# Patient Record
Sex: FEMALE | Race: WHITE | NOT HISPANIC OR LATINO | Employment: OTHER | URBAN - METROPOLITAN AREA
[De-identification: names, ages, dates, MRNs, and addresses within clinical notes are randomized per-mention and may not be internally consistent; named-entity substitution may affect disease eponyms.]

---

## 2017-01-03 ENCOUNTER — OFFICE VISIT (OUTPATIENT)
Dept: INTERNAL MEDICINE | Facility: CLINIC | Age: 62
End: 2017-01-03

## 2017-01-03 VITALS
DIASTOLIC BLOOD PRESSURE: 80 MMHG | HEIGHT: 65 IN | SYSTOLIC BLOOD PRESSURE: 110 MMHG | WEIGHT: 147 LBS | BODY MASS INDEX: 24.49 KG/M2

## 2017-01-03 DIAGNOSIS — E05.90 SUBCLINICAL HYPERTHYROIDISM: Primary | ICD-10-CM

## 2017-01-03 DIAGNOSIS — M25.512 CHRONIC PAIN OF BOTH SHOULDERS: ICD-10-CM

## 2017-01-03 DIAGNOSIS — R41.3 MEMORY CHANGES: ICD-10-CM

## 2017-01-03 DIAGNOSIS — E04.2 NON-TOXIC MULTINODULAR GOITER: ICD-10-CM

## 2017-01-03 DIAGNOSIS — R73.01 IMPAIRED FASTING GLUCOSE: ICD-10-CM

## 2017-01-03 DIAGNOSIS — R25.3 EYELID TWITCH: ICD-10-CM

## 2017-01-03 DIAGNOSIS — M25.511 CHRONIC PAIN OF BOTH SHOULDERS: ICD-10-CM

## 2017-01-03 DIAGNOSIS — G89.29 CHRONIC PAIN OF BOTH SHOULDERS: ICD-10-CM

## 2017-01-03 DIAGNOSIS — I10 ESSENTIAL HYPERTENSION: ICD-10-CM

## 2017-01-03 DIAGNOSIS — M79.601 RIGHT ARM PAIN: ICD-10-CM

## 2017-01-03 PROCEDURE — 99214 OFFICE O/P EST MOD 30 MIN: CPT | Performed by: INTERNAL MEDICINE

## 2017-01-03 RX ORDER — CYCLOBENZAPRINE HCL 10 MG
TABLET ORAL
Qty: 30 TABLET | Refills: 0 | Status: SHIPPED | OUTPATIENT
Start: 2017-01-03 | End: 2017-06-22 | Stop reason: SDUPTHER

## 2017-01-03 NOTE — MR AVS SNAPSHOT
Carli Bolanos   1/3/2017 2:45 PM   Office Visit    Dept Phone:  [phone]   Encounter #:  [id number]    Provider:  Rosa M Venegas MD   Department:  [facility] INTERNAL MEDICINE AND ENDOCRINOLOGY [place]                Your Full Care Plan              Today's Medication Changes          These changes are accurate as of: 1/3/17  3:36 PM.  If you have any questions, ask your nurse or doctor.               Stop taking medication(s)listed here:     amoxicillin 875 MG tablet   Commonly known as:  AMOXIL   Stopped by:  Rosa M Venegas MD           PredniSONE 10 MG (21) tablet pack   Commonly known as:  DELTASONE   Stopped by:  Rosa M Venegas MD           ZITHROMAX Z-DAVID 250 MG tablet   Generic drug:  azithromycin   Stopped by:  Rosa M Venegas MD                Where to Get Your Medications      These medications were sent to [address] - [phone]  - [phone] [place] [address]     Phone:  [phone]     cyclobenzaprine 10 MG tablet                  Your Updated Medication List          This list is accurate as of: 1/3/17  3:36 PM.  Always use your most recent med list.                amLODIPine 5 MG tablet   Commonly known as:  NORVASC   Take 1 tablet by mouth daily.       cyclobenzaprine 10 MG tablet   Commonly known as:  FLEXERIL   1/2-1 tid prn muscle spasm       docusate sodium 250 MG capsule   Commonly known as:  COLACE       ibuprofen 200 MG tablet   Commonly known as:  ADVIL,MOTRIN       simvastatin 20 MG tablet   Commonly known as:  ZOCOR   Take 1 tablet by mouth every night.       vitamin B-6 50 MG tablet   Commonly known as:  PYRIDOXINE               You Were Diagnosed With        Codes Comments    Subclinical hyperthyroidism    -  Primary ICD-10-CM: E05.90  ICD-9-CM: 242.90     Impaired fasting glucose     ICD-10-CM: R73.01  ICD-9-CM: 790.21     Essential hypertension     ICD-10-CM:  I10  ICD-9-CM: 401.9     Non-toxic multinodular goiter     ICD-10-CM: E04.2  ICD-9-CM: 241.1     Right arm pain     ICD-10-CM: M79.601  ICD-9-CM: 729.5     Chronic pain of both shoulders     ICD-10-CM: M25.512, G89.29, M25.511  ICD-9-CM: 719.41, 338.29       Instructions     None    Patient Instructions History      Upcoming Appointments     Visit Type Date Time Department    FOLLOW UP 1/3/2017  2:45 PM [facility]  [facility]    PAP SMEAR/PELVIC EXAM 4/18/2017  9:30 AM [facility]    PHYSICAL 6/22/2017  9:00 AM [facility]      MyChart Signup     Our records indicate that you have an active [facility] iZettle account.    You can view your After Visit Summary by going to lifeaction games and logging in with your iZettle username and password.  If you don't have a iZettle username and password but a parent or guardian has access to your record, the parent or guardian should login with their own iZettle username and password and access your record to view the After Visit Summary.    If you have questions, you can email [email or web address] or call [phone] to talk to our iZettle staff.  Remember, iZettle is NOT to be used for urgent needs.  For medical emergencies, dial 911.               Other Info from Your Visit           Your Appointments     Apr 18, 2017  9:30 AM EDT   Pap Smear/Pelvic Exam with GHADA Bello   [facility] (--)    [address]  [facility] [address] [phone]            Jun 22, 2017  9:00 AM EDT   Physical with Rosa M Venegas MD   [facility] INTERNAL MEDICINE AND ENDOCRINOLOGY [place] (--)    [address]  [facility] [address] [phone]           Arrive 15 minutes prior to appointment.              Allergies     Codeine      Other reaction(s): hyperactivity    Lisinopril  Cough    Naproxen      Other reaction(s): mouth sores      Reason for Visit     Follow-up Impaired fasting  "glucose, subclinical hyerthyroidism       Vital Signs     Blood Pressure Height Weight Body Mass Index Smoking Status       110/80 65\" (165.1 cm) 147 lb (66.7 kg) 24.46 kg/m2 Never Smoker       Problems and Diagnoses Noted     High blood pressure    Increased fasting blood sugar    Non-toxic multinodular goiter    Right arm pain    Subclinical hyperthyroidism    Chronic pain of both shoulders            "

## 2017-01-03 NOTE — PROGRESS NOTES
"Chief Complaint   Patient presents with   • Follow-up     Impaired fasting glucose, subclinical hyerthyroidism        History of Present Illness  61 y.o.  woman presents for sugar and thyroid follow-up.    Review of Systems  ROS (+) for arthritis pains, most recently in her shoulders, and currently takes OTC ibuprofen 200mg BID, with rare occasion 400mg dose when more pain.    ROS (+) for occ eye twitching right upper eyelid.  No associated visual changes, visual loss, eye pain.      ROS (+) for concerns with poss short term memory loss.  Forgets where she puts things down.  Balances her check book herself, cooks, and has no difficulty with traveling places. All other ROS reviewed and negative.    [facility]  The following portions of the patient's history were reviewed and updated as appropriate: allergies, current medications, past family history, past medical history, past social history, past surgical history and problem list.      Current Outpatient Prescriptions:   •  amLODIPine (NORVASC) 5 MG tablet, Take 1 tablet by mouth daily., Disp: 90 tablet, Rfl: 3  •  cyclobenzaprine (FLEXERIL) 10 MG tablet, 1/2-1 tid prn muscle spasm, Disp: 30 tablet, Rfl: 0  •  docusate sodium (COLACE) 250 MG capsule, Take 250 mg by mouth daily., Disp: , Rfl:   •  ibuprofen (ADVIL,MOTRIN) 200 MG tablet, Take 200 mg by mouth every 6 (six) hours as needed., Disp: , Rfl:   •  simvastatin (ZOCOR) 20 MG tablet, Take 1 tablet by mouth every night., Disp: 90 tablet, Rfl: 3  •  vitamin B-6 (PYRIDOXINE) 50 MG tablet, Take 100 mg by mouth Daily., Disp: , Rfl:     Visit Vitals   • /80   • Ht 65\" (165.1 cm)   • Wt 147 lb (66.7 kg)   • BMI 24.46 kg/m2       Physical Exam   Constitutional: She appears well-developed and well-nourished.   Eyes: Conjunctivae and EOM are normal. Pupils are equal, round, and reactive to light. No scleral icterus.   No current eyelid twitching   Cardiovascular: Normal rate, regular rhythm and normal heart " "sounds.    Pulmonary/Chest: Effort normal and breath sounds normal.   Abdominal: Soft. Bowel sounds are normal.   Neurological: She is alert.   Psychiatric: She has a normal mood and affect. Her behavior is normal.   Nursing note and vitals reviewed.      LABS  Results for orders placed or performed in visit on 11/30/16   TSH   Result Value Ref Range    TSH 0.525 0.350 - 5.350 mIU/mL   T4, free   Result Value Ref Range    Free T4 1.13 0.89 - 1.76 ng/dL   Hemoglobin A1c   Result Value Ref Range    Hemoglobin A1C 5.70 (H) 4.80 - 5.60 %   5/16 A1C 5.2    ASSESSMENT/PLAN  Problem List Items Addressed This Visit     Subclinical hyperthyroidism - Primary     Resolved - normal TFTs from 11/16         Hypertension     BP stable on amlodipine 5mg QD; low Na diet         Impaired fasting glucose     BG control bord with A1C 5.7; encouraged healthy exercise and moderation in unhealthy starches/sweet; f/u A1C in 6 mos (next PE 6/17)         Non-toxic multinodular goiter     Plan to updated thyr u/s with her next PE in 6/17         Right arm pain     Takes ibuprofen 200mg BID, occ 400mg BID; reviewed risks of chronic NSAIDs; cont to encourage HEP           Other Visit Diagnoses     Chronic pain of both shoulders        takes ibuprofen BID; caution re: chronic NSAIDs; assess renal function with PE labs    Relevant Medications    cyclobenzaprine (FLEXERIL) 10 MG tablet    Eyelid twitch        benign, reassurance given; reviewed role of stress and sleep; asx today on exam    Memory changes        currently no deficits with executive functioning; reviewed \"brain exercises\"; discussed importance of good BP, lipids, sugars, exercise          FOLLOW-UP  1. Health maintenance - flu vaccine 10/16  2. RTC for next PE after 6/14/17; fasting labs the week prior to appt (CBC, CMP, TSH, lipids, UA/micro, A1C, HCV, FT4)    Electronically signed by:    Rosa M Venegas MD  01/03/2017      "

## 2017-01-03 NOTE — ASSESSMENT & PLAN NOTE
BG control bord with A1C 5.7; encouraged healthy exercise and moderation in unhealthy starches/sweet; f/u A1C in 6 mos (next PE 6/17)

## 2017-04-18 ENCOUNTER — OFFICE VISIT (OUTPATIENT)
Dept: GYNECOLOGIC ONCOLOGY | Facility: CLINIC | Age: 62
End: 2017-04-18

## 2017-04-18 VITALS
WEIGHT: 147 LBS | HEIGHT: 65 IN | BODY MASS INDEX: 24.49 KG/M2 | TEMPERATURE: 98.3 F | HEART RATE: 62 BPM | SYSTOLIC BLOOD PRESSURE: 144 MMHG | RESPIRATION RATE: 20 BRPM | DIASTOLIC BLOOD PRESSURE: 68 MMHG | OXYGEN SATURATION: 96 %

## 2017-04-18 DIAGNOSIS — Z01.419 WELL WOMAN EXAM WITH ROUTINE GYNECOLOGICAL EXAM: Primary | ICD-10-CM

## 2017-04-18 DIAGNOSIS — C54.1 ENDOMETRIAL/UTERINE ADENOCARCINOMA (HCC): ICD-10-CM

## 2017-04-18 PROCEDURE — 99396 PREV VISIT EST AGE 40-64: CPT | Performed by: NURSE PRACTITIONER

## 2017-04-18 NOTE — PROGRESS NOTES
GYN ONCOLOGY ANNUAL WELL WOMAN VISIT      Carli Bolanos  [id number]  1955      Chief Complaint: Annual Exam (hx endometrial cancer)        History of present illness:  Carli Bolanos is a 62 y.o. year old female who is here today for an annual exam and ongoing surveillance of endometrial cancer, see history below. She reports she is feeling very well today and has no complaints. She denies vaginal bleeding, pelvic pain, changes in bowel or bladder function, new or concerning lesions, and breast problems. All well woman screenings brian currently UTD and she denies any changes since her last visit.       Cancer History:      Endometrial/uterine adenocarcinoma    2013 Initial Diagnosis    D&C for PMB and abnormal uterine ultrasound. Pathology revealed well differentiated adenocarcinoma in the background of CAH      3/15/2013 Surgery    RTLH/RSO, bilateral pelvic and periaortic LND, and omental biopsy. Stage IA grade 1 by final path         Obstetric History:  OB History      Para Term  AB TAB SAB Ectopic Multiple Living    2 2                 Menstrual History:     No LMP recorded. Patient is postmenopausal.          Past Medical History:   Diagnosis Date   • Arthritis    • Chest pain    • Diverticulosis of colon     Colonoscopy  repeat 2018 per Dr. Prather   • Endometrial cancer    • H/O uterine leiomyoma     s/p DUSTY/USO ('13)   • History of CT scan 2016    neg head ct   • Lumps on the skin     R. temple lesion; evaluated by Dr. Amelia Gann 13 with upcoming excision       Past Surgical History:   Procedure Laterality Date   • CERVICAL POLYPECTOMY  2005    with h/o fibroids and DUB   • CHOLECYSTECTOMY     • DILATATION AND CURETTAGE  2013   • EXCISION MASS HEAD/NECK  2013    s/p excision scalp lesion large R. parietal and L. temporal areas; plastics - Dr. Amelia Gann   • HIP ARTHROSCOPY Right     with subsequent [place] resurfacing with hip replacement  (5/11)   • OTHER SURGICAL HISTORY      KERATOTIC LESION   • SALPINGO OOPHORECTOMY Left 1991    secondary to tubal pregnancy and ovarian cyst   • SALPINGO OOPHORECTOMY Right     secondary to tubal pregnancy   • TOTAL HIP ARTHROPLASTY Left 06/2011   • TOTAL HIP ARTHROPLASTY Right 05/2011   • TOTAL LAPAROSCOPIC HYSTERECTOMY WITH DAVINCI ROBOT  03/05/2013    RTLH/BSO with LND and omental biopsy for endometrial cancer; GYN Onc - Dr. Malin   • WRIST SURGERY Left     carpel tunnel left        MEDICATIONS: The current medication list was reviewed and reconciled.     Allergies:  is allergic to codeine; lisinopril; and naproxen.    Family History   Problem Relation Age of Onset   • Dementia Mother    • Cancer Mother      GYN   • Hip fracture Mother    • Thyroid disease Mother    • Hypertension Mother    • Cervical cancer Mother    • Heart attack Father    • Heart failure Father    • Diabetes Father    • Heart disease Father      CHF   • Prostate cancer Father    • Hypertension Father    • Hypertension Brother    • Heart disease Brother    • Kidney cancer Maternal Grandmother    • Colon cancer Maternal Grandfather 70   • Heart failure Paternal Grandmother    • Heart disease Paternal Grandmother    • Diabetes Paternal Grandfather    • Colon cancer Paternal Uncle 78   • Breast cancer Neg Hx    • Ovarian cancer Neg Hx        Health Maintenance:  Last mammogram was 8/2016. Last colonoscopy was 2011, with recommended follow-up in 5-10 year(s). Last DEXA was 6/2015, WNL. Last pap smear was 4/2016, results were  normal PAP..      Review of Systems   Constitutional: Negative for fatigue, fever and unexpected weight change.   HENT: Negative for congestion, ear pain, hearing loss, sinus pressure and trouble swallowing.    Eyes: Negative for visual disturbance.   Respiratory: Negative for cough, chest tightness, shortness of breath and wheezing.    Cardiovascular: Negative for chest pain, palpitations and leg swelling.  "  Gastrointestinal: Negative for abdominal distention, abdominal pain, constipation, diarrhea, nausea and vomiting.   Endocrine: Negative for cold intolerance, heat intolerance, polydipsia, polyphagia and polyuria.   Genitourinary: Negative for difficulty urinating, dyspareunia, dysuria, frequency, hematuria, pelvic pain, urgency, vaginal bleeding, vaginal discharge and vaginal pain.   Musculoskeletal: Negative for arthralgias, gait problem, joint swelling and myalgias.   Skin: Negative for color change, pallor and rash.   Neurological: Negative for dizziness, seizures, syncope, weakness, light-headedness, numbness and headaches.   Hematological: Negative for adenopathy. Does not bruise/bleed easily.   Psychiatric/Behavioral: Negative for agitation, confusion, sleep disturbance and suicidal ideas. The patient is not nervous/anxious.        Physical Exam  Vital Signs: /68  Pulse 62  Temp 98.3 °F (36.8 °C) (Temporal Artery )   Resp 20  Ht 65\" (165.1 cm)  Wt 147 lb (66.7 kg)  SpO2 96%  BMI 24.46 kg/m2   General Appearance:  alert, cooperative, no apparent distress, appears stated age and normal weight   Neurologic/Psychiatric: A&O x 3, gait steady, appropriate affect   HEENT:  Normocephalic, without obvious abnormality, mucous membranes moist   Neck: Supple, symmetrical, trachea midline, no adenopathy;  No thyromegaly, masses, or tenderness   Back:   Symmetric, no curvature, ROM normal, no CVA tenderness   Lungs:   Clear to auscultation bilaterally; respirations regular, even, and unlabored bilaterally   Heart:  Regular rate and rhythm, no murmurs appreciated   Breasts:  Symmetrical, no masses, no lesions and no nipple discharge   Abdomen:   Soft, non-tender, non-distended and no organomegaly   Lymph nodes: No cervical, supraclavicular, inguinal or axillary adenopathy noted   Extremities: Normal, atraumatic; no clubbing, cyanosis, or edema    Skin: No rashes, ulcers, or suspicious lesions noted   Pelvic: " External Genitalia  without lesions or skin changes  Vagina  is pink, moist, without lesions.   Vaginal Cuff  Female Vaginal Cuff: smooth, intact, without visible lesions and pap obtained  Uterus  surgically absent and no palpable masses  Ovaries  surgically absent bilaterallly  Parametria  smooth  Rectovaginal  Female rectovaginal: confirms no masses or bleeding and Hemoccult negative     ECOG Performance Status: 0 - Asymptomatic    Procedure Note:  No notes on file    Assessment and Plan:    Carli was seen today for annual exam.    Diagnoses and all orders for this visit:    Well woman exam with routine gynecological exam    Endometrial/uterine adenocarcinoma        There is no evidence of disease upon today's exam. She is understanding to call with any changes in pelvic symptoms or general GYN concerns. As she is doing very well and has had not problems since completion of surgery, she may now go to annual visits. She v/u and agress with plan.     She may call in 1 week or check MyChart for pap results. Any abnormal results will be called.     She was encouraged to get yearly mammograms.  She should report any palpable breast lump(s) or skin changes regardless of mammographic findings.  I explained to Carli that notification regarding her mammogram results will come from the center performing the study.  Our office will not be routinely calling with mammogram results.  It is her responsibility to make sure that the results from the mammogram are communicated to her by the breast center.  If she has any questions about the results, she is welcome to call our office anytime.    Carli's PCP orders her colonoscopy and she plans to follow up with their office this summer to see when she is due to repeat this. DEXA will be repeated after she turns 63.     Return in about 1 year (around 4/18/2018) for annual exam, ongoing cancer surveillance.      GHADA Bello      Note: Speech recognition transcription  software was used to dictate portions of this document.  An attempt at proofreading has been made though minor errors in transcription may still be present.  Please do not hesitate to call our office with any questions.

## 2017-06-09 ENCOUNTER — TELEPHONE (OUTPATIENT)
Dept: INTERNAL MEDICINE | Facility: CLINIC | Age: 62
End: 2017-06-09

## 2017-06-09 DIAGNOSIS — Z11.59 SCREENING FOR VIRAL DISEASE: ICD-10-CM

## 2017-06-09 DIAGNOSIS — Z00.00 HEALTHCARE MAINTENANCE: ICD-10-CM

## 2017-06-09 DIAGNOSIS — E78.5 HYPERLIPIDEMIA, UNSPECIFIED HYPERLIPIDEMIA TYPE: Primary | ICD-10-CM

## 2017-06-09 DIAGNOSIS — E05.90 SUBCLINICAL HYPERTHYROIDISM: ICD-10-CM

## 2017-06-09 DIAGNOSIS — R73.01 IMPAIRED FASTING GLUCOSE: ICD-10-CM

## 2017-06-09 DIAGNOSIS — I10 ESSENTIAL HYPERTENSION: ICD-10-CM

## 2017-06-09 NOTE — TELEPHONE ENCOUNTER
PATIENT NEEDS ORDERS PLACED IN HER CHART FOR LABS. SHE WANTS TO HAVE THEM DONE AT [facility] ON Monday FOR HER PHYSICAL SCHEDULED ON June 22ND

## 2017-06-12 ENCOUNTER — APPOINTMENT (OUTPATIENT)
Dept: LAB | Facility: HOSPITAL | Age: 62
End: 2017-06-12

## 2017-06-12 LAB
ALBUMIN SERPL-MCNC: 4.8 G/DL (ref 3.2–4.8)
ALBUMIN/GLOB SERPL: 1.7 G/DL (ref 1.5–2.5)
ALP SERPL-CCNC: 76 U/L (ref 25–100)
ALT SERPL W P-5'-P-CCNC: 30 U/L (ref 7–40)
ANION GAP SERPL CALCULATED.3IONS-SCNC: 5 MMOL/L (ref 3–11)
ARTICHOKE IGE QN: 70 MG/DL (ref 0–130)
AST SERPL-CCNC: 27 U/L (ref 0–33)
BACTERIA UR QL AUTO: ABNORMAL /HPF
BASOPHILS # BLD AUTO: 0.02 10*3/MM3 (ref 0–0.2)
BASOPHILS NFR BLD AUTO: 0.2 % (ref 0–1)
BILIRUB SERPL-MCNC: 0.4 MG/DL (ref 0.3–1.2)
BILIRUB UR QL STRIP: NEGATIVE
BUN BLD-MCNC: 18 MG/DL (ref 9–23)
BUN/CREAT SERPL: 25.7 (ref 7–25)
CALCIUM SPEC-SCNC: 10.2 MG/DL (ref 8.7–10.4)
CHLORIDE SERPL-SCNC: 107 MMOL/L (ref 99–109)
CHOLEST SERPL-MCNC: 161 MG/DL (ref 0–200)
CLARITY UR: CLEAR
CO2 SERPL-SCNC: 31 MMOL/L (ref 20–31)
COLOR UR: YELLOW
CREAT BLD-MCNC: 0.7 MG/DL (ref 0.6–1.3)
DEPRECATED RDW RBC AUTO: 45.4 FL (ref 37–54)
EOSINOPHIL # BLD AUTO: 0.14 10*3/MM3 (ref 0.1–0.3)
EOSINOPHIL NFR BLD AUTO: 1.7 % (ref 0–3)
ERYTHROCYTE [DISTWIDTH] IN BLOOD BY AUTOMATED COUNT: 13.7 % (ref 11.3–14.5)
GFR SERPL CREATININE-BSD FRML MDRD: 84 ML/MIN/1.73
GLOBULIN UR ELPH-MCNC: 2.9 GM/DL
GLUCOSE BLD-MCNC: 99 MG/DL (ref 70–100)
GLUCOSE UR STRIP-MCNC: NEGATIVE MG/DL
HBA1C MFR BLD: 5.6 % (ref 4.8–5.6)
HCT VFR BLD AUTO: 42.6 % (ref 34.5–44)
HCV AB SER DONR QL: NORMAL
HDLC SERPL-MCNC: 62 MG/DL (ref 40–60)
HGB BLD-MCNC: 13.4 G/DL (ref 11.5–15.5)
HGB UR QL STRIP.AUTO: NEGATIVE
HYALINE CASTS UR QL AUTO: ABNORMAL /LPF
IMM GRANULOCYTES # BLD: 0.02 10*3/MM3 (ref 0–0.03)
IMM GRANULOCYTES NFR BLD: 0.2 % (ref 0–0.6)
KETONES UR QL STRIP: NEGATIVE
LEUKOCYTE ESTERASE UR QL STRIP.AUTO: NEGATIVE
LYMPHOCYTES # BLD AUTO: 1.78 10*3/MM3 (ref 0.6–4.8)
LYMPHOCYTES NFR BLD AUTO: 21.3 % (ref 24–44)
MCH RBC QN AUTO: 28.6 PG (ref 27–31)
MCHC RBC AUTO-ENTMCNC: 31.5 G/DL (ref 32–36)
MCV RBC AUTO: 91 FL (ref 80–99)
MONOCYTES # BLD AUTO: 0.54 10*3/MM3 (ref 0–1)
MONOCYTES NFR BLD AUTO: 6.5 % (ref 0–12)
NEUTROPHILS # BLD AUTO: 5.84 10*3/MM3 (ref 1.5–8.3)
NEUTROPHILS NFR BLD AUTO: 70.1 % (ref 41–71)
NITRITE UR QL STRIP: NEGATIVE
PH UR STRIP.AUTO: 6.5 [PH] (ref 5–8)
PLATELET # BLD AUTO: 268 10*3/MM3 (ref 150–450)
PMV BLD AUTO: 9.7 FL (ref 6–12)
POTASSIUM BLD-SCNC: 4.9 MMOL/L (ref 3.5–5.5)
PROT SERPL-MCNC: 7.7 G/DL (ref 5.7–8.2)
PROT UR QL STRIP: NEGATIVE
RBC # BLD AUTO: 4.68 10*6/MM3 (ref 3.89–5.14)
RBC # UR: ABNORMAL /HPF
REF LAB TEST METHOD: ABNORMAL
SODIUM BLD-SCNC: 143 MMOL/L (ref 132–146)
SP GR UR STRIP: 1.02 (ref 1–1.03)
SQUAMOUS #/AREA URNS HPF: ABNORMAL /HPF
T4 FREE SERPL-MCNC: 1.32 NG/DL (ref 0.89–1.76)
TRIGL SERPL-MCNC: 150 MG/DL (ref 0–150)
TSH SERPL DL<=0.05 MIU/L-ACNC: 0.21 MIU/ML (ref 0.35–5.35)
UROBILINOGEN UR QL STRIP: NORMAL
WBC NRBC COR # BLD: 8.34 10*3/MM3 (ref 3.5–10.8)
WBC UR QL AUTO: ABNORMAL /HPF

## 2017-06-12 PROCEDURE — 84443 ASSAY THYROID STIM HORMONE: CPT | Performed by: INTERNAL MEDICINE

## 2017-06-12 PROCEDURE — 85025 COMPLETE CBC W/AUTO DIFF WBC: CPT | Performed by: INTERNAL MEDICINE

## 2017-06-12 PROCEDURE — 81001 URINALYSIS AUTO W/SCOPE: CPT | Performed by: INTERNAL MEDICINE

## 2017-06-12 PROCEDURE — 86803 HEPATITIS C AB TEST: CPT | Performed by: INTERNAL MEDICINE

## 2017-06-12 PROCEDURE — 84439 ASSAY OF FREE THYROXINE: CPT | Performed by: INTERNAL MEDICINE

## 2017-06-12 PROCEDURE — 80053 COMPREHEN METABOLIC PANEL: CPT | Performed by: INTERNAL MEDICINE

## 2017-06-12 PROCEDURE — 83036 HEMOGLOBIN GLYCOSYLATED A1C: CPT | Performed by: INTERNAL MEDICINE

## 2017-06-12 PROCEDURE — 80061 LIPID PANEL: CPT | Performed by: INTERNAL MEDICINE

## 2017-06-12 PROCEDURE — 36415 COLL VENOUS BLD VENIPUNCTURE: CPT | Performed by: INTERNAL MEDICINE

## 2017-06-22 ENCOUNTER — OFFICE VISIT (OUTPATIENT)
Dept: INTERNAL MEDICINE | Facility: CLINIC | Age: 62
End: 2017-06-22

## 2017-06-22 VITALS
BODY MASS INDEX: 24.32 KG/M2 | HEIGHT: 65 IN | WEIGHT: 146 LBS | DIASTOLIC BLOOD PRESSURE: 82 MMHG | SYSTOLIC BLOOD PRESSURE: 130 MMHG

## 2017-06-22 DIAGNOSIS — M15.9 PRIMARY OSTEOARTHRITIS INVOLVING MULTIPLE JOINTS: ICD-10-CM

## 2017-06-22 DIAGNOSIS — R73.01 IMPAIRED FASTING GLUCOSE: ICD-10-CM

## 2017-06-22 DIAGNOSIS — M25.512 CHRONIC PAIN OF BOTH SHOULDERS: ICD-10-CM

## 2017-06-22 DIAGNOSIS — Z00.00 PE (PHYSICAL EXAM), ROUTINE: Primary | ICD-10-CM

## 2017-06-22 DIAGNOSIS — M85.80 OSTEOPENIA: ICD-10-CM

## 2017-06-22 DIAGNOSIS — E05.90 SUBCLINICAL HYPERTHYROIDISM: ICD-10-CM

## 2017-06-22 DIAGNOSIS — M25.511 CHRONIC PAIN OF BOTH SHOULDERS: ICD-10-CM

## 2017-06-22 DIAGNOSIS — E78.00 PURE HYPERCHOLESTEROLEMIA: ICD-10-CM

## 2017-06-22 DIAGNOSIS — E04.2 NON-TOXIC MULTINODULAR GOITER: ICD-10-CM

## 2017-06-22 DIAGNOSIS — G89.29 CHRONIC PAIN OF BOTH SHOULDERS: ICD-10-CM

## 2017-06-22 DIAGNOSIS — I10 ESSENTIAL HYPERTENSION: ICD-10-CM

## 2017-06-22 PROCEDURE — 99214 OFFICE O/P EST MOD 30 MIN: CPT | Performed by: INTERNAL MEDICINE

## 2017-06-22 PROCEDURE — 99396 PREV VISIT EST AGE 40-64: CPT | Performed by: INTERNAL MEDICINE

## 2017-06-22 RX ORDER — AMLODIPINE BESYLATE 5 MG/1
5 TABLET ORAL DAILY
Qty: 90 TABLET | Refills: 3 | Status: SHIPPED | OUTPATIENT
Start: 2017-06-22 | End: 2018-06-26 | Stop reason: SDUPTHER

## 2017-06-22 RX ORDER — SIMVASTATIN 20 MG
20 TABLET ORAL NIGHTLY
Qty: 90 TABLET | Refills: 3 | Status: SHIPPED | OUTPATIENT
Start: 2017-06-22 | End: 2018-06-26 | Stop reason: SDUPTHER

## 2017-06-22 RX ORDER — CYCLOBENZAPRINE HCL 10 MG
TABLET ORAL
Qty: 30 TABLET | Refills: 0 | Status: SHIPPED | OUTPATIENT
Start: 2017-06-22 | End: 2018-06-26 | Stop reason: SDUPTHER

## 2017-06-22 NOTE — ASSESSMENT & PLAN NOTE
BP and electrolytes stable on amlodipine 5mg QD #90, 3RF; low Na diet; discussed not taking extra doses of amlodipine when BP only rises to the 130/70s for risk of hypotension

## 2017-06-22 NOTE — ASSESSMENT & PLAN NOTE
Random joints at hands, knees, and foot; discussed topical and OTC meds for inflammation; prn ibuprofen

## 2017-06-22 NOTE — PROGRESS NOTES
"Chief Complaint   Patient presents with   • Annual Exam       History of Present Illness  62 y.o.  woman presents for updated phys examination.  Feels well overall.  Notes some tress and anxiety, therefore wondering if ok to take an extra amlodipine when she feels that way.  BP has been in the 130s/70s during these anxious episodes.    Review of Systems  Denies headaches, visual changes, CP, palpitations, SOB, cough, abd pain, n/v/d, difficulty with urination, vaginal discharge/bleeding (no periods), numbness/tingling, falls, mood changes, lightheadedness, hearing changes, rashes.    ROS (+) for occ joint pains in the fingers and knees.  Reports some right knee pain.  Has a bony bump at the right big toe and wonders whether this is altering her gait.  Denies significant pain with walking at this location.     Notes sparing use of ibuprofen and muscle relaxant when upper back and shoulder pains flare up.     All other ROS reviewed and negative.    [facility]  The following portions of the patient's history were reviewed and updated as appropriate: allergies, current medications, past family history, past medical history, past social history, past surgical history and problem list.      Current Outpatient Prescriptions:   •  amLODIPine (NORVASC) 5 MG tablet, Take 1 tablet by mouth Daily., Disp: 90 tablet, Rfl: 3  •  cyclobenzaprine (FLEXERIL) 10 MG tablet, 1/2-1 tid prn muscle spasm, Disp: 30 tablet, Rfl: 0  •  ibuprofen (ADVIL,MOTRIN) 200 MG tablet, Take 200 mg by mouth every 6 (six) hours as needed., Disp: , Rfl:   •  simvastatin (ZOCOR) 20 MG tablet, Take 1 tablet by mouth Every Night., Disp: 90 tablet, Rfl: 3  •  vitamin B-6 (PYRIDOXINE) 50 MG tablet, Take 100 mg by mouth Daily., Disp: , Rfl:     VITALS:  /82  Ht 65\" (165.1 cm)  Wt 146 lb (66.2 kg)  BMI 24.3 kg/m2    Physical Exam   Constitutional: She is oriented to person, place, and time. She appears well-developed and well-nourished.   HENT: "   Head: Normocephalic.   Right Ear: Tympanic membrane normal.   Left Ear: Tympanic membrane normal.   Nose: Nose normal.   Mouth/Throat: Oropharynx is clear and moist and mucous membranes are normal. No oropharyngeal exudate.   Eyes: Conjunctivae and EOM are normal. Pupils are equal, round, and reactive to light.   Neck: Normal range of motion. Neck supple. Carotid bruit is not present. Thyromegaly (nontender, L>R) present.   Cardiovascular: Normal rate, regular rhythm and normal heart sounds.    Pulmonary/Chest: Effort normal and breath sounds normal. No respiratory distress. She has no wheezes. She has no rales.   Abdominal: Soft. Bowel sounds are normal. She exhibits no distension and no mass. There is no hepatosplenomegaly. There is no tenderness.   Genitourinary:   Genitourinary Comments: Breast/pelvic exams deferred to GYN     Musculoskeletal: Normal range of motion. She exhibits no edema.   Dorsal bony abnlity at the right 1st MTP without erythema, warmth, swelling, or crepitus   Lymphadenopathy:     She has no cervical adenopathy.   Neurological: She is alert and oriented to person, place, and time. She has normal strength and normal reflexes. She displays normal reflexes. No cranial nerve deficit or sensory deficit.   Skin: Skin is warm and dry. No rash noted.   Psychiatric: She has a normal mood and affect. Her behavior is normal.   Nursing note and vitals reviewed.      LABS  Results for orders placed or performed in visit on 06/09/17   Comprehensive Metabolic Panel   Result Value Ref Range    Glucose 99 70 - 100 mg/dL    BUN 18 9 - 23 mg/dL    Creatinine 0.70 0.60 - 1.30 mg/dL    Sodium 143 132 - 146 mmol/L    Potassium 4.9 3.5 - 5.5 mmol/L    Chloride 107 99 - 109 mmol/L    CO2 31.0 20.0 - 31.0 mmol/L    Calcium 10.2 8.7 - 10.4 mg/dL    Total Protein 7.7 5.7 - 8.2 g/dL    Albumin 4.80 3.20 - 4.80 g/dL    ALT (SGPT) 30 7 - 40 U/L    AST (SGOT) 27 0 - 33 U/L    Alkaline Phosphatase 76 25 - 100 U/L     Total Bilirubin 0.4 0.3 - 1.2 mg/dL    eGFR Non African Amer 84 >60 mL/min/1.73    Globulin 2.9 gm/dL    A/G Ratio 1.7 1.5 - 2.5 g/dL    BUN/Creatinine Ratio 25.7 (H) 7.0 - 25.0    Anion Gap 5.0 3.0 - 11.0 mmol/L   TSH   Result Value Ref Range    TSH 0.214 (L) 0.350 - 5.350 mIU/mL   Lipid Panel   Result Value Ref Range    Total Cholesterol 161 0 - 200 mg/dL    Triglycerides 150 0 - 150 mg/dL    HDL Cholesterol 62 (H) 40 - 60 mg/dL    LDL Cholesterol  70 0 - 130 mg/dL   Hemoglobin A1c   Result Value Ref Range    Hemoglobin A1C 5.60 4.80 - 5.60 %   Hepatitis C antibody   Result Value Ref Range    Hepatitis C Ab Non-Reactive Non-Reactive   T4, Free   Result Value Ref Range    Free T4 1.32 0.89 - 1.76 ng/dL   CBC Auto Differential   Result Value Ref Range    WBC 8.34 3.50 - 10.80 10*3/mm3    RBC 4.68 3.89 - 5.14 10*6/mm3    Hemoglobin 13.4 11.5 - 15.5 g/dL    Hematocrit 42.6 34.5 - 44.0 %    MCV 91.0 80.0 - 99.0 fL    MCH 28.6 27.0 - 31.0 pg    MCHC 31.5 (L) 32.0 - 36.0 g/dL    RDW 13.7 11.3 - 14.5 %    RDW-SD 45.4 37.0 - 54.0 fl    MPV 9.7 6.0 - 12.0 fL    Platelets 268 150 - 450 10*3/mm3    Neutrophil % 70.1 41.0 - 71.0 %    Lymphocyte % 21.3 (L) 24.0 - 44.0 %    Monocyte % 6.5 0.0 - 12.0 %    Eosinophil % 1.7 0.0 - 3.0 %    Basophil % 0.2 0.0 - 1.0 %    Immature Grans % 0.2 0.0 - 0.6 %    Neutrophils, Absolute 5.84 1.50 - 8.30 10*3/mm3    Lymphocytes, Absolute 1.78 0.60 - 4.80 10*3/mm3    Monocytes, Absolute 0.54 0.00 - 1.00 10*3/mm3    Eosinophils, Absolute 0.14 0.10 - 0.30 10*3/mm3    Basophils, Absolute 0.02 0.00 - 0.20 10*3/mm3    Immature Grans, Absolute 0.02 0.00 - 0.03 10*3/mm3   Urinalysis   Result Value Ref Range    Color, UA Yellow Yellow, Straw    Appearance, UA Clear Clear    pH, UA 6.5 5.0 - 8.0    Specific Gravity, UA 1.019 1.001 - 1.030    Glucose, UA Negative Negative    Ketones, UA Negative Negative    Bilirubin, UA Negative Negative    Blood, UA Negative Negative    Protein, UA Negative Negative     Leuk Esterase, UA Negative Negative    Nitrite, UA Negative Negative    Urobilinogen, UA 0.2 E.U./dL 0.2 - 1.0 E.U./dL   Urinalysis, Microscopic Only   Result Value Ref Range    RBC, UA 0-2 None Seen, 0-2 /HPF    WBC, UA 0-2 (A) None Seen /HPF    Bacteria, UA None Seen None Seen, Trace /HPF    Squamous Epithelial Cells, UA 0-2 None Seen, 0-2 /HPF    Hyaline Casts, UA 0-6 0 - 6 /LPF    Methodology Automated Microscopy        ASSESSMENT/PLAN  Problem List Items Addressed This Visit     Subclinical hyperthyroidism     Reviewed s/sxs of hyperthyr; asx therefore follow clinically and repeat TFTs in 6 mos         Relevant Orders    TSH    T4, Free    Hyperlipidemia     Lipids, LFTs stable on simvastatin 20mg QHS #90, 3RF         Relevant Medications    simvastatin (ZOCOR) 20 MG tablet    Hypertension     BP and electrolytes stable on amlodipine 5mg QD #90, 3RF; low Na diet; discussed not taking extra doses of amlodipine when BP only rises to the 130/70s for risk of hypotension         Relevant Medications    amLODIPine (NORVASC) 5 MG tablet    Impaired fasting glucose     BG control stable with A1C 5.6; encouraged reg phys activity to decr insulin resistance, moderation in unhealthy starches/sweets; f/u A1C in 6 mos           Relevant Orders    Hemoglobin A1c    Non-toxic multinodular goiter     Stable TFTs with asx subclin hyperthyroidism; f/u thyr u/s for surveillance of MNG         Relevant Orders    US Thyroid    Osteoarthritis     Random joints at hands, knees, and foot; discussed topical and OTC meds for inflammation; prn ibuprofen         Osteopenia     Calcium and vitamin D supplementation with weight-bearing exercise; DEXA 6/15, repeat 2018-19            PE (physical exam), routine - Primary     Health maintenance - flu vacc 10/16; Prevnar 6/15; PVX 3/14, Tdap 10/14, Zostavax 6/15; mammo due after 8/8/17; GYN care with Pap 4/17 with Dr. Malin's PA; DEXA nl 6/15, repeat 2018-19; colonosc 3/11, repeat 2018 per  Dr. Thornton; eye exam with Dr. Melissa 8/15, to be repeated; dental exam UTD, done every 6 mos           Other Visit Diagnoses     Shoulder pain        takes ibuprofen BID and prn cyclobenzaprine #30, 0RF    Relevant Medications    cyclobenzaprine (FLEXERIL) 10 MG tablet          FOLLOW-UP  RTC 6 mos with TFTs, A1C (escribed)    Electronically signed by:    Rosa M Venegas MD  06/22/2017

## 2017-06-22 NOTE — ASSESSMENT & PLAN NOTE
Health maintenance - flu vacc 10/16; Prevnar 6/15; PVX 3/14, Tdap 10/14, Zostavax 6/15; mammo due after 8/8/17; GYN care with Pap 4/17 with Dr. Malin's PA; DEXA nl 6/15, repeat 2018-19; colonosc 3/11, repeat 2018 per Dr. Thornton; eye exam with Dr. Melissa 8/15, to be repeated; dental exam UTD, done every 6 mos

## 2017-06-30 ENCOUNTER — APPOINTMENT (OUTPATIENT)
Dept: ULTRASOUND IMAGING | Facility: HOSPITAL | Age: 62
End: 2017-06-30
Attending: INTERNAL MEDICINE

## 2017-07-05 ENCOUNTER — HOSPITAL ENCOUNTER (OUTPATIENT)
Dept: ULTRASOUND IMAGING | Facility: HOSPITAL | Age: 62
Discharge: HOME | End: 2017-07-05
Attending: INTERNAL MEDICINE | Admitting: INTERNAL MEDICINE

## 2017-07-05 DIAGNOSIS — E04.2 NON-TOXIC MULTINODULAR GOITER: ICD-10-CM

## 2017-07-05 PROCEDURE — 76536 US EXAM OF HEAD AND NECK: CPT

## 2017-07-09 NOTE — PROGRESS NOTES
Stable enlarged thyroid with nodules on both sides, unchanged as compared to last thyroid u/s in 2015, plan to repeat in 2 years for cont'd surveillance

## 2017-07-10 ENCOUNTER — TELEPHONE (OUTPATIENT)
Dept: INTERNAL MEDICINE | Facility: CLINIC | Age: 62
End: 2017-07-10

## 2017-07-10 NOTE — TELEPHONE ENCOUNTER
7/10/17    Left v/m for pt to call back.    Pt called back and was notified of results, pt understood.

## 2017-07-10 NOTE — TELEPHONE ENCOUNTER
----- Message from Rosa M Venegas MD sent at 7/9/2017  4:08 AM EDT -----  Stable enlarged thyroid with nodules on both sides, unchanged as compared to last thyroid u/s in 2015, plan to repeat in 2 years for cont'd surveillance

## 2017-07-13 ENCOUNTER — TELEPHONE (OUTPATIENT)
Dept: INTERNAL MEDICINE | Facility: CLINIC | Age: 62
End: 2017-07-13

## 2017-07-13 NOTE — TELEPHONE ENCOUNTER
----- Message from Rosa M Venegas MD sent at 7/12/2017 12:16 AM EDT -----  Regarding: thyr u/s results      ----- Message -----     From: Mary Russo MA     Sent: 7/10/2017  10:06 AM       To: Rosa M Venegas MD    7/10/17    Left v/m for pt to call back.

## 2017-07-17 ENCOUNTER — TELEPHONE (OUTPATIENT)
Dept: INTERNAL MEDICINE | Facility: CLINIC | Age: 62
End: 2017-07-17

## 2017-07-17 NOTE — TELEPHONE ENCOUNTER
PT RECEIVED A BILL FOR $30 FOR HER June 22 2017 VISIT. SHE CALLED THE BILLING OFFICE AND THEY INFORMED HER THAT IS SHE SPOKE WITH DR GALVAN ABOUT ANYTHING OTHER THAN WHAT HER SCHEDULED VISIT WAS THAT SHE WOULD GET BILLED FOR THAT AS WELL.THE PT FEELS THAT SHE DIDN'T SPEAK WITH DR GALVAN ABOUT ANYTHING THAT WOULD ADD ON AN EXTRA $30 AND WOULD LIKE FOR DR GALVAN OUR HER NURSE TO GIVE HER A CALL BACK [phone]

## 2017-07-17 NOTE — TELEPHONE ENCOUNTER
Unfortunately, any medical issues discussed constitutes as something additional to the physical visit per insurance.  This includes issues with blood pressure, anxiety, arthritis, muscles pain that you brought up in the office.  We don't submit for billing all the other issues discussed, including cholesterol, thyroid, pre-diabetes, osteopenia.

## 2017-07-19 ENCOUNTER — TRANSCRIBE ORDERS (OUTPATIENT)
Dept: ADMINISTRATIVE | Facility: HOSPITAL | Age: 62
End: 2017-07-19

## 2017-07-19 DIAGNOSIS — Z12.31 VISIT FOR SCREENING MAMMOGRAM: Primary | ICD-10-CM

## 2017-07-19 NOTE — TELEPHONE ENCOUNTER
Patient contacted [facility] to inquire about the bill ($30 co-pay)  she received for 6/22/17 office visit. She requested that a representative call and discuss this matter with me.     Zayra from [facility] called. According to her,  the only dx code they received for 6/22/17 was hypertension. She said if it was billed as preventative, it would be covered 100%. It looks to me like it was billed as preventative.

## 2017-08-15 ENCOUNTER — HOSPITAL ENCOUNTER (OUTPATIENT)
Dept: MAMMOGRAPHY | Facility: HOSPITAL | Age: 62
Discharge: HOME | End: 2017-08-15
Attending: INTERNAL MEDICINE | Admitting: INTERNAL MEDICINE

## 2017-08-15 DIAGNOSIS — Z12.31 VISIT FOR SCREENING MAMMOGRAM: ICD-10-CM

## 2017-08-15 PROCEDURE — 77067 SCR MAMMO BI INCL CAD: CPT | Performed by: RADIOLOGY

## 2017-08-15 PROCEDURE — G0202 SCR MAMMO BI INCL CAD: HCPCS

## 2017-08-15 PROCEDURE — 77063 BREAST TOMOSYNTHESIS BI: CPT | Performed by: RADIOLOGY

## 2017-08-15 PROCEDURE — 77063 BREAST TOMOSYNTHESIS BI: CPT

## 2017-11-18 ENCOUNTER — APPOINTMENT (OUTPATIENT)
Dept: GENERAL RADIOLOGY | Facility: HOSPITAL | Age: 62
End: 2017-11-18

## 2017-11-18 ENCOUNTER — HOSPITAL ENCOUNTER (EMERGENCY)
Facility: HOSPITAL | Age: 62
Discharge: HOME | End: 2017-11-19
Attending: EMERGENCY MEDICINE | Admitting: EMERGENCY MEDICINE

## 2017-11-18 DIAGNOSIS — M25.571 ACUTE RIGHT ANKLE PAIN: ICD-10-CM

## 2017-11-18 DIAGNOSIS — M79.671 RIGHT FOOT PAIN: Primary | ICD-10-CM

## 2017-11-18 DIAGNOSIS — L03.115 CELLULITIS OF RIGHT LOWER EXTREMITY: ICD-10-CM

## 2017-11-18 LAB
ANION GAP SERPL CALCULATED.3IONS-SCNC: 5 MMOL/L (ref 3–11)
BASOPHILS # BLD AUTO: 0.02 10*3/MM3 (ref 0–0.2)
BASOPHILS NFR BLD AUTO: 0.2 % (ref 0–1)
BUN BLD-MCNC: 16 MG/DL (ref 9–23)
BUN/CREAT SERPL: 20 (ref 7–25)
CALCIUM SPEC-SCNC: 9.7 MG/DL (ref 8.7–10.4)
CHLORIDE SERPL-SCNC: 107 MMOL/L (ref 99–109)
CO2 SERPL-SCNC: 28 MMOL/L (ref 20–31)
CREAT BLD-MCNC: 0.8 MG/DL (ref 0.6–1.3)
DEPRECATED RDW RBC AUTO: 41.8 FL (ref 37–54)
EOSINOPHIL # BLD AUTO: 0.1 10*3/MM3 (ref 0–0.3)
EOSINOPHIL NFR BLD AUTO: 1 % (ref 0–3)
ERYTHROCYTE [DISTWIDTH] IN BLOOD BY AUTOMATED COUNT: 13 % (ref 11.3–14.5)
GFR SERPL CREATININE-BSD FRML MDRD: 72 ML/MIN/1.73
GLUCOSE BLD-MCNC: 120 MG/DL (ref 70–100)
HCT VFR BLD AUTO: 39.8 % (ref 34.5–44)
HGB BLD-MCNC: 13.5 G/DL (ref 11.5–15.5)
IMM GRANULOCYTES # BLD: 0.02 10*3/MM3 (ref 0–0.03)
IMM GRANULOCYTES NFR BLD: 0.2 % (ref 0–0.6)
LYMPHOCYTES # BLD AUTO: 1.54 10*3/MM3 (ref 0.6–4.8)
LYMPHOCYTES NFR BLD AUTO: 15.8 % (ref 24–44)
MCH RBC QN AUTO: 29.5 PG (ref 27–31)
MCHC RBC AUTO-ENTMCNC: 33.9 G/DL (ref 32–36)
MCV RBC AUTO: 86.9 FL (ref 80–99)
MONOCYTES # BLD AUTO: 0.69 10*3/MM3 (ref 0–1)
MONOCYTES NFR BLD AUTO: 7.1 % (ref 0–12)
NEUTROPHILS # BLD AUTO: 7.38 10*3/MM3 (ref 1.5–8.3)
NEUTROPHILS NFR BLD AUTO: 75.7 % (ref 41–71)
PLATELET # BLD AUTO: 248 10*3/MM3 (ref 150–450)
PMV BLD AUTO: 8.7 FL (ref 6–12)
POTASSIUM BLD-SCNC: 3.8 MMOL/L (ref 3.5–5.5)
RBC # BLD AUTO: 4.58 10*6/MM3 (ref 3.89–5.14)
SODIUM BLD-SCNC: 140 MMOL/L (ref 132–146)
URATE SERPL-MCNC: 4.1 MG/DL (ref 3.1–7.8)
WBC NRBC COR # BLD: 9.75 10*3/MM3 (ref 3.5–10.8)

## 2017-11-18 PROCEDURE — 73610 X-RAY EXAM OF ANKLE: CPT

## 2017-11-18 PROCEDURE — 85025 COMPLETE CBC W/AUTO DIFF WBC: CPT | Performed by: NURSE PRACTITIONER

## 2017-11-18 PROCEDURE — 73630 X-RAY EXAM OF FOOT: CPT

## 2017-11-18 PROCEDURE — 80048 BASIC METABOLIC PNL TOTAL CA: CPT | Performed by: NURSE PRACTITIONER

## 2017-11-18 PROCEDURE — 36415 COLL VENOUS BLD VENIPUNCTURE: CPT

## 2017-11-18 PROCEDURE — 84550 ASSAY OF BLOOD/URIC ACID: CPT | Performed by: NURSE PRACTITIONER

## 2017-11-18 PROCEDURE — 99284 EMERGENCY DEPT VISIT MOD MDM: CPT

## 2017-11-18 RX ORDER — HYDROCODONE BITARTRATE AND ACETAMINOPHEN 5; 325 MG/1; MG/1
1 TABLET ORAL ONCE
Status: COMPLETED | OUTPATIENT
Start: 2017-11-18 | End: 2017-11-18

## 2017-11-18 RX ORDER — IBUPROFEN 600 MG/1
600 TABLET ORAL EVERY 6 HOURS PRN
Qty: 24 TABLET | Refills: 0 | Status: SHIPPED | OUTPATIENT
Start: 2017-11-18 | End: 2018-01-22

## 2017-11-18 RX ORDER — CEPHALEXIN 500 MG/1
500 CAPSULE ORAL 4 TIMES DAILY
Qty: 40 CAPSULE | Refills: 0 | Status: SHIPPED | OUTPATIENT
Start: 2017-11-18 | End: 2018-01-22

## 2017-11-18 RX ADMIN — HYDROCODONE BITARTRATE AND ACETAMINOPHEN 1 TABLET: 5; 325 TABLET ORAL at 22:39

## 2017-11-19 VITALS
WEIGHT: 145 LBS | HEART RATE: 62 BPM | DIASTOLIC BLOOD PRESSURE: 70 MMHG | SYSTOLIC BLOOD PRESSURE: 116 MMHG | OXYGEN SATURATION: 97 % | BODY MASS INDEX: 24.75 KG/M2 | TEMPERATURE: 98.3 F | HEIGHT: 64 IN | RESPIRATION RATE: 16 BRPM

## 2017-11-19 LAB
HOLD SPECIMEN: NORMAL
HOLD SPECIMEN: NORMAL
WHOLE BLOOD HOLD SPECIMEN: NORMAL
WHOLE BLOOD HOLD SPECIMEN: NORMAL

## 2017-11-19 RX ORDER — CEPHALEXIN 250 MG/1
500 CAPSULE ORAL ONCE
Status: COMPLETED | OUTPATIENT
Start: 2017-11-19 | End: 2017-11-19

## 2017-11-19 RX ADMIN — CEPHALEXIN 500 MG: 250 CAPSULE ORAL at 00:11

## 2017-11-19 NOTE — ED PROVIDER NOTES
Subjective   HPI Comments: Carli Bolanos is a 62 y.o.female who presents to the ED with c/o foot pain with onset today. She reports that she woke up this morning with right foot, calf, and ankle pain with increased redness and swelling. She notes that her pain has progressively worsened throughout the day which prompted her visit to the ED. She states that her pain worsens with ambulation. She notes that she has taken three ibuprofen with partial relief. She also complains of mild SOA with pain but denies CP, or any other complaints at this time. She notes that she has hx of HTN but denies hx of DM or gout. She denies any recent surgeries, recent travel, or embolizations. She denies injuring her right foot.     Patient is a 62 y.o. female presenting with lower extremity pain.   History provided by:  Patient  Lower Extremity Issue   Location:  Leg, foot and ankle  Leg location:  R leg  Ankle location:  R ankle  Foot location:  R foot  Pain details:     Quality:  Aching and burning    Radiates to:  Does not radiate    Severity:  Moderate    Onset quality:  Sudden    Timing:  Constant    Progression:  Worsening  Chronicity:  New  Dislocation: no    Foreign body present:  No foreign bodies  Tetanus status:  Unknown  Prior injury to area:  Unable to specify  Relieved by: Ibuprofen x 3.  Worsened by:  Bearing weight (Ambulating)  Ineffective treatments:  None tried  Associated symptoms: swelling        Review of Systems   Respiratory: Positive for shortness of breath.    Cardiovascular: Negative for chest pain.   Musculoskeletal: Positive for arthralgias.   Skin: Positive for color change (Right foot redness).   All other systems reviewed and are negative.      Past Medical History:   Diagnosis Date   • Abnormal thyroid ultrasound 07/05/2017    stable MNG (7/5/17 by u/s   • Chest pain    • Endometrial cancer    • H/O uterine leiomyoma     s/p DUSTY/USO ('13)   • History of CT scan 02/27/2016    neg head ct   • Lumps on  the skin     R. temple lesion; evaluated by Dr. Amelia Gann 02/20/13 with upcoming excision       Allergies   Allergen Reactions   • Codeine      Other reaction(s): hyperactivity   • Lisinopril Cough   • Naproxen      Other reaction(s): mouth sores       Past Surgical History:   Procedure Laterality Date   • CARPAL TUNNEL RELEASE Left     carpel tunnel left    • CERVICAL POLYPECTOMY  03/2005    with h/o fibroids and DUB   • CHOLECYSTECTOMY     • DILATATION AND CURETTAGE  02/22/2013   • EXCISION MASS HEAD/NECK  03/2013    s/p excision scalp lesion large R. parietal and L. temporal areas; plastics - Dr. Amelia Gann   • HIP ARTHROSCOPY Right 2004    with subsequent Clyde resurfacing with hip replacement (5/11)   • HYSTERECTOMY      age 58   • OTHER SURGICAL HISTORY      KERATOTIC LESION   • SALPINGO OOPHORECTOMY Left 1991    secondary to tubal pregnancy and ovarian cyst   • SALPINGO OOPHORECTOMY Right     secondary to tubal pregnancy   • TOTAL HIP ARTHROPLASTY Left 06/2011   • TOTAL HIP ARTHROPLASTY Right 05/2011   • TOTAL LAPAROSCOPIC HYSTERECTOMY WITH DAVINCI ROBOT  03/05/2013    RTLH/BSO with LND and omental biopsy for endometrial cancer; GYN Onc - Dr. Malin       Family History   Problem Relation Age of Onset   • Dementia Mother    • Cancer Mother      GYN   • Hip fracture Mother    • Thyroid disease Mother    • Hypertension Mother    • Cervical cancer Mother    • Heart attack Father    • Heart failure Father    • Diabetes Father    • Heart disease Father      CHF   • Prostate cancer Father    • Hypertension Father    • Hypertension Brother    • Heart disease Brother    • Kidney cancer Maternal Grandmother    • Colon cancer Maternal Grandfather 70   • Heart failure Paternal Grandmother    • Heart disease Paternal Grandmother    • Diabetes Paternal Grandfather    • Colon cancer Paternal Uncle 78   • Breast cancer Neg Hx    • Ovarian cancer Neg Hx        Social History     Social History   • Marital  status:      Spouse name: N/A   • Number of children: N/A   • Years of education: N/A     Occupational History   •  at [employer]      Social History Main Topics   • Smoking status: Never Smoker   • Smokeless tobacco: Never Used   • Alcohol use Yes      Comment: social   • Drug use: No   • Sexual activity: Yes     Partners: Male     Birth control/ protection: Surgical     Other Topics Concern   • None     Social History Narrative         Objective   Physical Exam   Constitutional: She is oriented to person, place, and time. She appears well-developed and well-nourished. No distress.   HENT:   Head: Normocephalic and atraumatic.   Eyes: Conjunctivae and EOM are normal.   Neck: Normal range of motion. Neck supple.   Cardiovascular: Normal rate, regular rhythm, normal heart sounds and intact distal pulses.    Pulmonary/Chest: Effort normal and breath sounds normal. No respiratory distress. She has no wheezes.   Abdominal: Soft. Bowel sounds are normal. She exhibits no distension. There is no tenderness.   Musculoskeletal:        Right ankle: She exhibits swelling. She exhibits normal range of motion and no deformity. Tenderness. Lateral malleolus tenderness found.        Right foot: There is tenderness (dorsal aspect of foot, erythema to dorsal aspect of foot.) and swelling. There is normal range of motion, normal capillary refill and no deformity.   Neurological: She is alert and oriented to person, place, and time.   Skin: Skin is warm and dry. There is erythema (rt dorsal foot).   Psychiatric: She has a normal mood and affect.   Nursing note and vitals reviewed.      Procedures         ED Course  ED Course   Comment By Time   0000 results are discussed patient this time.  Patient is encouraged to follow up with her orthopedic doctor / PCP as discussed.  Patient to take anti-inflammatories as well as Keflex as ordered.  Patient agrees and verbalizes understanding. Magnolia DANISHA Funk, APRN 11/19 [id number]      Recent Results (from the past 24 hour(s))   Basic Metabolic Panel    Collection Time: 11/18/17 10:46 PM   Result Value Ref Range    Glucose 120 (H) 70 - 100 mg/dL    BUN 16 9 - 23 mg/dL    Creatinine 0.80 0.60 - 1.30 mg/dL    Sodium 140 132 - 146 mmol/L    Potassium 3.8 3.5 - 5.5 mmol/L    Chloride 107 99 - 109 mmol/L    CO2 28.0 20.0 - 31.0 mmol/L    Calcium 9.7 8.7 - 10.4 mg/dL    eGFR Non African Amer 72 >60 mL/min/1.73    BUN/Creatinine Ratio 20.0 7.0 - 25.0    Anion Gap 5.0 3.0 - 11.0 mmol/L   Uric Acid    Collection Time: 11/18/17 10:46 PM   Result Value Ref Range    Uric Acid 4.1 3.1 - 7.8 mg/dL   CBC Auto Differential    Collection Time: 11/18/17 10:46 PM   Result Value Ref Range    WBC 9.75 3.50 - 10.80 10*3/mm3    RBC 4.58 3.89 - 5.14 10*6/mm3    Hemoglobin 13.5 11.5 - 15.5 g/dL    Hematocrit 39.8 34.5 - 44.0 %    MCV 86.9 80.0 - 99.0 fL    MCH 29.5 27.0 - 31.0 pg    MCHC 33.9 32.0 - 36.0 g/dL    RDW 13.0 11.3 - 14.5 %    RDW-SD 41.8 37.0 - 54.0 fl    MPV 8.7 6.0 - 12.0 fL    Platelets 248 150 - 450 10*3/mm3    Neutrophil % 75.7 (H) 41.0 - 71.0 %    Lymphocyte % 15.8 (L) 24.0 - 44.0 %    Monocyte % 7.1 0.0 - 12.0 %    Eosinophil % 1.0 0.0 - 3.0 %    Basophil % 0.2 0.0 - 1.0 %    Immature Grans % 0.2 0.0 - 0.6 %    Neutrophils, Absolute 7.38 1.50 - 8.30 10*3/mm3    Lymphocytes, Absolute 1.54 0.60 - 4.80 10*3/mm3    Monocytes, Absolute 0.69 0.00 - 1.00 10*3/mm3    Eosinophils, Absolute 0.10 0.00 - 0.30 10*3/mm3    Basophils, Absolute 0.02 0.00 - 0.20 10*3/mm3    Immature Grans, Absolute 0.02 0.00 - 0.03 10*3/mm3   Light Blue Top    Collection Time: 11/18/17 10:46 PM   Result Value Ref Range    Extra Tube hold for add-on    Green Top (Gel)    Collection Time: 11/18/17 10:46 PM   Result Value Ref Range    Extra Tube Hold for add-ons.    Lavender Top    Collection Time: 11/18/17 10:46 PM   Result Value Ref Range    Extra Tube hold for add-on    Gold Top - SST    Collection Time: 11/18/17 10:46 PM   Result  "Value Ref Range    Extra Tube Hold for add-ons.      Note: In addition to lab results from this visit, the labs listed above may include labs taken at another facility or during a different encounter within the last 24 hours. Please correlate lab times with ED admission and discharge times for further clarification of the services performed during this visit.    XR Foot 3+ View Right   Final Result     No acute fracture identified.      THIS DOCUMENT HAS BEEN ELECTRONICALLY SIGNED BY BETTY IBANEZ MD      XR Ankle 3+ View Right   Final Result     No acute fracture identified.      THIS DOCUMENT HAS BEEN ELECTRONICALLY SIGNED BY BETTY IBANEZ MD        Vitals:    11/18/17 2143 11/18/17 2230 11/19/17 0011   BP: 176/81 137/68 116/70   BP Location: Right arm     Patient Position: Sitting     Pulse: 70 64 62   Resp: 18 16 16   Temp: 98 °F (36.7 °C)  98.3 °F (36.8 °C)   TempSrc: Oral     SpO2: 99% 96% 97%   Weight: 145 lb (65.8 kg)     Height: 64\" (162.6 cm)       Medications   HYDROcodone-acetaminophen (NORCO) 5-325 MG per tablet 1 tablet (1 tablet Oral Given 11/18/17 2239)   cephalexin (KEFLEX) capsule 500 mg (500 mg Oral Given 11/19/17 0011)     ECG/EMG Results (last 24 hours)     ** No results found for the last 24 hours. **                        [facility]    Final diagnoses:   Right foot pain   Acute right ankle pain   Cellulitis of right lower extremity       Documentation assistance provided by jose alejandro Gibbs.  Information recorded by the jose alejandro was done at my direction and has been verified and validated by me.     Ming Gibbs  11/18/17 2208       Magnolia Funk, GHADA  11/19/17 0140    "

## 2018-01-03 ENCOUNTER — LAB (OUTPATIENT)
Dept: INTERNAL MEDICINE | Facility: CLINIC | Age: 63
End: 2018-01-03

## 2018-01-03 DIAGNOSIS — E05.90 SUBCLINICAL HYPERTHYROIDISM: ICD-10-CM

## 2018-01-03 DIAGNOSIS — R73.01 IMPAIRED FASTING GLUCOSE: ICD-10-CM

## 2018-01-03 LAB
HBA1C MFR BLD: 5.8 % (ref 4.8–5.6)
T4 FREE SERPL-MCNC: 1.18 NG/DL (ref 0.89–1.76)
TSH SERPL DL<=0.05 MIU/L-ACNC: 0.31 MIU/ML (ref 0.35–5.35)

## 2018-01-03 PROCEDURE — 84439 ASSAY OF FREE THYROXINE: CPT | Performed by: INTERNAL MEDICINE

## 2018-01-03 PROCEDURE — 83036 HEMOGLOBIN GLYCOSYLATED A1C: CPT | Performed by: INTERNAL MEDICINE

## 2018-01-03 PROCEDURE — 84443 ASSAY THYROID STIM HORMONE: CPT | Performed by: INTERNAL MEDICINE

## 2018-01-22 ENCOUNTER — OFFICE VISIT (OUTPATIENT)
Dept: INTERNAL MEDICINE | Facility: CLINIC | Age: 63
End: 2018-01-22

## 2018-01-22 VITALS
BODY MASS INDEX: 25.32 KG/M2 | SYSTOLIC BLOOD PRESSURE: 130 MMHG | HEART RATE: 68 BPM | WEIGHT: 147.5 LBS | DIASTOLIC BLOOD PRESSURE: 78 MMHG | RESPIRATION RATE: 16 BRPM

## 2018-01-22 DIAGNOSIS — I10 ESSENTIAL HYPERTENSION: Primary | ICD-10-CM

## 2018-01-22 DIAGNOSIS — E78.00 PURE HYPERCHOLESTEROLEMIA: ICD-10-CM

## 2018-01-22 DIAGNOSIS — E05.90 SUBCLINICAL HYPERTHYROIDISM: ICD-10-CM

## 2018-01-22 DIAGNOSIS — M15.9 PRIMARY OSTEOARTHRITIS INVOLVING MULTIPLE JOINTS: ICD-10-CM

## 2018-01-22 DIAGNOSIS — R73.01 IMPAIRED FASTING GLUCOSE: ICD-10-CM

## 2018-01-22 DIAGNOSIS — Z00.00 ROUTINE HEALTH MAINTENANCE: ICD-10-CM

## 2018-01-22 LAB
CRP SERPL-MCNC: 0.02 MG/DL (ref 0–1)
ERYTHROCYTE [SEDIMENTATION RATE] IN BLOOD: 33 MM/HR (ref 0–30)

## 2018-01-22 PROCEDURE — 86160 COMPLEMENT ANTIGEN: CPT | Performed by: INTERNAL MEDICINE

## 2018-01-22 PROCEDURE — 86200 CCP ANTIBODY: CPT | Performed by: INTERNAL MEDICINE

## 2018-01-22 PROCEDURE — 86431 RHEUMATOID FACTOR QUANT: CPT | Performed by: INTERNAL MEDICINE

## 2018-01-22 PROCEDURE — 85652 RBC SED RATE AUTOMATED: CPT | Performed by: INTERNAL MEDICINE

## 2018-01-22 PROCEDURE — 86038 ANTINUCLEAR ANTIBODIES: CPT | Performed by: INTERNAL MEDICINE

## 2018-01-22 PROCEDURE — 86235 NUCLEAR ANTIGEN ANTIBODY: CPT | Performed by: INTERNAL MEDICINE

## 2018-01-22 PROCEDURE — 86140 C-REACTIVE PROTEIN: CPT | Performed by: INTERNAL MEDICINE

## 2018-01-22 PROCEDURE — 99214 OFFICE O/P EST MOD 30 MIN: CPT | Performed by: INTERNAL MEDICINE

## 2018-01-22 NOTE — ASSESSMENT & PLAN NOTE
Likely osteoarthritis and less likely gout or RA; she will proceed with rheum labs eval and right hand xray; rheum consult as needed

## 2018-01-22 NOTE — ASSESSMENT & PLAN NOTE
Labs remains c/w with subclinical hyperthy; reviewed s/sxs of hyperthyroidism - will cont to follow clinically

## 2018-01-22 NOTE — PROGRESS NOTES
"Chief Complaint   Patient presents with   • Hypothyroidism     fu   • Hyperglycemia     fu       History of Present Illness  63 y.o.  woman presents for sugar and thyroid follow-up.  Concerned about \"strange aches and pains in my joints.\"  Notes occ in the shoulder, knees, and an episode at the right ankle that ended her up in the ER.  Swelling and pain resolved after 1 week. Had some assoc'd redness, which was attributed to cellulitis.  Other joints don't swell and usually resolve after 4-5 days with ibuprofen (takes 3 at a time).  Denies falls or injuries. Notes increased swelling at the fingers, holger right index finger, that now has tightness and incomplete bending. Notes was checked in the ER for gout and told it wasn't gout.    Review of Systems  ROS (+) for joint pains without swelling or redness.  Already takes turmeric.  All other ROS reviewed and negative.    [facility]  The following portions of the patient's history were reviewed and updated as appropriate: allergies, current medications, past family history, past medical history, past social history, past surgical history and problem list.      Current Outpatient Prescriptions:   •  amLODIPine (NORVASC) 5 MG tablet, Take 1 tablet by mouth Daily., Disp: 90 tablet, Rfl: 3  •  cyclobenzaprine (FLEXERIL) 10 MG tablet, 1/2-1 tid prn muscle spasm, Disp: 30 tablet, Rfl: 0  •  ibuprofen (ADVIL,MOTRIN) 200 MG tablet, Take 200 mg by mouth every 6 (six) hours as needed., Disp: , Rfl:   •  simvastatin (ZOCOR) 20 MG tablet, Take 1 tablet by mouth Every Night., Disp: 90 tablet, Rfl: 3  •  TURMERIC PO, Take  by mouth., Disp: , Rfl:   •  vitamin B-6 (PYRIDOXINE) 50 MG tablet, Take 100 mg by mouth Daily., Disp: , Rfl:     VITALS:  /78 (BP Location: Left arm, Patient Position: Sitting)  Pulse 68  Resp 16  Wt 66.9 kg (147 lb 8 oz)  BMI 25.32 kg/m2    Physical Exam   Constitutional: She is oriented to person, place, and time. She appears well-developed and " well-nourished.   Eyes: Conjunctivae and EOM are normal.   Cardiovascular: Normal rate, regular rhythm and normal heart sounds.    Pulmonary/Chest: Effort normal and breath sounds normal.   Abdominal: Soft. Bowel sounds are normal.   Musculoskeletal: She exhibits deformity (mild swelling diffuse PIP joints of hands, decreased flexion at right index finger due to pain).   Neurological: She is alert and oriented to person, place, and time.   Psychiatric: She has a normal mood and affect. Her behavior is normal.   Nursing note and vitals reviewed.      LABS  Results for orders placed or performed in visit on 01/03/18   Hemoglobin A1c   Result Value Ref Range    Hemoglobin A1C 5.80 (H) 4.80 - 5.60 %   TSH   Result Value Ref Range    TSH 0.306 (L) 0.350 - 5.350 mIU/mL   T4, Free   Result Value Ref Range    Free T4 1.18 0.89 - 1.76 ng/dL       ASSESSMENT/PLAN  Problem List Items Addressed This Visit     Subclinical hyperthyroidism     Labs remains c/w with subclinical hyperthy; reviewed s/sxs of hyperthyroidism - will cont to follow clinically         Hypertension - Primary     BP remains stable on amlo 5mg QD; rec low Na diet, increased aerobic exercise; home BP monitoring with goal BP < 130/80           Impaired fasting glucose     BG control stable with A1C 5.8 (was 5.6 in 6/17); encouraged reg phys activity to decr insulin resistance, moderation in unhealthy starches/sweets; f/u A1C in 6 mos           Osteoarthritis     Likely osteoarthritis and less likely gout or RA; she will proceed with rheum labs eval and right hand xray; rheum consult as needed         Relevant Orders    XR Hand 3+ View Right    BUSHRA With / DsDNA, RNP, Sjogrens A / B, Smith    Cyclic Citrul Peptide Antibody, IgG / IgA    Rheumatoid Factor    Sedimentation Rate    C-reactive Protein    Sjogren's Antibody, Anti-SS-A / -SS-B    C4 Complement    C3 Complement          FOLLOW-UP  1. Health maintenance - flu vacc 9/29/17  2. RTC for next PE after  6/22/18; fasting labs wk prior to appt (CBC, CMP, TSH, lipids, UA/micr, A1C, FT4, CPK) - escribed    Electronically signed by:    Rosa M Venegas MD  01/22/2018

## 2018-01-22 NOTE — ASSESSMENT & PLAN NOTE
BG control stable with A1C 5.8 (was 5.6 in 6/17); encouraged reg phys activity to decr insulin resistance, moderation in unhealthy starches/sweets; f/u A1C in 6 mos

## 2018-01-23 LAB
RHEUMATOID FACT SERPL-ACNC: POSITIVE [IU]/ML
RHEUMATOID FACT TITR SER: NORMAL IU/ML

## 2018-01-24 ENCOUNTER — HOSPITAL ENCOUNTER (OUTPATIENT)
Dept: GENERAL RADIOLOGY | Facility: HOSPITAL | Age: 63
Discharge: HOME | End: 2018-01-24
Attending: INTERNAL MEDICINE | Admitting: INTERNAL MEDICINE

## 2018-01-24 DIAGNOSIS — M15.9 PRIMARY OSTEOARTHRITIS INVOLVING MULTIPLE JOINTS: ICD-10-CM

## 2018-01-24 PROBLEM — R76.8 RHEUMATOID FACTOR POSITIVE: Status: ACTIVE | Noted: 2018-01-24

## 2018-01-24 LAB
ANA SER QL: NEGATIVE
C3 SERPL-MCNC: 183 MG/DL (ref 82–167)
C4 SERPL-MCNC: 25 MG/DL (ref 14–44)
ENA SS-A AB SER-ACNC: <0.2 AI (ref 0–0.9)
ENA SS-B AB SER-ACNC: <0.2 AI (ref 0–0.9)

## 2018-01-24 PROCEDURE — 73130 X-RAY EXAM OF HAND: CPT

## 2018-01-25 LAB — CCP IGA+IGG SERPL IA-ACNC: >250 UNITS (ref 0–19)

## 2018-01-26 NOTE — PROGRESS NOTES
R. Hand xray shows arthritis changes at the base of the thumb and at the index finger where you have the swelling; otherwise no fractures or other abnormalities; will review with you at your follow-up visit 1/29/18

## 2018-01-29 ENCOUNTER — OFFICE VISIT (OUTPATIENT)
Dept: INTERNAL MEDICINE | Facility: CLINIC | Age: 63
End: 2018-01-29

## 2018-01-29 VITALS
HEART RATE: 68 BPM | SYSTOLIC BLOOD PRESSURE: 124 MMHG | RESPIRATION RATE: 18 BRPM | WEIGHT: 148 LBS | DIASTOLIC BLOOD PRESSURE: 80 MMHG | BODY MASS INDEX: 25.4 KG/M2

## 2018-01-29 DIAGNOSIS — I10 ESSENTIAL HYPERTENSION: ICD-10-CM

## 2018-01-29 DIAGNOSIS — Z20.828 EXPOSURE TO THE FLU: ICD-10-CM

## 2018-01-29 DIAGNOSIS — R76.8 RHEUMATOID FACTOR POSITIVE: Primary | ICD-10-CM

## 2018-01-29 DIAGNOSIS — M15.9 PRIMARY OSTEOARTHRITIS INVOLVING MULTIPLE JOINTS: ICD-10-CM

## 2018-01-29 PROCEDURE — 99214 OFFICE O/P EST MOD 30 MIN: CPT | Performed by: INTERNAL MEDICINE

## 2018-01-29 RX ORDER — OSELTAMIVIR PHOSPHATE 75 MG/1
75 CAPSULE ORAL DAILY
Qty: 10 CAPSULE | Refills: 0 | Status: SHIPPED | OUTPATIENT
Start: 2018-01-29 | End: 2018-02-08

## 2018-01-29 NOTE — PROGRESS NOTES
Chief Complaint   Patient presents with   • Abnormal Lab     (+) RF       History of Present Illness  63 y.o.  woman, accompanied by her  Elvis, presents for lab follow-up addressing hand joint pains.  Was evaluated recently for hand joints pain, right index finger swelling with decreased ROM.    Review of Systems  ROS (+) for hand joint pains that come/go. Denies numbness/tingling.  Admits to some morning stiffness.     ROS (+) for grandson just dx'd with flu per the .  All other ROS reviewed and negative.    [facility]  The following portions of the patient's history were reviewed and updated as appropriate: allergies, current medications, past family history, past medical history, past social history, past surgical history and problem list.      Current Outpatient Prescriptions:   •  amLODIPine (NORVASC) 5 MG tablet, QD  •  cyclobenzaprine (FLEXERIL) 10 MG tablet, 1/2-1 tid prn  •  ibuprofen (ADVIL,MOTRIN) 200 MG tablet, q6 prn   •  simvastatin (ZOCOR) 20 MG tablet, QHS  •  TURMERIC PO, Take  by mouth QD  •  vitamin B-6 (PYRIDOXINE) 50 MG tablet, 2 QD    VITALS:  /80 (BP Location: Right arm, Patient Position: Sitting)  Pulse 68  Resp 18  Wt 67.1 kg (148 lb)  BMI 25.4 kg/m2    Physical Exam   Constitutional: She is oriented to person, place, and time. She appears well-developed and well-nourished.   Eyes: Conjunctivae and EOM are normal.   Cardiovascular: Normal rate, regular rhythm and normal heart sounds.    Pulmonary/Chest: Effort normal and breath sounds normal.   Abdominal: Soft. Bowel sounds are normal.   Musculoskeletal: She exhibits deformity (right index finger with swollen PIP without erythema/warmth; bony changes at MCPs bilaterally).   Neurological: She is alert and oriented to person, place, and time.   Psychiatric: She has a normal mood and affect. Her behavior is normal.   Nursing note and vitals reviewed.      LABS  Results for orders placed or performed in visit on  01/22/18   BUSHRA With / DsDNA, RNP, Sjogrens A / B, Leal   Result Value Ref Range    BUSHRA Direct Negative Negative   Cyclic Citrul Peptide Antibody, IgG / IgA   Result Value Ref Range    CCP Antibodies IgG/IgA >250 (H) 0 - 19 units   Rheumatoid Factor   Result Value Ref Range    Rheumatoid Factor Qualitative Positive (A) Negative   Sedimentation Rate   Result Value Ref Range    Sed Rate 33 (H) 0 - 30 mm/hr   C-reactive Protein   Result Value Ref Range    C-Reactive Protein 0.02 0.00 - 1.00 mg/dL   Sjogren's Antibody, Anti-SS-A / -SS-B   Result Value Ref Range    DARIUS SSA (RO) Ab <0.2 0.0 - 0.9 AI    DARIUS SSB (LA) Ab <0.2 0.0 - 0.9 AI   C4 Complement   Result Value Ref Range    C4 Complement 25 14 - 44 mg/dL   C3 Complement   Result Value Ref Range    C3 Complement 183 (H) 82 - 167 mg/dL   Rheumatoid Factor (IU / ML) Titer   Result Value Ref Range    Rheumatoid Factor Semi-Quant 20 IU/mL IU/mL     1/24/18 R. Hand xray: degen changes 1st metacarpal, 2nd PIP    ASSESSMENT/PLAN  Problem List Items Addressed This Visit     Hypertension     BP remains stable; on amlo 5mg QD         Osteoarthritis     degen changes by R. Hand xray; on turmeric; rec topical therapy as well         Rheumatoid factor positive - Primary     New finding (+) RF and (+) anti-CCP; symptomatic at MCP and PIP joints; rheum referral to assess for possible RA           Other Visit Diagnoses     Exposure to the flu        prophylaxis with tamiflu 75mg QD x 10d    Relevant Medications    oseltamivir (TAMIFLU) 75 MG capsule          FOLLOW-UP  1. Health maintenance - flu vacc 9/17  2. RTC for next PE after 6/22/18; fasting labs wk prior to appt (CBC, CMP, TSH, lipids, UA/micr, A1C, FT4, CPK)    Electronically signed by:    Rosa M Venegas MD  01/29/2018

## 2018-01-30 NOTE — ASSESSMENT & PLAN NOTE
New finding (+) RF and (+) anti-CCP; symptomatic at MCP and PIP joints; rheum referral to assess for possible RA

## 2018-02-05 ENCOUNTER — TELEPHONE (OUTPATIENT)
Dept: INTERNAL MEDICINE | Facility: CLINIC | Age: 63
End: 2018-02-05

## 2018-02-05 NOTE — TELEPHONE ENCOUNTER
PT WAS LAST SEEN ON 1/29 BY DR. GALVAN AND SAID THAT A REFERRAL TO RHEUMATOLOGY WAS DISCUSSED IN APPT.. SHE CALLED TO CHECK ON STATUS OF APPT BUT THERE IS NOT A REFERRAL IN PATIENTS CHART.     PLEASE ADVISE. THANKS.

## 2018-03-08 DIAGNOSIS — I10 ESSENTIAL HYPERTENSION: ICD-10-CM

## 2018-03-08 PROBLEM — M05.79 RHEUMATOID ARTHRITIS INVOLVING MULTIPLE SITES WITH POSITIVE RHEUMATOID FACTOR: Status: ACTIVE | Noted: 2018-01-24

## 2018-03-08 RX ORDER — FOLIC ACID 1 MG/1
1 TABLET ORAL DAILY
Start: 2018-03-08 | End: 2019-04-25

## 2018-03-08 RX ORDER — AMLODIPINE BESYLATE 5 MG/1
5 TABLET ORAL DAILY
Qty: 90 TABLET | Refills: 3 | OUTPATIENT
Start: 2018-03-08

## 2018-03-08 NOTE — TELEPHONE ENCOUNTER
----- Message from Rosa M Venegas MD sent at 3/8/2018  2:55 PM EST -----  Regarding: update med list  Per rheum Dr. Orozco, new meds for rheum arthritis:    Methotrexate 2.5mg 6/week  Folic acid 1mg QD    Also send him last DEXA (looks like in 2015 as she is due for one this year)

## 2018-03-08 NOTE — TELEPHONE ENCOUNTER
Patient received amlo RX #90, 3RF at 6/22/17 PE, therefore should have enough until 6/2018.    If something has gone wrong at the pharmacy level, ok to #90, 0RF

## 2018-04-17 ENCOUNTER — OFFICE VISIT (OUTPATIENT)
Dept: INTERNAL MEDICINE | Facility: CLINIC | Age: 63
End: 2018-04-17

## 2018-04-17 ENCOUNTER — HOSPITAL ENCOUNTER (OUTPATIENT)
Dept: GENERAL RADIOLOGY | Facility: HOSPITAL | Age: 63
Discharge: HOME | End: 2018-04-17
Admitting: NURSE PRACTITIONER

## 2018-04-17 VITALS
SYSTOLIC BLOOD PRESSURE: 124 MMHG | WEIGHT: 145 LBS | OXYGEN SATURATION: 99 % | TEMPERATURE: 98.6 F | BODY MASS INDEX: 24.16 KG/M2 | DIASTOLIC BLOOD PRESSURE: 72 MMHG | HEIGHT: 65 IN | HEART RATE: 63 BPM

## 2018-04-17 DIAGNOSIS — J20.9 ACUTE BRONCHITIS, UNSPECIFIED ORGANISM: Primary | ICD-10-CM

## 2018-04-17 DIAGNOSIS — R04.2 HEMOPTYSIS: ICD-10-CM

## 2018-04-17 LAB
BASOPHILS # BLD AUTO: 0.02 10*3/MM3 (ref 0–0.2)
BASOPHILS NFR BLD AUTO: 0.2 % (ref 0–1)
DEPRECATED RDW RBC AUTO: 41.6 FL (ref 37–54)
EOSINOPHIL # BLD AUTO: 0.33 10*3/MM3 (ref 0–0.3)
EOSINOPHIL NFR BLD AUTO: 4 % (ref 0–3)
ERYTHROCYTE [DISTWIDTH] IN BLOOD BY AUTOMATED COUNT: 13.2 % (ref 11.3–14.5)
HCT VFR BLD AUTO: 39.8 % (ref 34.5–44)
HGB BLD-MCNC: 13.1 G/DL (ref 11.5–15.5)
IMM GRANULOCYTES # BLD: 0.03 10*3/MM3 (ref 0–0.03)
IMM GRANULOCYTES NFR BLD: 0.4 % (ref 0–0.6)
LYMPHOCYTES # BLD AUTO: 2.17 10*3/MM3 (ref 0.6–4.8)
LYMPHOCYTES NFR BLD AUTO: 26.4 % (ref 24–44)
MCH RBC QN AUTO: 28.5 PG (ref 27–31)
MCHC RBC AUTO-ENTMCNC: 32.9 G/DL (ref 32–36)
MCV RBC AUTO: 86.5 FL (ref 80–99)
MONOCYTES # BLD AUTO: 0.63 10*3/MM3 (ref 0–1)
MONOCYTES NFR BLD AUTO: 7.7 % (ref 0–12)
NEUTROPHILS # BLD AUTO: 5.05 10*3/MM3 (ref 1.5–8.3)
NEUTROPHILS NFR BLD AUTO: 61.3 % (ref 41–71)
PLATELET # BLD AUTO: 326 10*3/MM3 (ref 150–450)
PMV BLD AUTO: 9.4 FL (ref 6–12)
RBC # BLD AUTO: 4.6 10*6/MM3 (ref 3.89–5.14)
WBC NRBC COR # BLD: 8.23 10*3/MM3 (ref 3.5–10.8)

## 2018-04-17 PROCEDURE — 85025 COMPLETE CBC W/AUTO DIFF WBC: CPT | Performed by: NURSE PRACTITIONER

## 2018-04-17 PROCEDURE — 71046 X-RAY EXAM CHEST 2 VIEWS: CPT

## 2018-04-17 PROCEDURE — 99213 OFFICE O/P EST LOW 20 MIN: CPT | Performed by: NURSE PRACTITIONER

## 2018-04-17 RX ORDER — AMOXICILLIN AND CLAVULANATE POTASSIUM 875; 125 MG/1; MG/1
1 TABLET, FILM COATED ORAL EVERY 12 HOURS SCHEDULED
Qty: 20 TABLET | Refills: 0 | Status: SHIPPED | OUTPATIENT
Start: 2018-04-17 | End: 2018-06-26

## 2018-04-17 RX ORDER — ALBUTEROL SULFATE 90 UG/1
2 AEROSOL, METERED RESPIRATORY (INHALATION) EVERY 6 HOURS PRN
Qty: 1 INHALER | Refills: 0 | Status: SHIPPED | OUTPATIENT
Start: 2018-04-17 | End: 2018-06-26

## 2018-04-17 NOTE — PATIENT INSTRUCTIONS
Hemoptysis  Hemoptysis is when you cough up blood. It can be mild or serious. If it is mild, you may cough up bloody spit and mucus (sputum). If you cough up 1-2 cups (240-480 mL) of blood within 24 hours (massive hemoptysis), it is an emergency.  If you cough up blood, it is important to go and see your doctor.  Follow these instructions at home:  · Watch your condition for any changes.  · Take over-the-counter and prescription medicines only as told by your doctor.  · If you were prescribed an antibiotic medicine, take it as told by your doctor. Do not stop taking the antibiotic even if you start to feel better.  · Go back to your normal activities as told by your doctor. Ask your doctor what activities are safe for you to do.  · Do not use any products that contain nicotine or tobacco. These include cigarettes and e-cigarettes. If you need help quitting, ask your doctor.  · Keep all follow-up visits as told by your doctor. This is important.  Contact a doctor if:  · You have a fever.  · You cough up bloody spit and mucus.  Get help right away if:  · You cough up fresh blood or blood clots.  · You have trouble breathing.  · You have chest pain.  This information is not intended to replace advice given to you by your health care provider. Make sure you discuss any questions you have with your health care provider.  Document Released: 12/04/2013 Document Revised: 09/15/2017 Document Reviewed: 09/15/2017  [employer] Interactive Patient Education © 2017 [employer]    Begin Probiotic to help prevent diarrhea while taking Augmentin.  Proventil as discussed.  Drink plenty of fluids. CBC, CXR today   Begin Robitussin DM as discussed FU in Clinic 1 week.

## 2018-04-17 NOTE — PROGRESS NOTES
Subjective   Carli Bolanos is a 63 y.o. female.   Chief Complaint   Patient presents with   • URI     Was seen at [facility] in [facility]; given abx; now coughing up blood this morning.       History of Present Illness  Was seen at [facility] on 04/11/18-was given a Zpack and Tessalon Perles.  Today, started coughing up blood and feeling lack luster.  Had HA this AM improved as day progressed.  Has mostly clear sinus D/C.  Ears feel full.  No sore throat.  No dysphagia,  SOA, C/P, abd pain NV.  Had 3 episodes of sputum streaked with blood.  Temp 99.7 at home this AM, chills    The following portions of the patient's history were reviewed and updated as appropriate: allergies, current medications, past family history, past medical history, past social history, past surgical history and problem list.    Current Outpatient Prescriptions:   •  amLODIPine (NORVASC) 5 MG tablet, Take 1 tablet by mouth Daily., Disp: 90 tablet, Rfl: 3  •  azithromycin (ZITHROMAX) 250 MG tablet, Take 2 tablets the first day, then 1 tablet daily for 4 days., Disp: 6 tablet, Rfl: 0  •  benzonatate (TESSALON) 200 MG capsule, Take 1 capsule by mouth 3 (Three) Times a Day As Needed for Cough., Disp: 20 capsule, Rfl: 0  •  cyclobenzaprine (FLEXERIL) 10 MG tablet, 1/2-1 tid prn muscle spasm, Disp: 30 tablet, Rfl: 0  •  folic acid (FOLVITE) 1 MG tablet, Take 1 tablet by mouth Daily., Disp: , Rfl:   •  ibuprofen (ADVIL,MOTRIN) 200 MG tablet, Take 200 mg by mouth every 6 (six) hours as needed., Disp: , Rfl:   •  methotrexate 2.5 MG tablet, Take 1 tablet by mouth 1 (One) Time Per Week., Disp: , Rfl:   •  simvastatin (ZOCOR) 20 MG tablet, Take 1 tablet by mouth Every Night., Disp: 90 tablet, Rfl: 3  •  TURMERIC PO, Take  by mouth., Disp: , Rfl:   •  vitamin B-6 (PYRIDOXINE) 50 MG tablet, Take 100 mg by mouth Daily., Disp: , Rfl:   •  albuterol (PROVENTIL HFA;VENTOLIN HFA) 108 (90 Base) MCG/ACT inhaler, Inhale 2 puffs Every 6 (Six) Hours As Needed for Wheezing.,  "Disp: 1 inhaler, Rfl: 0  •  amoxicillin-clavulanate (AUGMENTIN) 875-125 MG per tablet, Take 1 tablet by mouth Every 12 (Twelve) Hours., Disp: 20 tablet, Rfl: 0    Review of Systems   Constitutional:        See history of present illness   HENT:        See history of present illness   Eyes: Negative.    Respiratory: Positive for cough.         See history of present illness   Cardiovascular: Negative for chest pain, palpitations and leg swelling.   Gastrointestinal: Negative for abdominal pain, blood in stool, constipation, nausea and vomiting.   Endocrine: Negative.    Genitourinary: Negative for difficulty urinating.   Musculoskeletal: Negative for arthralgias.   Skin: Negative.    Neurological:        See history of present illness   Hematological: Negative.      /72   Pulse 63   Temp 98.6 °F (37 °C) (Oral)   Ht 165.1 cm (65\")   Wt 65.8 kg (145 lb)   SpO2 99%   BMI 24.13 kg/m²     Objective   Allergies   Allergen Reactions   • Codeine      Other reaction(s): hyperactivity   • Lisinopril Cough   • Naproxen      Other reaction(s): mouth sores       Physical Exam   Constitutional: She is oriented to person, place, and time. She appears well-developed and well-nourished. No distress.   HENT:   Head: Normocephalic and atraumatic.   TMs dull.  Throat with PND.  Nontender over sinuses.   Eyes: Right eye exhibits no discharge. Left eye exhibits no discharge.   Neck: Neck supple.   Cardiovascular: Normal rate, regular rhythm, normal heart sounds and intact distal pulses.  Exam reveals no gallop and no friction rub.    No murmur heard.  Pulmonary/Chest: Effort normal.   Occasional scattered wheeze in lower bases   Abdominal: Soft. There is no tenderness.   Lymphadenopathy:     She has no cervical adenopathy.   Neurological: She is alert and oriented to person, place, and time.   Skin: Skin is warm and dry. Capillary refill takes less than 2 seconds.   Pink, no rash   Nursing note and vitals " reviewed.      Procedures    Assessment/Plan   Carli was seen today for uri.    Diagnoses and all orders for this visit:    Acute bronchitis, unspecified organism  -     albuterol (PROVENTIL HFA;VENTOLIN HFA) 108 (90 Base) MCG/ACT inhaler; Inhale 2 puffs Every 6 (Six) Hours As Needed for Wheezing.  -     amoxicillin-clavulanate (AUGMENTIN) 875-125 MG per tablet; Take 1 tablet by mouth Every 12 (Twelve) Hours.  -     XR Chest PA & Lateral    Hemoptysis  -     CBC w AUTO Differential  -     XR Chest PA & Lateral  -     CBC Auto Differential        Patient Instructions   Hemoptysis  Hemoptysis is when you cough up blood. It can be mild or serious. If it is mild, you may cough up bloody spit and mucus (sputum). If you cough up 1-2 cups (240-480 mL) of blood within 24 hours (massive hemoptysis), it is an emergency.  If you cough up blood, it is important to go and see your doctor.  Follow these instructions at home:  · Watch your condition for any changes.  · Take over-the-counter and prescription medicines only as told by your doctor.  · If you were prescribed an antibiotic medicine, take it as told by your doctor. Do not stop taking the antibiotic even if you start to feel better.  · Go back to your normal activities as told by your doctor. Ask your doctor what activities are safe for you to do.  · Do not use any products that contain nicotine or tobacco. These include cigarettes and e-cigarettes. If you need help quitting, ask your doctor.  · Keep all follow-up visits as told by your doctor. This is important.  Contact a doctor if:  · You have a fever.  · You cough up bloody spit and mucus.  Get help right away if:  · You cough up fresh blood or blood clots.  · You have trouble breathing.  · You have chest pain.  This information is not intended to replace advice given to you by your health care provider. Make sure you discuss any questions you have with your health care provider.  Document Released: 12/04/2013  Document Revised: 09/15/2017 Document Reviewed: 09/15/2017  [employer] Interactive Patient Education © 2017 [employer]    Begin Probiotic to help prevent diarrhea while taking Augmentin.  Proventil as discussed.  Drink plenty of fluids. CBC, CXR today   Begin Robitussin DM as discussed FU in Clinic 1 week.         Marisol Crow, APRN

## 2018-04-19 ENCOUNTER — OFFICE VISIT (OUTPATIENT)
Dept: GYNECOLOGIC ONCOLOGY | Facility: CLINIC | Age: 63
End: 2018-04-19

## 2018-04-19 VITALS
WEIGHT: 144 LBS | TEMPERATURE: 99.2 F | DIASTOLIC BLOOD PRESSURE: 67 MMHG | RESPIRATION RATE: 16 BRPM | OXYGEN SATURATION: 95 % | SYSTOLIC BLOOD PRESSURE: 141 MMHG | HEART RATE: 66 BPM | BODY MASS INDEX: 23.99 KG/M2 | HEIGHT: 65 IN

## 2018-04-19 DIAGNOSIS — C54.1 ENDOMETRIAL/UTERINE ADENOCARCINOMA (HCC): ICD-10-CM

## 2018-04-19 DIAGNOSIS — Z01.419 WELL WOMAN EXAM WITH ROUTINE GYNECOLOGICAL EXAM: Primary | ICD-10-CM

## 2018-04-19 PROCEDURE — G0101 CA SCREEN;PELVIC/BREAST EXAM: HCPCS | Performed by: NURSE PRACTITIONER

## 2018-04-19 NOTE — PROGRESS NOTES
GYN ONCOLOGY ANNUAL WELL WOMAN VISIT      Carli Bolanos  [id number]  1955      Chief Complaint: Annual Exam (with no gyn complaints)        History of present illness:  Carli Bolanos is a 63 y.o. year old female who is here today for an annual exam and ongoing surveillance for Stage IA endometrial cancer, see history below. She reports she is feeling generally well today and has no GYN complaints. She denies vaginal bleeding, pelvic pain, changes in bowel or bladder function, new or concerning lesions, and breast problems. All well woman screenings are currently UTD. Since her last visit she has been diagnosed with RA. She is followed by Dr. KIMBERLY Crow and continues to see her PCP, Dr. Venegas, regularly. She started on methotrexate last month and feels this has been very helpful thus far.     Cancer History:      Endometrial/uterine adenocarcinoma    2013 Initial Diagnosis     D&C for PMB and abnormal uterine ultrasound. Pathology revealed well differentiated adenocarcinoma in the background of CAH         3/15/2013 Surgery     RTLH/RSO, bilateral pelvic and periaortic LND, and omental biopsy. Stage IA grade 1 by final path            Obstetric History:  OB History      Para Term  AB Living    2 2        SAB TAB Ectopic Molar Multiple Live Births                  Menstrual History:     No LMP recorded. Patient is postmenopausal.          Past Medical History:   Diagnosis Date   • Abnormal thyroid ultrasound 2017    stable MNG (17 by u/s   • Chest pain    • Endometrial cancer    • H/O uterine leiomyoma     s/p DUSTY/USO ('13)   • History of CT scan 2016    neg head ct   • Hx of hand x-ray 2018    R. hand xray (18): degen changes 1st metacarpal joint and index PIP   • Lumps on the skin     R. temple lesion; evaluated by Dr. Amelia Gann 13 with upcoming excision   • Rheumatoid arthritis        Past Surgical History:   Procedure Laterality Date   • CARPAL TUNNEL  RELEASE Left     carpel tunnel left    • CERVICAL POLYPECTOMY  03/2005    with h/o fibroids and DUB   • CHOLECYSTECTOMY     • DILATATION AND CURETTAGE  02/22/2013   • EXCISION MASS HEAD/NECK  03/2013    s/p excision scalp lesion large R. parietal and L. temporal areas; plastics - Dr. Amelia Gann   • HIP ARTHROSCOPY Right 2004    with subsequent Ivory resurfacing with hip replacement (5/11)   • HYSTERECTOMY      age 58   • OTHER SURGICAL HISTORY      KERATOTIC LESION   • SALPINGO OOPHORECTOMY Left 1991    secondary to tubal pregnancy and ovarian cyst   • SALPINGO OOPHORECTOMY Right     secondary to tubal pregnancy   • TOTAL HIP ARTHROPLASTY Left 06/2011   • TOTAL HIP ARTHROPLASTY Right 05/2011   • TOTAL LAPAROSCOPIC HYSTERECTOMY WITH DAVINCI ROBOT  03/05/2013    RTLH/BSO with LND and omental biopsy for endometrial cancer; GYN Onc - Dr. Malin       MEDICATIONS: The current medication list was reviewed and reconciled.     Allergies:  is allergic to codeine; lisinopril; and naproxen.    Family History   Problem Relation Age of Onset   • Dementia Mother    • Cancer Mother      GYN   • Hip fracture Mother    • Thyroid disease Mother    • Hypertension Mother    • Cervical cancer Mother    • Heart attack Father    • Heart failure Father    • Diabetes Father    • Heart disease Father      CHF   • Prostate cancer Father    • Hypertension Father    • Hypertension Brother    • Heart disease Brother    • Kidney cancer Maternal Grandmother    • Colon cancer Maternal Grandfather 70   • Heart failure Paternal Grandmother    • Heart disease Paternal Grandmother    • Diabetes Paternal Grandfather    • Colon cancer Paternal Uncle 78   • Breast cancer Neg Hx    • Ovarian cancer Neg Hx        Health Maintenance:  Last mammogram was 8/2017. Last colonoscopy was 2011, with recommended follow-up in 7 year(s). Last DEXA was 2016. Last pap smear was 4/18/2017, results were  normal PAP..      Review of Systems   Constitutional:  "Negative for fatigue, fever and unexpected weight change.   HENT: Negative for congestion, ear pain, hearing loss, sinus pressure and trouble swallowing.    Eyes: Negative for visual disturbance.   Respiratory: Negative for cough, chest tightness, shortness of breath and wheezing.    Cardiovascular: Negative for chest pain, palpitations and leg swelling.   Gastrointestinal: Negative for abdominal distention, abdominal pain, constipation, diarrhea, nausea and vomiting.   Endocrine: Negative for cold intolerance, heat intolerance, polydipsia, polyphagia and polyuria.   Genitourinary: Negative for difficulty urinating, dyspareunia, dysuria, frequency, hematuria, pelvic pain, urgency, vaginal bleeding, vaginal discharge and vaginal pain.   Musculoskeletal: Positive for arthralgias. Negative for gait problem, joint swelling and myalgias.   Skin: Negative for color change, pallor and rash.   Neurological: Negative for dizziness, seizures, syncope, weakness, light-headedness, numbness and headaches.   Hematological: Negative for adenopathy. Does not bruise/bleed easily.   Psychiatric/Behavioral: Negative for agitation, confusion, sleep disturbance and suicidal ideas. The patient is not nervous/anxious.        Physical Exam  Vital Signs: /67   Pulse 66   Temp 99.2 °F (37.3 °C) (Temporal Artery )   Resp 16   Ht 165.1 cm (65\")   Wt 65.3 kg (144 lb)   SpO2 95%   BMI 23.96 kg/m²    General Appearance:  alert, cooperative, no apparent distress, appears stated age and normal weight   Neurologic/Psychiatric: A&O x 3, gait steady, appropriate affect   HEENT:  Normocephalic, without obvious abnormality, mucous membranes moist   Neck: Supple, symmetrical, trachea midline, no adenopathy;  No thyromegaly, masses, or tenderness   Back:   Symmetric, no curvature, ROM normal, no CVA tenderness   Lungs:   Clear to auscultation bilaterally; respirations regular, even, and unlabored bilaterally   Heart:  Regular rate and rhythm, " no murmurs appreciated   Breasts:  Symmetrical, no masses, no lesions and no nipple discharge   Abdomen:   Soft, non-tender, non-distended and no organomegaly   Lymph nodes: No cervical, supraclavicular, inguinal or axillary adenopathy noted   Extremities: Normal, atraumatic; no clubbing, cyanosis, or edema    Skin: No rashes, ulcers, or suspicious lesions noted   Pelvic: External Genitalia  without lesions or skin changes  Vagina  is pink, moist, without lesions.   Vaginal Cuff  Female Vaginal Cuff: smooth, intact, without visible lesions and pap obtained  Uterus  surgically absent and no palpable masses  Ovaries  surgically absent bilaterallly  Parametria  smooth  Rectovaginal  Female rectovaginal: confirms no masses or bleeding and Hemoccult negative     ECOG Performance Status: 0 - Asymptomatic    Procedure Note:  No notes on file    Assessment and Plan:    Carli was seen today for annual exam.    Diagnoses and all orders for this visit:    Well woman exam with routine gynecological exam    Endometrial/uterine adenocarcinoma      There is no evidence of disease upon today's exam. She is understanding to call with any changes in pelvic symptoms or general GYN concerns at any time between regularly scheduled visits.     Pap was done today. Call in 1 week for pap smear results.     She was encouraged to get yearly mammograms.  She should report any palpable breast lump(s) or skin changes regardless of mammographic findings.  I explained to Carli that notification regarding her mammogram results will come from the center performing the study.  Our office will not be routinely calling with mammogram results.  It is her responsibility to make sure that the results from the mammogram are communicated to her by the breast center.  If she has any questions about the results, she is welcome to call our office anytime.    Repeat colonoscopy due this year or next.     Repeat DEXA per PCP and rheumatologist  recommendations.       Return in about 1 year (around 4/19/2019) for Annual & ongoing cancer surveillance, or PRN.      GHADA Bello      Note: Speech recognition transcription software was used to dictate portions of this document.  An attempt at proofreading has been made though minor errors in transcription may still be present.  Please do not hesitate to call our office with any questions.

## 2018-06-13 ENCOUNTER — LAB (OUTPATIENT)
Dept: LAB | Facility: HOSPITAL | Age: 63
End: 2018-06-13

## 2018-06-13 DIAGNOSIS — I10 ESSENTIAL HYPERTENSION: ICD-10-CM

## 2018-06-13 DIAGNOSIS — R73.01 IMPAIRED FASTING GLUCOSE: ICD-10-CM

## 2018-06-13 DIAGNOSIS — E78.00 PURE HYPERCHOLESTEROLEMIA: ICD-10-CM

## 2018-06-13 DIAGNOSIS — Z00.00 ROUTINE HEALTH MAINTENANCE: ICD-10-CM

## 2018-06-13 DIAGNOSIS — E05.90 SUBCLINICAL HYPERTHYROIDISM: ICD-10-CM

## 2018-06-13 LAB
ALBUMIN SERPL-MCNC: 4.74 G/DL (ref 3.2–4.8)
ALBUMIN/GLOB SERPL: 1.5 G/DL (ref 1.5–2.5)
ALP SERPL-CCNC: 82 U/L (ref 25–100)
ALT SERPL W P-5'-P-CCNC: 32 U/L (ref 7–40)
ANION GAP SERPL CALCULATED.3IONS-SCNC: 6 MMOL/L (ref 3–11)
ARTICHOKE IGE QN: 77 MG/DL (ref 0–130)
AST SERPL-CCNC: 25 U/L (ref 0–33)
BACTERIA UR QL AUTO: NORMAL /HPF
BASOPHILS # BLD AUTO: 0.03 10*3/MM3 (ref 0–0.2)
BASOPHILS NFR BLD AUTO: 0.5 % (ref 0–1)
BILIRUB SERPL-MCNC: 0.4 MG/DL (ref 0.3–1.2)
BILIRUB UR QL STRIP: NEGATIVE
BUN BLD-MCNC: 17 MG/DL (ref 9–23)
BUN/CREAT SERPL: 23.6 (ref 7–25)
CALCIUM SPEC-SCNC: 9.7 MG/DL (ref 8.7–10.4)
CHLORIDE SERPL-SCNC: 105 MMOL/L (ref 99–109)
CHOLEST SERPL-MCNC: 141 MG/DL (ref 0–200)
CK SERPL-CCNC: 71 U/L (ref 26–174)
CLARITY UR: CLEAR
CO2 SERPL-SCNC: 28 MMOL/L (ref 20–31)
COLOR UR: YELLOW
CREAT BLD-MCNC: 0.72 MG/DL (ref 0.6–1.3)
DEPRECATED RDW RBC AUTO: 45.8 FL (ref 37–54)
EOSINOPHIL # BLD AUTO: 0.11 10*3/MM3 (ref 0–0.3)
EOSINOPHIL NFR BLD AUTO: 1.7 % (ref 0–3)
ERYTHROCYTE [DISTWIDTH] IN BLOOD BY AUTOMATED COUNT: 14.3 % (ref 11.3–14.5)
GFR SERPL CREATININE-BSD FRML MDRD: 81 ML/MIN/1.73
GLOBULIN UR ELPH-MCNC: 3.1 GM/DL
GLUCOSE BLD-MCNC: 102 MG/DL (ref 70–100)
GLUCOSE UR STRIP-MCNC: NEGATIVE MG/DL
HBA1C MFR BLD: 5.9 % (ref 4.8–5.6)
HCT VFR BLD AUTO: 41.2 % (ref 34.5–44)
HDLC SERPL-MCNC: 61 MG/DL (ref 40–60)
HGB BLD-MCNC: 13.8 G/DL (ref 11.5–15.5)
HGB UR QL STRIP.AUTO: NEGATIVE
HYALINE CASTS UR QL AUTO: NORMAL /LPF
IMM GRANULOCYTES # BLD: 0.01 10*3/MM3 (ref 0–0.03)
IMM GRANULOCYTES NFR BLD: 0.2 % (ref 0–0.6)
KETONES UR QL STRIP: NEGATIVE
LEUKOCYTE ESTERASE UR QL STRIP.AUTO: NEGATIVE
LYMPHOCYTES # BLD AUTO: 1.7 10*3/MM3 (ref 0.6–4.8)
LYMPHOCYTES NFR BLD AUTO: 26.2 % (ref 24–44)
MCH RBC QN AUTO: 29.9 PG (ref 27–31)
MCHC RBC AUTO-ENTMCNC: 33.5 G/DL (ref 32–36)
MCV RBC AUTO: 89.2 FL (ref 80–99)
MONOCYTES # BLD AUTO: 0.35 10*3/MM3 (ref 0–1)
MONOCYTES NFR BLD AUTO: 5.4 % (ref 0–12)
NEUTROPHILS # BLD AUTO: 4.3 10*3/MM3 (ref 1.5–8.3)
NEUTROPHILS NFR BLD AUTO: 66.2 % (ref 41–71)
NITRITE UR QL STRIP: NEGATIVE
PH UR STRIP.AUTO: 5.5 [PH] (ref 5–8)
PLATELET # BLD AUTO: 269 10*3/MM3 (ref 150–450)
PMV BLD AUTO: 10 FL (ref 6–12)
POTASSIUM BLD-SCNC: 4.5 MMOL/L (ref 3.5–5.5)
PROT SERPL-MCNC: 7.8 G/DL (ref 5.7–8.2)
PROT UR QL STRIP: NEGATIVE
RBC # BLD AUTO: 4.62 10*6/MM3 (ref 3.89–5.14)
RBC # UR: NORMAL /HPF
REF LAB TEST METHOD: NORMAL
SODIUM BLD-SCNC: 139 MMOL/L (ref 132–146)
SP GR UR STRIP: <=1.005 (ref 1–1.03)
SQUAMOUS #/AREA URNS HPF: NORMAL /HPF
T4 FREE SERPL-MCNC: 1.31 NG/DL (ref 0.89–1.76)
TRIGL SERPL-MCNC: 43 MG/DL (ref 0–150)
TSH SERPL DL<=0.05 MIU/L-ACNC: 0.24 MIU/ML (ref 0.35–5.35)
UROBILINOGEN UR QL STRIP: NORMAL
WBC NRBC COR # BLD: 6.49 10*3/MM3 (ref 3.5–10.8)
WBC UR QL AUTO: NORMAL /HPF

## 2018-06-13 PROCEDURE — 80053 COMPREHEN METABOLIC PANEL: CPT

## 2018-06-13 PROCEDURE — 82570 ASSAY OF URINE CREATININE: CPT | Performed by: INTERNAL MEDICINE

## 2018-06-13 PROCEDURE — 84439 ASSAY OF FREE THYROXINE: CPT

## 2018-06-13 PROCEDURE — 85025 COMPLETE CBC W/AUTO DIFF WBC: CPT

## 2018-06-13 PROCEDURE — 80061 LIPID PANEL: CPT

## 2018-06-13 PROCEDURE — 82550 ASSAY OF CK (CPK): CPT

## 2018-06-13 PROCEDURE — 83036 HEMOGLOBIN GLYCOSYLATED A1C: CPT

## 2018-06-13 PROCEDURE — 84443 ASSAY THYROID STIM HORMONE: CPT

## 2018-06-13 PROCEDURE — 82043 UR ALBUMIN QUANTITATIVE: CPT | Performed by: INTERNAL MEDICINE

## 2018-06-13 PROCEDURE — 81001 URINALYSIS AUTO W/SCOPE: CPT

## 2018-06-14 LAB
CREAT 24H UR-MCNC: 18.6 MG/DL
MICROALBUMIN UR-MCNC: 3.1 UG/ML
MICROALBUMIN/CREAT UR: 16.7 MG/G CREAT (ref 0–30)

## 2018-06-26 ENCOUNTER — OFFICE VISIT (OUTPATIENT)
Dept: INTERNAL MEDICINE | Facility: CLINIC | Age: 63
End: 2018-06-26

## 2018-06-26 VITALS
HEART RATE: 62 BPM | SYSTOLIC BLOOD PRESSURE: 128 MMHG | BODY MASS INDEX: 23.37 KG/M2 | RESPIRATION RATE: 16 BRPM | DIASTOLIC BLOOD PRESSURE: 76 MMHG | WEIGHT: 140.25 LBS | HEIGHT: 65 IN

## 2018-06-26 DIAGNOSIS — M05.79 RHEUMATOID ARTHRITIS INVOLVING MULTIPLE SITES WITH POSITIVE RHEUMATOID FACTOR (HCC): ICD-10-CM

## 2018-06-26 DIAGNOSIS — E78.00 PURE HYPERCHOLESTEROLEMIA: ICD-10-CM

## 2018-06-26 DIAGNOSIS — I10 ESSENTIAL HYPERTENSION: ICD-10-CM

## 2018-06-26 DIAGNOSIS — M85.89 OSTEOPENIA OF MULTIPLE SITES: ICD-10-CM

## 2018-06-26 DIAGNOSIS — M25.512 CHRONIC PAIN OF BOTH SHOULDERS: ICD-10-CM

## 2018-06-26 DIAGNOSIS — M25.511 CHRONIC PAIN OF BOTH SHOULDERS: ICD-10-CM

## 2018-06-26 DIAGNOSIS — Z00.00 WELCOME TO MEDICARE PREVENTIVE VISIT: Primary | ICD-10-CM

## 2018-06-26 DIAGNOSIS — Z78.0 MENOPAUSE: ICD-10-CM

## 2018-06-26 DIAGNOSIS — R73.01 IMPAIRED FASTING GLUCOSE: ICD-10-CM

## 2018-06-26 DIAGNOSIS — G89.29 CHRONIC PAIN OF BOTH SHOULDERS: ICD-10-CM

## 2018-06-26 DIAGNOSIS — L82.1 SEBORRHEIC KERATOSIS: ICD-10-CM

## 2018-06-26 DIAGNOSIS — E05.90 SUBCLINICAL HYPERTHYROIDISM: ICD-10-CM

## 2018-06-26 PROCEDURE — 99214 OFFICE O/P EST MOD 30 MIN: CPT | Performed by: INTERNAL MEDICINE

## 2018-06-26 PROCEDURE — G0403 EKG FOR INITIAL PREVENT EXAM: HCPCS | Performed by: INTERNAL MEDICINE

## 2018-06-26 PROCEDURE — G0402 INITIAL PREVENTIVE EXAM: HCPCS | Performed by: INTERNAL MEDICINE

## 2018-06-26 RX ORDER — SIMVASTATIN 20 MG
20 TABLET ORAL NIGHTLY
Qty: 90 TABLET | Refills: 3 | Status: SHIPPED | OUTPATIENT
Start: 2018-06-26 | End: 2019-06-18 | Stop reason: SDUPTHER

## 2018-06-26 RX ORDER — AMLODIPINE BESYLATE 5 MG/1
5 TABLET ORAL DAILY
Qty: 90 TABLET | Refills: 3 | Status: SHIPPED | OUTPATIENT
Start: 2018-06-26 | End: 2019-06-18 | Stop reason: SDUPTHER

## 2018-06-26 RX ORDER — CYCLOBENZAPRINE HCL 10 MG
TABLET ORAL
Qty: 30 TABLET | Refills: 0 | Status: SHIPPED | OUTPATIENT
Start: 2018-06-26 | End: 2021-01-14 | Stop reason: SDUPTHER

## 2018-06-26 NOTE — ASSESSMENT & PLAN NOTE
Health maintenance - flu vacc 9/17; Prevnar 6/15; PVX 3/14, Tdap 10/14, Zostavax 6/15; rec Shingrix; mammo due after 8/15/18; GYN care with nl Pap 4/18 per Dr. Escalera, APRN; DEXA ordered (nl 6/15); due for colonosc (last 3/11) per Dr. Thornton; eye exam with Dr. Fuentes 2018; dental exam UTD q6 mos; (+) seat belt use    Consultants:  Patient Care Team:  Rosa M Venegas MD as PCP - General  Rosa M Venegas MD as PCP - Internal Medicine  Arthur Thornton MD as Consulting Physician (Colon and Rectal Surgery)  Jeromy Melissa, STERLING (Optometry)  Shalini Malin MD as Consulting Physician (Gynecologic Oncology)  Ernesto Crow MD as Consulting Physician (Rheumatology)  Bree Ware MD as Consulting Physician (Dermatology)

## 2018-06-26 NOTE — PROGRESS NOTES
WELCOME TO MEDICARE    DRUG AND ALCOHOL USE      no tobacco use, alcohol intake:1 glasses of wine per weeks and caffeine intake: 2 cups of caffeinated coffee per day    DIET AND PHYSICAL ACTIVITY     Diet: general    Exercise: daily   Exercise Details: walking    MOOD DISORDER AND COGNITIVE SCREENING   Depression Screening Tool Used yes - see PHQ-9   Anxiety Screening Tool Used yes     Mini-Cog Performed   Yes    1. Tell Patient 3 Words Apple, Vee, Watch    2. Administer Clock Test normal    3. Recall 3 words  Apple, Vee, Watch    4. Number Correct Items 3    FUNCTIONAL ABILITY AND LEVEL OF SAFETY   Hearing mild hearing loss     Wears Hearing Aids No       Current Activities Independent      none  - see Funct/Cog Status Intake     Fall Risk Assessment       Has difficulty with walking or balance  No         Timed Up and Go (TUG) Test  8 sec.       If >12 sec, normal    ADVANCED DIRECTIVE has an advance directive - a copy HAS NOT been provided    PAIN SCREENING Do you have pain right now? yes      If so, 1-10 scale: 3     Intermittent     Do you have pain every day? Yes      Probable chronic pain: Yes     Recent Hospitalizations:  No recent hospitalization(s)..     MEDICATION REVIEW   - updated and reviewed (see Medication List).   - reviewed for potentially harmful drug-disease interactions in the elderly.   - reviewed for high risk medications in the elderly.   - aspirin use: No    BMI  Body mass index is 23.7 kg/m².    Patient's Body mass index is 23.7 kg/m². BMI is within normal parameters. No follow-up required.    _________________________________________________________  Chief Complaint   Patient presents with   • Annual Exam     welcome to medicare       History of Present Illness  63 y.o.  woman presents for Wel to [facility].  Has a list of questions to include questions on labs (urine microalb and Cr), small spot on left side of face, labs for Dr. Crow; issues with vision and dry  "eyes.    States rough spot on left cheek and just saw derm; has had several benign spots taken off.  Denies assoc'd pain, redness, or irritation.    Worried about kidney function with MTX; states she has to do labs every 8 weeks and next due 7/8/18; wants to do them here.    Notes change of ophtho due to dry eyes, attributed to RA.  Currently wearing reading glasses but has been advised by ophtho to change to bifocals.    Review of Systems  Denies headaches, visual changes (except issues with dry eyes; also uses readers currently), CP, palpitations, SOB, cough, abd pain, n/v/d, difficulty with urination, numbness/tingling, falls, mood changes, lightheadedness, hearing changes, rashes.    Denies vaginal discharge or bleeding (no periods) or breast concerns.    ROS (+) for pink spot on left cheek, no assoc'd pain, rash, or redness.    Also requesting refill for cyclobenzaprine.     All other ROS reviewed and negative.    [facility]  The following portions of the patient's history were reviewed and updated as appropriate: allergies, current medications, past family history, past medical history, past social history, past surgical history and problem list.    Current Outpatient Prescriptions:   •  amLODIPine (NORVASC) 5 MG tablet, QD  •  cyclobenzaprine (FLEXERIL) 10 MG tablet, 1/2-1 tid prn muscle spasm  •  folic acid (FOLVITE) 1 MG tablet, QD  •  ibuprofen (ADVIL,MOTRIN) 200 MG tablet, q6 prn  •  methotrexate 2.5 MG tablet, 6 weekly  •  simvastatin (ZOCOR) 20 MG tablet, QHS  •  TURMERIC PO, Take  by mouth., Disp: , Rfl:   •  vitamin B-6 (PYRIDOXINE) 50 MG tablet, 2 QD     VITALS:  /76 (BP Location: Right arm, Patient Position: Sitting)   Pulse 62   Resp 16   Ht 163.8 cm (64.5\")   Wt 63.6 kg (140 lb 4 oz)   BMI 23.70 kg/m²     VISUAL ACUITY - not corrected (has contacts at home)  Right Eye (OD):  20/40  Left Eye (OS):  20/40  Both eyes (OU): 20/40    Physical Exam   Constitutional: She is oriented to person, " place, and time. She appears well-developed and well-nourished.   HENT:   Head: Normocephalic.   Right Ear: External ear normal.   Left Ear: External ear normal.   Nose: Nose normal.   Mouth/Throat: Oropharynx is clear and moist and mucous membranes are normal. No oropharyngeal exudate.   Eyes: Conjunctivae and EOM are normal. Pupils are equal, round, and reactive to light.   Neck: Normal range of motion. Neck supple. Carotid bruit is not present (bilaterally). No thyromegaly present.   Cardiovascular: Regular rhythm and normal heart sounds.  Bradycardia present.    Pulmonary/Chest: Effort normal and breath sounds normal. No respiratory distress.   Abdominal: Soft. Bowel sounds are normal. She exhibits no distension and no mass. There is no hepatosplenomegaly. There is no tenderness.   Genitourinary:   Genitourinary Comments: Breast/pelvic exams deferred to GYN     Musculoskeletal: Normal range of motion. She exhibits no edema.   Lymphadenopathy:     She has no cervical adenopathy.   Neurological: She is alert and oriented to person, place, and time. She has normal reflexes. She displays normal reflexes. No cranial nerve deficit.   Skin: Skin is warm and dry. No rash noted.   Left cheek at cheek bone area with 3mm pink hyperkeratotic papule, c/w SK   Psychiatric: She has a normal mood and affect. Her behavior is normal.   Nursing note and vitals reviewed.      LABS  Results for orders placed or performed in visit on 06/13/18   Comprehensive Metabolic Panel   Result Value Ref Range    Glucose 102 (H) 70 - 100 mg/dL    BUN 17 9 - 23 mg/dL    Creatinine 0.72 0.60 - 1.30 mg/dL    Sodium 139 132 - 146 mmol/L    Potassium 4.5 3.5 - 5.5 mmol/L    Chloride 105 99 - 109 mmol/L    CO2 28.0 20.0 - 31.0 mmol/L    Calcium 9.7 8.7 - 10.4 mg/dL    Total Protein 7.8 5.7 - 8.2 g/dL    Albumin 4.74 3.20 - 4.80 g/dL    ALT (SGPT) 32 7 - 40 U/L    AST (SGOT) 25 0 - 33 U/L    Alkaline Phosphatase 82 25 - 100 U/L    Total Bilirubin 0.4  0.3 - 1.2 mg/dL    eGFR Non African Amer 81 >60 mL/min/1.73    Globulin 3.1 gm/dL    A/G Ratio 1.5 1.5 - 2.5 g/dL    BUN/Creatinine Ratio 23.6 7.0 - 25.0    Anion Gap 6.0 3.0 - 11.0 mmol/L   Lipid Panel   Result Value Ref Range    Total Cholesterol 141 0 - 200 mg/dL    Triglycerides 43 0 - 150 mg/dL    HDL Cholesterol 61 (H) 40 - 60 mg/dL    LDL Cholesterol  77 0 - 130 mg/dL   TSH   Result Value Ref Range    TSH 0.241 (L) 0.350 - 5.350 mIU/mL   T4, Free   Result Value Ref Range    Free T4 1.31 0.89 - 1.76 ng/dL   CK   Result Value Ref Range    Creatine Kinase 71 26 - 174 U/L   Hemoglobin A1c   Result Value Ref Range    Hemoglobin A1C 5.90 (H) 4.80 - 5.60 %   CBC Auto Differential   Result Value Ref Range    WBC 6.49 3.50 - 10.80 10*3/mm3    RBC 4.62 3.89 - 5.14 10*6/mm3    Hemoglobin 13.8 11.5 - 15.5 g/dL    Hematocrit 41.2 34.5 - 44.0 %    MCV 89.2 80.0 - 99.0 fL    MCH 29.9 27.0 - 31.0 pg    MCHC 33.5 32.0 - 36.0 g/dL    RDW 14.3 11.3 - 14.5 %    RDW-SD 45.8 37.0 - 54.0 fl    MPV 10.0 6.0 - 12.0 fL    Platelets 269 150 - 450 10*3/mm3    Neutrophil % 66.2 41.0 - 71.0 %    Lymphocyte % 26.2 24.0 - 44.0 %    Monocyte % 5.4 0.0 - 12.0 %    Eosinophil % 1.7 0.0 - 3.0 %    Basophil % 0.5 0.0 - 1.0 %    Immature Grans % 0.2 0.0 - 0.6 %    Neutrophils, Absolute 4.30 1.50 - 8.30 10*3/mm3    Lymphocytes, Absolute 1.70 0.60 - 4.80 10*3/mm3    Monocytes, Absolute 0.35 0.00 - 1.00 10*3/mm3    Eosinophils, Absolute 0.11 0.00 - 0.30 10*3/mm3    Basophils, Absolute 0.03 0.00 - 0.20 10*3/mm3    Immature Grans, Absolute 0.01 0.00 - 0.03 10*3/mm3   Urinalysis - Urine, Clean Catch   Result Value Ref Range    Color, UA Yellow Yellow, Straw    Appearance, UA Clear Clear    pH, UA 5.5 5.0 - 8.0    Specific Gravity, UA <=1.005 1.001 - 1.030    Glucose, UA Negative Negative    Ketones, UA Negative Negative    Bilirubin, UA Negative Negative    Blood, UA Negative Negative    Protein, UA Negative Negative    Leuk Esterase, UA Negative  Negative    Nitrite, UA Negative Negative    Urobilinogen, UA 0.2 E.U./dL 0.2 - 1.0 E.U./dL   Urinalysis, Microscopic Only - Urine, Clean Catch   Result Value Ref Range    RBC, UA 0-2 None Seen, 0-2 /HPF    WBC, UA None Seen None Seen, 0-2 /HPF    Bacteria, UA None Seen None Seen, Trace /HPF    Squamous Epithelial Cells, UA None Seen None Seen, 0-2 /HPF    Hyaline Casts, UA None Seen 0 - 6 /LPF    Methodology Automated Microscopy      1/18 A1C 5.8    ECG 12 Lead  Date/Time: 6/26/2018 3:44 PM  Performed by: ROGELIO GALVAN  Authorized by: ROGELIO GALVAN   Comparison: compared with previous ECG from 6/25/2016  Similar to previous ECG  Rhythm: sinus bradycardia  Rate: bradycardic  BPM: 53  Conduction: conduction normal  ST Segments: ST segments normal  T Waves: T waves normal  QRS axis: normal  Clinical impression: normal ECG          ASSESSMENT/PLAN  Problem List Items Addressed This Visit     Subclinical hyperthyroidism     Remains abnl with subclin hyperthyroidism; reviewed s/sxs hyperthyroidism and patient remains asx; last thyr u/s 7/17; f/u TFTs in 6 mos         Relevant Orders    TSH    T4, Free    Hyperlipidemia     Lipids stable, at goal on simvastatin 20mg QHS #90, 3RF with stable LFTs, CPK         Relevant Medications    simvastatin (ZOCOR) 20 MG tablet    Hypertension     BP and electrolytes stable on amlo 5mg QD #90, 3RF         Relevant Medications    amLODIPine (NORVASC) 5 MG tablet    Impaired fasting glucose     BG control stable with A1C 5.9; encouraged reg phys activity to decr insulin resistance, moderation in unhealthy starches/sweets; f/u A1C in 6 mos           Relevant Orders    Hemoglobin A1c    Osteopenia      Calcium and vitamin D supplementation with weight-bearing exercise; DEXA ordered and need to forward results to Dr. Crow (rheum)            Rheumatoid arthritis involving multiple sites with positive rheumatoid factor    Relevant Orders    CBC & Differential    Comprehensive Metabolic Panel     Sedimentation Rate    C-reactive Protein    Welcome to Medicare preventive visit - Primary     Health maintenance - flu vacc 9/17; Prevnar 6/15; PVX 3/14, Tdap 10/14, Zostavax 6/15; rec Shingrix; mammo due after 8/15/18; GYN care with nl Pap 4/18 per Dr. Escalera, APRN; DEXA ordered (nl 6/15); due for colonosc (last 3/11) per Dr. Thornton; eye exam with Dr. Fuentes 2018; dental exam UTD q6 mos; (+) seat belt use    Consultants:  Patient Care Team:  Rosa M Venegas MD as PCP - General  Rosa M Venegas MD as PCP - Internal Medicine  Arthur Thornton MD as Consulting Physician (Colon and Rectal Surgery)  Jeromy Melissa, STERLING (Optometry)  Shalini Malin MD as Consulting Physician (Gynecologic Oncology)  Ernesto Crow MD as Consulting Physician (Rheumatology)  Bree Ware MD as Consulting Physician (Dermatology)             Relevant Orders    ECG 12 Lead    Menopause    Relevant Orders    DEXA Bone Density Axial      Other Visit Diagnoses     Shoulder pain        takes ibuprofen BID and prn cyclobenzaprine #30, 0RF    Relevant Medications    cyclobenzaprine (FLEXERIL) 10 MG tablet    Seborrheic keratosis        left cheek papule c/w SK; reassurance and follow clinically - she states next derm appt 11/18          FOLLOW-UP  1. CBC, CMP, ESR, CRP per Dr. Crow due 1st week July - will forward results as well as DEXA results  2. RTC 6 mos with A1C, TSH, FT4 (escribed)    Electronically signed by:    Rosa M Venegas MD  06/26/2018

## 2018-06-26 NOTE — ASSESSMENT & PLAN NOTE
Remains abnl with subclin hyperthyroidism; reviewed s/sxs hyperthyroidism and patient remains asx; last thyr u/s 7/17; f/u TFTs in 6 mos

## 2018-06-26 NOTE — ASSESSMENT & PLAN NOTE
BG control stable with A1C 5.9; encouraged reg phys activity to decr insulin resistance, moderation in unhealthy starches/sweets; f/u A1C in 6 mos

## 2018-06-26 NOTE — ASSESSMENT & PLAN NOTE
Calcium and vitamin D supplementation with weight-bearing exercise; DEXA ordered and need to forward results to Dr. Crow (rheum)

## 2018-07-10 ENCOUNTER — LAB (OUTPATIENT)
Dept: INTERNAL MEDICINE | Facility: CLINIC | Age: 63
End: 2018-07-10

## 2018-07-10 DIAGNOSIS — E05.90 SUBCLINICAL HYPERTHYROIDISM: ICD-10-CM

## 2018-07-10 DIAGNOSIS — R73.01 IMPAIRED FASTING GLUCOSE: ICD-10-CM

## 2018-07-10 DIAGNOSIS — M05.79 RHEUMATOID ARTHRITIS INVOLVING MULTIPLE SITES WITH POSITIVE RHEUMATOID FACTOR (HCC): ICD-10-CM

## 2018-07-10 LAB
ALBUMIN SERPL-MCNC: 4.58 G/DL (ref 3.2–4.8)
ALBUMIN/GLOB SERPL: 1.6 G/DL (ref 1.5–2.5)
ALP SERPL-CCNC: 73 U/L (ref 25–100)
ALT SERPL W P-5'-P-CCNC: 33 U/L (ref 7–40)
ANION GAP SERPL CALCULATED.3IONS-SCNC: 9 MMOL/L (ref 3–11)
AST SERPL-CCNC: 29 U/L (ref 0–33)
BASOPHILS # BLD AUTO: 0.03 10*3/MM3 (ref 0–0.2)
BASOPHILS NFR BLD AUTO: 0.4 % (ref 0–1)
BILIRUB SERPL-MCNC: 0.4 MG/DL (ref 0.3–1.2)
BUN BLD-MCNC: 14 MG/DL (ref 9–23)
BUN/CREAT SERPL: 19.4 (ref 7–25)
CALCIUM SPEC-SCNC: 9.1 MG/DL (ref 8.7–10.4)
CHLORIDE SERPL-SCNC: 103 MMOL/L (ref 99–109)
CO2 SERPL-SCNC: 29 MMOL/L (ref 20–31)
CREAT BLD-MCNC: 0.72 MG/DL (ref 0.6–1.3)
CRP SERPL-MCNC: <0.01 MG/DL (ref 0–1)
DEPRECATED RDW RBC AUTO: 45.6 FL (ref 37–54)
EOSINOPHIL # BLD AUTO: 0.14 10*3/MM3 (ref 0–0.3)
EOSINOPHIL NFR BLD AUTO: 2 % (ref 0–3)
ERYTHROCYTE [DISTWIDTH] IN BLOOD BY AUTOMATED COUNT: 14.2 % (ref 11.3–14.5)
ERYTHROCYTE [SEDIMENTATION RATE] IN BLOOD: 24 MM/HR (ref 0–30)
GFR SERPL CREATININE-BSD FRML MDRD: 81 ML/MIN/1.73
GLOBULIN UR ELPH-MCNC: 2.8 GM/DL
GLUCOSE BLD-MCNC: 92 MG/DL (ref 70–100)
HBA1C MFR BLD: 6.1 % (ref 4.8–5.6)
HCT VFR BLD AUTO: 40.4 % (ref 34.5–44)
HGB BLD-MCNC: 13.2 G/DL (ref 11.5–15.5)
IMM GRANULOCYTES # BLD: 0.02 10*3/MM3 (ref 0–0.03)
IMM GRANULOCYTES NFR BLD: 0.3 % (ref 0–0.6)
LYMPHOCYTES # BLD AUTO: 1.7 10*3/MM3 (ref 0.6–4.8)
LYMPHOCYTES NFR BLD AUTO: 24.1 % (ref 24–44)
MCH RBC QN AUTO: 29.2 PG (ref 27–31)
MCHC RBC AUTO-ENTMCNC: 32.7 G/DL (ref 32–36)
MCV RBC AUTO: 89.4 FL (ref 80–99)
MONOCYTES # BLD AUTO: 0.42 10*3/MM3 (ref 0–1)
MONOCYTES NFR BLD AUTO: 5.9 % (ref 0–12)
NEUTROPHILS # BLD AUTO: 4.77 10*3/MM3 (ref 1.5–8.3)
NEUTROPHILS NFR BLD AUTO: 67.6 % (ref 41–71)
PLATELET # BLD AUTO: 293 10*3/MM3 (ref 150–450)
PMV BLD AUTO: 9.8 FL (ref 6–12)
POTASSIUM BLD-SCNC: 4.8 MMOL/L (ref 3.5–5.5)
PROT SERPL-MCNC: 7.4 G/DL (ref 5.7–8.2)
RBC # BLD AUTO: 4.52 10*6/MM3 (ref 3.89–5.14)
SODIUM BLD-SCNC: 141 MMOL/L (ref 132–146)
T4 FREE SERPL-MCNC: 1.4 NG/DL (ref 0.89–1.76)
TSH SERPL DL<=0.05 MIU/L-ACNC: 0.28 MIU/ML (ref 0.35–5.35)
WBC NRBC COR # BLD: 7.06 10*3/MM3 (ref 3.5–10.8)

## 2018-07-10 PROCEDURE — 80053 COMPREHEN METABOLIC PANEL: CPT | Performed by: INTERNAL MEDICINE

## 2018-07-10 PROCEDURE — 84439 ASSAY OF FREE THYROXINE: CPT | Performed by: INTERNAL MEDICINE

## 2018-07-10 PROCEDURE — 85652 RBC SED RATE AUTOMATED: CPT | Performed by: INTERNAL MEDICINE

## 2018-07-10 PROCEDURE — 86140 C-REACTIVE PROTEIN: CPT | Performed by: INTERNAL MEDICINE

## 2018-07-10 PROCEDURE — 85025 COMPLETE CBC W/AUTO DIFF WBC: CPT | Performed by: INTERNAL MEDICINE

## 2018-07-10 PROCEDURE — 83036 HEMOGLOBIN GLYCOSYLATED A1C: CPT | Performed by: INTERNAL MEDICINE

## 2018-07-10 PROCEDURE — 84443 ASSAY THYROID STIM HORMONE: CPT | Performed by: INTERNAL MEDICINE

## 2018-07-13 ENCOUNTER — TRANSCRIBE ORDERS (OUTPATIENT)
Dept: ADMINISTRATIVE | Facility: HOSPITAL | Age: 63
End: 2018-07-13

## 2018-07-13 DIAGNOSIS — Z12.31 VISIT FOR SCREENING MAMMOGRAM: Primary | ICD-10-CM

## 2018-07-15 NOTE — PROGRESS NOTES
pls forward labs to rheum Dr. Crow - let patient know labs stable except bord A1C 6.1 and persistent bord elevated thyroid hormone level.  Have followed this bord thyr abnlity for 4 years now and remains unchanged; rec endo consultation for 2nd opinion - is she ok with referral?

## 2018-07-17 ENCOUNTER — HOSPITAL ENCOUNTER (OUTPATIENT)
Dept: BONE DENSITY | Facility: HOSPITAL | Age: 63
Discharge: HOME | End: 2018-07-17
Attending: INTERNAL MEDICINE | Admitting: INTERNAL MEDICINE

## 2018-07-17 DIAGNOSIS — Z78.0 MENOPAUSE: ICD-10-CM

## 2018-07-17 PROCEDURE — 77080 DXA BONE DENSITY AXIAL: CPT

## 2018-07-18 DIAGNOSIS — E05.90 SUBCLINICAL HYPERTHYROIDISM: Primary | ICD-10-CM

## 2018-07-18 NOTE — PROGRESS NOTES
Dear Carli,    Thank you for obtaining your DEXA (bone density) scan.  I have received those results and would like to review them with you.      Your bone density remains in the normal range although it is a little decreased compared to your last DEXA in 2015.    Please maintain regular calcium and vitamin D supplementation.  Calcium intake should be 1000mg per day between diet and supplements.  Weight bearing exercise is good for bone health as well.    We should plan to repeat your DEXA in 3 years.  Please let me know if you have any questions or concerns regarding these results.        Sincerely,  Rosa M Venegas MD

## 2018-08-24 ENCOUNTER — HOSPITAL ENCOUNTER (OUTPATIENT)
Dept: MAMMOGRAPHY | Facility: HOSPITAL | Age: 63
Discharge: HOME | End: 2018-08-24
Attending: INTERNAL MEDICINE | Admitting: INTERNAL MEDICINE

## 2018-08-24 DIAGNOSIS — Z12.31 VISIT FOR SCREENING MAMMOGRAM: ICD-10-CM

## 2018-08-24 PROCEDURE — 77067 SCR MAMMO BI INCL CAD: CPT

## 2018-08-24 PROCEDURE — 77063 BREAST TOMOSYNTHESIS BI: CPT

## 2018-08-24 PROCEDURE — 77063 BREAST TOMOSYNTHESIS BI: CPT | Performed by: RADIOLOGY

## 2018-08-24 PROCEDURE — 77067 SCR MAMMO BI INCL CAD: CPT | Performed by: RADIOLOGY

## 2018-09-16 DIAGNOSIS — M05.79 RHEUMATOID ARTHRITIS INVOLVING MULTIPLE SITES WITH POSITIVE RHEUMATOID FACTOR (HCC): Primary | ICD-10-CM

## 2018-09-16 RX ORDER — CELECOXIB 200 MG/1
200 CAPSULE ORAL DAILY PRN
Start: 2018-09-16

## 2018-10-09 PROBLEM — K64.8 INTERNAL HEMORRHOIDS: Status: ACTIVE | Noted: 2018-10-09

## 2018-12-12 ENCOUNTER — TELEPHONE (OUTPATIENT)
Dept: INTERNAL MEDICINE | Facility: CLINIC | Age: 63
End: 2018-12-12

## 2018-12-12 DIAGNOSIS — R73.01 IMPAIRED FASTING GLUCOSE: ICD-10-CM

## 2018-12-12 DIAGNOSIS — Z00.00 WELCOME TO MEDICARE PREVENTIVE VISIT: Primary | ICD-10-CM

## 2018-12-12 DIAGNOSIS — E04.2 NON-TOXIC MULTINODULAR GOITER: ICD-10-CM

## 2019-01-02 ENCOUNTER — OFFICE VISIT (OUTPATIENT)
Dept: ENDOCRINOLOGY | Facility: CLINIC | Age: 64
End: 2019-01-02

## 2019-01-02 VITALS
BODY MASS INDEX: 23.32 KG/M2 | WEIGHT: 140 LBS | OXYGEN SATURATION: 98 % | DIASTOLIC BLOOD PRESSURE: 82 MMHG | HEIGHT: 65 IN | HEART RATE: 53 BPM | SYSTOLIC BLOOD PRESSURE: 130 MMHG

## 2019-01-02 DIAGNOSIS — E05.90 SUBCLINICAL HYPERTHYROIDISM: ICD-10-CM

## 2019-01-02 DIAGNOSIS — E04.2 MULTINODULAR GOITER: Primary | ICD-10-CM

## 2019-01-02 PROCEDURE — 99204 OFFICE O/P NEW MOD 45 MIN: CPT | Performed by: INTERNAL MEDICINE

## 2019-01-02 PROCEDURE — 76536 US EXAM OF HEAD AND NECK: CPT | Performed by: INTERNAL MEDICINE

## 2019-01-02 RX ORDER — GLUCOSAMINE/D3/BOSWELLIA SERRA 1500MG-400
TABLET ORAL DAILY
COMMUNITY
Start: 2018-12-10 | End: 2019-06-03

## 2019-01-02 NOTE — PROGRESS NOTES
Chief Complaint   Patient presents with   • Subclinical hyperthyroidism     Consult for Dr. Rosa M Venegas        HPI:   Carli Bolanos is a 64 y.o.female sent in consultation by Rosa M Venegas MD for further evaluation of pt's subclinical hyperthyroidism and multinodular goiter. Her history is as follows:    1) subclinical hyperthyroidism:  - per chart review, pt has had a slightly decreased TSH with normal free T4 and T3 levels since 2014. Her TSH has not been suppressed to <0.01. (TSH levels ranging 0.214 - 0.525)  - on further review, pt denies palpitations or tremor. She has had a mild weight loss of 5 lbs since 04/2018 which she attributes to her RA.     Pt is currently taking 10,000 mcg of Biotin which she started yesterday. Her prior TFT's were not collected on biotin.      2) multinodular goiter:  - first found on physical exam in 2014. Pt has had Thyroid US's completed at  radiology in 04/2014, 06/2015, and 07/2017  - no prior FNA  Summary of imaging with  radiology:  (04/2014): R mid - 1.9 x 2.7, R inf - 1.5 x 1.9, L mid 1.7 x 2.5 complex nodules  (06/2015): Report states stable nodules. Size and appearance of nodules are not described.  (07/2017): R sup- 2.1 x 1.0 x 1.4, R mid 2.3 x 1.7 x 2.6, L mid 2.3 x 1.8 x 2.3 complex nodules    FMH: no known thyroid cancer, mother had thyroid disease  PMH: no h/o irradiation of head, neck, or chest    Review of Systems   Constitutional:        Weight loss   HENT: Negative.    Eyes:        Dry eyes   Respiratory: Negative.    Cardiovascular: Negative.    Gastrointestinal: Negative.    Endocrine: Negative.    Genitourinary: Negative.    Musculoskeletal: Positive for arthralgias and myalgias.   Skin: Negative.    Allergic/Immunologic: Negative.    Neurological: Negative.    Hematological: Negative.    Psychiatric/Behavioral: Negative.      Past Medical History:   Diagnosis Date   • Abnormal thyroid ultrasound 07/05/2017    stable MNG (7/5/17) by u/s   • Abnormal  thyroid ultrasound 06/12/2015    stable MNG (6/12/15)   • Abnormal thyroid ultrasound 04/18/2014    thyr u/s (4/18/14): MNG except dominant complex mass mid L. lobe, repeat u/s in 6 mos   • Endometrial cancer (CMS/HCC)    • H/O uterine leiomyoma     s/p DUSTY/USO ('13)   • History of CT scan 02/27/2016    neg head ct   • Hx of bone density study 06/08/2015    DEXA (6/15) L -0.4, H0.7   • Hx of bone density study 07/17/2018    nl DEXA (7/17/18): L -0.4, H 0.2, repeat 3 yrs   • Hx of colonoscopy 03/11/2011    colonosc (3/11/11): diverticulosis, sm int hem, repeat 2018; CRS - Dr. Thornton   • Hx of colonoscopy 10/05/2018    colonosc (10/5/18): diverticulosis, redundant colon, int hem, repeat 5 yrs due to h/o polyps; CRS Juani Thornton   • Hx of EMG/NCV 02/27/2008    EMG/NCV (2/27/08): right median neuropathy   • Hx of hand x-ray 01/24/2018    R. hand xray (1/24/18): degen changes 1st metacarpal joint and index PIP   • Lumps on the skin 02/20/2013    R. temple lesion; evaluated by Dr. Amelia Gann 02/20/13 with upcoming excision     family history includes Cancer in her mother; Cervical cancer in her mother; Colon cancer (age of onset: 70) in her maternal grandfather; Colon cancer (age of onset: 78) in her paternal uncle; Dementia in her mother; Diabetes in her father and paternal grandfather; Heart attack in her father; Heart disease in her brother, father, and paternal grandmother; Heart failure in her father and paternal grandmother; Hip fracture in her mother; Hypertension in her brother, father, and mother; Kidney cancer in her maternal grandmother; Prostate cancer in her father; Thyroid disease in her mother.  Past Surgical History:   Procedure Laterality Date   • CARPAL TUNNEL RELEASE Left     carpel tunnel left    • CERVICAL POLYPECTOMY  03/2005    with h/o fibroids and DUB   • CHOLECYSTECTOMY     • DILATATION AND CURETTAGE  02/22/2013   • EXCISION MASS HEAD/NECK Right 03/05/2013    s/p excision scalp lesion large R.  "parietal and L. temporal areas; plastics - Dr. Amelia Gann   • HIP ARTHROSCOPY Right 2004    with subsequent Ivory resurfacing with hip replacement (5/11)   • HYSTERECTOMY      age 58   • OTHER SURGICAL HISTORY      KERATOTIC LESION   • SALPINGO OOPHORECTOMY Left 1991    secondary to tubal pregnancy and ovarian cyst   • SALPINGO OOPHORECTOMY Right     secondary to tubal pregnancy   • TOTAL HIP ARTHROPLASTY Left 06/2011   • TOTAL HIP ARTHROPLASTY Right 05/2011   • TOTAL LAPAROSCOPIC HYSTERECTOMY WITH DAVINCI ROBOT  03/05/2013    RTLH/BSO with LND and omental biopsy for endometrial cancer; GYN Onc - Dr. Malin     Social History     Tobacco Use   • Smoking status: Never Smoker   • Smokeless tobacco: Never Used   Substance Use Topics   • Alcohol use: Yes     Comment: social   • Drug use: No     Current Outpatient Medications:   •  amLODIPine (NORVASC) 5 MG tablet, Take 1 tablet by mouth Daily., Disp: 90 tablet, Rfl: 3  •  Biotin 48546 MCG tablet, , Disp: , Rfl:   •  celecoxib (CELEBREX) 200 MG capsule, Take 1 capsule by mouth Daily As Needed for Mild Pain ., Disp: , Rfl:   •  cyclobenzaprine (FLEXERIL) 10 MG tablet, 1/2-1 tid prn muscle spasm, Disp: 30 tablet, Rfl: 0  •  folic acid (FOLVITE) 1 MG tablet, Take 1 tablet by mouth Daily., Disp: , Rfl:   •  methotrexate 2.5 MG tablet, Take 6 tablets by mouth 1 (One) Time Per Week., Disp: , Rfl:   •  simvastatin (ZOCOR) 20 MG tablet, Take 1 tablet by mouth Every Night., Disp: 90 tablet, Rfl: 3  •  TURMERIC PO, Take  by mouth., Disp: , Rfl:   •  vitamin B-6 (PYRIDOXINE) 50 MG tablet, Take 100 mg by mouth Daily., Disp: , Rfl:   Allergies   Allergen Reactions   • Codeine Other (See Comments)     Other reaction(s): hyperactivity   • Lisinopril Cough   • Naproxen Other (See Comments)     Other reaction(s): mouth sores       /82   Pulse 53   Ht 165.1 cm (65\")   Wt 63.5 kg (140 lb)   SpO2 98%   BMI 23.30 kg/m²   Physical Exam   Constitutional: She is oriented to " person, place, and time. She appears well-developed. No distress.   HENT:   Head: Normocephalic.   Mouth/Throat: Oropharynx is clear and moist.   Eyes: Conjunctivae and EOM are normal. Pupils are equal, round, and reactive to light.   Neck: No tracheal deviation present. Thyromegaly (mild, right lobe larger than left) present.   No palpable thyroid nodules     Cardiovascular: Normal rate, regular rhythm and normal heart sounds.   No murmur heard.  Pulmonary/Chest: Effort normal and breath sounds normal. No respiratory distress.   Abdominal: Soft. Bowel sounds are normal. She exhibits no mass. There is no tenderness.   Lymphadenopathy:     She has no cervical adenopathy.   Neurological: She is alert and oriented to person, place, and time. No cranial nerve deficit.   Skin: Skin is warm and dry. She is not diaphoretic. No erythema.   Psychiatric: She has a normal mood and affect. Her behavior is normal.   Vitals reviewed.    LABS/IMAGING: outside records reviewed and summarized in HPI    ADDENDUM: labs completed off of biotin for 5 days  (01/07/2019) TSH 0.251, free T4 1.39, T3 116    PROCEDURES (in office):  Thyroid Ultrasound Report  [facility]  [place], [place]    Date: 01/02/2019    Indication: multinodular goiter  Comparison Imaging:  Radiology Thyroid US 04/2014, 06/2015, 07/2017  Clinical History: 64 y.o. Female with h/o MNG and subclinical hyperthyroidism since 2014    Real time high resolution imaging of the thyroid gland was performed in transverse and longitudinal planes.  The right lobe measured 6.59 cm in Length x 3.00cm in AP diameter x 3.38cm in TV dimension.  The isthmus measured 0.49 cm in thickness.  The left thyroid lobe measured 5.65 cm in length x 2.29 cm in AP diameter x 2.77 cm in TV dimension.    The thyroid gland appeared overall isoechoic with diffusely heterogeneous echotexture.     In the right inferior lobe extending into the isthmus, a 2.25(L) x 1.53(AP) x 2.28(T) cm  isoechoic, mostly solid nodule was identified. The nodule had a smooth margin, peripheral vascularity, and no microcalcifications.    In the right mid to superior lobe, a 1.90(L) x 1.34(AP) x 2.19(T) cm isoechoic, spongiform nodule was identified. The nodule had a smooth margin, minimal peripheral vascularity, and no microcalcifications.    In the left mid lobe, a 2.23(L) x 1.47(AP) x 2.07(T) cm isoechoic, spongiform nodule was identified. The nodule had a smooth margin, peripheral and intranodal vascularity, and no microcalcifications.    In the left mid lobe, 1.84(L) x 1.11(AP) x 1.38(T) cm isoechoic, spongiform nodule was identified. The nodule had a smooth margin, minimal peripheral vascularity, and no microcalcifications.     No pathologic lymph nodes were seen.     IMPRESSION:   1) The thyroid gland was enlarged by measured dimensions. Findings were consistent with a multinodular goiter.    2) Multiple isoechoic, spongiform nodules were identified in both lobes as described above. These nodules lacked suspicious US characteristics and did not meet criteria for FNA.   3) These nodules appeared similar to prior outside imaging completed from 2014 - 2017.     RECOMMENDATIONS: These nodules lacked suspicious US characteristics and did not meet criteria for FNA. Given pt's h/o subclinical hyperthyroidism, recommended NM thyroid uptake and scan for further characterization of the nodules.     ASSESSMENT/PLAN:  1) subclinical hyperthyroidism due to multinodular goiter:  - could not complete labs today as she has been taking high dose biotin. Reviewed with patient that high dose biotin supplements may interfere with the lab assays for thyroid function and cause spurious results. Had pt hold biotin for 5 days.  Labs 01/07/2019 showed a stable slightly decreased TSH with normal free T4 and T3 levels.     - Discussed with patient that there are no exact guidelines on the management of subclinical hyperthyroidism as there  is very limited clinical data on the long term benefits of treatment. However, for patients with endogenous subclinical hyperthyroidism at high risk for cardiac or skeletal complications (ie, older adults) and who have a TSH concentration less than 0.1 mU/L persistently, treatment is often considered.     - In Ms. Bolanos's case, I do not think treatment is indicated at this time.  - her TFT's can be followed yearly or sooner if concerning symptoms develop    2) multinodular goiter: Thyroid US completed in clinic today showed -   - The thyroid gland was enlarged by measured dimensions. Findings were consistent with a multinodular goiter.    - Multiple isoechoic, spongiform nodules were identified in both lobes as described above in the full report. These nodules lacked suspicious US characteristics and did not meet criteria for FNA.   - These nodules appeared similar to prior outside imaging completed from 2014 - 2017.     RECOMMENDATIONS: These nodules lacked suspicious US characteristics and did not meet criteria for FNA. Given pt's h/o subclinical hyperthyroidism, recommended NM thyroid uptake and scan for further characterization of the nodules.     I have ordered a NM thyroid uptake and scan to be completed at the pt's convenience.     RTC in one year.

## 2019-01-02 NOTE — PROGRESS NOTES
Thyroid Ultrasound Report  [facility]  [place], [place]    Date: 01/02/2019    Indication: multinodular goiter  Comparison Imaging:  Radiology Thyroid US 04/2014, 06/2015, 07/2017  Clinical History: 64 y.o. Female with h/o MNG and subclinical hyperthyroidism since 2014    Real time high resolution imaging of the thyroid gland was performed in transverse and longitudinal planes.  The right lobe measured 6.59 cm in Length x 3.00cm in AP diameter x 3.38cm in TV dimension.  The isthmus measured 0.49 cm in thickness.  The left thyroid lobe measured 5.65 cm in length x 2.29 cm in AP diameter x 2.77 cm in TV dimension.    The thyroid gland appeared overall isoechoic with diffusely heterogeneous echotexture.     In the right inferior lobe extending into the isthmus, a 2.25(L) x 1.53(AP) x 2.28(T) cm isoechoic, mostly solid nodule was identified. The nodule had a smooth margin, peripheral vascularity, and no microcalcifications.    In the right mid to superior lobe, a 1.90(L) x 1.34(AP) x 2.19(T) cm isoechoic, spongiform nodule was identified. The nodule had a smooth margin, minimal peripheral vascularity, and no microcalcifications.    In the left mid lobe, a 2.23(L) x 1.47(AP) x 2.07(T) cm isoechoic, spongiform nodule was identified. The nodule had a smooth margin, peripheral and intranodal vascularity, and no microcalcifications.    In the left mid lobe, 1.84(L) x 1.11(AP) x 1.38(T) cm isoechoic, spongiform nodule was identified. The nodule had a smooth margin, minimal peripheral vascularity, and no microcalcifications.     No pathologic lymph nodes were seen.     IMPRESSION:   1) The thyroid gland was enlarged by measured dimensions. Findings were consistent with a multinodular goiter.    2) Multiple isoechoic, spongiform nodules were identified in both lobes as described above. These nodules lacked suspicious US characteristics and did not meet criteria for FNA.   3) These nodules appeared similar to  prior outside imaging completed from 2014 - 2017.     RECOMMENDATIONS: These nodules lacked suspicious US characteristics and did not meet criteria for FNA. Given pt's h/o subclinical hyperthyroidism, recommended NM thyroid uptake and scan for further characterization of the nodules.

## 2019-01-07 ENCOUNTER — LAB (OUTPATIENT)
Dept: INTERNAL MEDICINE | Facility: CLINIC | Age: 64
End: 2019-01-07

## 2019-01-07 DIAGNOSIS — R73.01 IMPAIRED FASTING GLUCOSE: ICD-10-CM

## 2019-01-07 DIAGNOSIS — E05.90 SUBCLINICAL HYPERTHYROIDISM: ICD-10-CM

## 2019-01-07 DIAGNOSIS — E04.2 NON-TOXIC MULTINODULAR GOITER: ICD-10-CM

## 2019-01-07 LAB
HBA1C MFR BLD: 5.9 % (ref 4.8–5.6)
T4 FREE SERPL-MCNC: 1.39 NG/DL (ref 0.89–1.76)
TSH SERPL DL<=0.05 MIU/L-ACNC: 0.25 MIU/ML (ref 0.35–5.35)

## 2019-01-07 PROCEDURE — 84439 ASSAY OF FREE THYROXINE: CPT | Performed by: INTERNAL MEDICINE

## 2019-01-07 PROCEDURE — 84443 ASSAY THYROID STIM HORMONE: CPT | Performed by: INTERNAL MEDICINE

## 2019-01-07 PROCEDURE — 84480 ASSAY TRIIODOTHYRONINE (T3): CPT | Performed by: INTERNAL MEDICINE

## 2019-01-07 PROCEDURE — 83036 HEMOGLOBIN GLYCOSYLATED A1C: CPT | Performed by: INTERNAL MEDICINE

## 2019-01-08 ENCOUNTER — OFFICE VISIT (OUTPATIENT)
Dept: INTERNAL MEDICINE | Facility: CLINIC | Age: 64
End: 2019-01-08

## 2019-01-08 VITALS
HEART RATE: 51 BPM | WEIGHT: 139 LBS | OXYGEN SATURATION: 98 % | HEIGHT: 65 IN | SYSTOLIC BLOOD PRESSURE: 128 MMHG | DIASTOLIC BLOOD PRESSURE: 78 MMHG | BODY MASS INDEX: 23.16 KG/M2

## 2019-01-08 DIAGNOSIS — I10 ESSENTIAL HYPERTENSION: ICD-10-CM

## 2019-01-08 DIAGNOSIS — M15.9 PRIMARY OSTEOARTHRITIS INVOLVING MULTIPLE JOINTS: ICD-10-CM

## 2019-01-08 DIAGNOSIS — E05.90 SUBCLINICAL HYPERTHYROIDISM: ICD-10-CM

## 2019-01-08 DIAGNOSIS — F51.01 PRIMARY INSOMNIA: ICD-10-CM

## 2019-01-08 DIAGNOSIS — R73.01 IMPAIRED FASTING GLUCOSE: Primary | ICD-10-CM

## 2019-01-08 LAB — T3 SERPL-MCNC: 116 NG/DL (ref 71–180)

## 2019-01-08 PROCEDURE — 99214 OFFICE O/P EST MOD 30 MIN: CPT | Performed by: INTERNAL MEDICINE

## 2019-01-08 NOTE — PROGRESS NOTES
"Chief Complaint   Patient presents with   • Impaired fasting glucose   • Hypertension   • Hyperthyroidism       History of Present Illness  63 y.o.  woman presents for sugar and thyroid follow-up.  States still waiting to get scheduled for iodine test for her thyroid.  Reports recent stable u/s with Dr. Mae.    Has been trying to stay active and eat healthy. Has been walking 4-5 miles daily.      Has insomnia a few times per week, has been taking some benadryl, which has been ineffective but afraid of taking too much.    Also notes rheum has prescribed celebrex but she is concerned about potential cause for heart failure since she has (+) FH CHF.  Patient herself without heart failure dx, also denying difficulty with breathing, breathing when sleeping, and leg swelling. States celebrex is effective, but she also tries to ration it to only about 2x/week. Has been taking turmeric and wonders about adding fish oil for her joints.    Review of Systems  ROS (+) for RA joint pains.  ROS (+) for intermittent insomnia.  Denies CP, palpitations, SOB, orthopnea/PND, leg swelling, dizziness, lightheadedness, falling down. All other ROS reviewed and negative.    [facility]  The following portions of the patient's history were reviewed and updated as appropriate: allergies, current medications, past family history, past medical history, past social history, past surgical history and problem list.      Current Outpatient Medications:   •  amLODIPine (NORVASC) 5 MG QD  •  Biotin 17964 MCG tablet, QD  •  celecoxib (CELEBREX) 200 MG QD prn  •  cyclobenzaprine (FLEXERIL) 10 MG 1/2-1 tid prn   •  folic acid (FOLVITE) 1 MG tablet, QD  •  methotrexate 2.5 MG tablet, 6 weekly  •  simvastatin (ZOCOR) 20 MG QHS  •  TURMERIC PO, QD  •  vitamin B-6 (PYRIDOXINE) 50 MG 2 QD    VITALS:  /78   Pulse 51   Ht 165.1 cm (65\")   Wt 63 kg (139 lb)   SpO2 98%   BMI 23.13 kg/m²     Physical Exam   Constitutional: She is oriented to " person, place, and time. She appears well-developed and well-nourished.   Eyes: Conjunctivae and EOM are normal.   Cardiovascular: Normal rate, regular rhythm and normal heart sounds.   Pulmonary/Chest: Effort normal and breath sounds normal. No respiratory distress. She has no wheezes. She has no rales.   Abdominal: Soft. Bowel sounds are normal.   Musculoskeletal: She exhibits no edema (BLE).   Normal gait   Neurological: She is alert and oriented to person, place, and time.   Skin: Skin is warm and dry.   Psychiatric: She has a normal mood and affect. Her behavior is normal.   Nursing note and vitals reviewed.      LABS  Results for orders placed or performed in visit on 01/07/19   TSH   Result Value Ref Range    TSH 0.251 (L) 0.350 - 5.350 mIU/mL   Hemoglobin A1c   Result Value Ref Range    Hemoglobin A1C 5.90 (H) 4.80 - 5.60 %   T4, Free   Result Value Ref Range    Free T4 1.39 0.89 - 1.76 ng/dL   T3   Result Value Ref Range    T3, Total 116 71 - 180 ng/dL     7/18 A1C 6.1, TSH 0.283    ASSESSMENT/PLAN  Problem List Items Addressed This Visit     Subclinical hyperthyroidism     Asx - reviewed s/sxs of hyperthyroidism; no meds currently; thyr u/s updated per Dr. Mae and waiting on what sounds like thyroid uptake scan to be scheduled         Hypertension     BP stable on amlo 5mg QD         Impaired fasting glucose - Primary     BG control stable with A1C 5.9; encouraged reg phys activity to decr insulin resistance, moderation in unhealthy starches/sweets; f/u A1C in 6 mos           Osteoarthritis     Reviewed risks versus benefits of NSAIDs, particularly celebrex; note also with RA; also taking turmeric currently; unsure fish oil supplementation would contribute much to pain control; encouraged patient to take celebrex as needed, rec taking with food and monitoring for s/e; will cont to monitor renal function         Insomnia     Discussed good sleep hygiene; discussed scheduled benadryl for 7-10 nights to  "\"teset the sleep clock\"; could return to discuss other sleep RX options if needed               FOLLOW-UP  1. Health maintenance - rec hep A vacc series; will inquire whether patient rec'd 2nd dose of Shingrix  2. RTC for init AWV after 6/26/19; fasting labs wk prior to appt (CBC, CMP, TSH, lipids, UA/micr, A1C, FT4, CPK)    Electronically signed by:    Rosa M Venegas MD  01/08/2019      "

## 2019-01-08 NOTE — ASSESSMENT & PLAN NOTE
Asx - reviewed s/sxs of hyperthyroidism; no meds currently; thyr u/s updated per Dr. Mae and waiting on what sounds like thyroid uptake scan to be scheduled

## 2019-01-09 ENCOUNTER — TELEPHONE (OUTPATIENT)
Dept: ENDOCRINOLOGY | Facility: CLINIC | Age: 64
End: 2019-01-09

## 2019-01-09 ENCOUNTER — TELEPHONE (OUTPATIENT)
Dept: INTERNAL MEDICINE | Facility: CLINIC | Age: 64
End: 2019-01-09

## 2019-01-09 NOTE — TELEPHONE ENCOUNTER
----- Message from Rosa M Venegas MD sent at 1/8/2019 10:47 PM EST -----  pls ask patient and document if needed whether she got her 2nd dose of Shingrix; also flu vaccine this season - needs it if not done yet

## 2019-01-09 NOTE — TELEPHONE ENCOUNTER
Spoke to patient about thyroid lab results. See clinic note from 01/02/2019 for details.   Judi Mae MD

## 2019-01-09 NOTE — TELEPHONE ENCOUNTER
Pt called back to clarify, She actually got second shingrix in November (documented) along with Flu vaccine, had Hep A and goes back in May for second shot of Hep A.

## 2019-01-09 NOTE — PROGRESS NOTES
Pt states she got her flu vaccine in November and will be able to get her 2nd shingrix in May 2019.

## 2019-01-09 NOTE — ASSESSMENT & PLAN NOTE
Reviewed risks versus benefits of NSAIDs, particularly celebrex; note also with RA; also taking turmeric currently; unsure fish oil supplementation would contribute much to pain control; encouraged patient to take celebrex as needed, rec taking with food and monitoring for s/e; will cont to monitor renal function

## 2019-01-09 NOTE — ASSESSMENT & PLAN NOTE
"Discussed good sleep hygiene; discussed scheduled benadryl for 7-10 nights to \"teset the sleep clock\"; could return to discuss other sleep RX options if needed  "

## 2019-01-09 NOTE — TELEPHONE ENCOUNTER
Pt states she had flu vaccine in November (documented) and will be able to get 2nd Shingrix in May 2019

## 2019-02-11 ENCOUNTER — HOSPITAL ENCOUNTER (OUTPATIENT)
Dept: NUCLEAR MEDICINE | Facility: HOSPITAL | Age: 64
Discharge: HOME | End: 2019-02-11
Attending: INTERNAL MEDICINE

## 2019-02-11 DIAGNOSIS — E04.2 MULTINODULAR GOITER: ICD-10-CM

## 2019-02-11 DIAGNOSIS — E05.90 SUBCLINICAL HYPERTHYROIDISM: ICD-10-CM

## 2019-02-11 PROCEDURE — A9516 IODINE I-123 SOD IODIDE MIC: HCPCS | Performed by: INTERNAL MEDICINE

## 2019-02-11 PROCEDURE — 0 SODIUM IODIDE 3.7 MBQ CAPSULE: Performed by: INTERNAL MEDICINE

## 2019-02-11 PROCEDURE — 78014 THYROID IMAGING W/BLOOD FLOW: CPT

## 2019-02-11 RX ORDER — SODIUM IODIDE I 123 100 UCI/1
1 CAPSULE, GELATIN COATED ORAL
Status: COMPLETED | OUTPATIENT
Start: 2019-02-11 | End: 2019-02-11

## 2019-02-11 RX ADMIN — Medication 1 CAPSULE: at 10:35

## 2019-02-12 ENCOUNTER — HOSPITAL ENCOUNTER (OUTPATIENT)
Dept: NUCLEAR MEDICINE | Facility: HOSPITAL | Age: 64
Discharge: HOME | End: 2019-02-12
Attending: INTERNAL MEDICINE

## 2019-02-25 ENCOUNTER — TELEPHONE (OUTPATIENT)
Dept: INTERNAL MEDICINE | Facility: CLINIC | Age: 64
End: 2019-02-25

## 2019-02-25 NOTE — TELEPHONE ENCOUNTER
THIS MESSAGE WAS SUPPOSE TO GO TO DR NINO, JUST CALLED AGAIN, I TOLD HER I WOULD SEND TO DR NINO, PLEASE SEE PREVIOUS MESSAGE...THANKS

## 2019-02-26 NOTE — TELEPHONE ENCOUNTER
Spoke to patient about NM thyroid uptake and scan results. I have reviewed the images and correlated with her Thyroid US. No concerning features. No indication for FNA. Follow-up as scheduled.  Judi Mae MD

## 2019-04-23 ENCOUNTER — OFFICE VISIT (OUTPATIENT)
Dept: GYNECOLOGIC ONCOLOGY | Facility: CLINIC | Age: 64
End: 2019-04-23

## 2019-04-23 VITALS
HEART RATE: 68 BPM | DIASTOLIC BLOOD PRESSURE: 70 MMHG | WEIGHT: 139 LBS | SYSTOLIC BLOOD PRESSURE: 156 MMHG | RESPIRATION RATE: 14 BRPM | BODY MASS INDEX: 23.13 KG/M2 | OXYGEN SATURATION: 98 %

## 2019-04-23 DIAGNOSIS — Z01.419 WELL WOMAN EXAM WITH ROUTINE GYNECOLOGICAL EXAM: Primary | ICD-10-CM

## 2019-04-23 PROCEDURE — G0101 CA SCREEN;PELVIC/BREAST EXAM: HCPCS | Performed by: NURSE PRACTITIONER

## 2019-04-23 NOTE — PROGRESS NOTES
GYN ONCOLOGY ANNUAL WELL WOMAN VISIT      Carli Bolanos  [id number]  1955      Chief Complaint: Annual Exam (no gyn complaints)        History of present illness:  Carli Bolanos is a 64 y.o. year old female who is here today for an annual exam ongoing surveillance for Stage IA endometrial cancer, see history below. She reports she is feeling very well today and has no GYN complaints. She denies vaginal bleeding, pelvic pain, changes in bowel or bladder function, new or concerning lesions, and breast problems. All of her well woman screenings are UTD. She continues to be followed by Dr. Crow and Dr. Venegas regularly. She does note some joint stiffness and discomfort associated with her RA diagnosis. She has had some sickness and had to go off the methotrexate for brief time and has noticed an increased flare in her RA symptoms. She rates pain r/t arthritis 4/10 on pain scale today, particularly in hands. She is having a hearing test this afternoon.     Cancer History:      Endometrial/uterine adenocarcinoma (CMS/HCC)    2013 Initial Diagnosis     D&C for PMB and abnormal uterine ultrasound. Pathology revealed well differentiated adenocarcinoma in the background of CAH         3/15/2013 Surgery     RTLH/RSO, bilateral pelvic and periaortic LND, and omental biopsy. Stage IA grade 1 by final path            Obstetric History:  OB History      Para Term  AB Living    2 2            SAB TAB Ectopic Molar Multiple Live Births                        Menstrual History:     No LMP recorded. Patient is postmenopausal.          Past Medical History:   Diagnosis Date   • Abnormal thyroid ultrasound 2017    stable MNG (17) by u/s   • Abnormal thyroid ultrasound 2015    stable MNG (6/12/15)   • Abnormal thyroid ultrasound 2014    thyr u/s (14): MNG except dominant complex mass mid L. lobe, repeat u/s in 6 mos   • Endometrial cancer (CMS/HCC)    • H/O uterine leiomyoma     s/p  DUSTY/USO ('13)   • History of CT scan 02/27/2016    neg head ct   • Hx of bone density study 06/08/2015    DEXA (6/15) L -0.4, H0.7   • Hx of bone density study 07/17/2018    nl DEXA (7/17/18): L -0.4, H 0.2, repeat 3 yrs   • Hx of colonoscopy 03/11/2011    colonosc (3/11/11): diverticulosis, sm int hem, repeat 2018; CRS - Dr. Thornton   • Hx of colonoscopy 10/05/2018    colonosc (10/5/18): diverticulosis, redundant colon, int hem, repeat 5 yrs due to h/o polyps; CRS - Dr. Thornton   • Hx of EMG/NCV 02/27/2008    EMG/NCV (2/27/08): right median neuropathy   • Hx of hand x-ray 01/24/2018    R. hand xray (1/24/18): degen changes 1st metacarpal joint and index PIP   • Lumps on the skin 02/20/2013    R. temple lesion; evaluated by Dr. Amelia Gann 02/20/13 with upcoming excision   • Right arm numbness 06/13/2016    Impression: improved after CTS surgery per pt; 03/01/2016 - resolved at this time; consider pinched nerve due to positioning during sleep the night before; if sxs recur, rec updated EMG/NCV for further eval;        Past Surgical History:   Procedure Laterality Date   • CARPAL TUNNEL RELEASE Left     carpel tunnel left    • CERVICAL POLYPECTOMY  03/2005    with h/o fibroids and DUB   • CHOLECYSTECTOMY     • DILATATION AND CURETTAGE  02/22/2013   • EXCISION MASS HEAD/NECK Right 03/05/2013    s/p excision scalp lesion large R. parietal and L. temporal areas; plastics - Dr. Amelia Gann   • HIP ARTHROSCOPY Right 2004    with subsequent Floris resurfacing with hip replacement (5/11)   • HYSTERECTOMY      age 58   • OTHER SURGICAL HISTORY      KERATOTIC LESION   • SALPINGO OOPHORECTOMY Left 1991    secondary to tubal pregnancy and ovarian cyst   • SALPINGO OOPHORECTOMY Right     secondary to tubal pregnancy   • TOTAL HIP ARTHROPLASTY Left 06/2011   • TOTAL HIP ARTHROPLASTY Right 05/2011   • TOTAL LAPAROSCOPIC HYSTERECTOMY WITH DAVINCI ROBOT  03/05/2013    RTLH/BSO with LND and omental biopsy for endometrial cancer;  GYN Onc - Dr. Malin       MEDICATIONS: The current medication list was reviewed and reconciled.     Allergies:  is allergic to codeine; lisinopril; and naproxen.    Family History   Problem Relation Age of Onset   • Dementia Mother    • Cancer Mother         GYN   • Hip fracture Mother    • Thyroid disease Mother    • Hypertension Mother    • Cervical cancer Mother    • Heart attack Father    • Heart failure Father    • Diabetes Father    • Heart disease Father         CHF   • Prostate cancer Father    • Hypertension Father    • Hypertension Brother    • Heart disease Brother    • Kidney cancer Maternal Grandmother    • Colon cancer Maternal Grandfather 70   • Heart failure Paternal Grandmother    • Heart disease Paternal Grandmother    • Diabetes Paternal Grandfather    • Colon cancer Paternal Uncle 78   • Breast cancer Neg Hx    • Ovarian cancer Neg Hx        Health Maintenance:  Last mammogram was 8/24/2018. Last colonoscopy was 10/2018, with recommended follow-up in 7 year(s). Last DEXA was 7/17/2018. Last pap smear was 4/19/2018, results were  normal PAP..      Review of Systems   Constitutional: Negative for fatigue, fever and unexpected weight change.   HENT: Negative for congestion, ear pain, hearing loss, sinus pressure and trouble swallowing.    Eyes: Negative for visual disturbance.   Respiratory: Negative for cough, chest tightness, shortness of breath and wheezing.    Cardiovascular: Negative for chest pain, palpitations and leg swelling.   Gastrointestinal: Negative for abdominal distention, abdominal pain, constipation, diarrhea, nausea and vomiting.   Endocrine: Negative for cold intolerance, heat intolerance, polydipsia, polyphagia and polyuria.   Genitourinary: Negative for difficulty urinating, dyspareunia, dysuria, frequency, hematuria, pelvic pain, urgency, vaginal bleeding, vaginal discharge and vaginal pain.   Musculoskeletal: Positive for arthralgias. Negative for gait problem, joint  swelling and myalgias.   Skin: Negative for color change, pallor and rash.   Neurological: Negative for dizziness, seizures, syncope, weakness, light-headedness, numbness and headaches.   Hematological: Negative for adenopathy. Does not bruise/bleed easily.   Psychiatric/Behavioral: Negative for agitation, confusion, sleep disturbance and suicidal ideas. The patient is not nervous/anxious.        Physical Exam  Vital Signs: /70   Pulse 68   Resp 14   Wt 63 kg (139 lb)   SpO2 98%   BMI 23.13 kg/m²   Pain Score    04/23/19 0934   PainSc:   4   PainLoc: Hand      General Appearance:  alert, cooperative, no apparent distress and appears stated age   Neurologic/Psychiatric: A&O x 3, gait steady, appropriate affect   HEENT:  Normocephalic, without obvious abnormality, mucous membranes moist   Neck: Supple, symmetrical, trachea midline, no adenopathy;  No masses or tenderness. Small palpable right thyroid goiter, known   Back:   Symmetric, no curvature, ROM normal, no CVA tenderness   Lungs:   Clear to auscultation bilaterally; respirations regular, even, and unlabored bilaterally   Heart:  Regular rate and rhythm, no murmurs appreciated   Breasts:  Symmetrical, no masses, no lesions and no nipple discharge   Abdomen:   Soft, non-tender, non-distended and no organomegaly   Lymph nodes: No cervical, supraclavicular, inguinal or axillary adenopathy noted   Extremities: Normal, atraumatic; no clubbing, cyanosis, or edema    Skin: No rashes, ulcers, or suspicious lesions noted   Pelvic: External Genitalia  without lesions or skin changes  Vagina  is pale, atrophic.   Vaginal Cuff  Female Vaginal Cuff: smooth, intact and without visible lesions  Uterus  surgically absent and no palpable masses  Ovaries  surgically absent bilaterallly and without palpable masses or fullness  Parametria  smooth  Rectovaginal  Female rectovaginal: deferred     ECOG Performance Status: 0 - Asymptomatic    Procedure Note:  No notes on  file    Assessment and Plan:    Carli was seen today for annual exam.    Diagnoses and all orders for this visit:    Well woman exam with routine gynecological exam        There is no evidence of disease upon today's exam. She is understanding to call with any changes in pelvic symptoms or general GYN concerns at any time between regularly scheduled visits.     She was encouraged to get yearly mammograms.  She should report any palpable breast lump(s) or skin changes regardless of mammographic findings.  I explained to Carli that notification regarding her mammogram results will come from the center performing the study.  Our office will not be routinely calling with mammogram results.  It is her responsibility to make sure that the results from the mammogram are communicated to her by the breast center.  If she has any questions about the results, she is welcome to call our office anytime.    Colonoscopy due 2025 or sooner if new problems.    DEXA due approximately 2021.    Pain assessment was performed today as a part of patient’s care.  For patients with pain related to surgery, gynecologic malignancy or cancer treatment, the plan is as noted in the assessment/plan.  For patients with pain not related to these issues, they are to seek any further needed care from a more appropriate provider, such as PCP or rheumatologitst.    She continues to be followed by Dr. Mae for her goiter. She also is being followed by Dr. Crow for her RA and management of methotrexate. Dr. Venegas performs all of her primary care and keeps her well woman screenings UTD.       Return to clinic in 1 year for Annual exam.      GHADA Bello

## 2019-04-25 DIAGNOSIS — M05.79 RHEUMATOID ARTHRITIS INVOLVING MULTIPLE SITES WITH POSITIVE RHEUMATOID FACTOR (HCC): Primary | ICD-10-CM

## 2019-04-25 RX ORDER — FOLIC ACID 1 MG/1
2 TABLET ORAL DAILY
Start: 2019-04-25 | End: 2022-12-16

## 2019-04-25 NOTE — PROGRESS NOTES
Reviewed rheum note 4/24/19 - seen for RA    Note patient is on biotin 10,000 units QD    Will notify patient that biotin can interfere with some lab assays - may want to hold med within 1 week of whenever she has to get labs drawn in the future

## 2019-06-03 ENCOUNTER — OFFICE VISIT (OUTPATIENT)
Dept: INTERNAL MEDICINE | Facility: CLINIC | Age: 64
End: 2019-06-03

## 2019-06-03 VITALS
BODY MASS INDEX: 22.82 KG/M2 | WEIGHT: 137 LBS | HEART RATE: 63 BPM | SYSTOLIC BLOOD PRESSURE: 164 MMHG | OXYGEN SATURATION: 99 % | DIASTOLIC BLOOD PRESSURE: 78 MMHG | HEIGHT: 65 IN

## 2019-06-03 DIAGNOSIS — I10 ESSENTIAL HYPERTENSION: Primary | ICD-10-CM

## 2019-06-03 DIAGNOSIS — M05.79 RHEUMATOID ARTHRITIS INVOLVING MULTIPLE SITES WITH POSITIVE RHEUMATOID FACTOR (HCC): ICD-10-CM

## 2019-06-03 PROCEDURE — 99214 OFFICE O/P EST MOD 30 MIN: CPT | Performed by: INTERNAL MEDICINE

## 2019-06-03 PROCEDURE — 93000 ELECTROCARDIOGRAM COMPLETE: CPT | Performed by: INTERNAL MEDICINE

## 2019-06-03 NOTE — ASSESSMENT & PLAN NOTE
Discussed taking Celebrex more regularly if it is actually helpful for her symptoms; discussed monitoring side effects and complications of NSAIDs, including renal function; discussed role of DMARDs for RA and would defer recommendations to rheumatology; note current concerns with elevated blood pressure when she has gone in for new infusion treatment

## 2019-06-03 NOTE — ASSESSMENT & PLAN NOTE
BP elevated in office today but asymptomatic; she notes BP also elevated only at rheumatology on the times she went for the infusion but not her other regular follow-up visits there; remains asymptomatic with respect to elevated BP; discussed checking blood pressures more regularly at home; reviewed how to check blood pressure correctly; have asked her to assess whether the blood pressures are elevated in general versus only related to anxiety associated with the infusion; if BPs are elevated at home, will need to augment BP med therapy; if normotensive at home, consider pre-medicating with BP med prior to infusion versus dose of benzodiazepine to help calm her before the infusions; discussed risks of each medication option as well as goals; follow-up in 2 weeks and recommend that she bring her BP cuff with her so we can assess accuracy; EKG stable today; plsn to check labs if BPs are all elevated; goal BP < 130/80; remains on amlo 5mg QD

## 2019-06-03 NOTE — PROGRESS NOTES
Chief Complaint   Patient presents with   • Hypertension       History of Present Illness  64 y.o.  woman, accompanied by her  Elvis, presents for further evaluation of elevated blood pressure.  Has been to rheumatology twice now to initiate IV Orencia for RA.  Each time blood pressures were quite elevated.  At home she notes blood pressures in 120s-130s/70s-80s.  She feels the blood pressure is due to anxiety of potential side effects and complications of the medication.  She was given Benadryl prior to the last attempt but this also did not raise blood pressure, blood pressure up to 190/90.  She is only symptomatic with mild palpitations with heavy heartbeat but not beating fast.  No associated headaches, visual changes, chest pain, lightheadedness, confusion, nausea/vomiting.    Also try taking an extra dose of amlodipine in the morning of the last attempted infusion, and this also did not help blood pressure.  Amlodipine is usually taken at nighttime per patient.    Notes previously worsened arthritis with some fist and hand stiffness which is now improving.  Tries to utilize Celebrex only sparingly, currently only about once every 3 days.  Denies any known side effects with the Celebrex but has concerns regarding potential cardiac side effects.    Review of Systems  ROS (+) for elevated pressures only when in rheum office to initiate IV infusion per rheumatology.  She notes rheumatology has reviewed her previous visits there, and the blood pressures were normal.  Patient also notes normotensive readings at home.  Has heavy beating of the heart when the blood pressure is elevated at the rheumatology office but no tachycardia and no irreg beats; otherwise denies associated chest pain,  shortness of breath, visual changes, headaches, lightheadedness, dizziness, confusion, passing out.  All other ROS reviewed and negative.    PMSFH  The following portions of the patient's history were reviewed and  "updated as appropriate: allergies, current medications, past family history, past medical history, past social history, past surgical history and problem list.    Current Outpatient Medications:   •  amLODIPine (NORVASC) 5 MG tablet, QD  •  celecoxib (CELEBREX) 200 MG capsule, QD prn  •  cyclobenzaprine (FLEXERIL) 10 MG tablet, 1/2-1 tid prn muscle spasm  •  folic acid (FOLVITE) 1 MG tablet, 2 QD  •  methotrexate 2.5 MG tablet, 7 tabs weekly  •  simvastatin (ZOCOR) 20 MG tablet, TaQD  •  vitamin B-6 (PYRIDOXINE) 50 MG tablet, 2 QD    VITALS:  /78   Pulse 63   Ht 165.1 cm (65\")   Wt 62.1 kg (137 lb)   SpO2 99%   BMI 22.80 kg/m²   Repeat /74 right arm, 164/80 left arm    Physical Exam   Constitutional: She is oriented to person, place, and time. She appears well-developed and well-nourished.   Eyes: Conjunctivae and EOM are normal.   Cardiovascular: Normal rate, regular rhythm and normal heart sounds.   No murmur heard.  Pulmonary/Chest: Effort normal and breath sounds normal. No respiratory distress. She has no wheezes. She has no rales.   Abdominal: Soft. Bowel sounds are normal.   Musculoskeletal: She exhibits tenderness (Discomfort with range of motion of the wrist and fingers but no swelling, erythema, warmth).   Normal gait   Neurological: She is alert and oriented to person, place, and time.   Psychiatric: She has a normal mood and affect. Her behavior is normal.   Nursing note and vitals reviewed.      LABS  1/19 A1c 5.9      ECG 12 Lead  Date/Time: 6/3/2019 4:28 PM  Performed by: Rosa M Venegas MD  Authorized by: Rosa M Venegas MD   Comparison: compared with previous ECG from 7/30/2018  Similar to previous ECG  Comparison to previous ECG: Except no longer bradycardic  Rhythm: sinus rhythm  Rate: normal  BPM: 60  Conduction: conduction normal  ST Segments: ST segments normal  T Waves: T waves normal  QRS axis: normal    Clinical impression: normal ECG  Clinical impression comment: stable " EKG            ASSESSMENT/PLAN  Problem List Items Addressed This Visit     Hypertension - Primary     BP elevated in office today but asymptomatic; she notes BP also elevated only at rheumatology on the times she went for the infusion but not her other regular follow-up visits there; remains asymptomatic with respect to elevated BP; discussed checking blood pressures more regularly at home; reviewed how to check blood pressure correctly; have asked her to assess whether the blood pressures are elevated in general versus only related to anxiety associated with the infusion; if BPs are elevated at home, will need to augment BP med therapy; if normotensive at home, consider pre-medicating with BP med prior to infusion versus dose of benzodiazepine to help calm her before the infusions; discussed risks of each medication option as well as goals; follow-up in 2 weeks and recommend that she bring her BP cuff with her so we can assess accuracy; EKG stable today; plsn to check labs if BPs are all elevated; goal BP < 130/80; remains on amlo 5mg QD         Relevant Orders    ECG 12 Lead    Rheumatoid arthritis involving multiple sites with positive rheumatoid factor (CMS/[facility])     Discussed taking Celebrex more regularly if it is actually helpful for her symptoms; discussed monitoring side effects and complications of NSAIDs, including renal function; discussed role of DMARDs for RA and would defer recommendations to rheumatology; note current concerns with elevated blood pressure when she has gone in for new infusion treatment             Time Documentation  Counseled patient and  Elvis  Counseling topics: diagnosis, treatment options, follow up plan and discussion of mental health issues  Total encounter time: counseling time more than 50% of visit: 25 minutes    FOLLOW-UP  RTC 2wks with BP readings; also recommend bringing BP cuff to visit to check accuracy' chirag contact rheum at that time with plan for BP  treatment    Electronically signed by:    Rosa M Venegas MD  06/03/2019

## 2019-06-18 ENCOUNTER — OFFICE VISIT (OUTPATIENT)
Dept: INTERNAL MEDICINE | Facility: CLINIC | Age: 64
End: 2019-06-18

## 2019-06-18 VITALS
HEART RATE: 58 BPM | DIASTOLIC BLOOD PRESSURE: 82 MMHG | BODY MASS INDEX: 22.99 KG/M2 | WEIGHT: 138 LBS | HEIGHT: 65 IN | SYSTOLIC BLOOD PRESSURE: 144 MMHG | OXYGEN SATURATION: 99 %

## 2019-06-18 DIAGNOSIS — I10 ESSENTIAL HYPERTENSION: ICD-10-CM

## 2019-06-18 DIAGNOSIS — E78.00 PURE HYPERCHOLESTEROLEMIA: ICD-10-CM

## 2019-06-18 DIAGNOSIS — M05.79 RHEUMATOID ARTHRITIS INVOLVING MULTIPLE SITES WITH POSITIVE RHEUMATOID FACTOR (HCC): ICD-10-CM

## 2019-06-18 DIAGNOSIS — E05.90 SUBCLINICAL HYPERTHYROIDISM: ICD-10-CM

## 2019-06-18 DIAGNOSIS — R73.01 IMPAIRED FASTING GLUCOSE: ICD-10-CM

## 2019-06-18 DIAGNOSIS — F41.9 ANXIETY: ICD-10-CM

## 2019-06-18 DIAGNOSIS — I10 ESSENTIAL HYPERTENSION: Primary | ICD-10-CM

## 2019-06-18 LAB
ALBUMIN SERPL-MCNC: 4.8 G/DL (ref 3.5–5.2)
ALBUMIN/GLOB SERPL: 1.3 G/DL
ALP SERPL-CCNC: 87 U/L (ref 39–117)
ALT SERPL W P-5'-P-CCNC: 21 U/L (ref 1–33)
ANION GAP SERPL CALCULATED.3IONS-SCNC: 13.1 MMOL/L
AST SERPL-CCNC: 25 U/L (ref 1–32)
BACTERIA UR QL AUTO: NORMAL /HPF
BASOPHILS # BLD AUTO: 0.04 10*3/MM3 (ref 0–0.2)
BASOPHILS NFR BLD AUTO: 0.5 % (ref 0–1.5)
BILIRUB SERPL-MCNC: 0.5 MG/DL (ref 0.2–1.2)
BILIRUB UR QL STRIP: NEGATIVE
BUN BLD-MCNC: 14 MG/DL (ref 8–23)
BUN/CREAT SERPL: 21.9 (ref 7–25)
CALCIUM SPEC-SCNC: 9.8 MG/DL (ref 8.6–10.5)
CHLORIDE SERPL-SCNC: 100 MMOL/L (ref 98–107)
CHOLEST SERPL-MCNC: 146 MG/DL (ref 0–200)
CK SERPL-CCNC: 66 U/L (ref 20–180)
CLARITY UR: CLEAR
CO2 SERPL-SCNC: 26.9 MMOL/L (ref 22–29)
COLOR UR: YELLOW
CREAT BLD-MCNC: 0.64 MG/DL (ref 0.57–1)
DEPRECATED RDW RBC AUTO: 46.6 FL (ref 37–54)
EOSINOPHIL # BLD AUTO: 0.11 10*3/MM3 (ref 0–0.4)
EOSINOPHIL NFR BLD AUTO: 1.3 % (ref 0.3–6.2)
ERYTHROCYTE [DISTWIDTH] IN BLOOD BY AUTOMATED COUNT: 13.7 % (ref 12.3–15.4)
GFR SERPL CREATININE-BSD FRML MDRD: 93 ML/MIN/1.73
GLOBULIN UR ELPH-MCNC: 3.8 GM/DL
GLUCOSE BLD-MCNC: 80 MG/DL (ref 65–99)
GLUCOSE UR STRIP-MCNC: NEGATIVE MG/DL
HBA1C MFR BLD: 5.6 % (ref 4.8–5.6)
HCT VFR BLD AUTO: 43.2 % (ref 34–46.6)
HDLC SERPL-MCNC: 68 MG/DL (ref 40–60)
HGB BLD-MCNC: 13.4 G/DL (ref 12–15.9)
HGB UR QL STRIP.AUTO: NEGATIVE
HYALINE CASTS UR QL AUTO: NORMAL /LPF
IMM GRANULOCYTES # BLD AUTO: 0.04 10*3/MM3 (ref 0–0.05)
IMM GRANULOCYTES NFR BLD AUTO: 0.5 % (ref 0–0.5)
KETONES UR QL STRIP: NEGATIVE
LDLC SERPL CALC-MCNC: 65 MG/DL (ref 0–100)
LDLC/HDLC SERPL: 0.96 {RATIO}
LEUKOCYTE ESTERASE UR QL STRIP.AUTO: NEGATIVE
LYMPHOCYTES # BLD AUTO: 1.62 10*3/MM3 (ref 0.7–3.1)
LYMPHOCYTES NFR BLD AUTO: 18.8 % (ref 19.6–45.3)
MCH RBC QN AUTO: 28.9 PG (ref 26.6–33)
MCHC RBC AUTO-ENTMCNC: 31 G/DL (ref 31.5–35.7)
MCV RBC AUTO: 93.1 FL (ref 79–97)
MONOCYTES # BLD AUTO: 0.4 10*3/MM3 (ref 0.1–0.9)
MONOCYTES NFR BLD AUTO: 4.6 % (ref 5–12)
NEUTROPHILS # BLD AUTO: 6.41 10*3/MM3 (ref 1.7–7)
NEUTROPHILS NFR BLD AUTO: 74.3 % (ref 42.7–76)
NITRITE UR QL STRIP: NEGATIVE
NRBC BLD AUTO-RTO: 0 /100 WBC (ref 0–0.2)
PH UR STRIP.AUTO: 6.5 [PH] (ref 5–8)
PLATELET # BLD AUTO: 325 10*3/MM3 (ref 140–450)
PMV BLD AUTO: 10 FL (ref 6–12)
POTASSIUM BLD-SCNC: 4.6 MMOL/L (ref 3.5–5.2)
PROT SERPL-MCNC: 8.6 G/DL (ref 6–8.5)
PROT UR QL STRIP: NEGATIVE
RBC # BLD AUTO: 4.64 10*6/MM3 (ref 3.77–5.28)
RBC # UR: NORMAL /HPF
REF LAB TEST METHOD: NORMAL
SODIUM BLD-SCNC: 140 MMOL/L (ref 136–145)
SP GR UR STRIP: 1.01 (ref 1–1.03)
SQUAMOUS #/AREA URNS HPF: NORMAL /HPF
T4 FREE SERPL-MCNC: 1.61 NG/DL (ref 0.93–1.7)
TRIGL SERPL-MCNC: 65 MG/DL (ref 0–150)
TSH SERPL DL<=0.05 MIU/L-ACNC: 0.17 MIU/ML (ref 0.27–4.2)
UROBILINOGEN UR QL STRIP: NORMAL
VLDLC SERPL-MCNC: 13 MG/DL (ref 5–40)
WBC NRBC COR # BLD: 8.62 10*3/MM3 (ref 3.4–10.8)
WBC UR QL AUTO: NORMAL /HPF

## 2019-06-18 PROCEDURE — G0009 ADMIN PNEUMOCOCCAL VACCINE: HCPCS | Performed by: INTERNAL MEDICINE

## 2019-06-18 PROCEDURE — 83036 HEMOGLOBIN GLYCOSYLATED A1C: CPT | Performed by: INTERNAL MEDICINE

## 2019-06-18 PROCEDURE — 85025 COMPLETE CBC W/AUTO DIFF WBC: CPT | Performed by: INTERNAL MEDICINE

## 2019-06-18 PROCEDURE — 90732 PPSV23 VACC 2 YRS+ SUBQ/IM: CPT | Performed by: INTERNAL MEDICINE

## 2019-06-18 PROCEDURE — 99214 OFFICE O/P EST MOD 30 MIN: CPT | Performed by: INTERNAL MEDICINE

## 2019-06-18 PROCEDURE — 82550 ASSAY OF CK (CPK): CPT | Performed by: INTERNAL MEDICINE

## 2019-06-18 PROCEDURE — 81001 URINALYSIS AUTO W/SCOPE: CPT | Performed by: INTERNAL MEDICINE

## 2019-06-18 PROCEDURE — 82043 UR ALBUMIN QUANTITATIVE: CPT | Performed by: INTERNAL MEDICINE

## 2019-06-18 PROCEDURE — 80061 LIPID PANEL: CPT | Performed by: INTERNAL MEDICINE

## 2019-06-18 PROCEDURE — 82570 ASSAY OF URINE CREATININE: CPT | Performed by: INTERNAL MEDICINE

## 2019-06-18 PROCEDURE — 84439 ASSAY OF FREE THYROXINE: CPT | Performed by: INTERNAL MEDICINE

## 2019-06-18 PROCEDURE — 84443 ASSAY THYROID STIM HORMONE: CPT | Performed by: INTERNAL MEDICINE

## 2019-06-18 PROCEDURE — 80053 COMPREHEN METABOLIC PANEL: CPT | Performed by: INTERNAL MEDICINE

## 2019-06-18 RX ORDER — SIMVASTATIN 20 MG
TABLET ORAL
Qty: 90 TABLET | Refills: 2 | Status: SHIPPED | OUTPATIENT
Start: 2019-06-18 | End: 2020-03-09

## 2019-06-18 RX ORDER — AMLODIPINE BESYLATE 5 MG/1
TABLET ORAL
Qty: 90 TABLET | Refills: 2 | Status: SHIPPED | OUTPATIENT
Start: 2019-06-18 | End: 2020-03-09

## 2019-06-18 RX ORDER — PROPRANOLOL HYDROCHLORIDE 10 MG/1
10 TABLET ORAL 3 TIMES DAILY
Qty: 20 TABLET | Refills: 0 | Status: SHIPPED | OUTPATIENT
Start: 2019-06-18 | End: 2019-08-11 | Stop reason: SDUPTHER

## 2019-06-18 NOTE — PROGRESS NOTES
"Chief Complaint   Patient presents with   • Hypertension       History of Present Illness  64 y.o.  woman presents for blood pressure follow-up.  Home BP readings have been 110-120s/60-70s consistently.  Brings BP cuff with her today.    Continues to have reservations about proceeding with IV infusion medication recommended for RA versus continuing methotrexate.  Has just started trial of CBD oil.  Remains on Celebrex but would like to try to wean back on the Celebrex.  Notes some increased symptoms of the right hand as well as the right index finger.  Patient is right-handed.  Pain is better this week than it was a couple of weeks ago.    Thinks BP is elevated when she goes for the infusions due to anxiety and concerns regarding potential risks of the infusions.    Review of Systems  ROS (+) for joint pains attributed to RA, currently in the right hand.  Patient is right-handed.  Reports normotensive BP readings at home.  Denies visual changes, CP, palpitations, SOB, fainting.  ROS (+) for anxiety re: potential med complications/side effects. All other ROS reviewed and negative.    [facility]  The following portions of the patient's history were reviewed and updated as appropriate: allergies, current medications, past family history, past medical history, past social history, past surgical history and problem list.    Current Outpatient Medications:   •  amLODIPine (NORVASC) 5 MG tablet QD  •  celecoxib (CELEBREX) 200 MG capsule qd prn  •  cyclobenzaprine (FLEXERIL) 10 MG tablet, 1/2-1 tid prn  •  folic acid (FOLVITE) 1 MG tablet, 2 QD  •  methotrexate 2.5 MG tablet, 7 weekly  •  simvastatin (ZOCOR) 20 MG tablet, QPM  •  TURMERIC PO, QD  •  vitamin B-6 (PYRIDOXINE) 50 MG tablet, QD  •  CBD oil (from [facility], pharmacy in [place])    VITALS:  /82   Pulse 58   Ht 165.1 cm (65\")   Wt 62.6 kg (138 lb)   SpO2 99%   BMI 22.96 kg/m²   Repeat BP right arm 136/68, her machine 134/74    Physical Exam "   Constitutional: She is oriented to person, place, and time. She appears well-developed and well-nourished.   Eyes: Conjunctivae and EOM are normal.   Wears glasses   Cardiovascular: Normal rate, regular rhythm and normal heart sounds.   Pulmonary/Chest: Effort normal and breath sounds normal. No respiratory distress.   Abdominal: Soft. Bowel sounds are normal.   Musculoskeletal: She exhibits deformity (arthritic changes hands, note right index finger).   Decreased wrist ROM, holger ulnar deviation; normal gait   Neurological: She is alert and oriented to person, place, and time.   Psychiatric: She has a normal mood and affect. Her behavior is normal.   Nursing note and vitals reviewed.      LABS  Results for orders placed or performed in visit on 01/07/19   TSH   Result Value Ref Range    TSH 0.251 (L) 0.350 - 5.350 mIU/mL   Hemoglobin A1c   Result Value Ref Range    Hemoglobin A1C 5.90 (H) 4.80 - 5.60 %   T4, Free   Result Value Ref Range    Free T4 1.39 0.89 - 1.76 ng/dL   T3   Result Value Ref Range    T3, Total 116 71 - 180 ng/dL       ASSESSMENT/PLAN  Problem List Items Addressed This Visit     Subclinical hyperthyroidism     update TFTs holger with elev BP and anxiety re: RA infusion treatment         Relevant Medications    propranolol (INDERAL) 10 MG tablet    Other Relevant Orders    TSH    T4, Free    Hyperlipidemia     Update lipids on simva 20mg QD; she states Xeljanz is not an option due to lipids per rheum         Relevant Orders    Lipid Panel    CK    Hypertension - Primary     Normotensive readings at home; BP machine checked for accuracy and verified; repeat blood pressure improved today in the office; discussed elevated blood pressures prior to infusion is likely related to anxiety; options include a low-dose beta-blocker versus benzodiazepine; discussed risks and goals; patient is not for sure she wants to proceed with infusion; if she proceeds, she has Rx for Propranolol 10 mg TID prn #20, 0RF - she  was instructed to take a dose 1 hr prior to appt for infusion and monitor for effects and side effects; cont amlo 5mg QD         Relevant Medications    propranolol (INDERAL) 10 MG tablet    Other Relevant Orders    Comprehensive Metabolic Panel    Urinalysis With Microscopic - Urine, Clean Catch    Microalbumin / Creatinine Urine Ratio - Urine, Clean Catch    Urinalysis without microscopic (no culture) - Urine, Clean Catch    Urinalysis, Microscopic Only - Urine, Clean Catch    Impaired fasting glucose    Relevant Orders    Hemoglobin A1c    Rheumatoid arthritis involving multiple sites with positive rheumatoid factor (CMS/[facility])     Reviewed again role of DMARDs, to maintain her function and decrease risk of deformities, balanced again risks; remains on MTX and folic acid with prn celebrex; now patient is trying CBD oil; fu with rheum as scheduled and she has RX for propranolol for premedication for infusion treatment if she decides to proceed         Relevant Orders    CBC & Differential    CBC Auto Differential    Anxiety     elevated BPs before RA infusions attributed to stress/anxiety as she has been otherwise normotensive; discussed premedication options to incl beta-blockers versus benzos.  Reviewed risks and common side effects;  note her baseline HR in the 60s; if she decides to proceed, she has RX for propranolol 10mg tid prn #20, 0RF to take a dose 1 hr prior to appt; will need assess efficacy if she proceeds         Relevant Medications    propranolol (INDERAL) 10 MG tablet          FOLLOW-UP  1. Health maintenance - PVX given today; due for hep A #2 (get at pharmacy  2. RTC initial AWV 7/2/19 a scheduled; she is doing her fasting labs today    Electronically signed by:    Rosa M Venegas MD  06/18/2019

## 2019-06-18 NOTE — ASSESSMENT & PLAN NOTE
Normotensive readings at home; BP machine checked for accuracy and verified; repeat blood pressure improved today in the office; discussed elevated blood pressures prior to infusion is likely related to anxiety; options include a low-dose beta-blocker versus benzodiazepine; discussed risks and goals; patient is not for sure she wants to proceed with infusion; if she proceeds, she has Rx for Propranolol 10 mg TID prn #20, 0RF - she was instructed to take a dose 1 hr prior to appt for infusion and monitor for effects and side effects; cont amlo 5mg QD

## 2019-06-18 NOTE — ASSESSMENT & PLAN NOTE
Reviewed again role of DMARDs, to maintain her function and decrease risk of deformities, balanced again risks; remains on MTX and folic acid with prn celebrex; now patient is trying CBD oil; fu with rheum as scheduled and she has RX for propranolol for premedication for infusion treatment if she decides to proceed

## 2019-06-18 NOTE — ASSESSMENT & PLAN NOTE
elevated BPs before RA infusions attributed to stress/anxiety as she has been otherwise normotensive; discussed premedication options to incl beta-blockers versus benzos.  Reviewed risks and common side effects;  note her baseline HR in the 60s; if she decides to proceed, she has RX for propranolol 10mg tid prn #20, 0RF to take a dose 1 hr prior to appt; will need assess efficacy if she proceeds

## 2019-06-19 LAB
ALBUMIN UR-MCNC: <1.2 MG/L
CREAT UR-MCNC: 12.7 MG/DL
MICROALBUMIN/CREAT UR: NORMAL MG/G

## 2019-06-20 ENCOUNTER — OFFICE VISIT (OUTPATIENT)
Dept: INTERNAL MEDICINE | Facility: CLINIC | Age: 64
End: 2019-06-20

## 2019-06-20 VITALS
DIASTOLIC BLOOD PRESSURE: 68 MMHG | HEIGHT: 65 IN | BODY MASS INDEX: 23.29 KG/M2 | OXYGEN SATURATION: 97 % | HEART RATE: 73 BPM | TEMPERATURE: 98.5 F | WEIGHT: 139.8 LBS | SYSTOLIC BLOOD PRESSURE: 130 MMHG | RESPIRATION RATE: 18 BRPM

## 2019-06-20 DIAGNOSIS — T80.90XA INJECTION SITE REACTION, INITIAL ENCOUNTER: Primary | ICD-10-CM

## 2019-06-20 PROCEDURE — 99213 OFFICE O/P EST LOW 20 MIN: CPT | Performed by: INTERNAL MEDICINE

## 2019-06-20 RX ORDER — CEPHALEXIN 500 MG/1
500 CAPSULE ORAL 2 TIMES DAILY
Qty: 14 CAPSULE | Refills: 0 | Status: SHIPPED | OUTPATIENT
Start: 2019-06-20 | End: 2019-07-02

## 2019-06-20 RX ORDER — HEPATITIS A VACCINE 1440 [IU]/ML
INJECTION, SUSPENSION INTRAMUSCULAR
COMMUNITY
Start: 2019-06-03 | End: 2019-07-02

## 2019-06-20 RX ORDER — FOLIC ACID 1 MG/1
TABLET ORAL
COMMUNITY
End: 2019-07-02 | Stop reason: SDUPTHER

## 2019-06-20 NOTE — PROGRESS NOTES
Chief Complaint   Patient presents with   • Arm Swelling     L arm; redness and pain started 6/18/19 after immunization       Subjective   Carli Bolanos is a 64 y.o. female.       History of Present Illness     Pt had pneumovax 23 on 6/18. Later that evening she developed some hardness surrounding the injection site on lateral shoulder. The next day noticed some redness and swelling below the injection and down to her elbow. Felt tender to touch.Erythema continues today. Feels like the tenderness has improved but the redness and swelling may have moved down her arm somewhat. No fever or other systemic symptoms.        Current Outpatient Medications:   •  amLODIPine (NORVASC) 5 MG tablet, TAKE ONE TABLET BY MOUTH DAILY, Disp: 90 tablet, Rfl: 2  •  CBD (cannabidiol) oral oil, Take  by mouth., Disp: , Rfl:   •  celecoxib (CELEBREX) 200 MG capsule, Take 1 capsule by mouth Daily As Needed for Mild Pain ., Disp: , Rfl:   •  cyclobenzaprine (FLEXERIL) 10 MG tablet, 1/2-1 tid prn muscle spasm, Disp: 30 tablet, Rfl: 0  •  folic acid (FOLVITE) 1 MG tablet, , Disp: , Rfl:   •  methotrexate 2.5 MG tablet, Take 7 tablets by mouth 1 (One) Time Per Week., Disp: , Rfl:   •  simvastatin (ZOCOR) 20 MG tablet, TAKE ONE TABLET BY MOUTH EVERY EVENING, Disp: 90 tablet, Rfl: 2  •  TURMERIC PO, Take  by mouth., Disp: , Rfl:   •  vitamin B-6 (PYRIDOXINE) 50 MG tablet, Take 100 mg by mouth Daily., Disp: , Rfl:   •  cephalexin (KEFLEX) 500 MG capsule, Take 1 capsule by mouth 2 (Two) Times a Day., Disp: 14 capsule, Rfl: 0  •  folic acid (FOLVITE) 1 MG tablet, Take 2 tablets by mouth Daily., Disp: , Rfl:   •  HAVRIX 1440 EL U/ML vaccine, , Disp: , Rfl:   •  propranolol (INDERAL) 10 MG tablet, Take 1 tablet by mouth 3 (Three) Times a Day., Disp: 20 tablet, Rfl: 0     PMFSH  The following portions of the patient's history were reviewed and updated as appropriate: allergies, current medications, past family history, past medical history, past  "social history, past surgical history and problem list.    Review of Systems   Constitutional: Negative for activity change, appetite change and fatigue.   HENT: Negative for congestion and rhinorrhea.    Respiratory: Negative for chest tightness and shortness of breath.    Cardiovascular: Negative for chest pain and palpitations.   Gastrointestinal: Negative for abdominal pain.   Genitourinary: Negative for dysuria.   Musculoskeletal: Negative for arthralgias and myalgias.   Skin: Positive for rash.   Neurological: Negative for dizziness, weakness, light-headedness and headaches.   Psychiatric/Behavioral: Negative for dysphoric mood. The patient is not nervous/anxious.        Objective   /68   Pulse 73   Temp 98.5 °F (36.9 °C) (Oral)   Resp 18   Ht 165.1 cm (65\")   Wt 63.4 kg (139 lb 12.8 oz)   SpO2 97%   BMI 23.26 kg/m²     Physical Exam   Skin:        Well-demarcated erythematous area as depicted, no warmth or tenderness       ASSESSMENT/PLAN    Problem List Items Addressed This Visit     None      Visit Diagnoses     Injection site reaction, initial encounter    -  Primary    Reaction to vaccine vs cellulitis. Pt will use warm compresses and ibuprofen for symptom relief. Start keflex for any underlying infection.    Relevant Medications    cephalexin (KEFLEX) 500 MG capsule               Return if symptoms worsen or fail to improve.  "

## 2019-07-02 ENCOUNTER — OFFICE VISIT (OUTPATIENT)
Dept: INTERNAL MEDICINE | Facility: CLINIC | Age: 64
End: 2019-07-02

## 2019-07-02 VITALS
HEART RATE: 64 BPM | SYSTOLIC BLOOD PRESSURE: 118 MMHG | DIASTOLIC BLOOD PRESSURE: 64 MMHG | HEIGHT: 65 IN | WEIGHT: 141 LBS | OXYGEN SATURATION: 98 % | BODY MASS INDEX: 23.49 KG/M2

## 2019-07-02 DIAGNOSIS — R73.01 IMPAIRED FASTING GLUCOSE: ICD-10-CM

## 2019-07-02 DIAGNOSIS — Z00.00 MEDICARE ANNUAL WELLNESS VISIT, INITIAL: Primary | ICD-10-CM

## 2019-07-02 DIAGNOSIS — E04.2 NON-TOXIC MULTINODULAR GOITER: ICD-10-CM

## 2019-07-02 DIAGNOSIS — I10 ESSENTIAL HYPERTENSION: ICD-10-CM

## 2019-07-02 DIAGNOSIS — E78.00 PURE HYPERCHOLESTEROLEMIA: ICD-10-CM

## 2019-07-02 DIAGNOSIS — E05.90 SUBCLINICAL HYPERTHYROIDISM: ICD-10-CM

## 2019-07-02 DIAGNOSIS — M85.89 OSTEOPENIA OF MULTIPLE SITES: ICD-10-CM

## 2019-07-02 PROCEDURE — 99213 OFFICE O/P EST LOW 20 MIN: CPT | Performed by: INTERNAL MEDICINE

## 2019-07-02 PROCEDURE — G0438 PPPS, INITIAL VISIT: HCPCS | Performed by: INTERNAL MEDICINE

## 2019-07-02 NOTE — ASSESSMENT & PLAN NOTE
BG control stable/improved with A1C 5.6; encouraged reg phys activity to decr insulin resistance, moderation in unhealthy starches/sweets; f/u A1C in 6 mos

## 2019-07-02 NOTE — ASSESSMENT & PLAN NOTE
Reviewed results from uptake scan as well as thyroid ultrasound; discussed details of tiny nodular goiter as well as correlation with TFTs; she has follow-up with Dr. Mae 1/6/2020

## 2019-07-02 NOTE — PROGRESS NOTES
ANNUAL WELLNESS VISIT    DRUG AND ALCOHOL USE      no tobacco use, alcohol intake:1 glasses of wine per week and caffeine intake: 2 cups of caffeinated coffee per day    DIET AND PHYSICAL ACTIVITY     Diet: general    Exercise: daily   Exercise Details: walking    MOOD DISORDER AND COGNITIVE SCREENING   Depression Screening Tool Used yes - see PHQ-9; components reviewed with patient - denies depressed mood or feeling blue or not having fun doing activities she enjoys, (+) only for some sleeping issues.   Anxiety Screening Tool Used yes     Mini-Cog Performed   Yes    1. Tell Patient 3 Words apple table aarti    2. Administer Clock Test normal    3. Recall 3 words  apple table aarti    4. Number Correct Items 3    FUNCTIONAL ABILITY AND LEVEL OF SAFETY   Hearing mild hearing loss     Wears Hearing Aids No       Current Activities Independent      none  - see Funct/Cog Status Intake     Fall Risk Assessment       Has difficulty with walking or balance  No         Timed Up and Go (TUG) Test  8 sec.       If >12 sec, normal    ADVANCED DIRECTIVE Patient has an advance directive - a copy has been provided and is visible in patient header    PAIN SCREENING Do you have pain right now? yes      If so, 1-10 scale: 3     Intermittent     Do you have pain every day? Yes      Probable chronic pain: Yes     Recent Hospitalizations:  No recent hospitalization(s)..     MEDICATION REVIEW   - updated and reviewed (see Medication List).   - reviewed for potentially harmful drug-disease interactions in the elderly.   - reviewed for high risk medications in the elderly.   - aspirin use: No    BMI  Body mass index is 23.46 kg/m².    Patient's Body mass index is 23.46 kg/m². BMI is within normal parameters. No follow-up required..    ____________________________________________________  Chief Complaint   Patient presents with   • Medicare Wellness-Initial Visit   • Hypertension       History of Present Illness  64 y.o.  woman  "presents for initial AWV.  Has cut out soda (orange crush) and has been walking for exercise.    Review of Systems  Denies headaches, visual changes, CP, palpitations, SOB, cough, abd pain, n/v/d, difficulty with urination, numbness/tingling, falls, mood changes, lightheadedness, hearing changes, rashes.    Denies vaginal discharge or bleeding (no periods) or breast concerns.   All other ROS reviewed and negative.    [facility]  The following portions of the patient's history were reviewed and updated as appropriate: allergies, current medications, past family history, past medical history, past social history, past surgical history and problem list.      Current Outpatient Medications:   •  amLODIPine (NORVASC) 5 MG QD  •  CBD (cannabidiol) oral oil, QD  •  celecoxib (CELEBREX) 200 MG QD prn  •  cyclobenzaprine (FLEXERIL) 10 MG tablet, 1/2-1 tid prn  •  folic acid (FOLVITE) 1 MG tablet, 2 QD  •  methotrexate 2.5 MG tablet, 7 weekly  •  propranolol (INDERAL) 10 MG tablet, TID  •  simvastatin (ZOCOR) 20 MG QHS  •  TURMERIC PO, QD  •  vitamin B-6 (PYRIDOXINE) 50 MG 2 QD      VITALS:  /64   Pulse 64   Ht 165.1 cm (65\")   Wt 64 kg (141 lb)   SpO2 98%   BMI 23.46 kg/m²     Physical Exam   Constitutional: She is oriented to person, place, and time. She appears well-developed and well-nourished.   HENT:   Head: Normocephalic.   Right Ear: External ear normal.   Left Ear: External ear normal.   Nose: Nose normal.   Mouth/Throat: Oropharynx is clear and moist and mucous membranes are normal. No oropharyngeal exudate.   Eyes: Conjunctivae and EOM are normal. Pupils are equal, round, and reactive to light.   Wears glasses   Neck: Normal range of motion. Neck supple. Carotid bruit is not present (bilaterally). Thyromegaly (mild thyromegaly, R>L) present.   Cardiovascular: Normal rate, regular rhythm and normal heart sounds.   Pulmonary/Chest: Effort normal and breath sounds normal. No respiratory distress.   Abdominal: " Soft. Bowel sounds are normal. She exhibits no distension and no mass. There is no hepatosplenomegaly. There is no tenderness.   Genitourinary:   Genitourinary Comments: Breast/pelvic exams deferred to GYN     Musculoskeletal: Normal range of motion. She exhibits no edema.   Lymphadenopathy:     She has no cervical adenopathy.   Neurological: She is alert and oriented to person, place, and time. She displays normal reflexes. No cranial nerve deficit.   Skin: Skin is warm and dry. No rash noted.   Psychiatric: She has a normal mood and affect. Her behavior is normal.   Nursing note and vitals reviewed.      LABS  Results for orders placed or performed in visit on 06/18/19   Comprehensive Metabolic Panel   Result Value Ref Range    Glucose 80 65 - 99 mg/dL    BUN 14 8 - 23 mg/dL    Creatinine 0.64 0.57 - 1.00 mg/dL    Sodium 140 136 - 145 mmol/L    Potassium 4.6 3.5 - 5.2 mmol/L    Chloride 100 98 - 107 mmol/L    CO2 26.9 22.0 - 29.0 mmol/L    Calcium 9.8 8.6 - 10.5 mg/dL    Total Protein 8.6 (H) 6.0 - 8.5 g/dL    Albumin 4.80 3.50 - 5.20 g/dL    ALT (SGPT) 21 1 - 33 U/L    AST (SGOT) 25 1 - 32 U/L    Alkaline Phosphatase 87 39 - 117 U/L    Total Bilirubin 0.5 0.2 - 1.2 mg/dL    eGFR Non African Amer 93 >60 mL/min/1.73    Globulin 3.8 gm/dL    A/G Ratio 1.3 g/dL    BUN/Creatinine Ratio 21.9 7.0 - 25.0    Anion Gap 13.1 mmol/L   Lipid Panel   Result Value Ref Range    Total Cholesterol 146 0 - 200 mg/dL    Triglycerides 65 0 - 150 mg/dL    HDL Cholesterol 68 (H) 40 - 60 mg/dL    LDL Cholesterol  65 0 - 100 mg/dL    VLDL Cholesterol 13 5 - 40 mg/dL    LDL/HDL Ratio 0.96    TSH   Result Value Ref Range    TSH 0.171 (L) 0.270 - 4.200 mIU/mL   Hemoglobin A1c   Result Value Ref Range    Hemoglobin A1C 5.60 4.80 - 5.60 %   T4, Free   Result Value Ref Range    Free T4 1.61 0.93 - 1.70 ng/dL   Microalbumin / Creatinine Urine Ratio - Urine, Clean Catch   Result Value Ref Range    Microalbumin/Creatinine Ratio  mg/g     Creatinine, Urine 12.7 mg/dL    Microalbumin, Urine <1.2 mg/L   CK   Result Value Ref Range    Creatine Kinase 66 20 - 180 U/L   CBC Auto Differential   Result Value Ref Range    WBC 8.62 3.40 - 10.80 10*3/mm3    RBC 4.64 3.77 - 5.28 10*6/mm3    Hemoglobin 13.4 12.0 - 15.9 g/dL    Hematocrit 43.2 34.0 - 46.6 %    MCV 93.1 79.0 - 97.0 fL    MCH 28.9 26.6 - 33.0 pg    MCHC 31.0 (L) 31.5 - 35.7 g/dL    RDW 13.7 12.3 - 15.4 %    RDW-SD 46.6 37.0 - 54.0 fl    MPV 10.0 6.0 - 12.0 fL    Platelets 325 140 - 450 10*3/mm3    Neutrophil % 74.3 42.7 - 76.0 %    Lymphocyte % 18.8 (L) 19.6 - 45.3 %    Monocyte % 4.6 (L) 5.0 - 12.0 %    Eosinophil % 1.3 0.3 - 6.2 %    Basophil % 0.5 0.0 - 1.5 %    Immature Grans % 0.5 0.0 - 0.5 %    Neutrophils, Absolute 6.41 1.70 - 7.00 10*3/mm3    Lymphocytes, Absolute 1.62 0.70 - 3.10 10*3/mm3    Monocytes, Absolute 0.40 0.10 - 0.90 10*3/mm3    Eosinophils, Absolute 0.11 0.00 - 0.40 10*3/mm3    Basophils, Absolute 0.04 0.00 - 0.20 10*3/mm3    Immature Grans, Absolute 0.04 0.00 - 0.05 10*3/mm3    nRBC 0.0 0.0 - 0.2 /100 WBC   Urinalysis without microscopic (no culture) - Urine, Clean Catch   Result Value Ref Range    Color, UA Yellow Yellow, Straw    Appearance, UA Clear Clear    pH, UA 6.5 5.0 - 8.0    Specific Gravity, UA 1.006 1.005 - 1.030    Glucose, UA Negative Negative    Ketones, UA Negative Negative    Bilirubin, UA Negative Negative    Blood, UA Negative Negative    Protein, UA Negative Negative    Leuk Esterase, UA Negative Negative    Nitrite, UA Negative Negative    Urobilinogen, UA 0.2 E.U./dL 0.2 - 1.0 E.U./dL   Urinalysis, Microscopic Only - Urine, Clean Catch   Result Value Ref Range    RBC, UA 0-2 None Seen, 0-2 /HPF    WBC, UA 0-2 None Seen, 0-2 /HPF    Bacteria, UA None Seen None Seen /HPF    Squamous Epithelial Cells, UA 0-2 None Seen, 0-2 /HPF    Hyaline Casts, UA None Seen None Seen /LPF    Methodology Automated Microscopy        ASSESSMENT/PLAN  Problem List Items  Addressed This Visit     Subclinical hyperthyroidism     Bord TSH 0.171 with nl FT4; rec repeat TFTs in 6 mos; followed by Dr. Mae - had thyr uptake scan in 2/19, next appt pending 1/6/20         Hyperlipidemia     Lipids stable, at goal with LDL 65; on simva 20mg QHS - renew when needed         Hypertension     BP stable/good with stable electrolytes; on amlo 5mg QD renew when needed         Impaired fasting glucose     BG control stable/improved with A1C 5.6; encouraged reg phys activity to decr insulin resistance, moderation in unhealthy starches/sweets; f/u A1C in 6 mos           Non-toxic multinodular goiter     Reviewed results from uptake scan as well as thyroid ultrasound; discussed details of tiny nodular goiter as well as correlation with TFTs; she has follow-up with Dr. Mae 1/6/2020         Osteopenia      Calcium and vitamin D supplementation with weight-bearing exercise; DEXA 7/18, repeat 2021            Medicare annual wellness visit, initial - Primary     Health maintenance - flu vacc 11/18 (rec annually); Prevnar 6/15; PVX 3/14, Tdap 10/14 (Td due 2024), Zostavax 6/15; Shingrix done; HAV done; mammo 8/25/18, repeat 1-2 yrs; GYN care with nl Pap 4/18 per GHADA White (next apptt 4/20/20); DEXA 7/18, repeat 2021; colonosc 10/18, repeat 2023 per Dr. Thornton; eye exam with [facility] 4/19 (glasses); dental exam q6 mos (last 5/19); (+) seat belt use    Consultants:  Patient Care Team:  Rosa M Venegas MD as PCP - General  Rosa M Venegas MD as PCP - Internal Medicine  Ernesto Crow MD as PCP - Claims Attributed  Arthur Thornton MD as Consulting Physician (Colon and Rectal Surgery)  Jeromy Melissa, STERLING (Optometry)  Shalini Malin MD as Consulting Physician (Gynecologic Oncology)  Ernesto Crow MD as Consulting Physician (Rheumatology)  Bree Ware MD as Consulting Physician (Dermatology)  Quincy Fuentes MD as Consulting Physician (Ophthalmology)  Amelia Gann MD as Consulting  Physician (Plastic Surgery)  Judi Mae MD as Consulting Physician (Endocrinology)  Solange Alejandra APRN as Nurse Practitioner (Rheumatology)                   FOLLOW-UP  RTC 6 mos with A1C    Electronically signed by:    Rosa M Venegas MD  07/02/2019

## 2019-07-02 NOTE — ASSESSMENT & PLAN NOTE
Bord TSH 0.171 with nl FT4; rec repeat TFTs in 6 mos; followed by Dr. Mae - had thyr uptake scan in 2/19, next appt pending 1/6/20

## 2019-07-02 NOTE — ASSESSMENT & PLAN NOTE
Health maintenance - flu vacc 11/18 (rec annually); Prevnar 6/15; PVX 3/14, Tdap 10/14 (Td due 2024), Zostavax 6/15; Shingrix done; HAV done; mammo 8/25/18, repeat 1-2 yrs; GYN care with nl Pap 4/18 per GHADA White (next apptt 4/20/20); DEXA 7/18, repeat 2021; colonosc 10/18, repeat 2023 per Dr. Thornton; eye exam with [facility] 4/19 (glasses); dental exam q6 mos (last 5/19); (+) seat belt use    Consultants:  Patient Care Team:  Rosa M Venegas MD as PCP - General  Rosa M Venegas MD as PCP - Internal Medicine  Ernesto Crow MD as PCP - Claims Attributed  Arthur Thornton MD as Consulting Physician (Colon and Rectal Surgery)  Jeromy Melissa, STERLING (Optometry)  Shalini Malin MD as Consulting Physician (Gynecologic Oncology)  Ernesto Crow MD as Consulting Physician (Rheumatology)  Bree Ware MD as Consulting Physician (Dermatology)  Quincy Fuentes MD as Consulting Physician (Ophthalmology)  Amelia Gann MD as Consulting Physician (Plastic Surgery)  Judi Mae MD as Consulting Physician (Endocrinology)  Solange Alejandra APRN as Nurse Practitioner (Rheumatology)

## 2019-08-11 DIAGNOSIS — F41.9 ANXIETY: ICD-10-CM

## 2019-08-11 RX ORDER — PROPRANOLOL HYDROCHLORIDE 10 MG/1
TABLET ORAL
Qty: 20 TABLET | Refills: 0 | Status: SHIPPED | OUTPATIENT
Start: 2019-08-11 | End: 2020-01-06

## 2019-08-19 DIAGNOSIS — F41.9 ANXIETY: ICD-10-CM

## 2019-08-19 RX ORDER — PROPRANOLOL HYDROCHLORIDE 10 MG/1
TABLET ORAL
Qty: 20 TABLET | Refills: 0 | OUTPATIENT
Start: 2019-08-19

## 2019-08-22 ENCOUNTER — TELEPHONE (OUTPATIENT)
Dept: INTERNAL MEDICINE | Facility: CLINIC | Age: 64
End: 2019-08-22

## 2019-08-22 NOTE — TELEPHONE ENCOUNTER
Pt notified and verbalized understanding. She thinks she was just nervous and that her Bps are usually doing well. She states she will check them at home for a week to see if they are good at home.

## 2019-08-22 NOTE — TELEPHONE ENCOUNTER
----- Message from Rosa M Venegas MD sent at 8/22/2019  1:26 PM EDT -----  Regarding: needs BP f/u   Reviewed recent note from rheumatology.    BP was elevated 156/77    Needs office visit sooner than January 2020 if BP remains elevated consistently > 140/90 (goal is < 130/< 80)

## 2019-12-27 ENCOUNTER — APPOINTMENT (OUTPATIENT)
Dept: LAB | Facility: HOSPITAL | Age: 64
End: 2019-12-27

## 2019-12-27 ENCOUNTER — TELEPHONE (OUTPATIENT)
Dept: INTERNAL MEDICINE | Facility: CLINIC | Age: 64
End: 2019-12-27

## 2019-12-27 DIAGNOSIS — R73.01 IMPAIRED FASTING GLUCOSE: Primary | ICD-10-CM

## 2019-12-27 LAB — HBA1C MFR BLD: 5.94 % (ref 4.8–5.6)

## 2019-12-27 PROCEDURE — 83036 HEMOGLOBIN GLYCOSYLATED A1C: CPT | Performed by: INTERNAL MEDICINE

## 2019-12-27 NOTE — TELEPHONE ENCOUNTER
Pt went to the lab in [place] to have her A1C drawn. Ordered the A1C since she was already at the lab.

## 2020-01-06 ENCOUNTER — OFFICE VISIT (OUTPATIENT)
Dept: ENDOCRINOLOGY | Facility: CLINIC | Age: 65
End: 2020-01-06

## 2020-01-06 VITALS
HEIGHT: 65 IN | BODY MASS INDEX: 23.32 KG/M2 | HEART RATE: 58 BPM | OXYGEN SATURATION: 99 % | SYSTOLIC BLOOD PRESSURE: 120 MMHG | DIASTOLIC BLOOD PRESSURE: 76 MMHG | WEIGHT: 140 LBS

## 2020-01-06 DIAGNOSIS — E04.2 MULTINODULAR GOITER: ICD-10-CM

## 2020-01-06 DIAGNOSIS — E05.90 SUBCLINICAL HYPERTHYROIDISM: Primary | ICD-10-CM

## 2020-01-06 LAB
T3 SERPL-MCNC: 116 NG/DL (ref 80–200)
T4 FREE SERPL-MCNC: 1.59 NG/DL (ref 0.93–1.7)
TSH SERPL DL<=0.05 MIU/L-ACNC: 0.18 UIU/ML (ref 0.27–4.2)

## 2020-01-06 PROCEDURE — 84439 ASSAY OF FREE THYROXINE: CPT | Performed by: INTERNAL MEDICINE

## 2020-01-06 PROCEDURE — 84443 ASSAY THYROID STIM HORMONE: CPT | Performed by: INTERNAL MEDICINE

## 2020-01-06 PROCEDURE — 76536 US EXAM OF HEAD AND NECK: CPT | Performed by: INTERNAL MEDICINE

## 2020-01-06 PROCEDURE — 99213 OFFICE O/P EST LOW 20 MIN: CPT | Performed by: INTERNAL MEDICINE

## 2020-01-06 PROCEDURE — 84480 ASSAY TRIIODOTHYRONINE (T3): CPT | Performed by: INTERNAL MEDICINE

## 2020-01-06 NOTE — PROGRESS NOTES
Chief Complaint   Patient presents with   • Multinodular goiter     f/u        HPI:   Carli Bolanos is a 64 y.o.female who returns to Endocrine Clinic for f/u evaluation of her subclinical hyperthyroidism and multinodular goiter. Last visit 01/02/2019 Her history is as follows:    Interim Events:   - has been off biotin for the past 30 days  - using chlorhexidine troches for mouth sores due to methotrexate   - no other new medical problems    1) subclinical hyperthyroidism:  - per chart review, pt has had a slightly decreased TSH with normal free T4 and T3 levels since 2014. Her TSH has not been suppressed to <0.01. (TSH levels ranging 0.214 - 0.525)  - on further review, pt denies palpitations or tremor. Weight is stable. She had a mild weight loss of 5 lbs after 2018 which she attributes to her RA.     - has been off biotin for the past 30 days    2) multinodular goiter:  - first found on physical exam in 2014. Pt has had Thyroid US's completed at  radiology in 04/2014, 06/2015, and 07/2017  - no prior FNA  FMH: no known thyroid cancer, mother had thyroid disease  PMH: no h/o irradiation of head, neck, or chest    Summary of imaging with  radiology:  (04/2014): R mid - 1.9 x 2.7, R inf - 1.5 x 1.9, L mid 1.7 x 2.5 complex nodules  (06/2015): Report states stable nodules. Size and appearance of nodules are not described.  (07/2017): R sup- 2.1 x 1.0 x 1.4, R mid 2.3 x 1.7 x 2.6, L mid 2.3 x 1.8 x 2.3 complex nodules    Initial Endocrine Visit: (01/2019)  Thyroid US completed in clinic (01/02/2019) showed the thyroid gland was enlarged by measured dimensions. Findings were consistent with a multinodular goiter. Multiple isoechoic, spongiform nodules were identified in both lobes as described in the full report. These nodules lacked suspicious US characteristics and did not meet criteria for FNA. These nodules appeared similar to prior outside imaging completed from 2014 - 2017.   RECOMMENDATIONS: These  "nodules lacked suspicious US characteristics and did not meet criteria for FNA. Given pt's h/o subclinical hyperthyroidism, recommended NM thyroid uptake and scan for further characterization of the nodules.     A NM thyroid uptake and scan was completed at  radiology on 02/12/2019:  \"Four hour thyroid uptake at the lower limits of normal at 11%. 24-hour thyroid uptake is also in the lower range of normal at 25%\"  \"IMPRESSION: 4 hour and 24 hour thyroid uptake both at lower limits of normal. Please correlate with patient's serum thyroid profile.  Heterogeneous thyroid uptake pattern, with relatively large right thyroid lobe, and questionable small cold nodule possibly a cyst of the  left lateral mid thyroid pole.\"     Pt returns today for follow-up evaluation of her MNG.     Review of Systems   Constitutional:        Weight stable   HENT: Negative.    Eyes:        Dry eyes   Respiratory: Negative.    Cardiovascular: Negative.    Gastrointestinal: Negative.    Endocrine: Negative.    Genitourinary: Negative.    Musculoskeletal: Positive for arthralgias and myalgias.   Skin: Negative.    Allergic/Immunologic: Negative.    Neurological: Negative.    Hematological: Negative.    Psychiatric/Behavioral: Negative.      The following portions of the patient's history were reviewed and updated as appropriate: allergies, current medications, past family history, past medical history, past social history, past surgical history and problem list.    /76   Pulse 58   Ht 165.1 cm (65\")   Wt 63.5 kg (140 lb)   SpO2 99%   BMI 23.30 kg/m²   Physical Exam   Constitutional: She is oriented to person, place, and time. She appears well-developed. No distress.   HENT:   Head: Normocephalic.   Mouth/Throat: Oropharynx is clear and moist.   Eyes: Pupils are equal, round, and reactive to light. Conjunctivae and EOM are normal.   Neck: No tracheal deviation present. Thyromegaly (mild, right lobe larger than left) present.   No " palpable thyroid nodules     Cardiovascular: Normal rate, regular rhythm and normal heart sounds.   No murmur heard.  Pulmonary/Chest: Effort normal and breath sounds normal. No respiratory distress.   Abdominal: Soft. Bowel sounds are normal. She exhibits no mass. There is no tenderness.   Lymphadenopathy:     She has no cervical adenopathy.   Neurological: She is alert and oriented to person, place, and time. No cranial nerve deficit.   Skin: Skin is warm and dry. She is not diaphoretic. No erythema.   Psychiatric: She has a normal mood and affect. Her behavior is normal.   Vitals reviewed.    LABS/IMAGING: outside records reviewed and summarized in HPI  Office Visit on 01/06/2020   Component Date Value Ref Range Status   • TSH 01/06/2020 0.177* 0.270 - 4.200 uIU/mL Final   • Free T4 01/06/2020 1.59  0.93 - 1.70 ng/dL Final   • T3, Total 01/06/2020 116.0  80.0 - 200.0 ng/dl Final       PROCEDURES (in office):  Thyroid Ultrasound Report  [facility]  [place], [place]    Date: 01/06/2020    Indication: multinodular goiter  Comparison Imaging:  Radiology Thyroid US 04/2014, 06/2015, 07/2017, Endocrine Clinic Thyroid US 01/2019, NM Thyroid uptake and scan 02/2019  Clinical History: 65 y.o. Female with h/o MNG and subclinical hyperthyroidism since 2014    Real time high resolution imaging of the thyroid gland was performed in transverse and longitudinal planes.  The right lobe measured 6.07 cm in Length x 3.50 cm in AP diameter x 3.86 cm in TV dimension.  The isthmus measured 0.48 cm in thickness.  The left thyroid lobe measured 6.24 cm in length x 2.34 cm in AP diameter x 2.81 cm in TV dimension.    The thyroid gland appeared overall isoechoic with diffusely heterogeneous echotexture.     In the right inferior lobe extending into the isthmus, a 2.17(L) x 2.07(AP) x 2.36(T) cm isoechoic, mixed solid and cystic nodule was again identified. The nodule had a smooth margin, peripheral and intranodal  "vascularity, and no microcalcifications.    In the right mid to superior lobe, a 2.16(L) x 1.34(AP) x 2.21(T) cm isoechoic, spongiform nodule was again identified. The nodule had a smooth margin, minimal peripheral vascularity, and no microcalcifications.    In the right mid, a 2.28(L) x 2.01(AP) x 2.45(T) cm isoechoic, spongiform nodule was identified. The nodule's margin was not well defined. The nodule had minimal peripheral vascularity and no microcalcifications.    In the left mid lobe, a 2.42(L) x 2.06(AP) x 2.13(T) cm isoechoic, spongiform nodule was again identified. The nodule had a smooth margin, peripheral and intranodal vascularity, and no microcalcifications. Multiple comet tail artifacts consistent with colloid crystals were present. This nodule had a peripheral cystic area that correlated with the \"questionable small cold area\" in the left lateral mid thyroid pole seen on NM thyroid uptake and scan in 02/2019.     In the left mid lobe, medially, 1.79(L) x 1.08(AP) x 1.08(T) cm isoechoic, spongiform nodule was again identified. The nodule had a smooth margin, minimal peripheral vascularity, and no microcalcifications.     No pathologic lymph nodes were seen.     IMPRESSION:   1) The thyroid gland was enlarged by measured dimensions. Findings were consistent with a multinodular goiter.    2) Multiple isoechoic, spongiform nodules were identified in both lobes as described above. These nodules lacked suspicious US characteristics and did not meet criteria for FNA.   3) These nodules appeared similar to prior outside imaging completed from 2014 - 2019.     RECOMMENDATIONS: These nodules lacked suspicious US characteristics and did not meet criteria for FNA. Repeat Thyroid US recommended if changes in the gland/neck are noted on physical exam.       ASSESSMENT/PLAN:  1) subclinical hyperthyroidism due to multinodular goiter:  - labs completed today again show a slight decrease in TSH with normal free T4, " "normal Total T3    - Again reviewed with patient that there are no definitive guidelines on the management of subclinical hyperthyroidism as there is very limited clinical data on the long term benefits of treatment. However, for patients with endogenous subclinical hyperthyroidism at high risk for cardiac or skeletal complications (ie, older adults) and who have a TSH concentration less than 0.1 mU/L persistently, treatment is often considered.   - In Ms. Bolanos's case, I do not think treatment is indicated at this time.  - her TFT's can be followed yearly or sooner if concerning symptoms develop  - I will have her RTC in one year for follow-up of her subclinical hyperthyroidism     2) multinodular goiter: Thyroid US completed in clinic today showed -   - The thyroid gland was enlarged by measured dimensions. Findings were consistent with a multinodular goiter.    - Multiple isoechoic, spongiform nodules were identified in both lobes as described above. These nodules lacked suspicious US characteristics and did not meet criteria for FNA.   - These nodules appeared similar to prior outside imaging completed from 2014 - 2019.   - In the left mid lobe, a 2.42(L) x 2.06(AP) x 2.13(T) cm isoechoic, spongiform nodule was again identified. The nodule had a smooth margin, peripheral and intranodal vascularity, and no microcalcifications. Multiple comet tail artifacts consistent with colloid crystals were present. This nodule had a peripheral cystic area that correlated with the \"questionable small cold area\" in the left lateral mid thyroid pole seen on NM thyroid uptake and scan in 02/2019.     RECOMMENDATIONS: These nodules lacked suspicious US characteristics and did not meet criteria for FNA. Repeat Thyroid US recommended if changes in the gland/neck are noted on physical exam.     Pt scheduled to RTC in one year for f/u of her subclinical hyperthyroidism.         "

## 2020-01-06 NOTE — PROGRESS NOTES
"Thyroid Ultrasound Report  [facility]  [place], [place]    Date: 01/06/2020    Indication: multinodular goiter  Comparison Imaging:  Radiology Thyroid US 04/2014, 06/2015, 07/2017, Endocrine Clinic Thyroid US 01/2019, NM Thyroid uptake and scan 02/2019  Clinical History: 65 y.o. Female with h/o MNG and subclinical hyperthyroidism since 2014    Real time high resolution imaging of the thyroid gland was performed in transverse and longitudinal planes.  The right lobe measured 6.07 cm in Length x 3.50 cm in AP diameter x 3.86 cm in TV dimension.  The isthmus measured 0.48 cm in thickness.  The left thyroid lobe measured 6.24 cm in length x 2.34 cm in AP diameter x 2.81 cm in TV dimension.    The thyroid gland appeared overall isoechoic with diffusely heterogeneous echotexture.     In the right inferior lobe extending into the isthmus, a 2.17(L) x 2.07(AP) x 2.36(T) cm isoechoic, mixed solid and cystic nodule was again identified. The nodule had a smooth margin, peripheral and intranodal vascularity, and no microcalcifications.    In the right mid to superior lobe, a 2.16(L) x 1.34(AP) x 2.21(T) cm isoechoic, spongiform nodule was again identified. The nodule had a smooth margin, minimal peripheral vascularity, and no microcalcifications.    In the right mid, a 2.28(L) x 2.01(AP) x 2.45(T) cm isoechoic, spongiform nodule was identified. The nodule's margin was not well defined. The nodule had minimal peripheral vascularity and no microcalcifications.    In the left mid lobe, a 2.42(L) x 2.06(AP) x 2.13(T) cm isoechoic, spongiform nodule was again identified. The nodule had a smooth margin, peripheral and intranodal vascularity, and no microcalcifications. Multiple comet tail artifacts consistent with colloid crystals were present. This nodule had a peripheral cystic area that correlated with the \"questionable small cold area\" in the left lateral mid thyroid pole seen on NM thyroid uptake and scan in " 02/2019.     In the left mid lobe, medially, 1.79(L) x 1.08(AP) x 1.08(T) cm isoechoic, spongiform nodule was again identified. The nodule had a smooth margin, minimal peripheral vascularity, and no microcalcifications.     No pathologic lymph nodes were seen.     IMPRESSION:   1) The thyroid gland was enlarged by measured dimensions. Findings were consistent with a multinodular goiter.    2) Multiple isoechoic, spongiform nodules were identified in both lobes as described above. These nodules lacked suspicious US characteristics and did not meet criteria for FNA.   3) These nodules appeared similar to prior outside imaging completed from 2014 - 2019.     RECOMMENDATIONS: These nodules lacked suspicious US characteristics and did not meet criteria for FNA. Repeat Thyroid US recommended if changes in the gland/neck are noted on physical exam.

## 2020-01-07 ENCOUNTER — OFFICE VISIT (OUTPATIENT)
Dept: INTERNAL MEDICINE | Facility: CLINIC | Age: 65
End: 2020-01-07

## 2020-01-07 VITALS
HEIGHT: 65 IN | DIASTOLIC BLOOD PRESSURE: 70 MMHG | SYSTOLIC BLOOD PRESSURE: 118 MMHG | OXYGEN SATURATION: 99 % | WEIGHT: 139 LBS | BODY MASS INDEX: 23.16 KG/M2 | HEART RATE: 58 BPM

## 2020-01-07 DIAGNOSIS — M54.2 NECK PAIN ON RIGHT SIDE: ICD-10-CM

## 2020-01-07 DIAGNOSIS — R73.01 IMPAIRED FASTING GLUCOSE: Primary | ICD-10-CM

## 2020-01-07 DIAGNOSIS — E05.90 SUBCLINICAL HYPERTHYROIDISM: ICD-10-CM

## 2020-01-07 DIAGNOSIS — I10 ESSENTIAL HYPERTENSION: ICD-10-CM

## 2020-01-07 PROCEDURE — 99214 OFFICE O/P EST MOD 30 MIN: CPT | Performed by: INTERNAL MEDICINE

## 2020-01-07 NOTE — PROGRESS NOTES
"Chief Complaint   Patient presents with   • Impaired fasting glucose       History of Present Illness  64 y.o.  woman presents for sugar follow-up. Also brings paperwork from [employer] screening - all normal. Also saw Dr. Mae yesterday.    Complains of right neck pain, on/off for last 3-6 months.  Sleeps in recliner, sometimes worsened at nighttime.  Also has assoc'd popping and cracking.  Denies injuries or falls. Denies radiation of pain nor any numbness/tingling or weakness in the arms. Takes celebrex. Carries stress in upper back.    Review of Systems  ROS (+) for right neck pain that comes and goes with no assoc'd radicular sxs or weakness.  No neuro deficits incl no numbness/tingling. Denies CP, palpitations, SOB, abd pain, falls.  All other ROS reviewed and negative.    [facility]  The following portions of the patient's history were reviewed and updated as appropriate: allergies, current medications, past family history, past medical history, past social history, past surgical history and problem list.      Current Outpatient Medications:   •  amLODIPine (NORVASC) 5 MG QD  •  CBD (cannabidiol) oral oil, QD  •  celecoxib (CELEBREX) 200 MG QD prn  •  cyclobenzaprine (FLEXERIL) 10 MG tablet, 1/2-1 tid prn muscle spasm  •  folic acid (FOLVITE) 1 MG tablet, 2 QD  •  methotrexate 2.5 MG tablet, Take 7 tablets weekly  •  simvastatin (ZOCOR) 20 MG tablet, QD  •  TURMERIC PO, QD  •  vitamin B-6 (PYRIDOXINE) 50 MG tablet, 2 QD       VITALS:  /70   Pulse 58   Ht 165.1 cm (65\")   Wt 63 kg (139 lb)   SpO2 99%   BMI 23.13 kg/m²     Physical Exam   Constitutional: She is oriented to person, place, and time. She appears well-developed and well-nourished.   Eyes: Conjunctivae and EOM are normal.   Cardiovascular: Normal rate, regular rhythm and normal heart sounds.   Pulmonary/Chest: Effort normal and breath sounds normal. No respiratory distress.   Abdominal: Soft. Bowel sounds are normal.   Musculoskeletal: " She exhibits no tenderness (pain in the right paraspinal cervical muscles, not reproducible with palpation but mild spasm present; neck FROM).   bilat shoulders also FROM   Neurological: She is alert and oriented to person, place, and time. No sensory deficit.   Psychiatric: She has a normal mood and affect. Her behavior is normal.   Nursing note and vitals reviewed.      LABS  12/19 A1C 5.94 (was 5.6 in 6/19)    11/7/19 [employer] screening - nl screening tests for carotid, afib, AAA, PAD, osteoporosis; BMI nl at 22    ASSESSMENT/PLAN  Problem List Items Addressed This Visit     Subclinical hyperthyroidism     Saw Dr. Mae yesterday, s/p thyr u/s; recommended to proceed with thyr uptake scan for further eval of subclin hyperthyroidism; asx         Impaired fasting glucose - Primary     Stable A1C 5.94; encouraged reg phys activity to decr insulin resistance, moderation in unhealthy starches/sweets; f/u A1C in 6 mos           Hypertension     BP stable 118/70 on amlo 5mg QD           Other Visit Diagnoses     Neck pain on right side        x3-6 mos; likely musculoskeletal strain/spasm; already has prn celebrex and cyclobenzaprine; rec prn heat and creams; rec PT if persists/worsens; f/u if new sxs          FOLLOW-UP  1. Health maintenance - flu vacc 11/1/19 at pharmacy  2. RTC for AWV 7/7/20; fasting labs prior to appt (CBC, CMP, TSH, lipids, UA/micr, A1C, FT4, CPK)    Electronically signed by:    Rosa M Venegas MD, FACP  01/07/2020

## 2020-01-07 NOTE — ASSESSMENT & PLAN NOTE
Saw Dr. Mae yesterday, s/p thyr u/s; recommended to proceed with thyr uptake scan for further eval of subclin hyperthyroidism; asx

## 2020-01-07 NOTE — ASSESSMENT & PLAN NOTE
Stable A1C 5.94; encouraged reg phys activity to decr insulin resistance, moderation in unhealthy starches/sweets; f/u A1C in 6 mos

## 2020-03-09 DIAGNOSIS — I10 ESSENTIAL HYPERTENSION: ICD-10-CM

## 2020-03-09 DIAGNOSIS — E78.00 PURE HYPERCHOLESTEROLEMIA: ICD-10-CM

## 2020-03-09 RX ORDER — SIMVASTATIN 20 MG
TABLET ORAL
Qty: 90 TABLET | Refills: 0 | Status: SHIPPED | OUTPATIENT
Start: 2020-03-09 | End: 2020-06-04

## 2020-03-09 RX ORDER — AMLODIPINE BESYLATE 5 MG/1
TABLET ORAL
Qty: 90 TABLET | Refills: 0 | Status: SHIPPED | OUTPATIENT
Start: 2020-03-09 | End: 2020-06-04

## 2020-03-14 PROBLEM — L84 CORN OR CALLUS: Status: ACTIVE | Noted: 2020-03-14

## 2020-06-04 DIAGNOSIS — E78.00 PURE HYPERCHOLESTEROLEMIA: ICD-10-CM

## 2020-06-04 DIAGNOSIS — I10 ESSENTIAL HYPERTENSION: ICD-10-CM

## 2020-06-04 RX ORDER — SIMVASTATIN 20 MG
TABLET ORAL
Qty: 30 TABLET | Refills: 0 | Status: SHIPPED | OUTPATIENT
Start: 2020-06-04 | End: 2020-07-06

## 2020-06-04 RX ORDER — AMLODIPINE BESYLATE 5 MG/1
TABLET ORAL
Qty: 30 TABLET | Refills: 0 | Status: SHIPPED | OUTPATIENT
Start: 2020-06-04 | End: 2020-07-06

## 2020-06-30 ENCOUNTER — TELEPHONE (OUTPATIENT)
Dept: INTERNAL MEDICINE | Facility: CLINIC | Age: 65
End: 2020-06-30

## 2020-06-30 ENCOUNTER — OFFICE VISIT (OUTPATIENT)
Dept: GYNECOLOGIC ONCOLOGY | Facility: CLINIC | Age: 65
End: 2020-06-30

## 2020-06-30 VITALS
RESPIRATION RATE: 12 BRPM | SYSTOLIC BLOOD PRESSURE: 155 MMHG | BODY MASS INDEX: 23.13 KG/M2 | DIASTOLIC BLOOD PRESSURE: 77 MMHG | WEIGHT: 139 LBS | TEMPERATURE: 97.3 F | HEART RATE: 69 BPM

## 2020-06-30 DIAGNOSIS — E78.00 PURE HYPERCHOLESTEROLEMIA: ICD-10-CM

## 2020-06-30 DIAGNOSIS — I10 ESSENTIAL HYPERTENSION: ICD-10-CM

## 2020-06-30 DIAGNOSIS — Z12.31 ENCOUNTER FOR SCREENING MAMMOGRAM FOR MALIGNANT NEOPLASM OF BREAST: ICD-10-CM

## 2020-06-30 DIAGNOSIS — E05.90 SUBCLINICAL HYPERTHYROIDISM: ICD-10-CM

## 2020-06-30 DIAGNOSIS — R73.01 IMPAIRED FASTING GLUCOSE: ICD-10-CM

## 2020-06-30 DIAGNOSIS — K64.9 ACUTE HEMORRHOID: ICD-10-CM

## 2020-06-30 DIAGNOSIS — Z00.00 MEDICARE ANNUAL WELLNESS VISIT, INITIAL: Primary | ICD-10-CM

## 2020-06-30 DIAGNOSIS — Z12.72 ENCOUNTER FOR SCREENING FOR MALIGNANT NEOPLASM OF VAGINA: ICD-10-CM

## 2020-06-30 DIAGNOSIS — C54.1 ENDOMETRIAL/UTERINE ADENOCARCINOMA (HCC): ICD-10-CM

## 2020-06-30 DIAGNOSIS — Z01.419 WELL WOMAN EXAM WITH ROUTINE GYNECOLOGICAL EXAM: Primary | ICD-10-CM

## 2020-06-30 PROCEDURE — G0101 CA SCREEN;PELVIC/BREAST EXAM: HCPCS | Performed by: NURSE PRACTITIONER

## 2020-06-30 RX ORDER — HYDROCORTISONE ACETATE 25 MG/1
25 SUPPOSITORY RECTAL 2 TIMES DAILY
Qty: 12 SUPPOSITORY | Refills: 0 | Status: SHIPPED | OUTPATIENT
Start: 2020-06-30 | End: 2021-01-14

## 2020-06-30 NOTE — PROGRESS NOTES
GYN ONCOLOGY ANNUAL WELL WOMAN VISIT      Carli Bolanos  [id number]  1955      Chief Complaint: Annual Exam (no complaints)        History of present illness:  Carli Bolanos is a 65 y.o. year old female who is here today for an annual exam. She has a history of endometrial cancer as outlined below. She reports she is feeling very well today and has no complaints. She denies vaginal bleeding, pelvic pain, changes in bowel or bladder function, new or concerning lesions, and breast problems. Colonoscopy and DEXA are currently UTD. She is overdue for mammogram.       Cancer History:      Endometrial/uterine adenocarcinoma (CMS/HCC)    2013 Initial Diagnosis     D&C for PMB and abnormal uterine ultrasound. Pathology revealed well differentiated adenocarcinoma in the background of CAH      3/15/2013 Surgery     RTLH/RSO, bilateral pelvic and periaortic LND, and omental biopsy. Stage IA grade 1 by final path         Obstetric History:  OB History        2    Para   2    Term                AB        Living           SAB        TAB        Ectopic        Molar        Multiple        Live Births                   Menstrual History:     No LMP recorded. Patient is postmenopausal.          Past Medical History:   Diagnosis Date   • Abnormal thyroid ultrasound 2017    stable MNG (17) by u/s   • Abnormal thyroid ultrasound 2015    stable MNG (6/12/15)   • Abnormal thyroid ultrasound 2014    thyr u/s (14): MNG except dominant complex mass mid L. lobe, repeat u/s in 6 mos   • Arthritis    • Cataract still small   • Cholelithiasis Gall bladder removed   • Colon polyp    • Diverticulosis     When was this diagnosed?   • Endometrial cancer (CMS/HCC)    • H/O uterine leiomyoma     s/p DUSYT/USO ('13)   • History of CT scan 2016    neg head ct   • Hx of bone density study 2015    DEXA (6/15) L -0.4, H0.7   • Hx of bone density study 2018    nl DEXA (18): L  -0.4, H 0.2, repeat 3 yrs   • Hx of colonoscopy 03/11/2011    colonosc (3/11/11): diverticulosis, sm int hem, repeat 2018; CRS - Dr. Thornton   • Hx of colonoscopy 10/05/2018    colonosc (10/5/18): diverticulosis, redundant colon, int hem, repeat 5 yrs due to h/o polyps; CRS - Dr. Thornton   • Hx of EMG/NCV 02/27/2008    EMG/NCV (2/27/08): right median neuropathy   • Hx of hand x-ray 01/24/2018    R. hand xray (1/24/18): degen changes 1st metacarpal joint and index PIP   • Hyperlipidemia    • Hypertension    • Lumps on the skin 02/20/2013    R. temple lesion; evaluated by Dr. Amelia Gann 02/20/13 with upcoming excision   • Osteopenia    • Right arm numbness 06/13/2016    Impression: improved after CTS surgery per pt; 03/01/2016 - resolved at this time; consider pinched nerve due to positioning during sleep the night before; if sxs recur, rec updated EMG/NCV for further eval;    • Visual impairment        Past Surgical History:   Procedure Laterality Date   • APPENDECTOMY      taken out at gall bladder surgery   • CARPAL TUNNEL RELEASE Left     carpel tunnel left    • CERVICAL POLYPECTOMY  03/2005    with h/o fibroids and DUB   • CHOLECYSTECTOMY     • COLONOSCOPY  2011   • DILATATION AND CURETTAGE  02/22/2013   • EXCISION MASS HEAD/NECK Right 03/05/2013    s/p excision scalp lesion large R. parietal and L. temporal areas; plastics - Dr. Amelia Gann   • HIP ARTHROSCOPY Right 2004    with subsequent Ivory resurfacing with hip replacement (5/11)   • HYSTERECTOMY      age 58   • JOINT REPLACEMENT  2007 2011    both hips   • OTHER SURGICAL HISTORY      KERATOTIC LESION   • SALPINGO OOPHORECTOMY Left 1991    secondary to tubal pregnancy and ovarian cyst   • SALPINGO OOPHORECTOMY Right     secondary to tubal pregnancy   • TOTAL HIP ARTHROPLASTY Left 06/2011   • TOTAL HIP ARTHROPLASTY Right 05/2011   • TOTAL LAPAROSCOPIC HYSTERECTOMY WITH DAVINCI ROBOT  03/05/2013    RTLH/BSO with LND and omental biopsy for endometrial  cancer; GYN Onc - Dr. Malin       MEDICATIONS: The current medication list was reviewed and reconciled.     Allergies:  is allergic to codeine; lisinopril; and naproxen.    Family History   Problem Relation Age of Onset   • Dementia Mother    • Cancer Mother         GYN   • Hip fracture Mother    • Thyroid disease Mother    • Hypertension Mother    • Cervical cancer Mother    • Heart attack Father    • Heart failure Father    • Diabetes Father    • Heart disease Father         HEart failure   • Prostate cancer Father    • Hypertension Father    • Hypertension Brother    • Heart disease Brother    • Kidney cancer Maternal Grandmother    • Arthritis Maternal Grandmother    • Cancer Maternal Grandmother         Kidney Cancer   • Colon cancer Maternal Grandfather 70   • Cancer Maternal Grandfather         Colon cancer   • Heart failure Paternal Grandmother    • Heart disease Paternal Grandmother    • Arthritis Paternal Grandmother    • Hypertension Paternal Grandmother    • Diabetes Paternal Grandfather    • Colon cancer Paternal Uncle 78   • Breast cancer Neg Hx    • Ovarian cancer Neg Hx        Health Maintenance:  Last mammogram was 8/24/2018. Last colonoscopy was 10/2018, with recommended follow-up in 7 year(s). Last DEXA was 7/17/2018, normal. Last pap smear was 4/19/2018, results were  normal PAP..      Review of Systems   Constitutional: Negative for fatigue, fever and unexpected weight change.   HENT: Negative for congestion, ear pain, hearing loss, sinus pressure and trouble swallowing.    Eyes: Negative for visual disturbance.   Respiratory: Negative for cough, chest tightness, shortness of breath and wheezing.    Cardiovascular: Negative for chest pain, palpitations and leg swelling.   Gastrointestinal: Negative for abdominal distention, abdominal pain, constipation, diarrhea, nausea and vomiting.   Endocrine: Negative for cold intolerance, heat intolerance, polydipsia, polyphagia and polyuria.    Genitourinary: Negative for difficulty urinating, dyspareunia, dysuria, frequency, hematuria, pelvic pain, urgency, vaginal bleeding, vaginal discharge and vaginal pain.   Musculoskeletal: Negative for arthralgias, gait problem, joint swelling and myalgias.   Skin: Negative for color change, pallor and rash.   Neurological: Negative for dizziness, seizures, syncope, weakness, light-headedness, numbness and headaches.   Hematological: Negative for adenopathy. Does not bruise/bleed easily.   Psychiatric/Behavioral: Negative for agitation, confusion, sleep disturbance and suicidal ideas. The patient is not nervous/anxious.          Physical Exam  Vital Signs: /77   Pulse 69   Temp 97.3 °F (36.3 °C)   Resp 12   Wt 63 kg (139 lb)   BMI 23.13 kg/m²   Vitals:    06/30/20 0859   PainSc: 0-No pain           General Appearance:  alert, cooperative, no apparent distress, appears stated age and normal weight   Neurologic/Psychiatric: A&O x 3, gait steady, appropriate affect   HEENT:  Normocephalic, without obvious abnormality, mucous membranes moist   Neck: Supple, symmetrical, trachea midline, no adenopathy;  No thyromegaly, masses, or tenderness   Back:   Symmetric, no curvature, ROM normal, no CVA tenderness   Lungs:   Clear to auscultation bilaterally; respirations regular, even, and unlabored bilaterally   Heart:  Regular rate and rhythm, no murmurs appreciated   Breasts:  Symmetrical, no masses, no lesions and no nipple discharge   Abdomen:   Soft, non-tender, non-distended and no organomegaly   Lymph nodes: No cervical, supraclavicular, inguinal or axillary adenopathy noted   Extremities: Normal, atraumatic; no clubbing, cyanosis, or edema    Skin: No rashes, ulcers, or suspicious lesions noted   Pelvic: External Genitalia  atrophic, without lesions  Vagina  is pale, atrophic.   Vaginal Cuff  Female Vaginal Cuff: smooth, intact, without visible lesions and pap obtained  Uterus  surgically absent and no  palpable masses  Ovaries  surgically absent bilaterally  Parametria  smooth  Rectovaginal  Female rectovaginal: deferred and external acute inflammed hemorrhoid noted     ECOG Performance Status: (0) Fully Active - Able to Carry On All Pre-disease Performance Without Restriction    Procedure Note:  No notes on file    Assessment and Plan:    Carli was seen today for annual exam.    Diagnoses and all orders for this visit:    Well woman exam with routine gynecological exam    Endometrial/uterine adenocarcinoma (CMS/HCC)  -     Pap IG, Rfx HPV ASCU; Future    Encounter for screening for malignant neoplasm of vagina   -     Pap IG, Rfx HPV ASCU; Future    Encounter for screening mammogram for malignant neoplasm of breast   -     Mammo Screening Digital Tomosynthesis Bilateral With CAD; Future    Acute hemorrhoid    Other orders  -     hydrocortisone (ANUSOL-HC) 25 MG suppository; Insert 1 suppository into the rectum 2 (Two) Times a Day.        There is no evidence of disease upon today's exam. She is understanding to call with any changes in pelvic symptoms or general GYN concerns at any time between regularly scheduled visits.     Pap was done today. If she does not receive the results of the Pap within 2 weeks  time, she was instructed to call to find out the results.      Anusol suppositories sent to local pharmacy for acute hemorrhoid.     She was encouraged to get yearly mammograms.  She should report any palpable breast lump(s) or skin changes regardless of mammographic findings.  I explained to Carli that notification regarding her mammogram results will come from the center performing the study.  Our office will not be routinely calling with mammogram results.  It is her responsibility to make sure that the results from the mammogram are communicated to her by the breast center.  If she has any questions about the results, she is welcome to call our office anytime.    Repeat colonoscopy in 2025 or sooner if  new problems.     Repeat DEXA in 2021.     Pain assessment was performed today as a part of patient’s care. For patients with pain related to surgery, gynecologic malignancy or cancer treatment, the plan is as noted in the assessment/plan.  For patients with pain not related to these issues, they are to seek any further needed care from a more appropriate provider, such as PCP.      Advance Care Planning   ACP discussion was held with the patient during this visit. Patient does not have an advance directive, information provided.       Return to clinic in 1 year for Annual exam.      GHADA Bello

## 2020-07-06 DIAGNOSIS — E78.00 PURE HYPERCHOLESTEROLEMIA: ICD-10-CM

## 2020-07-06 DIAGNOSIS — I10 ESSENTIAL HYPERTENSION: ICD-10-CM

## 2020-07-06 RX ORDER — SIMVASTATIN 20 MG
TABLET ORAL
Qty: 30 TABLET | Refills: 0 | Status: SHIPPED | OUTPATIENT
Start: 2020-07-06 | End: 2020-07-14 | Stop reason: SDUPTHER

## 2020-07-06 RX ORDER — AMLODIPINE BESYLATE 5 MG/1
TABLET ORAL
Qty: 30 TABLET | Refills: 0 | Status: SHIPPED | OUTPATIENT
Start: 2020-07-06 | End: 2020-07-14 | Stop reason: SDUPTHER

## 2020-07-10 ENCOUNTER — LAB (OUTPATIENT)
Dept: LAB | Facility: HOSPITAL | Age: 65
End: 2020-07-10

## 2020-07-10 DIAGNOSIS — E78.00 PURE HYPERCHOLESTEROLEMIA: ICD-10-CM

## 2020-07-10 DIAGNOSIS — I10 ESSENTIAL HYPERTENSION: ICD-10-CM

## 2020-07-10 DIAGNOSIS — Z00.00 MEDICARE ANNUAL WELLNESS VISIT, INITIAL: ICD-10-CM

## 2020-07-10 DIAGNOSIS — R73.01 IMPAIRED FASTING GLUCOSE: ICD-10-CM

## 2020-07-10 DIAGNOSIS — E05.90 SUBCLINICAL HYPERTHYROIDISM: ICD-10-CM

## 2020-07-10 LAB
ALBUMIN SERPL-MCNC: 4.6 G/DL (ref 3.5–5.2)
ALBUMIN/GLOB SERPL: 1.4 G/DL
ALP SERPL-CCNC: 63 U/L (ref 39–117)
ALT SERPL W P-5'-P-CCNC: 28 U/L (ref 1–33)
ANION GAP SERPL CALCULATED.3IONS-SCNC: 13.8 MMOL/L (ref 5–15)
AST SERPL-CCNC: 28 U/L (ref 1–32)
BACTERIA UR QL AUTO: NORMAL /HPF
BASOPHILS # BLD AUTO: 0.03 10*3/MM3 (ref 0–0.2)
BASOPHILS NFR BLD AUTO: 0.5 % (ref 0–1.5)
BILIRUB SERPL-MCNC: 0.4 MG/DL (ref 0–1.2)
BILIRUB UR QL STRIP: NEGATIVE
BUN SERPL-MCNC: 17 MG/DL (ref 8–23)
BUN/CREAT SERPL: 24.3 (ref 7–25)
CALCIUM SPEC-SCNC: 10 MG/DL (ref 8.6–10.5)
CHLORIDE SERPL-SCNC: 103 MMOL/L (ref 98–107)
CHOLEST SERPL-MCNC: 146 MG/DL (ref 0–200)
CK SERPL-CCNC: 68 U/L (ref 20–180)
CLARITY UR: CLEAR
CO2 SERPL-SCNC: 25.2 MMOL/L (ref 22–29)
COLOR UR: YELLOW
CREAT SERPL-MCNC: 0.7 MG/DL (ref 0.57–1)
DEPRECATED RDW RBC AUTO: 42.6 FL (ref 37–54)
EOSINOPHIL # BLD AUTO: 0.08 10*3/MM3 (ref 0–0.4)
EOSINOPHIL NFR BLD AUTO: 1.3 % (ref 0.3–6.2)
ERYTHROCYTE [DISTWIDTH] IN BLOOD BY AUTOMATED COUNT: 13.4 % (ref 12.3–15.4)
GFR SERPL CREATININE-BSD FRML MDRD: 83 ML/MIN/1.73
GLOBULIN UR ELPH-MCNC: 3.3 GM/DL
GLUCOSE SERPL-MCNC: 87 MG/DL (ref 65–99)
GLUCOSE UR STRIP-MCNC: NEGATIVE MG/DL
HBA1C MFR BLD: 5.7 % (ref 4.8–5.6)
HCT VFR BLD AUTO: 39 % (ref 34–46.6)
HDLC SERPL-MCNC: 64 MG/DL (ref 40–60)
HGB BLD-MCNC: 13.2 G/DL (ref 12–15.9)
HGB UR QL STRIP.AUTO: NEGATIVE
HYALINE CASTS UR QL AUTO: NORMAL /LPF
IMM GRANULOCYTES # BLD AUTO: 0.01 10*3/MM3 (ref 0–0.05)
IMM GRANULOCYTES NFR BLD AUTO: 0.2 % (ref 0–0.5)
KETONES UR QL STRIP: NEGATIVE
LDLC SERPL CALC-MCNC: 69 MG/DL (ref 0–100)
LDLC/HDLC SERPL: 1.07 {RATIO}
LEUKOCYTE ESTERASE UR QL STRIP.AUTO: NEGATIVE
LYMPHOCYTES # BLD AUTO: 1.37 10*3/MM3 (ref 0.7–3.1)
LYMPHOCYTES NFR BLD AUTO: 22.5 % (ref 19.6–45.3)
MCH RBC QN AUTO: 29.6 PG (ref 26.6–33)
MCHC RBC AUTO-ENTMCNC: 33.8 G/DL (ref 31.5–35.7)
MCV RBC AUTO: 87.4 FL (ref 79–97)
MONOCYTES # BLD AUTO: 0.46 10*3/MM3 (ref 0.1–0.9)
MONOCYTES NFR BLD AUTO: 7.5 % (ref 5–12)
NEUTROPHILS NFR BLD AUTO: 4.15 10*3/MM3 (ref 1.7–7)
NEUTROPHILS NFR BLD AUTO: 68 % (ref 42.7–76)
NITRITE UR QL STRIP: NEGATIVE
NRBC BLD AUTO-RTO: 0 /100 WBC (ref 0–0.2)
PH UR STRIP.AUTO: 7 [PH] (ref 5–8)
PLATELET # BLD AUTO: 255 10*3/MM3 (ref 140–450)
PMV BLD AUTO: 9.5 FL (ref 6–12)
POTASSIUM SERPL-SCNC: 5 MMOL/L (ref 3.5–5.2)
PROT SERPL-MCNC: 7.9 G/DL (ref 6–8.5)
PROT UR QL STRIP: NEGATIVE
RBC # BLD AUTO: 4.46 10*6/MM3 (ref 3.77–5.28)
RBC # UR: NORMAL /HPF
REF LAB TEST METHOD: NORMAL
SODIUM SERPL-SCNC: 142 MMOL/L (ref 136–145)
SP GR UR STRIP: 1.01 (ref 1–1.03)
SQUAMOUS #/AREA URNS HPF: NORMAL /HPF
T4 FREE SERPL-MCNC: 1.45 NG/DL (ref 0.93–1.7)
TRIGL SERPL-MCNC: 67 MG/DL (ref 0–150)
TSH SERPL DL<=0.05 MIU/L-ACNC: 0.22 UIU/ML (ref 0.27–4.2)
UROBILINOGEN UR QL STRIP: NORMAL
VLDLC SERPL-MCNC: 13.4 MG/DL (ref 5–40)
WBC # BLD AUTO: 6.1 10*3/MM3 (ref 3.4–10.8)
WBC UR QL AUTO: NORMAL /HPF

## 2020-07-10 PROCEDURE — 84443 ASSAY THYROID STIM HORMONE: CPT | Performed by: INTERNAL MEDICINE

## 2020-07-10 PROCEDURE — 80061 LIPID PANEL: CPT | Performed by: INTERNAL MEDICINE

## 2020-07-10 PROCEDURE — 84439 ASSAY OF FREE THYROXINE: CPT | Performed by: INTERNAL MEDICINE

## 2020-07-10 PROCEDURE — 82550 ASSAY OF CK (CPK): CPT | Performed by: INTERNAL MEDICINE

## 2020-07-10 PROCEDURE — 83036 HEMOGLOBIN GLYCOSYLATED A1C: CPT | Performed by: INTERNAL MEDICINE

## 2020-07-10 PROCEDURE — 81001 URINALYSIS AUTO W/SCOPE: CPT | Performed by: INTERNAL MEDICINE

## 2020-07-10 PROCEDURE — 85025 COMPLETE CBC W/AUTO DIFF WBC: CPT | Performed by: INTERNAL MEDICINE

## 2020-07-10 PROCEDURE — 80053 COMPREHEN METABOLIC PANEL: CPT | Performed by: INTERNAL MEDICINE

## 2020-07-12 RX ORDER — SIMVASTATIN 20 MG
20 TABLET ORAL EVERY EVENING
Qty: 90 TABLET | Refills: 3 | Status: CANCELLED | OUTPATIENT
Start: 2020-07-12

## 2020-07-12 RX ORDER — AMLODIPINE BESYLATE 5 MG/1
5 TABLET ORAL DAILY
Qty: 90 TABLET | Refills: 3 | Status: CANCELLED | OUTPATIENT
Start: 2020-07-12

## 2020-07-13 NOTE — ASSESSMENT & PLAN NOTE
Health maintenance - flu vacc 10/19 (rec annually in the fall); Prevnar 6/15; PVX 3/14, Tdap 10/14 (next Td due 2024), Zostavax 6/15; HAV and Shingrix done; mammo pending 10/9/20; GYN care 6/20 per GHADA Tim; DEXA 7/18, repeat 2021; colonosc 10/18, repeat 2023 per Dr. Thornton; eye exam with 5.20 w/ Dr. Avani Fuentes; dental exam 6/20, done q6 mos; (+) seat belt use    Consultants:  Patient Care Team:  Rosa M Venegas MD as PCP - General  Rosa M Venegas MD as PCP - Internal Medicine  Rosa M Venegas MD as PCP - Claims Attributed  Arthur Thornton MD as Consulting Physician (Colon and Rectal Surgery)  Jeromy Melissa, STERLING (Optometry)  Shalini Malin MD as Consulting Physician (Gynecologic Oncology)  Ernesto Crow MD as Consulting Physician (Rheumatology)  Bree Ware MD as Consulting Physician (Dermatology)  Quincy Fuentes MD as Consulting Physician (Ophthalmology)  Amelia Gann MD as Consulting Physician (Plastic Surgery)  Judi Mae MD as Consulting Physician (Endocrinology)  Solange Alejandra APRN as Nurse Practitioner (Rheumatology)  Dillon Alcantara DPM (Podiatry)

## 2020-07-14 ENCOUNTER — OFFICE VISIT (OUTPATIENT)
Dept: INTERNAL MEDICINE | Facility: CLINIC | Age: 65
End: 2020-07-14

## 2020-07-14 VITALS
HEIGHT: 65 IN | OXYGEN SATURATION: 99 % | DIASTOLIC BLOOD PRESSURE: 78 MMHG | HEART RATE: 59 BPM | SYSTOLIC BLOOD PRESSURE: 128 MMHG | BODY MASS INDEX: 22.99 KG/M2 | WEIGHT: 138 LBS

## 2020-07-14 DIAGNOSIS — E05.90 SUBCLINICAL HYPERTHYROIDISM: ICD-10-CM

## 2020-07-14 DIAGNOSIS — Z00.00 MEDICARE ANNUAL WELLNESS VISIT, SUBSEQUENT: Primary | ICD-10-CM

## 2020-07-14 DIAGNOSIS — M85.89 OSTEOPENIA OF MULTIPLE SITES: ICD-10-CM

## 2020-07-14 DIAGNOSIS — I10 ESSENTIAL HYPERTENSION: ICD-10-CM

## 2020-07-14 DIAGNOSIS — R73.01 IMPAIRED FASTING GLUCOSE: ICD-10-CM

## 2020-07-14 DIAGNOSIS — E78.00 PURE HYPERCHOLESTEROLEMIA: ICD-10-CM

## 2020-07-14 PROCEDURE — 99497 ADVNCD CARE PLAN 30 MIN: CPT | Performed by: INTERNAL MEDICINE

## 2020-07-14 PROCEDURE — G0439 PPPS, SUBSEQ VISIT: HCPCS | Performed by: INTERNAL MEDICINE

## 2020-07-14 PROCEDURE — 93000 ELECTROCARDIOGRAM COMPLETE: CPT | Performed by: INTERNAL MEDICINE

## 2020-07-14 PROCEDURE — G0444 DEPRESSION SCREEN ANNUAL: HCPCS | Performed by: INTERNAL MEDICINE

## 2020-07-14 RX ORDER — AMLODIPINE BESYLATE 5 MG/1
5 TABLET ORAL DAILY
Qty: 90 TABLET | Refills: 3 | Status: SHIPPED | OUTPATIENT
Start: 2020-07-14 | End: 2021-07-18 | Stop reason: SDUPTHER

## 2020-07-14 RX ORDER — SIMVASTATIN 20 MG
20 TABLET ORAL EVERY EVENING
Qty: 90 TABLET | Refills: 3 | Status: SHIPPED | OUTPATIENT
Start: 2020-07-14 | End: 2021-07-18 | Stop reason: SDUPTHER

## 2020-07-14 NOTE — PROGRESS NOTES
ANNUAL WELLNESS VISIT    DRUG AND ALCOHOL USE      no tobacco use, alcohol intake:1 beers per week and caffeine intake: 2 cups of caffeinated coffee per day    DIET AND PHYSICAL ACTIVITY     Diet: general    Exercise: daily   Exercise Details: walking    MOOD DISORDER AND COGNITIVE SCREENING   Depression Screening Tool Used yes - see PHQ-9; components reviewed with patient; 15-min counseling done - questionnaire items reviewed with patient; denies not having pleasure in doing things; has been doing some shopping but in the early morning hours; Notes several days of feeling down and difficulty with staying asleep, but unchanged from baseline. Denies issues with energy, concentration, focus, slow movements, negative thoughts. Screening is NEG for depression.   Anxiety Screening Tool Used yes     Mini-Cog Performed   Yes    1. Tell Patient 3 Words apple table aarti    2. Administer Clock Test normal    3. Recall 3 words  apple table aarti    4. Number Correct Items 3    FUNCTIONAL ABILITY AND LEVEL OF SAFETY   Hearing mild hearing loss     Wears Hearing Aids No       Current Activities Independent      none  - see Funct/Cog Status Intake     Fall Risk Assessment       Has difficulty with walking or balance  No         Timed Up and Go (TUG) Test  9 sec.       If >12 sec, normal    ADVANCED DIRECTIVE  Advance Care Planning   ACP discussion was held with the patient during this visit. Patient has an advance directive (not in EMR), copy requested.     Advance Care Planning Discussion:  15 min counseling done; patient has advanced directive and living will (but thought we has a copy; does not know what it says).  Reviewed desires for end of life care, which is to have comfort care.  Patient does not want extraordinary life-sustaining measures, including no chest compression, shocks, intubation/ventilator use, feeding tube, or prolonged artificial life support.  Reviewed code status options, meanings, desires. Patient 's  code status is DNR.   Encouraged patient to ensure family is aware of desires/preferences. Patient amenable to updating paperwork with ACP team assistance.      PAIN SCREENING Do you have pain right now? no      If so, 1-10 scale: 0     Intermittent     Do you have pain every day? No      Probable chronic pain: No     Recent Hospitalizations:  No recent hospitalization(s)..     MEDICATION REVIEW   - updated and reviewed (see Medication List).   - reviewed for potentially harmful drug-disease interactions in the elderly.   - reviewed for high risk medications in the elderly.   - aspirin use: No    BMI  Body mass index is 22.96 kg/m².    Patient's Body mass index is 22.96 kg/m². BMI is within normal parameters. No follow-up required..    _________________________________________________________    Chief Complaint   Patient presents with   • Medicare Wellness-subsequent   • Hypertension       History of Present Illness  65 y.o.  woman presents for updated wellness visit. Feels well overall. Reports some itching at the right ant thigh, mostly at nighttime, for last few weeks. Her research suggests it is a nerve issue. She notes rare assoc'd numbness. Denies assoc'd rash. Has been doing some lunge stretching exercises and reports it seems to be helping.    Stable RA arthritis pains.    Review of Systems  Denies headaches, visual changes, CP, palpitations, SOB, cough, abd pain, n/v/d, difficulty with urination, numbness/tingling, falls, mood changes, lightheadedness, hearing changes, rashes.    Denies vaginal discharge or bleeding (no periods) or breast concerns.   All other ROS reviewed and negative.    [facility]  The following portions of the patient's history were reviewed and updated as appropriate: allergies, current medications, past family history, past medical history, past social history, past surgical history and problem list.    Current Outpatient Medications:   •  amLODIPine (NORVASC) 5 MG QD  •  CBD  "(cannabidiol) oral oil QD  •  celecoxib (CELEBREX) 200 MG QD prn  •  cyclobenzaprine (FLEXERIL) 10 MG tablet, 1/2-1 tid prn muscle spasm  •  folic acid (FOLVITE) 1 MG 2 QD  •  hydrocortisone (ANUSOL-HC) 25 MG supp prn  •  methotrexate 2.5 MG tablet, 7 weekly  •  simvastatin (ZOCOR) 20 MG QD  •  TURMERIC PO, QD  •  vitamin B-6 (PYRIDOXINE) 50 MG 2 QD:     VITALS:  /78   Pulse 59   Ht 165.1 cm (65\")   Wt 62.6 kg (138 lb)   SpO2 99%   BMI 22.96 kg/m²     Physical Exam   Constitutional: She is oriented to person, place, and time. She appears well-developed and well-nourished.   HENT:   Head: Normocephalic.   Right Ear: External ear normal.   Left Ear: External ear normal.   Nose: Nose normal.   Mouth/Throat: Oropharynx is clear and moist and mucous membranes are normal. No oropharyngeal exudate.   Eyes: Pupils are equal, round, and reactive to light. Conjunctivae and EOM are normal.   Wears glasses   Neck: Normal range of motion. Neck supple. Carotid bruit is not present (bilaterally). No thyromegaly present.   Cardiovascular: Normal rate, regular rhythm and normal heart sounds.   Pulmonary/Chest: Effort normal and breath sounds normal. No respiratory distress. She has no wheezes. She has no rales.   Abdominal: Soft. Bowel sounds are normal. She exhibits no distension and no mass. There is no hepatosplenomegaly. There is no tenderness.   Genitourinary:   Genitourinary Comments: Breast/pelvic exams deferred to GYN     Musculoskeletal: She exhibits no edema.   Normal gait   Lymphadenopathy:     She has no cervical adenopathy.   Neurological: She is alert and oriented to person, place, and time. She displays normal reflexes. No cranial nerve deficit.   Skin: Skin is warm and dry. No rash noted. No erythema.   Psychiatric: She has a normal mood and affect. Her behavior is normal.   Nursing note and vitals reviewed.        LABS  Results for orders placed or performed in visit on 07/10/20   Comprehensive Metabolic " Panel   Result Value Ref Range    Glucose 87 65 - 99 mg/dL    BUN 17 8 - 23 mg/dL    Creatinine 0.70 0.57 - 1.00 mg/dL    Sodium 142 136 - 145 mmol/L    Potassium 5.0 3.5 - 5.2 mmol/L    Chloride 103 98 - 107 mmol/L    CO2 25.2 22.0 - 29.0 mmol/L    Calcium 10.0 8.6 - 10.5 mg/dL    Total Protein 7.9 6.0 - 8.5 g/dL    Albumin 4.60 3.50 - 5.20 g/dL    ALT (SGPT) 28 1 - 33 U/L    AST (SGOT) 28 1 - 32 U/L    Alkaline Phosphatase 63 39 - 117 U/L    Total Bilirubin 0.4 0.0 - 1.2 mg/dL    eGFR Non African Amer 83 >60 mL/min/1.73    Globulin 3.3 gm/dL    A/G Ratio 1.4 g/dL    BUN/Creatinine Ratio 24.3 7.0 - 25.0    Anion Gap 13.8 5.0 - 15.0 mmol/L   Lipid Panel   Result Value Ref Range    Total Cholesterol 146 0 - 200 mg/dL    Triglycerides 67 0 - 150 mg/dL    HDL Cholesterol 64 (H) 40 - 60 mg/dL    LDL Cholesterol  69 0 - 100 mg/dL    VLDL Cholesterol 13.4 5 - 40 mg/dL    LDL/HDL Ratio 1.07    TSH   Result Value Ref Range    TSH 0.218 (L) 0.270 - 4.200 uIU/mL   T4, Free   Result Value Ref Range    Free T4 1.45 0.93 - 1.70 ng/dL   Hemoglobin A1c   Result Value Ref Range    Hemoglobin A1C 5.70 (H) 4.80 - 5.60 %   CK   Result Value Ref Range    Creatine Kinase 68 20 - 180 U/L   CBC Auto Differential   Result Value Ref Range    WBC 6.10 3.40 - 10.80 10*3/mm3    RBC 4.46 3.77 - 5.28 10*6/mm3    Hemoglobin 13.2 12.0 - 15.9 g/dL    Hematocrit 39.0 34.0 - 46.6 %    MCV 87.4 79.0 - 97.0 fL    MCH 29.6 26.6 - 33.0 pg    MCHC 33.8 31.5 - 35.7 g/dL    RDW 13.4 12.3 - 15.4 %    RDW-SD 42.6 37.0 - 54.0 fl    MPV 9.5 6.0 - 12.0 fL    Platelets 255 140 - 450 10*3/mm3    Neutrophil % 68.0 42.7 - 76.0 %    Lymphocyte % 22.5 19.6 - 45.3 %    Monocyte % 7.5 5.0 - 12.0 %    Eosinophil % 1.3 0.3 - 6.2 %    Basophil % 0.5 0.0 - 1.5 %    Immature Grans % 0.2 0.0 - 0.5 %    Neutrophils, Absolute 4.15 1.70 - 7.00 10*3/mm3    Lymphocytes, Absolute 1.37 0.70 - 3.10 10*3/mm3    Monocytes, Absolute 0.46 0.10 - 0.90 10*3/mm3    Eosinophils, Absolute  0.08 0.00 - 0.40 10*3/mm3    Basophils, Absolute 0.03 0.00 - 0.20 10*3/mm3    Immature Grans, Absolute 0.01 0.00 - 0.05 10*3/mm3    nRBC 0.0 0.0 - 0.2 /100 WBC   Urinalysis without microscopic (no culture) - Urine, Clean Catch   Result Value Ref Range    Color, UA Yellow Yellow, Straw    Appearance, UA Clear Clear    pH, UA 7.0 5.0 - 8.0    Specific Gravity, UA 1.007 1.005 - 1.030    Glucose, UA Negative Negative    Ketones, UA Negative Negative    Bilirubin, UA Negative Negative    Blood, UA Negative Negative    Protein, UA Negative Negative    Leuk Esterase, UA Negative Negative    Nitrite, UA Negative Negative    Urobilinogen, UA 0.2 E.U./dL 0.2 - 1.0 E.U./dL   Urinalysis, Microscopic Only - Urine, Clean Catch   Result Value Ref Range    RBC, UA 0-2 None Seen, 0-2 /HPF    WBC, UA 0-2 None Seen, 0-2 /HPF    Bacteria, UA None Seen None Seen /HPF    Squamous Epithelial Cells, UA 0-2 None Seen, 0-2 /HPF    Hyaline Casts, UA None Seen None Seen /LPF    Methodology Automated Microscopy      12/19 A1C 5.94      ECG 12 Lead  Date/Time: 7/14/2020 9:47 AM  Performed by: Rosa M Venegas MD  Authorized by: Rosa M Venegas MD   Comparison: compared with previous ECG from 6/3/2019  Similar to previous ECG  Rhythm: sinus rhythm  Rate: normal  BPM: 58  Conduction: conduction normal  ST Segments: ST segments normal  T Waves: T waves normal  QRS axis: normal  Clinical impression comment: stable EKG            ASSESSMENT/PLAN  Problem List Items Addressed This Visit        Cardiovascular and Mediastinum    Hypertension     BP and electrolytes stable on amlo 5mg QD #90, 3RF         Relevant Medications    amLODIPine (NORVASC) 5 MG tablet    Other Relevant Orders    ECG 12 Lead    Hyperlipidemia     Lipids stable, at goal with LDL 69 on simva 20mg QD #90, 3RF; also with stable LFTs and CPK         Relevant Medications    simvastatin (ZOCOR) 20 MG tablet       Endocrine    Subclinical hyperthyroidism     Chronically low TSH 0.218 with  nl FT4; also f/u by julianne Mae         Impaired fasting glucose     BG control stable with A1C 5.7; encouraged reg phys activity to decr insulin resistance, moderation in unhealthy starches/sweets; f/u A1C in 6 mos              Musculoskeletal and Integument    Osteopenia      Calcium and vitamin D supplementation with weight-bearing exercise; DEXA 7/18, repeat 2021               Other    Medicare annual wellness visit, subsequent - Primary     Health maintenance - flu vacc 10/19 (rec annually in the fall); Prevnar 6/15; PVX 3/14, Tdap 10/14 (next Td due 2024), Zostavax 6/15; HAV and Shingrix done; mammo pending 10/9/20; GYN care 6/20 per GHADA Tim; DEXA 7/18, repeat 2021; colonosc 10/18, repeat 2023 per Dr. Thornton; eye exam with 5.20 w/ Dr. Avani Fuentes; dental exam 6/20, done q6 mos; (+) seat belt use    Consultants:  Patient Care Team:  Rosa M Venegas MD as PCP - General  Rosa M Venegas MD as PCP - Internal Medicine  Rosa M Venegas MD as PCP - Claims Attributed  Arthur Thornton MD as Consulting Physician (Colon and Rectal Surgery)  Jeromy Melissa, STERLING (Optometry)  Shalini Malin MD as Consulting Physician (Gynecologic Oncology)  Ernesto Crow MD as Consulting Physician (Rheumatology)  Bree Ware MD as Consulting Physician (Dermatology)  Quincy Fuentes MD as Consulting Physician (Ophthalmology)  Amelia Gann MD as Consulting Physician (Plastic Surgery)  Judi Mae MD as Consulting Physician (Endocrinology)  Solange Alejandra APRN as Nurse Practitioner (Rheumatology)  Dillon Alcantara DPM (Podiatry)             Relevant Orders    Ambulatory Referral to Advance Care Planning          FOLLOW-UP  RTC 6 mos with A1C    Electronically signed by:    Rosa M Venegas MD, FACP  07/14/2020                     Answers for HPI/ROS submitted by the patient on 7/8/2020   What is the primary reason for your visit?: Physical

## 2020-08-24 ENCOUNTER — TELEPHONE (OUTPATIENT)
Dept: INTERNAL MEDICINE | Facility: CLINIC | Age: 65
End: 2020-08-24

## 2020-08-24 NOTE — TELEPHONE ENCOUNTER
----- Message from Rosa M Venegas MD sent at 8/22/2020  9:20 PM EDT -----  Regarding: biotin precaution  Just received office note from recent rheum visit  She is taking biotin 10,000mcg QD    Reminder when she gets our annual physical labs, she should hold that medicine 1 week prior to getting labs because biotin interferes with some of the lab testing; ok to restart as soon as labs are drawn

## 2020-09-02 NOTE — ASSESSMENT & PLAN NOTE
Resolved - normal TFTs from 11/16   Reconstitution Date (Optional): 9/2/20 Nasal Root Units: 0 Additional Area 2 Location: [place] Additional Area 6 Units: 7 Post-Care Instructions: Patient instructed to not lie down for 4 hours after injections and limit physical activity for 24 hours. Additional Area 1 Units: 16 Additional Area 3 Location: [place] Price (Use Numbers Only, No Special Characters Or $): 300 Lot #: [id number] Additional Area 1 Location: [place] Consent: Verbal and written informed consent were obtained to include the following risks: pain, swelling, bruising, eyelid or eyebrow droop, and lack of visible improvement of wrinkles in the areas treated.  The skin was cleansed with alcohol. Injections were administered with a 32g needle into the following areas: Detail Level: Detailed Dilution (U/0.1 Cc): 5 Additional Area 5 Location: perioral Additional Area 6 Location: plat[place] Expiration Date (Month Year): 11/22 Additional Area 4 Location: [place]

## 2020-10-09 ENCOUNTER — HOSPITAL ENCOUNTER (OUTPATIENT)
Dept: MAMMOGRAPHY | Facility: HOSPITAL | Age: 65
Discharge: HOME | End: 2020-10-09
Admitting: NURSE PRACTITIONER

## 2020-10-09 DIAGNOSIS — Z12.31 ENCOUNTER FOR SCREENING MAMMOGRAM FOR MALIGNANT NEOPLASM OF BREAST: ICD-10-CM

## 2020-10-09 PROCEDURE — 77063 BREAST TOMOSYNTHESIS BI: CPT | Performed by: RADIOLOGY

## 2020-10-09 PROCEDURE — 77067 SCR MAMMO BI INCL CAD: CPT | Performed by: RADIOLOGY

## 2020-10-09 PROCEDURE — 77067 SCR MAMMO BI INCL CAD: CPT

## 2020-10-09 PROCEDURE — 77063 BREAST TOMOSYNTHESIS BI: CPT

## 2020-10-16 ENCOUNTER — HOSPITAL ENCOUNTER (OUTPATIENT)
Dept: ULTRASOUND IMAGING | Facility: HOSPITAL | Age: 65
Discharge: HOME | End: 2020-10-16
Admitting: RADIOLOGY

## 2020-10-16 DIAGNOSIS — R92.8 ABNORMAL MAMMOGRAM: ICD-10-CM

## 2020-10-16 PROCEDURE — 76642 ULTRASOUND BREAST LIMITED: CPT

## 2020-10-16 PROCEDURE — 76642 ULTRASOUND BREAST LIMITED: CPT | Performed by: RADIOLOGY

## 2020-10-20 ENCOUNTER — TRANSCRIBE ORDERS (OUTPATIENT)
Dept: MAMMOGRAPHY | Facility: HOSPITAL | Age: 65
End: 2020-10-20

## 2020-10-20 DIAGNOSIS — R92.8 ABNORMAL MAMMOGRAM: Primary | ICD-10-CM

## 2020-12-30 ENCOUNTER — TELEPHONE (OUTPATIENT)
Dept: INTERNAL MEDICINE | Facility: CLINIC | Age: 65
End: 2020-12-30

## 2021-01-03 NOTE — PROGRESS NOTES
Chief Complaint   Patient presents with   • MNG and subclinical hyperthyroidism     follow-up       HPI:   Carli Bolanos is a 66 y.o.female who returns to Endocrine Clinic for f/u evaluation of her subclinical hyperthyroidism and multinodular goiter. Last visit 01/06/2020 Her history is as follows:    Interim Events:    - pt's RA is stable. Having bilateral wrist pain currently  - occasional palpitations she associates with anxiety.  - held Biotin supplement since 12/24/2020    1) subclinical hyperthyroidism:  - per chart review, pt has had a slightly decreased TSH with normal free T4 and T3 levels since 2014. Her TSH has not been suppressed to <0.01. (TSH levels ranging 0.214 - 0.525)  - on further review, pt denies persistent palpitations or tremor. Weight is stable.  - has been off biotin for the past 14 days    2) multinodular goiter:  - first found on physical exam in 2014. Pt has had Thyroid US's completed at  radiology in 04/2014, 06/2015, and 07/2017  - no prior FNA  FMH: no known thyroid cancer, mother had thyroid disease  PMH: no h/o irradiation of head, neck, or chest    Summary of imaging with  radiology:  (04/2014): R mid - 1.9 x 2.7, R inf - 1.5 x 1.9, L mid 1.7 x 2.5 complex nodules  (06/2015): Report states stable nodules. Size and appearance of nodules are not described.  (07/2017): R sup- 2.1 x 1.0 x 1.4, R mid 2.3 x 1.7 x 2.6, L mid 2.3 x 1.8 x 2.3 complex nodules    Initial Endocrine Visit: (01/2019)  Thyroid US completed in clinic (01/02/2019) showed the thyroid gland was enlarged by measured dimensions. Findings were consistent with a multinodular goiter. Multiple isoechoic, spongiform nodules were identified in both lobes as described in the full report. These nodules lacked suspicious US characteristics and did not meet criteria for FNA. These nodules appeared similar to prior outside imaging completed from 2014 - 2017.   RECOMMENDATIONS: These nodules lacked suspicious US  "characteristics and did not meet criteria for FNA. Given pt's h/o subclinical hyperthyroidism, recommended NM thyroid uptake and scan for further characterization of the nodules.     A NM thyroid uptake and scan was completed at  radiology on 02/12/2019:  \"Four hour thyroid uptake at the lower limits of normal at 11%. 24-hour thyroid uptake is also in the lower range of normal at 25%. IMPRESSION: 4 hour and 24 hour thyroid uptake both at lower limits of normal. Please correlate with patient's serum thyroid profile.Heterogeneous thyroid uptake pattern, with relatively large right thyroid lobe, and questionable small cold nodule possibly a cyst of the left lateral mid thyroid pole.\"    (01/2020) Thyroid US completed in clinic 01/06/2020 showed:  The thyroid gland was enlarged by measured dimensions. Findings were consistent with a multinodular goiter.    Multiple isoechoic, spongiform nodules were identified in both lobes as described above. These nodules lacked suspicious US characteristics and did not meet criteria for FNA.   These nodules appeared similar to prior outside imaging completed from 2014 - 2019.   In the left mid lobe, a 2.42(L) x 2.06(AP) x 2.13(T) cm isoechoic, spongiform nodule was again identified. The nodule had a smooth margin, peripheral and intranodal vascularity, and no microcalcifications. Multiple comet tail artifacts consistent with colloid crystals were present. This nodule had a peripheral cystic area that correlated with the \"questionable small cold area\" in the left lateral mid thyroid pole seen on NM thyroid uptake and scan in 02/2019.   RECOMMENDATIONS: These nodules lacked suspicious US characteristics and did not meet criteria for FNA. Repeat Thyroid US recommended if changes in the gland/neck are noted on physical exam.     Discussed plan with patient: follow yearly in regards to her subclinical hyperthyroidism    Review of Systems   Constitutional:        Weight stable   HENT: " "Negative.    Eyes:        Dry eyes   Respiratory: Negative.    Cardiovascular: Positive for palpitations (when anxious).   Gastrointestinal: Negative.    Endocrine: Negative.    Genitourinary: Negative.    Musculoskeletal: Positive for arthralgias and myalgias.   Skin: Negative.    Allergic/Immunologic: Negative.    Neurological: Negative.    Hematological: Negative.    Psychiatric/Behavioral: Negative.      The following portions of the patient's history were reviewed and updated as appropriate: allergies, current medications, past family history, past medical history, past social history, past surgical history and problem list.    /80   Pulse 64   Ht 165.1 cm (65\")   Wt 64.4 kg (142 lb)   SpO2 98%   BMI 23.63 kg/m²   Physical Exam   Constitutional: She is oriented to person, place, and time. She appears well-developed. No distress.   HENT:   Head: Normocephalic.   Eyes: Pupils are equal, round, and reactive to light. Conjunctivae are normal.   Neck: No tracheal deviation present. Thyromegaly (mild, right lobe larger than left) present.   2 cm palpable thyroid nodule in left lobe. Stable     Cardiovascular: Normal rate, regular rhythm and normal heart sounds.   No murmur heard.  Pulmonary/Chest: Effort normal and breath sounds normal. No respiratory distress.   Abdominal: Soft. Bowel sounds are normal. She exhibits no mass. There is no abdominal tenderness.   Lymphadenopathy:     She has no cervical adenopathy.   Neurological: She is alert and oriented to person, place, and time. No cranial nerve deficit.   Skin: Skin is warm and dry. She is not diaphoretic. No erythema.   Psychiatric: Her behavior is normal.   Vitals reviewed.    LABS/IMAGING: outside records reviewed and summarized in [facility]  Labs ordered 01/04/2021 and completed 01/05/2021:    TSH: 0.284 (0.450 - 4.500)   Free T4: 1.45 (0.82 - 1.77)  Total T3: 125 (71 - 180)    ASSESSMENT/PLAN:  1) subclinical hyperthyroidism due to multinodular " goiter:  - labs completed today again show a slight decrease in TSH with normal free T4, normal Total T3    - Again reviewed with patient that there are no definitive guidelines on the management of subclinical hyperthyroidism as there is very limited clinical data on the long term benefits of treatment. However, for patients with endogenous subclinical hyperthyroidism at high risk for cardiac or skeletal complications (ie, older adults) and who have a TSH concentration less than 0.1 mU/L persistently, treatment is often considered.   - In Ms. Bolanos's case, I do not think treatment is indicated at this time.  - her TFT's can be followed yearly or sooner if concerning symptoms develop  - I will have her RTC in one year for follow-up of her subclinical hyperthyroidism     2) multinodular goiter:  Stable on physical exam  Prior Thyroid US images reviewed.  Will repeat imaging is changes in her gland/neck are noted on physical exam.    RTC in one year.    Signed: Judi Mae MD

## 2021-01-03 NOTE — PROGRESS NOTES
"Thyroid Ultrasound Report  [facility]  [place], [place]    Date: 01/04/2021   Indication: multinodular goiter  Comparison Imaging:  Radiology Thyroid US 04/2014, 06/2015, 07/2017, Endocrine Clinic Thyroid US 01/2019, NM Thyroid uptake and scan 02/2019, Thyroid US 01/2020  Clinical History: 65 y.o. Female with h/o MNG and subclinical hyperthyroidism since 2014    Real time high resolution imaging of the thyroid gland was performed in transverse and longitudinal planes.  The right lobe measured 6.07 cm in Length x 3.50 cm in AP diameter x 3.86 cm in TV dimension.  The isthmus measured 0.48 cm in thickness.  The left thyroid lobe measured 6.24 cm in length x 2.34 cm in AP diameter x 2.81 cm in TV dimension.    The thyroid gland appeared overall isoechoic with diffusely heterogeneous echotexture.     In the right inferior lobe extending into the isthmus, a 2.17(L) x 2.07(AP) x 2.36(T) cm isoechoic, mixed solid and cystic nodule was again identified. The nodule had a smooth margin, peripheral and intranodal vascularity, and no microcalcifications.    In the right mid to superior lobe, a 2.16(L) x 1.34(AP) x 2.21(T) cm isoechoic, spongiform nodule was again identified. The nodule had a smooth margin, minimal peripheral vascularity, and no microcalcifications.    In the right mid, a 2.28(L) x 2.01(AP) x 2.45(T) cm isoechoic, spongiform nodule was identified. The nodule's margin was not well defined. The nodule had minimal peripheral vascularity and no microcalcifications.    In the left mid lobe, a 2.42(L) x 2.06(AP) x 2.13(T) cm isoechoic, spongiform nodule was again identified. The nodule had a smooth margin, peripheral and intranodal vascularity, and no microcalcifications. Multiple comet tail artifacts consistent with colloid crystals were present. This nodule had a peripheral cystic area that correlated with the \"questionable small cold area\" in the left lateral mid thyroid pole seen on NM thyroid " uptake and scan in 02/2019.     In the left mid lobe, medially, 1.79(L) x 1.08(AP) x 1.08(T) cm isoechoic, spongiform nodule was again identified. The nodule had a smooth margin, minimal peripheral vascularity, and no microcalcifications.     No pathologic lymph nodes were seen.     IMPRESSION:   1) The thyroid gland was enlarged by measured dimensions. Findings were consistent with a multinodular goiter.    2) Multiple isoechoic, spongiform nodules were identified in both lobes as described above. These nodules lacked suspicious US characteristics and did not meet criteria for FNA.   3) These nodules appeared similar to prior outside imaging completed from 2014 - 2019.     RECOMMENDATIONS: These nodules lacked suspicious US characteristics and did not meet criteria for FNA. Repeat Thyroid US recommended if changes in the gland/neck are noted on physical exam.

## 2021-01-04 ENCOUNTER — OFFICE VISIT (OUTPATIENT)
Dept: ENDOCRINOLOGY | Facility: CLINIC | Age: 66
End: 2021-01-04

## 2021-01-04 VITALS
HEIGHT: 65 IN | BODY MASS INDEX: 23.66 KG/M2 | SYSTOLIC BLOOD PRESSURE: 138 MMHG | WEIGHT: 142 LBS | OXYGEN SATURATION: 98 % | HEART RATE: 64 BPM | DIASTOLIC BLOOD PRESSURE: 80 MMHG

## 2021-01-04 DIAGNOSIS — E04.2 MULTINODULAR GOITER: ICD-10-CM

## 2021-01-04 DIAGNOSIS — E05.90 SUBCLINICAL HYPERTHYROIDISM: Primary | ICD-10-CM

## 2021-01-04 PROCEDURE — 99213 OFFICE O/P EST LOW 20 MIN: CPT | Performed by: INTERNAL MEDICINE

## 2021-01-04 RX ORDER — LIFITEGRAST 50 MG/ML
SOLUTION/ DROPS OPHTHALMIC
COMMUNITY
Start: 2020-12-13

## 2021-01-06 LAB
T3 SERPL-MCNC: 125 NG/DL (ref 71–180)
T4 FREE SERPL-MCNC: 1.45 NG/DL (ref 0.82–1.77)
TSH SERPL DL<=0.005 MIU/L-ACNC: 0.28 UIU/ML (ref 0.45–4.5)

## 2021-01-13 NOTE — PROGRESS NOTES
"Chief Complaint   Patient presents with   • Impaired fasting glucose       History of Present Illness  66 y.o.  woman presents for f/u on sugars. Just had endo visit with Dr. Mae for MNG and subclinical hyperthyroidism.  Complains of left jaw pain in front of the ear that started yesterday.  Cause pain with chewing and opening her mouth.  Today it is better.  Has had previous similar symptoms attributed to rheumatoid arthritis.    Has had recurrent burning pain with numbness and tingling at the right outer thigh.  Has no associated rash.  Symptoms ongoing for about 6 months.  Her Internet research suggests this is meralgia paresthetica.  Symptoms worsen when she lies on the right side.  Symptoms do not extend past the right knee.  No similar symptoms on the left thigh.  Already takes Celebrex as needed.    Concerned about the logistics of taking her methotrexate with timing of COVID-19 vaccine.    Review of Systems  ROS (+) for left jaw pain at the TMJ, improved date.  ROS (+) for right thigh burning and numbness/tingling at the outer aspect.  Denies chest pain, palpitations, shortness of breath, visual changes.  All other ROS reviewed and negative.      Current Outpatient Medications:   •  amLODIPine (NORVASC) 5 MG QD  •  CBD (cannabidiol) oral oil QD  •  celecoxib (CELEBREX) 200 MG QD prn  •  cyclobenzaprine (FLEXERIL) 10 MG tablet, 1/2-1 tid prn muscle spasm  •  folic acid (FOLVITE) 1 MG 2 QD  •  hydrocortisone (ANUSOL-HC) 25 MG AD  •  methotrexate 2.5 MG tablet 7 QD  •  simvastatin (ZOCOR) 20 MG QD  •  TURMERIC PO QD  •  vitamin B-6 (PYRIDOXINE) 50 MG 2 QD  •  Xiidra 5 % ophthalmic solution AD    VITALS:  /78   Pulse 61   Ht 165.1 cm (65\")   Wt 64 kg (141 lb)   SpO2 98%   BMI 23.46 kg/m²     Physical Exam  Vitals signs and nursing note reviewed.   Constitutional:       General: She is not in acute distress.     Appearance: Normal appearance.   HENT:      Mouth/Throat:      Comments: L. TMJ " pain with opening/closing mouth, mild  Eyes:      Extraocular Movements: Extraocular movements intact.      Conjunctiva/sclera: Conjunctivae normal.   Cardiovascular:      Rate and Rhythm: Normal rate and regular rhythm.      Heart sounds: Normal heart sounds.   Pulmonary:      Effort: Pulmonary effort is normal. No respiratory distress.      Breath sounds: Normal breath sounds.   Skin:     Findings: No rash.   Neurological:      Mental Status: She is alert and oriented to person, place, and time. Mental status is at baseline.      Gait: Gait normal.   Psychiatric:         Mood and Affect: Mood normal.         Behavior: Behavior normal.         LABS  Results for orders placed or performed in visit on 01/14/21   POC Glycosylated Hemoglobin (Hb A1C)    Specimen: Blood   Result Value Ref Range    Hemoglobin A1C 5.8 %     Results for orders placed or performed in visit on 01/05/21   T4, Free   Result Value Ref Range    Free T4 1.45 0.82 - 1.77 ng/dL   TSH   Result Value Ref Range    TSH 0.284 (L) 0.450 - 4.500 uIU/mL   T3   Result Value Ref Range    T3, Total 125 71 - 180 ng/dL       7/20 A1C 5.7    ASSESSMENT/PLAN    Diagnoses and all orders for this visit:    1. Impaired fasting glucose (Primary)  Assessment & Plan:  BG control stable with A1C 5.8; encouraged reg phys activity to decr insulin resistance, moderation in unhealthy starches/sweets; f/u A1C in 6 mos      Orders:  -     POC Glycosylated Hemoglobin (Hb A1C)    2. Shoulder pain  Comments:  takes celebrex and prn cyclobenzaprine #30, 0RF (last RX 6/18); precautions given  Orders:  -     cyclobenzaprine (FLEXERIL) 10 MG tablet; 1/2-1 tid prn muscle spasm  Dispense: 30 tablet; Refill: 0    3. Hypertension  Assessment & Plan:  BP bord/stable 132/78; on amlo 5mg QD; rec home monitoring with goal < 130/80      4. Jaw pain  Comments:  L. TMJ pain, improving; cont prn celebrex    5. Meralgia paresthetica of right side  Comments:  follow clinically; rec not wearing  tight pants and using wedge to help not lie on it at nighttime; f/u prn      FOLLOW-UP  1. Health maintenance - flu vacc already done at pharmacy; COVID19 vacc pending 1/16/21 - will hold MTX before and after per rheum, also rec premedication to decr side effects; dx mammo f/u pending 1/18/21  2. RTC for AWV 7/16/21; fasting labs prior to appt (CBC, CMP, TSH, lipids, UA/micr, A1C, FT4, CPK)    Electronically signed by:    Rosa M Venegas MD, FACP  01/14/2021

## 2021-01-14 ENCOUNTER — OFFICE VISIT (OUTPATIENT)
Dept: INTERNAL MEDICINE | Facility: CLINIC | Age: 66
End: 2021-01-14

## 2021-01-14 VITALS
SYSTOLIC BLOOD PRESSURE: 132 MMHG | HEART RATE: 61 BPM | BODY MASS INDEX: 23.49 KG/M2 | WEIGHT: 141 LBS | HEIGHT: 65 IN | DIASTOLIC BLOOD PRESSURE: 78 MMHG | OXYGEN SATURATION: 98 %

## 2021-01-14 DIAGNOSIS — I10 ESSENTIAL HYPERTENSION: ICD-10-CM

## 2021-01-14 DIAGNOSIS — M25.511 CHRONIC PAIN OF BOTH SHOULDERS: ICD-10-CM

## 2021-01-14 DIAGNOSIS — R68.84 JAW PAIN: ICD-10-CM

## 2021-01-14 DIAGNOSIS — G57.11 MERALGIA PARESTHETICA OF RIGHT SIDE: ICD-10-CM

## 2021-01-14 DIAGNOSIS — M25.512 CHRONIC PAIN OF BOTH SHOULDERS: ICD-10-CM

## 2021-01-14 DIAGNOSIS — G89.29 CHRONIC PAIN OF BOTH SHOULDERS: ICD-10-CM

## 2021-01-14 DIAGNOSIS — R73.01 IMPAIRED FASTING GLUCOSE: Primary | ICD-10-CM

## 2021-01-14 LAB — HBA1C MFR BLD: 5.8 %

## 2021-01-14 PROCEDURE — 83036 HEMOGLOBIN GLYCOSYLATED A1C: CPT | Performed by: INTERNAL MEDICINE

## 2021-01-14 PROCEDURE — 99214 OFFICE O/P EST MOD 30 MIN: CPT | Performed by: INTERNAL MEDICINE

## 2021-01-14 RX ORDER — CYCLOBENZAPRINE HCL 10 MG
TABLET ORAL
Qty: 30 TABLET | Refills: 0 | Status: SHIPPED | OUTPATIENT
Start: 2021-01-14 | End: 2021-07-20 | Stop reason: SDUPTHER

## 2021-01-16 ENCOUNTER — IMMUNIZATION (OUTPATIENT)
Dept: VACCINE CLINIC | Facility: HOSPITAL | Age: 66
End: 2021-01-16

## 2021-01-16 PROCEDURE — 0001A: CPT | Performed by: INTERNAL MEDICINE

## 2021-01-16 PROCEDURE — 91300 HC SARSCOV02 VAC 30MCG/0.3ML IM: CPT | Performed by: INTERNAL MEDICINE

## 2021-01-18 ENCOUNTER — HOSPITAL ENCOUNTER (OUTPATIENT)
Dept: MAMMOGRAPHY | Facility: HOSPITAL | Age: 66
Discharge: HOME | End: 2021-01-18
Admitting: NURSE PRACTITIONER

## 2021-01-18 ENCOUNTER — TRANSCRIBE ORDERS (OUTPATIENT)
Dept: MAMMOGRAPHY | Facility: HOSPITAL | Age: 66
End: 2021-01-18

## 2021-01-18 DIAGNOSIS — R92.8 ABNORMAL MAMMOGRAM: Primary | ICD-10-CM

## 2021-01-18 DIAGNOSIS — R92.8 ABNORMAL MAMMOGRAM: ICD-10-CM

## 2021-01-18 PROCEDURE — 77065 DX MAMMO INCL CAD UNI: CPT | Performed by: RADIOLOGY

## 2021-01-18 PROCEDURE — 77065 DX MAMMO INCL CAD UNI: CPT

## 2021-01-18 PROCEDURE — G0279 TOMOSYNTHESIS, MAMMO: HCPCS | Performed by: RADIOLOGY

## 2021-01-18 PROCEDURE — G0279 TOMOSYNTHESIS, MAMMO: HCPCS

## 2021-02-06 ENCOUNTER — IMMUNIZATION (OUTPATIENT)
Dept: VACCINE CLINIC | Facility: HOSPITAL | Age: 66
End: 2021-02-06

## 2021-02-06 PROCEDURE — 0002A: CPT | Performed by: INTERNAL MEDICINE

## 2021-02-06 PROCEDURE — 91300 HC SARSCOV02 VAC 30MCG/0.3ML IM: CPT | Performed by: INTERNAL MEDICINE

## 2021-03-18 ENCOUNTER — TELEPHONE (OUTPATIENT)
Dept: INTERNAL MEDICINE | Facility: CLINIC | Age: 66
End: 2021-03-18

## 2021-03-18 NOTE — TELEPHONE ENCOUNTER
"\"hub to read lm to let patient know wellness has been rescheduled due to Dr Venegas being out of the office. If that time or day doesn't work for her please reschedule to a new date for her\"  "

## 2021-04-05 ENCOUNTER — OFFICE VISIT (OUTPATIENT)
Dept: ORTHOPEDIC SURGERY | Facility: CLINIC | Age: 66
End: 2021-04-05

## 2021-04-05 ENCOUNTER — LAB (OUTPATIENT)
Dept: LAB | Facility: HOSPITAL | Age: 66
End: 2021-04-05

## 2021-04-05 VITALS
DIASTOLIC BLOOD PRESSURE: 90 MMHG | WEIGHT: 140.2 LBS | SYSTOLIC BLOOD PRESSURE: 162 MMHG | HEART RATE: 86 BPM | BODY MASS INDEX: 23.36 KG/M2 | OXYGEN SATURATION: 98 % | HEIGHT: 65 IN

## 2021-04-05 DIAGNOSIS — Z96.643 S/P BILATERAL HIP REPLACEMENTS: ICD-10-CM

## 2021-04-05 DIAGNOSIS — Z96.643 S/P BILATERAL HIP REPLACEMENTS: Primary | ICD-10-CM

## 2021-04-05 PROCEDURE — 83018 HEAVY METAL QUAN EACH NES: CPT

## 2021-04-05 PROCEDURE — 36415 COLL VENOUS BLD VENIPUNCTURE: CPT

## 2021-04-05 PROCEDURE — 82495 ASSAY OF CHROMIUM: CPT

## 2021-04-05 PROCEDURE — 99204 OFFICE O/P NEW MOD 45 MIN: CPT | Performed by: ORTHOPAEDIC SURGERY

## 2021-04-05 NOTE — PROGRESS NOTES
[facility] Note    Subjective     Chief Complaint   Patient presents with   • Left Hip - Pain   • Right Hip - Pain        HPI    Carli Bolanos is a 66 y.o. female who presents with new problem of bilateral hip pain.     Ms. Bolanos had right hip resurfacing in 2007 with Dr. Loving at the [facility] and a left total hip replacement in 2011 with Dr. Mcdermott at the [facility]. She reports stiffness in her right hip and cramping in her left hip when she walks for exercise. In terms of improvement in her hips after these surgeries, she states her right hip has a 90 percent improvement when she is walking normally and a 50 percent improvement when she walks for exercise, and reports a 95 percent improvement in normal function and 80 percent improvement when she exercises. She suspects that her left hip gets cramped because she favors her other hip. The pain is mainly localized in her posterior area bilaterally. The pain is exacerbated by cold weather. She has not had metal levels checked in the past. She denies known back problems.     I have reviewed the following portions of the patient's history and agree with: History of Present Illness and Review of Systems    Patient Active Problem List   Diagnosis   • Carpal tunnel syndrome of right wrist   • Subclinical hyperthyroidism   • Diverticulosis of colon   • Endometrial/uterine adenocarcinoma (CMS/HCC)   • Fibrocystic breast changes   • Hyperlipidemia   • Hypertension   • Impaired fasting glucose   • Localized superficial swelling, mass, or lump   • Multinodular goiter   • Osteoarthritis   • Osteopenia    • Rectocele   • Right arm pain   • Rheumatoid arthritis involving multiple sites with positive rheumatoid factor (CMS/HCC)   • Medicare annual wellness visit, subsequent   • Menopause   • Internal hemorrhoids   • Insomnia   • Anxiety   • Corn or callus, R. 4th and 5th toes     Past Medical History:   Diagnosis Date   •  Abnormal thyroid ultrasound 07/05/2017    stable MNG (7/5/17) by u/s   • Abnormal thyroid ultrasound 06/12/2015    stable MNG (6/12/15)   • Abnormal thyroid ultrasound 04/18/2014    thyr u/s (4/18/14): MNG except dominant complex mass mid L. lobe, repeat u/s in 6 mos   • Cataract still small   • Cholelithiasis Gall bladder removed   • Endometrial cancer (CMS/HCC)    • H/O uterine leiomyoma     s/p DUSTY/USO ('13)   • History of CT scan 02/27/2016    neg head ct   • Hx of bone density study 06/08/2015    DEXA (6/15) L -0.4, H0.7   • Hx of bone density study 07/17/2018    nl DEXA (7/17/18): L -0.4, H 0.2, repeat 3 yrs   • Hx of colonoscopy 03/11/2011    colonosc (3/11/11): diverticulosis, sm int hem, repeat 2018; CRS - Dr. Thornton   • Hx of colonoscopy 10/05/2018    colonosc (10/5/18): diverticulosis, redundant colon, int hem, repeat 5 yrs due to h/o polyps; CRS - Dr. Thornton   • Hx of EMG/NCV 02/27/2008    EMG/NCV (2/27/08): right median neuropathy   • Hx of hand x-ray 01/24/2018    R. hand xray (1/24/18): degen changes 1st metacarpal joint and index PIP   • Lumps on the skin 02/20/2013    R. temple lesion; evaluated by Dr. Amelia Gann 02/20/13 with upcoming excision   • Osteopenia    • Right arm numbness 06/13/2016    Impression: improved after CTS surgery per pt; 03/01/2016 - resolved at this time; consider pinched nerve due to positioning during sleep the night before; if sxs recur, rec updated EMG/NCV for further eval;       Past Surgical History:   Procedure Laterality Date   • APPENDECTOMY      taken out at gall bladder surgery   • CARPAL TUNNEL RELEASE Left     carpel tunnel left    • CERVICAL POLYPECTOMY  03/2005    with h/o fibroids and DUB   • CHOLECYSTECTOMY     • DILATATION AND CURETTAGE  02/22/2013   • EXCISION MASS HEAD/NECK Right 03/05/2013    s/p excision scalp lesion large R. parietal and L. temporal areas; plastics - Dr. Amelia Gann   • HIP ARTHROSCOPY Right 2004    with subsequent [place]  resurfacing with hip replacement (5/11)   • HYSTERECTOMY      age 58   • JOINT REPLACEMENT  2007 2011    both hips   • OTHER SURGICAL HISTORY      KERATOTIC LESION   • SALPINGO OOPHORECTOMY Left 1991    secondary to tubal pregnancy and ovarian cyst   • SALPINGO OOPHORECTOMY Right     secondary to tubal pregnancy   • TOTAL HIP ARTHROPLASTY Left 06/2011   • TOTAL HIP ARTHROPLASTY Right 05/2011   • TOTAL LAPAROSCOPIC HYSTERECTOMY WITH DAVINCI ROBOT  03/05/2013    RTLH/BSO with LND and omental biopsy for endometrial cancer; GYN Onc - Dr. Malin      Family History   Problem Relation Age of Onset   • Dementia Mother    • Cancer Mother         GYN   • Hip fracture Mother    • Thyroid disease Mother    • Hypertension Mother    • Cervical cancer Mother    • Heart attack Father    • Heart failure Father    • Diabetes Father    • Heart disease Father         HEart failure   • Prostate cancer Father    • Hypertension Father    • Hypertension Brother    • Heart disease Brother    • Kidney cancer Maternal Grandmother    • Arthritis Maternal Grandmother    • Cancer Maternal Grandmother         Kidney Cancer   • Colon cancer Maternal Grandfather 70   • Cancer Maternal Grandfather         Colon cancer   • Heart failure Paternal Grandmother    • Heart disease Paternal Grandmother    • Arthritis Paternal Grandmother    • Hypertension Paternal Grandmother    • Diabetes Paternal Grandfather    • Colon cancer Paternal Uncle 78   • Breast cancer Neg Hx    • Ovarian cancer Neg Hx      Social History     Socioeconomic History   • Marital status:      Spouse name: Not on file   • Number of children: Not on file   • Years of education: Not on file   • Highest education level: Not on file   Tobacco Use   • Smoking status: Never Smoker   • Smokeless tobacco: Never Used   Substance and Sexual Activity   • Alcohol use: Yes     Alcohol/week: 1.0 standard drinks     Types: 1 Glasses of wine per week     Comment: social   • Drug use: No    • Sexual activity: Yes     Partners: Male     Birth control/protection: Surgical      Current Outpatient Medications on File Prior to Visit   Medication Sig Dispense Refill   • amLODIPine (NORVASC) 5 MG tablet Take 1 tablet by mouth Daily. 90 tablet 3   • CBD (cannabidiol) oral oil Take  by mouth.     • celecoxib (CELEBREX) 200 MG capsule Take 1 capsule by mouth Daily As Needed for Mild Pain .     • cyclobenzaprine (FLEXERIL) 10 MG tablet 1/2-1 tid prn muscle spasm 30 tablet 0   • folic acid (FOLVITE) 1 MG tablet Take 2 tablets by mouth Daily.     • methotrexate 2.5 MG tablet Take 7 tablets by mouth 1 (One) Time Per Week.     • simvastatin (ZOCOR) 20 MG tablet Take 1 tablet by mouth Every Evening. 90 tablet 3   • TURMERIC PO Take  by mouth.     • vitamin B-6 (PYRIDOXINE) 50 MG tablet Take 100 mg by mouth Daily.     • Xiidra 5 % ophthalmic solution        No current facility-administered medications on file prior to visit.      Allergies   Allergen Reactions   • Codeine Other (See Comments)     Other reaction(s): hyperactivity   • Lisinopril Cough   • Naproxen Other (See Comments)     Other reaction(s): mouth sores        Review of Systems   Constitutional: Negative for activity change, appetite change, chills, diaphoresis, fatigue, fever and unexpected weight change.   HENT: Negative for congestion, dental problem, drooling, ear discharge, ear pain, facial swelling, hearing loss, mouth sores, nosebleeds, postnasal drip, rhinorrhea, sinus pressure, sneezing, sore throat, tinnitus, trouble swallowing and voice change.    Eyes: Negative for photophobia, pain, discharge, redness, itching and visual disturbance.   Respiratory: Negative for apnea, cough, choking, chest tightness, shortness of breath, wheezing and stridor.    Cardiovascular: Negative for chest pain, palpitations and leg swelling.   Gastrointestinal: Negative for abdominal distention, abdominal pain, anal bleeding, blood in stool, constipation, diarrhea,  "nausea, rectal pain and vomiting.   Endocrine: Negative for cold intolerance, heat intolerance, polydipsia, polyphagia and polyuria.   Genitourinary: Negative for decreased urine volume, difficulty urinating, dysuria, enuresis, flank pain, frequency, genital sores, hematuria and urgency.   Musculoskeletal: Positive for arthralgias. Negative for back pain, gait problem, joint swelling, myalgias, neck pain and neck stiffness.   Skin: Negative for color change, pallor, rash and wound.   Allergic/Immunologic: Negative for environmental allergies, food allergies and immunocompromised state.   Neurological: Negative for dizziness, tremors, seizures, syncope, facial asymmetry, speech difficulty, weakness, light-headedness, numbness and headaches.   Hematological: Negative for adenopathy. Does not bruise/bleed easily.   Psychiatric/Behavioral: Negative for agitation, behavioral problems, confusion, decreased concentration, dysphoric mood, hallucinations, self-injury, sleep disturbance and suicidal ideas. The patient is not nervous/anxious and is not hyperactive.         Objective      Physical Exam  /90   Pulse 86   Ht 165.1 cm (65\")   Wt 63.6 kg (140 lb 3.2 oz)   SpO2 98%   BMI 23.33 kg/m²     Body mass index is 23.33 kg/m².    General:   Mental Status:  Alert   Appearance: Cooperative, in no acute distress   Build and Nutrition: Well-nourished well-developed female   Orientation: Alert and oriented to person, place and time   Posture: Normal   Gait: Normal    Integument       • Right hip: No skin lesions, rash, or ecchymosis. Well-healed incision.        • Left hip: No skin lesions, rash, or ecchymosis. Well-healed incision.     Neurologic  • Sensation:       • Right foot:  Intact to light touch on the dorsal and plantar aspects       • Left foot:  Intact to light touch on the dorsal and plantar aspects    Motor       • Right lower extremity: 5/5 quadriceps, hamstrings, ankle dorsiflexors, and ankle plantar " flexors        • Left lower extremity: 5/5 quadriceps, hamstrings, ankle dorsiflexors, and ankle plantar flexors     Vascular       • Right lower extremity: 2+ dorsalis pedis pulse. Prompt capillary refill.       • Left lower extremity: 2+ dorsalis pedis pulse. Prompt capillary refill.    Lower Extremities  • Right Hip:        • Tenderness: No lateral tenderness.        • Swelling: None       • Crepitus: None       • Atrophy: None       • Range of motion:            • External rotation: 40°            • Internal rotation: 40°            • Flexion: 100°            • Extension: 0°       • Instability: None       • Deformities: None       • Functional Testing:            • Stinchfield Test: Negative       • No leg length discrepancy.    • Left Hip:        • Tenderness: No lateral tenderness.        • Swelling: None       • Crepitus: None       • Atrophy: None       • Range of motion:            • External rotation: 40°            • Internal rotation: 40°            • Flexion: 100°            • Extension: 0°       • Instability: None       • Deformities: None       • Functional Testing:            • Stinchfield Test: Negative       • No leg length discrepancy.    Imaging/Studies      Imaging Results (Last 24 Hours)     Procedure Component Value Units Date/Time    XR Hips Bilateral With or Without Pelvis 2 View [[id number]] Resulted: 04/05/21 0849     Updated: 04/05/21 0850    Narrative:      Right Hip Radiographs  Indication: status-post right hip resurfacing  Views: low AP pelvis and lateral of the right hip    Comparison: None available    Findings:   The components are well aligned, with no signs of loosening or failure.    Left Hip Radiographs  Indication: status-post left total hip arthroplasty  Views: low AP pelvis and lateral of the left hip    Comparison: None available    Findings:   The components are well aligned, with no signs of loosening or failure.            Assessment and Plan     Diagnoses and all  orders for this visit:    1. S/P bilateral hip replacements (Primary)  -     XR Hips Bilateral With or Without Pelvis 2 View  -     [place]; Future  -     Chromium Level; Future        1. S/P bilateral hip replacements        Plan:  Implants appear to be well situated with no gross signs of loosening or failure.  No previous radiographs are available for comparisons.  She has a resurfacing on the right, which is a metal-on-metal articulation.  We will obtain baseline metal levels, and I will see her back in 2 weeks to review.  There is some likelihood that this is coming from her back, as most of her pain is posterior.      Return in about 2 weeks (around 4/19/2021).      Scribed for Donald Sprague MD by JET BUCKLEY.  04/05/21   10:58 EDT    I have personally performed the services described in this document as scribed by the above individual, and it is both accurate and complete.  Donald Sprague MD  4/5/2021  12:23 EDT

## 2021-04-07 LAB
COBALT SERPL-MCNC: 2.6 UG/L (ref 0–0.9)
CR SERPL-MCNC: 5.6 UG/L (ref 0.1–2.1)

## 2021-04-11 PROBLEM — M16.0 OSTEOARTHRITIS OF BOTH HIPS: Status: ACTIVE | Noted: 2021-04-11

## 2021-04-13 ENCOUNTER — TELEPHONE (OUTPATIENT)
Dept: ORTHOPEDIC SURGERY | Facility: CLINIC | Age: 66
End: 2021-04-13

## 2021-04-13 DIAGNOSIS — Z96.643 S/P BILATERAL HIP REPLACEMENTS: Primary | ICD-10-CM

## 2021-04-13 NOTE — TELEPHONE ENCOUNTER
----- Message from MUNA Cid(CHASITY) sent at 4/8/2021  2:23 PM EDT -----  Regarding: FW: Test Results Question  Contact: [phone]  Dr. Sprague,    Please see below and advise.    Thanks,    Rula  ----- Message -----  From: Carli Bolanos  Sent: 4/8/2021  12:28 PM EDT  To: [facility] Clinical Pool  Subject: Test Results Question                            I see my test results for chromium (5.6) and cobalt (2.6) are quite a bit above normal limits.  I hate to wait until my follow-up appointment (April 21) to learn what this means as  I am a total worrywart :).  Will this involve just monitoring it, replacement , or some other option?  Also, I would be willing to have a virtual appointment if this could be done sooner than April 21st.      Thank you,  Carli Bolanos

## 2021-04-14 ENCOUNTER — TELEPHONE (OUTPATIENT)
Dept: ORTHOPEDIC SURGERY | Facility: CLINIC | Age: 66
End: 2021-04-14

## 2021-04-14 NOTE — TELEPHONE ENCOUNTER
Dr. Sprague,    For clarification, Ms. Bolanos does not need to keep her 04.21.21 appointment but to follow up in 6 months after new labs.  Correct?    Thanks,    Rula

## 2021-04-14 NOTE — TELEPHONE ENCOUNTER
Provider: DR OSEGUERA  Caller: ROYCE PACHECO  Relationship to Patient: SELF  Phone Number: [phone]  Reason for Call: PATIENT SEND A MESSAGE ON MY CHART TO DR OSEGUERA REGARDING HER LAB RESULTS AND HE CALLED HER ON 04/13/21 TO DISCUSS.  HE ALSO TOLD HER TO FOLLOW-UP WITH BLOOD WORK AND APPOINTMENT IN 6 MOS.  PATIENT HAS AN APPOINTMENT SCHEDULED 04/21/21 AND IT WAS ORIGINALLY SCHEDULED TO DISCUSS LABS.  PATIENT WANTS TO KNOW IF IT IS OKAY TO CANCEL THIS APPOINTMENT AND IF SHE JUST NEEDS TO SCHEDULE NEXT FOLLOW UP IN 6 MONTHS?  PLEASE ADVISE, THANK YOU.  When was the patient last seen: 04/05/21

## 2021-04-15 ENCOUNTER — TELEPHONE (OUTPATIENT)
Dept: CASE MANAGEMENT | Facility: OTHER | Age: 66
End: 2021-04-15

## 2021-04-15 NOTE — TELEPHONE ENCOUNTER
UTRx3 to complete ACP referral f/up: LVMs patient cell/home phone welcoming call back re ACP discussion/assistance.

## 2021-04-19 ENCOUNTER — HOSPITAL ENCOUNTER (OUTPATIENT)
Dept: MAMMOGRAPHY | Facility: HOSPITAL | Age: 66
Discharge: HOME | End: 2021-04-19

## 2021-04-19 ENCOUNTER — TRANSCRIBE ORDERS (OUTPATIENT)
Dept: MAMMOGRAPHY | Facility: HOSPITAL | Age: 66
End: 2021-04-19

## 2021-04-19 ENCOUNTER — HOSPITAL ENCOUNTER (OUTPATIENT)
Dept: ULTRASOUND IMAGING | Facility: HOSPITAL | Age: 66
Discharge: HOME | End: 2021-04-19

## 2021-04-19 DIAGNOSIS — R92.8 ABNORMAL MAMMOGRAM: Primary | ICD-10-CM

## 2021-04-19 DIAGNOSIS — R92.8 ABNORMAL MAMMOGRAM: ICD-10-CM

## 2021-04-19 PROCEDURE — 77065 DX MAMMO INCL CAD UNI: CPT | Performed by: RADIOLOGY

## 2021-04-19 PROCEDURE — 77065 DX MAMMO INCL CAD UNI: CPT

## 2021-04-19 PROCEDURE — 76642 ULTRASOUND BREAST LIMITED: CPT

## 2021-04-19 PROCEDURE — 76642 ULTRASOUND BREAST LIMITED: CPT | Performed by: RADIOLOGY

## 2021-04-22 ENCOUNTER — TELEPHONE (OUTPATIENT)
Dept: GYNECOLOGIC ONCOLOGY | Facility: CLINIC | Age: 66
End: 2021-04-22

## 2021-04-22 NOTE — TELEPHONE ENCOUNTER
Caller: ROYCE PACHECO    Relationship to patient: SELF    Best call back number: [phone]    PATIENT HAD AN APPT WITH [facility] ON 7/1 FOR A PAP SMEAR AND WAS TOLD SHE NEEDED TO RESCHEDULE.    PLEASE CALL TO DISCUSS.

## 2021-04-29 DIAGNOSIS — E78.00 PURE HYPERCHOLESTEROLEMIA: ICD-10-CM

## 2021-04-29 DIAGNOSIS — I10 ESSENTIAL HYPERTENSION: ICD-10-CM

## 2021-04-30 RX ORDER — AMLODIPINE BESYLATE 5 MG/1
TABLET ORAL
Qty: 90 TABLET | Refills: 2 | OUTPATIENT
Start: 2021-04-30

## 2021-04-30 RX ORDER — SIMVASTATIN 20 MG
TABLET ORAL
Qty: 90 TABLET | Refills: 2 | OUTPATIENT
Start: 2021-04-30

## 2021-05-11 ENCOUNTER — HOSPITAL ENCOUNTER (OUTPATIENT)
Dept: ULTRASOUND IMAGING | Facility: HOSPITAL | Age: 66
Discharge: HOME | End: 2021-05-11
Admitting: NURSE PRACTITIONER

## 2021-05-11 DIAGNOSIS — R92.8 ABNORMAL MAMMOGRAM: ICD-10-CM

## 2021-05-11 PROCEDURE — 88173 CYTOPATH EVAL FNA REPORT: CPT | Performed by: NURSE PRACTITIONER

## 2021-05-11 PROCEDURE — 10005 FNA BX W/US GDN 1ST LES: CPT | Performed by: RADIOLOGY

## 2021-05-11 PROCEDURE — 25010000003 LIDOCAINE 1 % SOLUTION: Performed by: RADIOLOGY

## 2021-05-11 PROCEDURE — 88184 FLOWCYTOMETRY/ TC 1 MARKER: CPT

## 2021-05-11 PROCEDURE — 88185 FLOWCYTOMETRY/TC ADD-ON: CPT

## 2021-05-11 RX ORDER — LIDOCAINE HYDROCHLORIDE 10 MG/ML
5 INJECTION, SOLUTION INFILTRATION; PERINEURAL ONCE
Status: COMPLETED | OUTPATIENT
Start: 2021-05-11 | End: 2021-05-11

## 2021-05-11 RX ADMIN — LIDOCAINE HYDROCHLORIDE 1 ML: 10 INJECTION, SOLUTION INFILTRATION; PERINEURAL at 10:39

## 2021-05-13 LAB
LAB AP CASE REPORT: NORMAL
LAB AP FLOW CYTOMETRY SUMMARY: NORMAL
PATH REPORT.FINAL DX SPEC: NORMAL

## 2021-05-14 ENCOUNTER — TELEPHONE (OUTPATIENT)
Dept: MAMMOGRAPHY | Facility: HOSPITAL | Age: 66
End: 2021-05-14

## 2021-05-14 NOTE — TELEPHONE ENCOUNTER
Referring provider's ( GHADA Barron) office notified Flow cytometry returned as increased T-Cell CD4 to CD8 ratio. Dr LALO Campbell recommending pt be notified & referred to Med-Onc for consultation & further evaluation by referring provider

## 2021-05-17 ENCOUNTER — TELEPHONE (OUTPATIENT)
Dept: GYNECOLOGIC ONCOLOGY | Facility: CLINIC | Age: 66
End: 2021-05-17

## 2021-05-17 DIAGNOSIS — R89.6 ABNORMAL CYTOLOGY: Primary | ICD-10-CM

## 2021-05-24 ENCOUNTER — CONSULT (OUTPATIENT)
Dept: ONCOLOGY | Facility: CLINIC | Age: 66
End: 2021-05-24

## 2021-05-24 VITALS
SYSTOLIC BLOOD PRESSURE: 151 MMHG | TEMPERATURE: 95.4 F | WEIGHT: 138.9 LBS | BODY MASS INDEX: 23.14 KG/M2 | DIASTOLIC BLOOD PRESSURE: 87 MMHG | OXYGEN SATURATION: 98 % | HEIGHT: 65 IN | HEART RATE: 70 BPM | RESPIRATION RATE: 16 BRPM

## 2021-05-24 DIAGNOSIS — R59.0 AXILLARY LYMPHADENOPATHY: Primary | ICD-10-CM

## 2021-05-24 PROCEDURE — 99213 OFFICE O/P EST LOW 20 MIN: CPT | Performed by: INTERNAL MEDICINE

## 2021-05-24 RX ORDER — BIOTIN 10000 MCG
CAPSULE ORAL
COMMUNITY
Start: 2021-05-06 | End: 2022-11-22

## 2021-05-24 NOTE — PROGRESS NOTES
ID: 66 y.o. year old female from [facility] [id number]    PCP: Rosa M Venegas MD    REFERRING PHYSICIAN: GHADA Bello    Reason for Consultation: Left Axillary Adenopathy    Dear Dr. Horn and Ms. Villa    It is a pleasure to meet Ms. Bolanos today.  She is a very pleasant 66-year-old lady who presents today for consultation due to axillary lymphadenopathy.  She had undergone a flu vaccination prior to a mammogram which showed a mildly prominent axillary node.  Subsequently they waited 3 months and repeated at this time after Covid vaccination.  Again there was a prominent node at that region and so subsequently she underwent an FNA of the node.  The FNA showed a increased ratio and CD4 to CD8 cells.  She is also on methotrexate.  The flow cytometry suggested a benign follicular hyperplasia.  Though they could not rule out Hodgkin's lymphoma either.  She is completely asymptomatic.  She does not have any night sweats fever chills or any significant palpable lymphadenopathy.        Past Medical History:   Diagnosis Date   • Abnormal thyroid ultrasound 07/05/2017    stable MNG (7/5/17) by u/s   • Abnormal thyroid ultrasound 06/12/2015    stable MNG (6/12/15)   • Abnormal thyroid ultrasound 04/18/2014    thyr u/s (4/18/14): MNG except dominant complex mass mid L. lobe, repeat u/s in 6 mos   • Cataract still small   • Cholelithiasis Gall bladder removed   • Endometrial cancer (CMS/HCC)    • H/O uterine leiomyoma     s/p DUSTY/USO ('13)   • History of CT scan 02/27/2016    neg head ct   • Hx of bone density study 06/08/2015    DEXA (6/15) L -0.4, H0.7   • Hx of bone density study 07/17/2018    nl DEXA (7/17/18): L -0.4, H 0.2, repeat 3 yrs   • Hx of colonoscopy 03/11/2011    colonosc (3/11/11): diverticulosis, sm int hem, repeat 2018; [facility] Thornton   • Hx of colonoscopy 10/05/2018    colonosc (10/5/18): diverticulosis, redundant colon, int hem, repeat 5 yrs due to h/o polyps; [facility] Thornton   • Hx of  EMG/NCV 02/27/2008    EMG/NCV (2/27/08): right median neuropathy   • Hx of hand x-ray 01/24/2018    R. hand xray (1/24/18): degen changes 1st metacarpal joint and index PIP   • Lumps on the skin 02/20/2013    R. temple lesion; evaluated by Dr. Amelia Gann 02/20/13 with upcoming excision   • Right arm numbness 06/13/2016    Impression: improved after CTS surgery per pt; 03/01/2016 - resolved at this time; consider pinched nerve due to positioning during sleep the night before; if sxs recur, rec updated EMG/NCV for further eval;        Past Surgical History:   Procedure Laterality Date   • APPENDECTOMY      taken out at gall bladder surgery   • CARPAL TUNNEL RELEASE Left     carpel tunnel left    • CERVICAL POLYPECTOMY  03/2005    with h/o fibroids and DUB   • CHOLECYSTECTOMY     • DILATATION AND CURETTAGE  02/22/2013   • EXCISION MASS HEAD/NECK Right 03/05/2013    s/p excision scalp lesion large R. parietal and L. temporal areas; plastics - Dr. Amelia Gann   • HIP ARTHROSCOPY Right 2004    with subsequent [place] resurfacing with hip replacement (5/11)   • HYSTERECTOMY      age 58   • SALPINGO OOPHORECTOMY Left 1991    secondary to tubal pregnancy and ovarian cyst   • SALPINGO OOPHORECTOMY Right     secondary to tubal pregnancy   • TOTAL HIP ARTHROPLASTY Left 06/2011     ortho Dr. Mcdermott   • TOTAL HIP ARTHROPLASTY Right 05/2011   • TOTAL LAPAROSCOPIC HYSTERECTOMY WITH DAVINCI ROBOT  03/05/2013    RTLH/BSO with LND and omental biopsy for endometrial cancer; GYN Onc - Dr. Malin   • US GUIDED FINE NEEDLE ASPIRATION  5/11/2021       Social History     Socioeconomic History   • Marital status:      Spouse name: Not on file   • Number of children: Not on file   • Years of education: Not on file   • Highest education level: Not on file   Tobacco Use   • Smoking status: Never Smoker   • Smokeless tobacco: Never Used   Substance and Sexual Activity   • Alcohol use: Yes     Alcohol/week: 1.0 standard drinks      Types: 1 Glasses of wine per week     Comment: social   • Drug use: No   • Sexual activity: Yes     Partners: Male     Birth control/protection: Surgical       Family History   Problem Relation Age of Onset   • Dementia Mother    • Cancer Mother         GYN   • Hip fracture Mother    • Thyroid disease Mother    • Hypertension Mother    • Cervical cancer Mother    • Heart attack Father    • Heart failure Father    • Diabetes Father    • Heart disease Father         HEart failure   • Prostate cancer Father    • Hypertension Father    • Hypertension Brother    • Heart disease Brother    • Kidney cancer Maternal Grandmother    • Arthritis Maternal Grandmother    • Cancer Maternal Grandmother         Kidney Cancer   • Colon cancer Maternal Grandfather 70   • Cancer Maternal Grandfather         Colon cancer   • Heart failure Paternal Grandmother    • Heart disease Paternal Grandmother    • Arthritis Paternal Grandmother    • Hypertension Paternal Grandmother    • Diabetes Paternal Grandfather    • Colon cancer Paternal Uncle 78   • Breast cancer Neg Hx    • Ovarian cancer Neg Hx        Review of Systems:    16 point review of systems was performed and reviewed and scanned into the EMR    Review of Systems - Oncology      Current Outpatient Medications:   •  amLODIPine (NORVASC) 5 MG tablet, Take 1 tablet by mouth Daily., Disp: 90 tablet, Rfl: 3  •  Biotin 10 MG capsule, one capsule once daily, Disp: , Rfl:   •  CBD (cannabidiol) oral oil, Take  by mouth., Disp: , Rfl:   •  celecoxib (CELEBREX) 200 MG capsule, Take 1 capsule by mouth Daily As Needed for Mild Pain ., Disp: , Rfl:   •  cyclobenzaprine (FLEXERIL) 10 MG tablet, 1/2-1 tid prn muscle spasm, Disp: 30 tablet, Rfl: 0  •  folic acid (FOLVITE) 1 MG tablet, Take 2 tablets by mouth Daily., Disp: , Rfl:   •  methotrexate 2.5 MG tablet, Take 7 tablets by mouth 1 (One) Time Per Week., Disp: , Rfl:   •  simvastatin (ZOCOR) 20 MG tablet, Take 1 tablet by mouth Every  Evening., Disp: 90 tablet, Rfl: 3  •  TURMERIC PO, Take  by mouth., Disp: , Rfl:   •  vitamin B-6 (PYRIDOXINE) 50 MG tablet, Take 100 mg by mouth Daily., Disp: , Rfl:   •  Xiidra 5 % ophthalmic solution, , Disp: , Rfl:     Pain Medications             celecoxib (CELEBREX) 200 MG capsule Take 1 capsule by mouth Daily As Needed for Mild Pain .    cyclobenzaprine (FLEXERIL) 10 MG tablet 1/2-1 tid prn muscle spasm           Allergies   Allergen Reactions   • Codeine Other (See Comments)     Other reaction(s): hyperactivity   • Lisinopril Cough   • Naproxen Other (See Comments)     Other reaction(s): mouth sores       ECOG SCORE: 0              Objective     Vitals:    05/24/21 0930   BP: 151/87   Pulse: 70   Resp: 16   Temp: 95.4 °F (35.2 °C)   SpO2: 98%     Body mass index is 23.11 kg/m².  Body surface area is 1.69 meters squared.        05/24/21 0930   Weight: 63 kg (138 lb 14.4 oz)     Pain Score    05/24/21 0930   PainSc: 0-No pain          Physical Exam    General: well appearing, in no acute distress  HEENT: sclera anicteric, oropharynx clear, neck is supple  Lymphatics: no cervical, supraclavicular, or axillary adenopathy  Cardiovascular: regular rate and rhythm, no murmurs, rubs or gallops  Lungs: clear to auscultation bilaterally  Abdomen: soft, nontender, nondistended.  No palpable organomegaly  Extremities: no lower extremity edema  Skin: no rashes, lesions, bruising, or petechiae  Msk:  Shows no weakness of the large muscle groups  Psych: Mood is stable        Lab Results   Component Value Date    GLUCOSE 87 07/10/2020    BUN 17 07/10/2020    CREATININE 0.70 07/10/2020     07/10/2020    K 5.0 07/10/2020     07/10/2020    CO2 25.2 07/10/2020    CALCIUM 10.0 07/10/2020    PROTEINTOT 7.9 07/10/2020    ALBUMIN 4.60 07/10/2020    BILITOT 0.4 07/10/2020    ALKPHOS 63 07/10/2020    AST 28 07/10/2020    ALT 28 07/10/2020       Lab Results   Component Value Date    HGB 13.2 07/10/2020    HCT 39.0  07/10/2020    MCV 87.4 07/10/2020     07/10/2020    WBC 6.10 07/10/2020    NEUTROABS 4.15 07/10/2020    LYMPHSABS 1.37 07/10/2020    MONOSABS 0.46 07/10/2020    EOSABS 0.08 07/10/2020    BASOSABS 0.03 07/10/2020       Mammo Diagnostic Left With CAD    Result Date: 4/19/2021  BI-RADS 4 SUSPICIOUS ABNORMALITY LEFT AXILLA  RECOMMENDATION: Ultrasound-guided fine-needle aspiration of the mildly prominent left axillary lymph node with material to be sent for cytology as well as flow cytometry.  CAD was utilized.  The standard false-negative rate of mammography is between 10% and 25%. Complex patterns or increased breast density will markedly elevate the false-negative rate of mammography.     Physicians Order  Ultrasound Guided Fine-Needle Aspiration Left Axillary Lymph Node  Diagnosis:  Abnormal Mammogram   This report was finalized on 4/19/2021 10:17 AM by Dr. Marian Campbell MD.      US Breast Bilateral Limited    Result Date: 4/19/2021  BI-RADS 4 SUSPICIOUS ABNORMALITY LEFT AXILLA  RECOMMENDATION: Ultrasound-guided fine-needle aspiration of the mildly prominent left axillary lymph node with material to be sent for cytology as well as flow cytometry.  CAD was utilized.  The standard false-negative rate of mammography is between 10% and 25%. Complex patterns or increased breast density will markedly elevate the false-negative rate of mammography.    Physicians Order  Ultrasound Guided Fine-Needle Aspiration Left Axillary Lymph Node  Diagnosis:  Abnormal Mammogram   This report was finalized on 4/19/2021 10:17 AM by Dr. Marian Campbell MD.      Lab Results   Component Value Date    FINALDX  05/11/2021     FNA OF LYMPH NODE:  Negative for malignant cells.  Slides reviewed and diagnosis rendered by Nadia Villatoro M.D., F.C.A.P.   Testing performed at outside laboratory. See scanned report.            Assessment/Plan      1.  Axillary adenopathy.  This appears to be a benign process.  Likely reactive rather  than malignant.  I think it is reasonable to consider imaging in about 3 months.  I gave her the option of considering an excisional biopsy of the node to rule out Hodgkin's lymphoma.  However I think with the situation it is reasonable to just get a CT of the chest in 3 months to see if there is any abnormal appearing lymphadenopathy.  If there is then I think it is reasonable to do an excisional biopsy.  Lymphoma is very hard to diagnose on FNA.  The increase in  CD4 to CD8 ratio may be seen from methotrexate use.         Thank you for allowing me to participate in the care of this patient.    Yours sincerely,    Chavo Vasquez MD  [facility]  Hematology and Oncology    Return on: 08/18/21  Return in (Approximately): 3 months    Orders Placed This Encounter   Procedures   • CT Chest Without Contrast Diagnostic     Standing Status:   Future

## 2021-06-29 ENCOUNTER — TELEPHONE (OUTPATIENT)
Dept: INTERNAL MEDICINE | Facility: CLINIC | Age: 66
End: 2021-06-29

## 2021-06-29 DIAGNOSIS — E05.90 SUBCLINICAL HYPERTHYROIDISM: ICD-10-CM

## 2021-06-29 DIAGNOSIS — I10 ESSENTIAL HYPERTENSION: ICD-10-CM

## 2021-06-29 DIAGNOSIS — R73.01 IMPAIRED FASTING GLUCOSE: ICD-10-CM

## 2021-06-29 DIAGNOSIS — Z00.00 MEDICARE ANNUAL WELLNESS VISIT, SUBSEQUENT: Primary | ICD-10-CM

## 2021-06-29 DIAGNOSIS — E78.00 PURE HYPERCHOLESTEROLEMIA: ICD-10-CM

## 2021-07-01 ENCOUNTER — OFFICE VISIT (OUTPATIENT)
Dept: GYNECOLOGIC ONCOLOGY | Facility: CLINIC | Age: 66
End: 2021-07-01

## 2021-07-01 VITALS
DIASTOLIC BLOOD PRESSURE: 79 MMHG | SYSTOLIC BLOOD PRESSURE: 146 MMHG | RESPIRATION RATE: 16 BRPM | BODY MASS INDEX: 23.49 KG/M2 | HEART RATE: 60 BPM | OXYGEN SATURATION: 98 % | WEIGHT: 141 LBS | TEMPERATURE: 98.6 F | HEIGHT: 65 IN

## 2021-07-01 DIAGNOSIS — Z12.31 ENCOUNTER FOR SCREENING MAMMOGRAM FOR MALIGNANT NEOPLASM OF BREAST: ICD-10-CM

## 2021-07-01 DIAGNOSIS — R59.0 AXILLARY ADENOPATHY: ICD-10-CM

## 2021-07-01 DIAGNOSIS — Z78.0 MENOPAUSE: ICD-10-CM

## 2021-07-01 DIAGNOSIS — Z85.42 HISTORY OF ENDOMETRIAL CANCER: Primary | ICD-10-CM

## 2021-07-01 PROCEDURE — 99214 OFFICE O/P EST MOD 30 MIN: CPT | Performed by: OBSTETRICS & GYNECOLOGY

## 2021-07-01 NOTE — PROGRESS NOTES
GYN ONCOLOGY ANNUAL WELL WOMAN VISIT      Carli Bolanos  [id number]  1955      Chief Complaint: annual exam, history of endometrial cancer.      History of present illness:  Carli Bolanos is a 66 y.o. year old female who is here today for an annual exam. She has a history of endometrial cancer as outlined below. She reports she is feeling very well today and has no complaints. She denies vaginal bleeding, pelvic pain, changes in bowel or bladder function, new or concerning lesions, and breast problems.  Of note she has been followed by Dr. Barnes for axillary adenopathy and was found on a mammogram.  Patient has not noticed any new lumps or bumps in the axilla. Colonoscopy and mammogram are UTD. She is due for a DEXA scan.       Cancer History:   Oncology/Hematology History   Endometrial/uterine adenocarcinoma (CMS/HCC)   2013 Initial Diagnosis    D&C for PMB and abnormal uterine ultrasound. Pathology revealed well differentiated adenocarcinoma in the background of CAH     3/15/2013 Surgery    RTLH/RSO, bilateral pelvic and periaortic LND, and omental biopsy. Stage IA grade 1 by final path         Obstetric History:  OB History        2    Para   2    Term                AB        Living           SAB        TAB        Ectopic        Molar        Multiple        Live Births                   Menstrual History:     No LMP recorded. Patient is postmenopausal.          Past Medical History:   Diagnosis Date   • Abnormal thyroid ultrasound 2017    stable MNG (17) by u/s   • Abnormal thyroid ultrasound 2015    stable MNG (6/12/15)   • Abnormal thyroid ultrasound 2014    thyr u/s (14): MNG except dominant complex mass mid L. lobe, repeat u/s in 6 mos   • Cataract still small   • Cholelithiasis Gall bladder removed   • Endometrial cancer (CMS/HCC)    • H/O uterine leiomyoma     s/p DUSTY/USO ('13)   • History of CT scan 2016    neg head ct   • Hx of bone density  study 06/08/2015    DEXA (6/15) L -0.4, H0.7   • Hx of bone density study 07/17/2018    nl DEXA (7/17/18): L -0.4, H 0.2, repeat 3 yrs   • Hx of colonoscopy 03/11/2011    colonosc (3/11/11): diverticulosis, sm int hem, repeat 2018; [facility] - Dr. Thornton   • Hx of colonoscopy 10/05/2018    colonosc (10/5/18): diverticulosis, redundant colon, int hem, repeat 5 yrs due to h/o polyps; CRS - Dr. Thornton   • Hx of EMG/NCV 02/27/2008    EMG/NCV (2/27/08): right median neuropathy   • Hx of hand x-ray 01/24/2018    R. hand xray (1/24/18): degen changes 1st metacarpal joint and index PIP   • Lumps on the skin 02/20/2013    R. temple lesion; evaluated by Dr. Amelia Gann 02/20/13 with upcoming excision   • Right arm numbness 06/13/2016    Impression: improved after CTS surgery per pt; 03/01/2016 - resolved at this time; consider pinched nerve due to positioning during sleep the night before; if sxs recur, rec updated EMG/NCV for further eval;        Past Surgical History:   Procedure Laterality Date   • APPENDECTOMY      taken out at gall bladder surgery   • CARPAL TUNNEL RELEASE Left     carpel tunnel left    • CERVICAL POLYPECTOMY  03/2005    with h/o fibroids and DUB   • CHOLECYSTECTOMY     • DILATATION AND CURETTAGE  02/22/2013   • EXCISION MASS HEAD/NECK Right 03/05/2013    s/p excision scalp lesion large R. parietal and L. temporal areas; plastics - Dr. Amelia Gann   • HIP ARTHROSCOPY Right 2004    with subsequent [place] resurfacing with hip replacement (5/11)   • HYSTERECTOMY      age 58   • SALPINGO OOPHORECTOMY Left 1991    secondary to tubal pregnancy and ovarian cyst   • SALPINGO OOPHORECTOMY Right     secondary to tubal pregnancy   • TOTAL HIP ARTHROPLASTY Left 06/2011     ortho Dr. Mcdermott   • TOTAL HIP ARTHROPLASTY Right 05/2011   • TOTAL LAPAROSCOPIC HYSTERECTOMY WITH DAVINCI ROBOT  03/05/2013    RTLH/BSO with LND and omental biopsy for endometrial cancer; GYN Onc - Dr. Malin   • US GUIDED FINE NEEDLE  ASPIRATION  5/11/2021       MEDICATIONS: The current medication list was reviewed and reconciled.     Allergies:  is allergic to codeine, lisinopril, and naproxen.    Family History   Problem Relation Age of Onset   • Dementia Mother    • Cancer Mother         GYN   • Hip fracture Mother    • Thyroid disease Mother    • Hypertension Mother    • Cervical cancer Mother    • Heart attack Father    • Heart failure Father    • Diabetes Father    • Heart disease Father         HEart failure   • Prostate cancer Father    • Hypertension Father    • Hypertension Brother    • Heart disease Brother    • Kidney cancer Maternal Grandmother    • Arthritis Maternal Grandmother    • Cancer Maternal Grandmother         Kidney Cancer   • Colon cancer Maternal Grandfather 70   • Cancer Maternal Grandfather         Colon cancer   • Heart failure Paternal Grandmother    • Heart disease Paternal Grandmother    • Arthritis Paternal Grandmother    • Hypertension Paternal Grandmother    • Diabetes Paternal Grandfather    • Colon cancer Paternal Uncle 78   • Breast cancer Neg Hx    • Ovarian cancer Neg Hx        Health Maintenance:  Last mammogram was 8/24/2018. Last colonoscopy was 10/2018, with recommended follow-up in 7 year(s). Last DEXA was 7/17/2018, normal. Last pap smear was 4/19/2018, results were  normal PAP..      Review of Systems   Constitutional: Negative for fatigue, fever and unexpected weight change.   HENT: Negative for congestion, ear pain, hearing loss, sinus pressure and trouble swallowing.    Eyes: Negative for visual disturbance.   Respiratory: Negative for cough, chest tightness, shortness of breath and wheezing.    Cardiovascular: Negative for chest pain, palpitations and leg swelling.   Gastrointestinal: Negative for abdominal distention, abdominal pain, constipation, diarrhea, nausea and vomiting.   Endocrine: Negative for cold intolerance, heat intolerance, polydipsia, polyphagia and polyuria.   Genitourinary:  "Negative for difficulty urinating, dyspareunia, dysuria, frequency, hematuria, pelvic pain, urgency, vaginal bleeding, vaginal discharge and vaginal pain.   Musculoskeletal: Negative for arthralgias, gait problem, joint swelling and myalgias.   Skin: Negative for color change, pallor and rash.   Neurological: Negative for dizziness, seizures, syncope, weakness, light-headedness, numbness and headaches.   Hematological: Positive for adenopathy (Left axillary - followed by Dr. Barnes). Does not bruise/bleed easily.   Psychiatric/Behavioral: Negative for agitation, confusion, sleep disturbance and suicidal ideas. The patient is not nervous/anxious.          Physical Exam  Vital Signs: /79 Comment: RUE  Pulse 60   Temp 98.6 °F (37 °C) (Infrared)   Resp 16   Ht 165.1 cm (65\")   Wt 64 kg (141 lb)   SpO2 98% Comment: RA  BMI 23.46 kg/m²   Vitals:    07/01/21 0924   PainSc: 0-No pain           General Appearance:  alert, cooperative, no apparent distress, appears stated age and normal weight   Neurologic/Psychiatric: A&O x 3, gait steady, appropriate affect   HEENT:  Normocephalic, without obvious abnormality, mucous membranes moist   Neck: Supple, symmetrical, trachea midline, no adenopathy;  No thyromegaly, masses, or tenderness   Back:   Symmetric, no curvature, ROM normal, no CVA tenderness   Lungs:   Clear to auscultation bilaterally; respirations regular, even, and unlabored bilaterally   Heart:  Regular rate and rhythm, no murmurs appreciated   Breasts:  Symmetrical, no masses, no lesions and no nipple discharge   Abdomen:   Soft, non-tender, non-distended and no organomegaly   Lymph nodes: No cervical, supraclavicular, inguinal adenopathy noted. Fullness in left axilla noted but no discrete lymph node palpable.   Extremities: Normal, atraumatic; no clubbing, cyanosis, or edema    Skin: No rashes, ulcers, or suspicious lesions noted   Pelvic: External Genitalia  atrophic, without lesions  Vagina  is pale, " atrophic.   Vaginal Cuff  Female Vaginal Cuff: smooth, intact, without visible lesions  Uterus  surgically absent and no palpable masses  Ovaries  surgically absent bilaterally  Parametria  smooth  Rectovaginal  Female rectovaginal: deferred     ECOG Performance Status: (0) Fully Active - Able to Carry On All Pre-disease Performance Without Restriction    Procedure Note:  No notes on file    Assessment and Plan:    Diagnoses and all orders for this visit:    1. History of endometrial cancer (Primary)    2. Menopause  -     DEXA Bone Density Axial; Future    3. Encounter for screening mammogram for malignant neoplasm of breast   -     Mammo Screening Digital Tomosynthesis Bilateral With CAD; Future    4. Axillary adenopathy        There is no evidence of disease upon today's exam. She is understanding to call with any changes in pelvic symptoms or general GYN concerns at any time between regularly scheduled visits.     Given her lack of history of abnormal pap tests and her hysterectomy for endometrial cancer, per the SGO and ASCCP guidelines a pap test is no longer indicated. The patient was informed that she no longer needs pap tests though a yearly pelvic exam is still recommended given her cancer history.     She was encouraged to get yearly mammograms.  She should report any palpable breast lump(s) or skin changes regardless of mammographic findings.  I explained to Carli that notification regarding her mammogram results will come from the center performing the study.  Our office will not be routinely calling with mammogram results.  It is her responsibility to make sure that the results from the mammogram are communicated to her by the breast center.  If she has any questions about the results, she is welcome to call our office anytime.    Repeat colonoscopy in 2025 or sooner if new problems.     Repeat DEXA in 2021. Ordered today.    Axillary adenopathy: Currently following with Dr. Barnes of Hem/Onc who favors  reactive adenopathy. She will follow up with him in August as scheduled.    Pain assessment was performed today as a part of patient’s care. For patients with pain related to surgery, gynecologic malignancy or cancer treatment, the plan is as noted in the assessment/plan.  For patients with pain not related to these issues, they are to seek any further needed care from a more appropriate provider, such as PCP.      Advance Care Planning   ACP discussion was held with the patient during this visit. Patient does not have an advance directive, information provided.       Return to clinic in 1 year for Annual exam.      Martha Giles MD

## 2021-07-09 ENCOUNTER — LAB (OUTPATIENT)
Dept: LAB | Facility: HOSPITAL | Age: 66
End: 2021-07-09

## 2021-07-09 ENCOUNTER — TRANSCRIBE ORDERS (OUTPATIENT)
Dept: LAB | Facility: HOSPITAL | Age: 66
End: 2021-07-09

## 2021-07-09 DIAGNOSIS — D89.9 DISEASE OF IMMUNE SYSTEM (HCC): Primary | ICD-10-CM

## 2021-07-09 DIAGNOSIS — D89.9 DISEASE OF IMMUNE SYSTEM (HCC): ICD-10-CM

## 2021-07-09 DIAGNOSIS — M05.79 SEROPOSITIVE RHEUMATOID ARTHRITIS OF MULTIPLE SITES (HCC): ICD-10-CM

## 2021-07-09 DIAGNOSIS — R73.01 IMPAIRED FASTING GLUCOSE: ICD-10-CM

## 2021-07-09 DIAGNOSIS — E05.90 SUBCLINICAL HYPERTHYROIDISM: ICD-10-CM

## 2021-07-09 DIAGNOSIS — E78.00 PURE HYPERCHOLESTEROLEMIA: ICD-10-CM

## 2021-07-09 DIAGNOSIS — I10 ESSENTIAL HYPERTENSION: ICD-10-CM

## 2021-07-09 LAB
ALBUMIN SERPL-MCNC: 4.3 G/DL (ref 3.5–5.2)
ALBUMIN/GLOB SERPL: 1.5 G/DL
ALP SERPL-CCNC: 66 U/L (ref 39–117)
ALT SERPL W P-5'-P-CCNC: 33 U/L (ref 1–33)
ANION GAP SERPL CALCULATED.3IONS-SCNC: 10.6 MMOL/L (ref 5–15)
AST SERPL-CCNC: 34 U/L (ref 1–32)
BACTERIA UR QL AUTO: NORMAL /HPF
BASOPHILS # BLD AUTO: 0.03 10*3/MM3 (ref 0–0.2)
BASOPHILS NFR BLD AUTO: 0.5 % (ref 0–1.5)
BILIRUB SERPL-MCNC: 0.4 MG/DL (ref 0–1.2)
BILIRUB UR QL STRIP: NEGATIVE
BUN SERPL-MCNC: 15 MG/DL (ref 8–23)
BUN/CREAT SERPL: 22.1 (ref 7–25)
CALCIUM SPEC-SCNC: 9.4 MG/DL (ref 8.6–10.5)
CHLORIDE SERPL-SCNC: 104 MMOL/L (ref 98–107)
CHOLEST SERPL-MCNC: 151 MG/DL (ref 0–200)
CK SERPL-CCNC: 63 U/L (ref 20–180)
CLARITY UR: CLEAR
CO2 SERPL-SCNC: 25.4 MMOL/L (ref 22–29)
COLOR UR: YELLOW
CREAT SERPL-MCNC: 0.68 MG/DL (ref 0.57–1)
CRP SERPL-MCNC: <0.3 MG/DL (ref 0–0.5)
DEPRECATED RDW RBC AUTO: 44.1 FL (ref 37–54)
EOSINOPHIL # BLD AUTO: 0.09 10*3/MM3 (ref 0–0.4)
EOSINOPHIL NFR BLD AUTO: 1.5 % (ref 0.3–6.2)
ERYTHROCYTE [DISTWIDTH] IN BLOOD BY AUTOMATED COUNT: 13.5 % (ref 12.3–15.4)
ERYTHROCYTE [SEDIMENTATION RATE] IN BLOOD: 24 MM/HR (ref 0–30)
GFR SERPL CREATININE-BSD FRML MDRD: 86 ML/MIN/1.73
GLOBULIN UR ELPH-MCNC: 2.8 GM/DL
GLUCOSE SERPL-MCNC: 85 MG/DL (ref 65–99)
GLUCOSE UR STRIP-MCNC: NEGATIVE MG/DL
HBA1C MFR BLD: 5.7 % (ref 4.8–5.6)
HCT VFR BLD AUTO: 41.4 % (ref 34–46.6)
HDLC SERPL-MCNC: 65 MG/DL (ref 40–60)
HGB BLD-MCNC: 13.7 G/DL (ref 12–15.9)
HGB UR QL STRIP.AUTO: NEGATIVE
HYALINE CASTS UR QL AUTO: NORMAL /LPF
IMM GRANULOCYTES # BLD AUTO: 0.01 10*3/MM3 (ref 0–0.05)
IMM GRANULOCYTES NFR BLD AUTO: 0.2 % (ref 0–0.5)
KETONES UR QL STRIP: NEGATIVE
LDLC SERPL CALC-MCNC: 72 MG/DL (ref 0–100)
LDLC/HDLC SERPL: 1.1 {RATIO}
LEUKOCYTE ESTERASE UR QL STRIP.AUTO: NEGATIVE
LYMPHOCYTES # BLD AUTO: 1.39 10*3/MM3 (ref 0.7–3.1)
LYMPHOCYTES NFR BLD AUTO: 23.3 % (ref 19.6–45.3)
MCH RBC QN AUTO: 30 PG (ref 26.6–33)
MCHC RBC AUTO-ENTMCNC: 33.1 G/DL (ref 31.5–35.7)
MCV RBC AUTO: 90.8 FL (ref 79–97)
MONOCYTES # BLD AUTO: 0.46 10*3/MM3 (ref 0.1–0.9)
MONOCYTES NFR BLD AUTO: 7.7 % (ref 5–12)
NEUTROPHILS NFR BLD AUTO: 3.99 10*3/MM3 (ref 1.7–7)
NEUTROPHILS NFR BLD AUTO: 66.8 % (ref 42.7–76)
NITRITE UR QL STRIP: NEGATIVE
NRBC BLD AUTO-RTO: 0 /100 WBC (ref 0–0.2)
PH UR STRIP.AUTO: 7 [PH] (ref 5–8)
PLATELET # BLD AUTO: 256 10*3/MM3 (ref 140–450)
PMV BLD AUTO: 10.2 FL (ref 6–12)
POTASSIUM SERPL-SCNC: 4.8 MMOL/L (ref 3.5–5.2)
PROT SERPL-MCNC: 7.1 G/DL (ref 6–8.5)
PROT UR QL STRIP: NEGATIVE
RBC # BLD AUTO: 4.56 10*6/MM3 (ref 3.77–5.28)
RBC # UR: NORMAL /HPF
REF LAB TEST METHOD: NORMAL
SODIUM SERPL-SCNC: 140 MMOL/L (ref 136–145)
SP GR UR STRIP: <1.005 (ref 1–1.03)
SQUAMOUS #/AREA URNS HPF: NORMAL /HPF
T4 FREE SERPL-MCNC: 1.25 NG/DL (ref 0.93–1.7)
TRIGL SERPL-MCNC: 73 MG/DL (ref 0–150)
TSH SERPL DL<=0.05 MIU/L-ACNC: 0.25 UIU/ML (ref 0.27–4.2)
UROBILINOGEN UR QL STRIP: ABNORMAL
VLDLC SERPL-MCNC: 14 MG/DL (ref 5–40)
WBC # BLD AUTO: 5.97 10*3/MM3 (ref 3.4–10.8)
WBC UR QL AUTO: NORMAL /HPF

## 2021-07-09 PROCEDURE — 82550 ASSAY OF CK (CPK): CPT

## 2021-07-09 PROCEDURE — 80061 LIPID PANEL: CPT

## 2021-07-09 PROCEDURE — 86140 C-REACTIVE PROTEIN: CPT

## 2021-07-09 PROCEDURE — 84439 ASSAY OF FREE THYROXINE: CPT

## 2021-07-09 PROCEDURE — 85652 RBC SED RATE AUTOMATED: CPT

## 2021-07-09 PROCEDURE — 36415 COLL VENOUS BLD VENIPUNCTURE: CPT

## 2021-07-09 PROCEDURE — 83036 HEMOGLOBIN GLYCOSYLATED A1C: CPT

## 2021-07-09 PROCEDURE — 84443 ASSAY THYROID STIM HORMONE: CPT

## 2021-07-09 PROCEDURE — 85025 COMPLETE CBC W/AUTO DIFF WBC: CPT

## 2021-07-09 PROCEDURE — 80053 COMPREHEN METABOLIC PANEL: CPT

## 2021-07-09 PROCEDURE — 81001 URINALYSIS AUTO W/SCOPE: CPT

## 2021-07-18 PROBLEM — M16.0 OSTEOARTHRITIS OF BOTH HIPS: Chronic | Status: ACTIVE | Noted: 2021-04-11

## 2021-07-18 PROBLEM — K64.8 INTERNAL HEMORRHOIDS: Chronic | Status: ACTIVE | Noted: 2018-10-09

## 2021-07-18 PROBLEM — M05.79 RHEUMATOID ARTHRITIS INVOLVING MULTIPLE SITES WITH POSITIVE RHEUMATOID FACTOR: Chronic | Status: ACTIVE | Noted: 2018-01-24

## 2021-07-18 PROBLEM — F41.9 ANXIETY: Chronic | Status: ACTIVE | Noted: 2019-06-18

## 2021-07-18 PROBLEM — F51.01 PRIMARY INSOMNIA: Chronic | Status: ACTIVE | Noted: 2019-01-08

## 2021-07-18 PROBLEM — Z78.0 MENOPAUSE: Chronic | Status: ACTIVE | Noted: 2018-06-26

## 2021-07-19 NOTE — PROGRESS NOTES
ANNUAL WELLNESS VISIT    DRUG AND ALCOHOL USE      no tobacco use, alcohol intake:social drinker and caffeine intake: 2 cups of caffeinated coffee per day    DIET AND PHYSICAL ACTIVITY     Diet: general    Exercise: daily   Exercise Details: walking 1-2 miles per day; class for flexibility and strength 2x/week    MOOD DISORDER AND COGNITIVE SCREENING   Depression Screening Tool Used yes - see PHQ-9; components reviewed with patient; questionnaire items reviewed with patient in which she denies feeling depressed, blue, hopeless, or not having pleasure in doing things; denies concerns with energy, sleep, appetite, concentration, slow movements, slow thoughts, negative thoughts. Screening is NEG for depression.    Anxiety Screening Tool Used yes     Mini-Cog Performed   Yes    1. Tell Patient 3 Words apple table watch    2. Administer Clock Test normal    3. Recall 3 words  apple table watch    4. Number Correct Items 3    FUNCTIONAL ABILITY AND LEVEL OF SAFETY   Hearing mild hearing loss     Wears Hearing Aids No       Current Activities Independent      none  - see Funct/Cog Status Intake     Fall Risk Assessment       Has difficulty with walking or balance  No         Timed Up and Go (TUG) Test  7 sec.       If >12 sec, normal    ADVANCED DIRECTIVE  Advance Care Planning   ACP discussion was held with the patient during this visit. Patient has an advance directive in EMR which is still valid.      Advance Care Planning Discussion:  16 min or more spent on counseling; patient has advanced directive and living will.  Reviewed desires for end of life care, which is to have comfort care.  Patient does not want extraordinary life-sustaining measures, including no chest compression, shocks, intubation/ventilator use, feeding tube, or prolonged artificial life support.  Reviewed code status options, meanings, desires. Patient 's code status is DNR.   Encouraged patient to ensure family is aware of  "desires/preferences.    PAIN SCREENING Do you have pain right now? no      If so, 1-10 scale: 0     Intermittent     Do you have pain every day? No      Probable chronic pain: No     Recent Hospitalizations:  No recent hospitalization(s)..     MEDICATION REVIEW   - updated and reviewed (see Medication List).   - reviewed for potentially harmful drug-disease interactions in the elderly.   - reviewed for high risk medications in the elderly.   - aspirin use: No    BMI  Body mass index is 23.46 kg/m².    Patient's Body mass index is 23.46 kg/m². indicating that she is within normal range (BMI 18.5-24.9). No BMI management plan needed..    _________________________________________________________    Chief Complaint   Patient presents with   • Medicare Wellness-subsequent   • Impaired Fasting Glucose       History of Present Illness  66 y.o.  woman presents for updated wellness visit and f/u on sugar, cholesterol, and BP.    Requesting cyclobenz since last RX in 2018; has a few left. Remains on MTX and feels well on that medication.    Review of Systems  Denies headaches, visual changes, CP, palpitations, SOB, cough, abd pain, n/v/d, difficulty with urination, numbness/tingling, falls, mood changes, lightheadedness, hearing changes, rashes.     All other ROS reviewed and negative.    Current Outpatient Medications:   •  amLODIPine (NORVASC) 5 MG QD  •  Biotin 10 MG capsule QD  •  CBD (cannabidiol) oral oil QD  •  celecoxib (CELEBREX) 200 MG QD prn  •  cyclobenzaprine (FLEXERIL) 10 MG tablet, 1/2-1 tid prn muscle spasm  •  folic acid (FOLVITE) 1 MG 2 QD  •  methotrexate 2.5 MG tablet, Take 7 weekly  •  simvastatin (ZOCOR) 20 MG QD  •  TURMERIC PO QD  •  vitamin B-6 (PYRIDOXINE) 50 MG 2 QD  •  Xiidra 5 % ophthalmic solution AD    VITALS:  /68   Pulse 68   Ht 165.1 cm (65\")   Wt 64 kg (141 lb)   SpO2 99%   BMI 23.46 kg/m²     Physical Exam  Vitals and nursing note reviewed.   Constitutional:       " General: She is not in acute distress.     Appearance: Normal appearance. She is well-developed.   HENT:      Head: Normocephalic.      Right Ear: Ear canal and external ear normal.      Left Ear: Ear canal and external ear normal.      Nose: Nose normal.   Eyes:      Extraocular Movements: Extraocular movements intact.      Conjunctiva/sclera: Conjunctivae normal.      Pupils: Pupils are equal, round, and reactive to light.   Neck:      Vascular: No carotid bruit (bilaterally).   Cardiovascular:      Rate and Rhythm: Normal rate and regular rhythm.      Heart sounds: Normal heart sounds.   Pulmonary:      Effort: Pulmonary effort is normal. No respiratory distress.      Breath sounds: Normal breath sounds. No wheezing or rales.   Abdominal:      General: Bowel sounds are normal. There is no distension.      Palpations: Abdomen is soft. There is no mass.      Tenderness: There is no abdominal tenderness.   Genitourinary:     Comments: Chaperone declined by patient.    Breast exam unremarkable without masses, skin changes, nipple discharge, or axillary adenopathy.    Musculoskeletal:      Cervical back: Normal range of motion and neck supple.   Lymphadenopathy:      Cervical: No cervical adenopathy.   Skin:     General: Skin is warm and dry.      Findings: No rash.   Neurological:      Mental Status: She is alert and oriented to person, place, and time.      Cranial Nerves: No cranial nerve deficit.      Deep Tendon Reflexes: Reflexes normal.   Psychiatric:         Mood and Affect: Mood normal.         Behavior: Behavior normal.           LABS  Results for orders placed or performed in visit on 07/09/21   Lipid Panel    Specimen: Blood   Result Value Ref Range    Total Cholesterol 151 0 - 200 mg/dL    Triglycerides 73 0 - 150 mg/dL    HDL Cholesterol 65 (H) 40 - 60 mg/dL    LDL Cholesterol  72 0 - 100 mg/dL    VLDL Cholesterol 14 5 - 40 mg/dL    LDL/HDL Ratio 1.10    TSH    Specimen: Blood   Result Value Ref Range     TSH 0.248 (L) 0.270 - 4.200 uIU/mL   Hemoglobin A1c    Specimen: Blood   Result Value Ref Range    Hemoglobin A1C 5.70 (H) 4.80 - 5.60 %   T4, Free    Specimen: Blood   Result Value Ref Range    Free T4 1.25 0.93 - 1.70 ng/dL   CK    Specimen: Blood   Result Value Ref Range    Creatine Kinase 63 20 - 180 U/L   Sedimentation Rate    Specimen: Blood   Result Value Ref Range    Sed Rate 24 0 - 30 mm/hr   C-reactive Protein    Specimen: Blood   Result Value Ref Range    C-Reactive Protein <0.30 0.00 - 0.50 mg/dL   Comprehensive Metabolic Panel    Specimen: Blood   Result Value Ref Range    Glucose 85 65 - 99 mg/dL    BUN 15 8 - 23 mg/dL    Creatinine 0.68 0.57 - 1.00 mg/dL    Sodium 140 136 - 145 mmol/L    Potassium 4.8 3.5 - 5.2 mmol/L    Chloride 104 98 - 107 mmol/L    CO2 25.4 22.0 - 29.0 mmol/L    Calcium 9.4 8.6 - 10.5 mg/dL    Total Protein 7.1 6.0 - 8.5 g/dL    Albumin 4.30 3.50 - 5.20 g/dL    ALT (SGPT) 33 1 - 33 U/L    AST (SGOT) 34 (H) 1 - 32 U/L    Alkaline Phosphatase 66 39 - 117 U/L    Total Bilirubin 0.4 0.0 - 1.2 mg/dL    eGFR Non African Amer 86 >60 mL/min/1.73    Globulin 2.8 gm/dL    A/G Ratio 1.5 g/dL    BUN/Creatinine Ratio 22.1 7.0 - 25.0    Anion Gap 10.6 5.0 - 15.0 mmol/L   Urinalysis without microscopic (no culture) - Urine, Clean Catch    Specimen: Urine, Clean Catch   Result Value Ref Range    Color, UA Yellow Yellow, Straw    Appearance, UA Clear Clear    pH, UA 7.0 5.0 - 8.0    Specific Gravity, UA <1.005 (L) 1.005 - 1.030    Glucose, UA Negative Negative    Ketones, UA Negative Negative    Bilirubin, UA Negative Negative    Blood, UA Negative Negative    Protein, UA Negative Negative    Leuk Esterase, UA Negative Negative    Nitrite, UA Negative Negative    Urobilinogen, UA 0.2 E.U./dL 0.2 - 1.0 E.U./dL   Urinalysis, Microscopic Only - Urine, Clean Catch    Specimen: Urine, Clean Catch   Result Value Ref Range    RBC, UA 0-2 None Seen, 0-2 /HPF    WBC, UA 0-2 None Seen, 0-2 /HPF    Bacteria,  UA None Seen None Seen /HPF    Squamous Epithelial Cells, UA 0-2 None Seen, 0-2 /HPF    Hyaline Casts, UA None Seen None Seen /LPF    Methodology Automated Microscopy    CBC Auto Differential    Specimen: Blood   Result Value Ref Range    WBC 5.97 3.40 - 10.80 10*3/mm3    RBC 4.56 3.77 - 5.28 10*6/mm3    Hemoglobin 13.7 12.0 - 15.9 g/dL    Hematocrit 41.4 34.0 - 46.6 %    MCV 90.8 79.0 - 97.0 fL    MCH 30.0 26.6 - 33.0 pg    MCHC 33.1 31.5 - 35.7 g/dL    RDW 13.5 12.3 - 15.4 %    RDW-SD 44.1 37.0 - 54.0 fl    MPV 10.2 6.0 - 12.0 fL    Platelets 256 140 - 450 10*3/mm3    Neutrophil % 66.8 42.7 - 76.0 %    Lymphocyte % 23.3 19.6 - 45.3 %    Monocyte % 7.7 5.0 - 12.0 %    Eosinophil % 1.5 0.3 - 6.2 %    Basophil % 0.5 0.0 - 1.5 %    Immature Grans % 0.2 0.0 - 0.5 %    Neutrophils, Absolute 3.99 1.70 - 7.00 10*3/mm3    Lymphocytes, Absolute 1.39 0.70 - 3.10 10*3/mm3    Monocytes, Absolute 0.46 0.10 - 0.90 10*3/mm3    Eosinophils, Absolute 0.09 0.00 - 0.40 10*3/mm3    Basophils, Absolute 0.03 0.00 - 0.20 10*3/mm3    Immature Grans, Absolute 0.01 0.00 - 0.05 10*3/mm3    nRBC 0.0 0.0 - 0.2 /100 WBC     1/21 A1C 5.8      ECG 12 Lead    Date/Time: 7/20/2021 1:00 PM  Performed by: Rosa M Venegas MD  Authorized by: Rosa M Venegas MD   Comparison: compared with previous ECG from 7/14/2020  Similar to previous ECG  Rhythm: sinus rhythm  Rate: normal  BPM: 68  Conduction: conduction normal  ST Segments: ST segments normal  T Waves: T waves normal  QRS axis: normal    Clinical impression: normal ECG  Clinical impression comment: stable EKG              ASSESSMENT/PLAN    Diagnoses and all orders for this visit:    1. Medicare annual wellness visit, subsequent (Primary)  Assessment & Plan:  Health maintenance - flu vacc 11/20 (annually); Prevnar 6/15; PVX 3/14, Tdap 10/14 (next Td due 2024), Zostavax 6/15; HAV and Shingrix done; COVID19 vacc done; mammo dx 4/21, FNA 5/21; GYN care 6/20 per GHADA Tim; DEXA ordered by GYN   Chan (last 7/18); colonosc 10/18, repeat 2023 per Dr. Thornton; eye exam 5/21,  Dr. Avani Fuentes; dental exam q6 mos; (+) seat belt use    Consultants:  Patient Care Team:  Rosa M Venegas MD as PCP - General  Rosa M Venegas MD as PCP - Internal Medicine  Arthur Thornton MD as Consulting Physician (Colon and Rectal Surgery)  Jeromy Melissa OD (Optometry)  Shalini Malin MD as Consulting Physician (Gynecologic Oncology)  Ernesto Crow MD as Consulting Physician (Rheumatology)  Bree Ware MD as Consulting Physician (Dermatology)  Quincy Fuentes MD as Consulting Physician (Ophthalmology)  Amelia Gann MD as Consulting Physician (Plastic Surgery)  Judi Mae MD as Consulting Physician (Endocrinology)  Solange Alejandra APRN as Nurse Practitioner (Rheumatology)  Dillon Alcantara DPM (Podiatry)  Donald Sprague MD as Consulting Physician (Orthopedic Surgery)  Chavo Vasquez MD as Consulting Physician (Hematology)          2. Impaired fasting glucose  Assessment & Plan:  BG control stable with A1C 5.7; encouraged reg phys activity to decr insulin resistance, moderation in unhealthy starches/sweets; f/u A1C in 6 mos        3. Hypertension  Assessment & Plan:  BP stable 118/68; cont amlo 5mg QD #90, 3RF    Orders:  -     ECG 12 Lead  -     amLODIPine (NORVASC) 5 MG tablet; Take 1 tablet by mouth Daily.  Dispense: 90 tablet; Refill: 3    4. Hyperlipidemia  Assessment & Plan:  Lipids are stable with LDL 72; cont simva 20mg QD #90, 3RF    Orders:  -     simvastatin (ZOCOR) 20 MG tablet; Take 1 tablet by mouth Every Evening.  Dispense: 90 tablet; Refill: 3    5. Osteopenia   Assessment & Plan:  Calcium and vitamin D supplementation with weight-bearing exercise; DEXA ordered         6. Subclinical hyperthyroidism  Assessment & Plan:  Persistent low TSH with nl FT4; followed by julianne Mae      7. Axillary lymphadenopathy  Assessment & Plan:  Normal CBC; CT chest pending  8.19.21 per Dr. Vasquez      8. Shoulder pain  Comments:  takes celebrex and prn cyclobenzaprine #30, 0RF (last RX 6/18); precautions given  Orders:  -     cyclobenzaprine (FLEXERIL) 10 MG tablet; 1/2-1 qhs prn muscle spasm  Dispense: 20 tablet; Refill: 0    Other orders  -     Cancel: DEXA Bone Density Axial; Future      FOLLOW-UP  RTC 6 mos with A1C    Electronically signed by:    Rosa M Venegas MD, FACP  07/20/2021

## 2021-07-19 NOTE — ASSESSMENT & PLAN NOTE
Health maintenance - flu vacc 11/20 (annually); Prevnar 6/15; PVX 3/14, Tdap 10/14 (next Td due 2024), Zostavax 6/15; HAV and Shingrix done; COVID19 vacc done; mammo dx 4/21, FNA 5/21; GYN care 6/20 per GHADA Tim; DEXA ordered by GYN Dr. Giles (last 7/18); colonosc 10/18, repeat 2023 per Dr. Thornton; eye exam 5/21,  Dr. Avani Fuentes; dental exam q6 mos; (+) seat belt use    Consultants:  Patient Care Team:  Rosa M Venegas MD as PCP - General  Rosa M Venegas MD as PCP - Internal Medicine  Arthur Thornton MD as Consulting Physician (Colon and Rectal Surgery)  Jeromy Melissa, STERLING (Optometry)  Shalini Malin MD as Consulting Physician (Gynecologic Oncology)  Ernesto Crow MD as Consulting Physician (Rheumatology)  Bree Ware MD as Consulting Physician (Dermatology)  Quincy Fuentes MD as Consulting Physician (Ophthalmology)  Amelia Gann MD as Consulting Physician (Plastic Surgery)  Judi Mae MD as Consulting Physician (Endocrinology)  Solange Alejandra APRN as Nurse Practitioner (Rheumatology)  Dillon Alcantara DPM (Podiatry)  Donald Sprague MD as Consulting Physician (Orthopedic Surgery)  Chavo Vasquez MD as Consulting Physician (Hematology)

## 2021-07-20 ENCOUNTER — OFFICE VISIT (OUTPATIENT)
Dept: INTERNAL MEDICINE | Facility: CLINIC | Age: 66
End: 2021-07-20

## 2021-07-20 VITALS
DIASTOLIC BLOOD PRESSURE: 68 MMHG | HEART RATE: 68 BPM | OXYGEN SATURATION: 99 % | WEIGHT: 141 LBS | HEIGHT: 65 IN | BODY MASS INDEX: 23.49 KG/M2 | SYSTOLIC BLOOD PRESSURE: 118 MMHG

## 2021-07-20 DIAGNOSIS — R73.01 IMPAIRED FASTING GLUCOSE: Chronic | ICD-10-CM

## 2021-07-20 DIAGNOSIS — M25.512 CHRONIC PAIN OF BOTH SHOULDERS: ICD-10-CM

## 2021-07-20 DIAGNOSIS — G89.29 CHRONIC PAIN OF BOTH SHOULDERS: ICD-10-CM

## 2021-07-20 DIAGNOSIS — E05.90 SUBCLINICAL HYPERTHYROIDISM: Chronic | ICD-10-CM

## 2021-07-20 DIAGNOSIS — Z00.00 MEDICARE ANNUAL WELLNESS VISIT, SUBSEQUENT: Primary | ICD-10-CM

## 2021-07-20 DIAGNOSIS — M85.89 OSTEOPENIA OF MULTIPLE SITES: Chronic | ICD-10-CM

## 2021-07-20 DIAGNOSIS — M25.511 CHRONIC PAIN OF BOTH SHOULDERS: ICD-10-CM

## 2021-07-20 DIAGNOSIS — I10 ESSENTIAL HYPERTENSION: Chronic | ICD-10-CM

## 2021-07-20 DIAGNOSIS — E78.00 PURE HYPERCHOLESTEROLEMIA: Chronic | ICD-10-CM

## 2021-07-20 DIAGNOSIS — R59.0 AXILLARY LYMPHADENOPATHY: ICD-10-CM

## 2021-07-20 PROBLEM — Z71.89 DNR (DO NOT RESUSCITATE) DISCUSSION: Status: ACTIVE | Noted: 2021-07-20

## 2021-07-20 PROCEDURE — 93000 ELECTROCARDIOGRAM COMPLETE: CPT | Performed by: INTERNAL MEDICINE

## 2021-07-20 PROCEDURE — 99214 OFFICE O/P EST MOD 30 MIN: CPT | Performed by: INTERNAL MEDICINE

## 2021-07-20 PROCEDURE — 99497 ADVNCD CARE PLAN 30 MIN: CPT | Performed by: INTERNAL MEDICINE

## 2021-07-20 PROCEDURE — G0439 PPPS, SUBSEQ VISIT: HCPCS | Performed by: INTERNAL MEDICINE

## 2021-07-20 RX ORDER — SIMVASTATIN 20 MG
20 TABLET ORAL EVERY EVENING
Qty: 90 TABLET | Refills: 3 | Status: SHIPPED | OUTPATIENT
Start: 2021-07-20 | End: 2022-07-23 | Stop reason: SDUPTHER

## 2021-07-20 RX ORDER — AMLODIPINE BESYLATE 5 MG/1
5 TABLET ORAL DAILY
Qty: 90 TABLET | Refills: 3 | Status: SHIPPED | OUTPATIENT
Start: 2021-07-20 | End: 2022-07-23 | Stop reason: SDUPTHER

## 2021-07-20 RX ORDER — CYCLOBENZAPRINE HCL 10 MG
TABLET ORAL
Qty: 20 TABLET | Refills: 0 | Status: SHIPPED | OUTPATIENT
Start: 2021-07-20

## 2021-08-19 ENCOUNTER — HOSPITAL ENCOUNTER (OUTPATIENT)
Dept: CT IMAGING | Facility: HOSPITAL | Age: 66
Discharge: HOME | End: 2021-08-19
Admitting: INTERNAL MEDICINE

## 2021-08-19 DIAGNOSIS — R59.0 AXILLARY LYMPHADENOPATHY: ICD-10-CM

## 2021-08-19 PROCEDURE — 71250 CT THORAX DX C-: CPT

## 2021-08-23 ENCOUNTER — OFFICE VISIT (OUTPATIENT)
Dept: ONCOLOGY | Facility: CLINIC | Age: 66
End: 2021-08-23

## 2021-08-23 VITALS
DIASTOLIC BLOOD PRESSURE: 85 MMHG | TEMPERATURE: 97.8 F | WEIGHT: 141.7 LBS | RESPIRATION RATE: 16 BRPM | OXYGEN SATURATION: 99 % | HEIGHT: 65 IN | SYSTOLIC BLOOD PRESSURE: 146 MMHG | HEART RATE: 61 BPM | BODY MASS INDEX: 23.61 KG/M2

## 2021-08-23 DIAGNOSIS — C54.1 ENDOMETRIAL/UTERINE ADENOCARCINOMA (HCC): Primary | Chronic | ICD-10-CM

## 2021-08-23 PROCEDURE — 99213 OFFICE O/P EST LOW 20 MIN: CPT | Performed by: INTERNAL MEDICINE

## 2021-08-23 NOTE — PROGRESS NOTES
PROBLEM LIST:  Oncology/Hematology History   Endometrial/uterine adenocarcinoma (CMS/HCC)   2/2/2013 Initial Diagnosis    D&C for PMB and abnormal uterine ultrasound. Pathology revealed well differentiated adenocarcinoma in the background of CAH     3/15/2013 Surgery    RTLH/RSO, bilateral pelvic and periaortic LND, and omental biopsy. Stage IA grade 1 by final path         REASON FOR VISIT: Axillary adenopathy    HISTORY OF PRESENT ILLNESS:   66 y.o.  female presents today for follow-up after undergoing imaging for axillary adenopathy.  She is otherwise fairly asymptomatic with no other issues ongoing.    Past medical history, social history and family history was reviewed 08/23/21 and unchanged from prior visit.    Review of Systems:    Review of Systems - Oncology         Medications:        Current Outpatient Medications:   •  amLODIPine (NORVASC) 5 MG tablet, Take 1 tablet by mouth Daily., Disp: 90 tablet, Rfl: 3  •  Biotin 10 MG capsule, one capsule once daily, Disp: , Rfl:   •  calcium citrate-vitamin d (CITRACAL) 200-250 MG-UNIT tablet tablet, Take  by mouth Daily., Disp: , Rfl:   •  CBD (cannabidiol) oral oil, Take  by mouth., Disp: , Rfl:   •  celecoxib (CELEBREX) 200 MG capsule, Take 1 capsule by mouth Daily As Needed for Mild Pain ., Disp: , Rfl:   •  cyclobenzaprine (FLEXERIL) 10 MG tablet, 1/2-1 qhs prn muscle spasm, Disp: 20 tablet, Rfl: 0  •  folic acid (FOLVITE) 1 MG tablet, Take 2 tablets by mouth Daily., Disp: , Rfl:   •  methotrexate 2.5 MG tablet, Take 7 tablets by mouth 1 (One) Time Per Week., Disp: , Rfl:   •  simvastatin (ZOCOR) 20 MG tablet, Take 1 tablet by mouth Every Evening., Disp: 90 tablet, Rfl: 3  •  TURMERIC PO, Take  by mouth., Disp: , Rfl:   •  vitamin B-6 (PYRIDOXINE) 50 MG tablet, Take 100 mg by mouth Daily., Disp: , Rfl:   •  Xiidra 5 % ophthalmic solution, , Disp: , Rfl:     Pain Medications             celecoxib (CELEBREX) 200 MG capsule Take 1 capsule by mouth Daily As Needed  "for Mild Pain .    cyclobenzaprine (FLEXERIL) 10 MG tablet 1/2-1 qhs prn muscle spasm             ALLERGIES:    Allergies   Allergen Reactions   • Codeine Other (See Comments)     Other reaction(s): hyperactivity   • Lisinopril Cough   • Naproxen Other (See Comments)     Other reaction(s): mouth sores         Physical Exam    VITAL SIGNS:  /85   Pulse 61   Temp 97.8 °F (36.6 °C) (Temporal)   Resp 16   Ht 165.1 cm (65\")   Wt 64.3 kg (141 lb 11.2 oz)   SpO2 99%   BMI 23.58 kg/m²                 Wt Readings from Last 3 Encounters:   08/23/21 64.3 kg (141 lb 11.2 oz)   07/20/21 64 kg (141 lb)   07/01/21 64 kg (141 lb)       Body mass index is 23.58 kg/m². Body surface area is 1.71 meters squared.    Pain Score    08/23/21 1035   PainSc: 0-No pain           Performance Status: 0    General: well appearing, in no acute distress  HEENT: sclera anicteric, neck is supple  Lymphatics: no cervical, supraclavicular, or axillary adenopathy  Cardiovascular: regular rate and rhythm, no murmurs, rubs or gallops  Lungs: clear to auscultation bilaterally  Abdomen: soft, nontender, nondistended.  No palpable organomegaly  Extremities: no lower extremity edema  Skin: no rashes, lesions, bruising, or petechiae  Msk:  Shows no weakness of the large muscle groups  Psych: Mood is stable        RECENT LABS:    Lab Results   Component Value Date    HGB 13.7 07/09/2021    HCT 41.4 07/09/2021    MCV 90.8 07/09/2021     07/09/2021    WBC 5.97 07/09/2021    NEUTROABS 3.99 07/09/2021    LYMPHSABS 1.39 07/09/2021    MONOSABS 0.46 07/09/2021    EOSABS 0.09 07/09/2021    BASOSABS 0.03 07/09/2021       Lab Results   Component Value Date    GLUCOSE 85 07/09/2021    BUN 15 07/09/2021    CREATININE 0.68 07/09/2021     07/09/2021    K 4.8 07/09/2021     07/09/2021    CO2 25.4 07/09/2021    CALCIUM 9.4 07/09/2021    PROTEINTOT 7.1 07/09/2021    ALBUMIN 4.30 07/09/2021    BILITOT 0.4 07/09/2021    ALKPHOS 66 07/09/2021    " AST 34 (H) 07/09/2021    ALT 33 07/09/2021       CT Chest Without Contrast Diagnostic    Result Date: 8/20/2021  No evidence of adenopathy identified in the hilar or axillary regions. The largest lymph node in the left axillary region is 1.2 cm. No acute parenchymal disease. No acute intrathoracic abnormality.  D:  08/20/2021 E:  08/20/2021  This report was finalized on 8/20/2021 4:31 PM by Dr. Annabel Alexander MD.            Assessment/Plan    1.  Axillary adenopathy.  This appears to be resolved.  Likely reactive due to either the flu vaccine or Covid vaccination.  I do not recommend any further imaging unless there is a drastic clinical symptoms that would suggest worsening adenopathy.  She can see me on an as-needed basis.        Total time of patient care on day of service including time prior to, face to face with patient, and following visit spent in reviewing records, lab results, imaging studies, discussion with patient, and documentation/charting was > 15 minutes.     Chavo Vasquez MD  [facility] Hematology and Oncology         No orders of the defined types were placed in this encounter.      8/23/2021

## 2021-09-27 ENCOUNTER — HOSPITAL ENCOUNTER (OUTPATIENT)
Dept: BONE DENSITY | Facility: HOSPITAL | Age: 66
Discharge: HOME | End: 2021-09-27
Admitting: OBSTETRICS & GYNECOLOGY

## 2021-09-27 DIAGNOSIS — Z78.0 MENOPAUSE: ICD-10-CM

## 2021-09-27 PROCEDURE — 77080 DXA BONE DENSITY AXIAL: CPT

## 2021-09-28 ENCOUNTER — TELEPHONE (OUTPATIENT)
Dept: GYNECOLOGIC ONCOLOGY | Facility: CLINIC | Age: 66
End: 2021-09-28

## 2021-09-28 NOTE — TELEPHONE ENCOUNTER
Patient was notified of osteopenia. Advised to start calcium ( 600 mg twice daily ) and vitamin D ( 400 IU daily or more ) if they are not currently taking. Encouraged to increase consumption of green leafy vegetable and dairy products. Weight bearing exercises also encouraged. Informed we will repeat this study in 2-3 years. Patient v/u and will call with any questions or concerns.

## 2021-09-28 NOTE — TELEPHONE ENCOUNTER
----- Message from GHADA Bello sent at 9/28/2021  9:01 AM EDT -----  Please call and notify of DEXA results, osteopenia.  on calcium and vitamin D supplementation. Plan to repeat scan in 2-3 years.   Thanks!

## 2021-10-12 ENCOUNTER — LAB (OUTPATIENT)
Dept: LAB | Facility: HOSPITAL | Age: 66
End: 2021-10-12

## 2021-10-12 DIAGNOSIS — Z96.643 S/P BILATERAL HIP REPLACEMENTS: ICD-10-CM

## 2021-10-12 PROCEDURE — 36415 COLL VENOUS BLD VENIPUNCTURE: CPT

## 2021-10-12 PROCEDURE — 82495 ASSAY OF CHROMIUM: CPT

## 2021-10-12 PROCEDURE — 83018 HEAVY METAL QUAN EACH NES: CPT

## 2021-10-14 LAB — CR SERPL-MCNC: 4.1 UG/L (ref 0.1–2.1)

## 2021-10-15 LAB — COBALT SERPL-MCNC: 3.8 UG/L (ref 0–0.9)

## 2021-10-18 ENCOUNTER — OFFICE VISIT (OUTPATIENT)
Dept: ORTHOPEDIC SURGERY | Facility: CLINIC | Age: 66
End: 2021-10-18

## 2021-10-18 VITALS
HEART RATE: 71 BPM | BODY MASS INDEX: 22.99 KG/M2 | SYSTOLIC BLOOD PRESSURE: 146 MMHG | WEIGHT: 138 LBS | DIASTOLIC BLOOD PRESSURE: 74 MMHG | HEIGHT: 65 IN

## 2021-10-18 DIAGNOSIS — Z96.643 S/P BILATERAL HIP REPLACEMENTS: Primary | ICD-10-CM

## 2021-10-18 PROCEDURE — 99213 OFFICE O/P EST LOW 20 MIN: CPT | Performed by: ORTHOPAEDIC SURGERY

## 2021-10-18 NOTE — PROGRESS NOTES
[facility]    Subjective     Chief Complaint   Patient presents with   • Follow-up     6 month follow up with Labs; S/P bilateral hip replacements         HPI    It has been 6  month(s) since Ms. Bolanos's last visit. She returns to clinic today for follow-up of bilateral hip pain. The issue has been ongoing for 6 month(s). She rates her pain a 2-3 out of 10 on the pain scale. Previous/current treatments: NSAIDS. Current symptoms: pain and stiffness. The pain is worse with walking and sleeping; pain medication and/or NSAID and stretching improve the pain. Overall, she is doing the same.  Here to review repeat cobalt chromium levels.    Previous hip surgery at the [facility], with a right hip resurfacing in 2007 with Dr. Loving, and a left total hip arthroplasty in 2011 with Dr. Mcdermott.    I have reviewed the following portions of the patient's history and agree with: History of Present Illness and Review of Systems    Patient Active Problem List   Diagnosis   • Carpal tunnel syndrome of right wrist   • Subclinical hyperthyroidism   • Diverticulosis of colon   • Endometrial/uterine adenocarcinoma (HCC)   • Fibrocystic breast changes   • Hyperlipidemia   • Hypertension   • Impaired fasting glucose   • Localized superficial swelling, mass, or lump   • Multinodular goiter   • Osteoarthritis   • Osteopenia    • Rectocele   • Right arm pain   • Rheumatoid arthritis involving multiple sites with positive rheumatoid factor (HCC)   • Medicare annual wellness visit, subsequent   • Menopause   • Internal hemorrhoids   • Insomnia   • Anxiety   • Corn or callus, R. 4th and 5th toes   • Osteoarthritis of both hips   • Axillary lymphadenopathy   • DNR (do not resuscitate) discussion     Past Medical History:   Diagnosis Date   • Abnormal thyroid ultrasound 07/05/2017    stable MNG (7/5/17) by u/s   • Abnormal thyroid ultrasound 06/12/2015    stable MNG (6/12/15)   • Abnormal thyroid  ultrasound 04/18/2014    thyr u/s (4/18/14): MNG except dominant complex mass mid L. lobe, repeat u/s in 6 mos   • Cataract still small   • CTS (carpal tunnel syndrome)    • Endometrial cancer (HCC)    • H/O uterine leiomyoma     s/p DUSTY/USO ('13)   • Hip arthrosis    • History of CT scan 02/27/2016    neg head ct   • Hx of bone density study 06/08/2015    DEXA (6/15) L -0.4, H0.7   • Hx of bone density study 07/17/2018    nl DEXA (7/17/18): L -0.4, H 0.2, repeat 3 yrs   • Hx of colonoscopy 03/11/2011    colonosc (3/11/11): diverticulosis, sm int hem, repeat 2018; CR[facility] - Dr. Thornton   • Hx of colonoscopy 10/05/2018    colonosc (10/5/18): diverticulosis, redundant colon, int hem, repeat 5 yrs due to h/o polyps; CRS - Dr. Thornton   • Hx of EMG/NCV 02/27/2008    EMG/NCV (2/27/08): right median neuropathy   • Hx of hand x-ray 01/24/2018    R. hand xray (1/24/18): degen changes 1st metacarpal joint and index PIP   • Lumps on the skin 02/20/2013    R. temple lesion; evaluated by Dr. Amelia Gann 02/20/13 with upcoming excision   • Right arm numbness 06/13/2016    Impression: improved after CTS surgery per pt; 03/01/2016 - resolved at this time; consider pinched nerve due to positioning during sleep the night before; if sxs recur, rec updated EMG/NCV for further eval;       Past Surgical History:   Procedure Laterality Date   • APPENDECTOMY  1980    taken out at gallbladder surgery   • CARPAL TUNNEL RELEASE Left     carpel tunnel left    • CERVICAL POLYPECTOMY  03/2005    with h/o fibroids and DUB   • CHOLECYSTECTOMY  1980   • DILATATION AND CURETTAGE  02/22/2013   • EXCISION MASS HEAD/NECK Right 03/05/2013    s/p excision scalp lesion large R. parietal and L. temporal areas; plastics - Dr. Amelia Gann   • HAND SURGERY      carpal tunnel   • HIP ARTHROSCOPY Right 2004    with subsequent [place] resurfacing with hip replacement (5/11)   • JOINT REPLACEMENT  2007. 2011    both hips   • SALPINGO OOPHORECTOMY Left 1991     secondary to tubal pregnancy and ovarian cyst   • SALPINGO OOPHORECTOMY Right     secondary to tubal pregnancy   • TOTAL HIP ARTHROPLASTY Left 06/2011    [facility] Dr. Mcdermott   • TOTAL HIP ARTHROPLASTY Right 05/2011   • TOTAL LAPAROSCOPIC HYSTERECTOMY WITH DAVINCI ROBOT  03/05/2013    RTLH/BSO with LND and omental biopsy for endometrial cancer; GYN Onc - Dr. Malin   • US GUIDED FINE NEEDLE ASPIRATION  5/11/2021      Family History   Problem Relation Age of Onset   • Dementia Mother    • Cancer Mother         GYN   • Hip fracture Mother    • Thyroid disease Mother    • Hypertension Mother    • Cervical cancer Mother    • Heart attack Father    • Heart failure Father    • Diabetes Father    • Heart disease Father         HEart failure   • Prostate cancer Father    • Hypertension Father    • Hypertension Brother    • Heart disease Brother    • Kidney cancer Maternal Grandmother    • Arthritis Maternal Grandmother    • Cancer Maternal Grandmother         Kidney Cancer   • Colon cancer Maternal Grandfather 70   • Cancer Maternal Grandfather         Colon cancer   • Heart failure Paternal Grandmother    • Heart disease Paternal Grandmother    • Arthritis Paternal Grandmother    • Hypertension Paternal Grandmother    • Diabetes Paternal Grandfather    • Colon cancer Paternal Uncle 78   • Breast cancer Neg Hx    • Ovarian cancer Neg Hx      Social History     Socioeconomic History   • Marital status:    Tobacco Use   • Smoking status: Never Smoker   • Smokeless tobacco: Never Used   Vaping Use   • Vaping Use: Never used   Substance and Sexual Activity   • Alcohol use: Yes     Alcohol/week: 1.0 standard drink     Types: 1 Glasses of wine per week     Comment: social   • Drug use: No   • Sexual activity: Yes     Partners: Male     Birth control/protection: Surgical      Current Outpatient Medications on File Prior to Visit   Medication Sig Dispense Refill   • amLODIPine (NORVASC) 5 MG tablet Take 1 tablet by mouth  Daily. 90 tablet 3   • Biotin 10 MG capsule one capsule once daily     • calcium citrate-vitamin d (CITRACAL) 200-250 MG-UNIT tablet tablet Take  by mouth Daily.     • CBD (cannabidiol) oral oil Take  by mouth.     • celecoxib (CELEBREX) 200 MG capsule Take 1 capsule by mouth Daily As Needed for Mild Pain .     • cyclobenzaprine (FLEXERIL) 10 MG tablet 1/2-1 qhs prn muscle spasm 20 tablet 0   • folic acid (FOLVITE) 1 MG tablet Take 2 tablets by mouth Daily.     • methotrexate 2.5 MG tablet Take 7 tablets by mouth 1 (One) Time Per Week.     • simvastatin (ZOCOR) 20 MG tablet Take 1 tablet by mouth Every Evening. 90 tablet 3   • TURMERIC PO Take  by mouth.     • vitamin B-6 (PYRIDOXINE) 50 MG tablet Take 100 mg by mouth Daily.     • Xiidra 5 % ophthalmic solution        No current facility-administered medications on file prior to visit.      Allergies   Allergen Reactions   • Codeine Other (See Comments)     Other reaction(s): hyperactivity   • Lisinopril Cough   • Naproxen Other (See Comments)     Other reaction(s): mouth sores        Review of Systems   Constitutional: Negative for activity change, appetite change, chills, diaphoresis, fatigue, fever and unexpected weight change.   HENT: Negative for congestion, dental problem, drooling, ear discharge, ear pain, facial swelling, hearing loss, mouth sores, nosebleeds, postnasal drip, rhinorrhea, sinus pressure, sneezing, sore throat, tinnitus, trouble swallowing and voice change.    Eyes: Negative for photophobia, pain, discharge, redness, itching and visual disturbance.   Respiratory: Negative for apnea, cough, choking, chest tightness, shortness of breath, wheezing and stridor.    Cardiovascular: Negative for chest pain, palpitations and leg swelling.   Gastrointestinal: Negative for abdominal distention, abdominal pain, anal bleeding, blood in stool, constipation, diarrhea, nausea, rectal pain and vomiting.   Endocrine: Negative for cold intolerance, heat  "intolerance, polydipsia, polyphagia and polyuria.   Genitourinary: Negative for decreased urine volume, difficulty urinating, dysuria, enuresis, flank pain, frequency, genital sores, hematuria and urgency.   Musculoskeletal: Positive for arthralgias. Negative for back pain, gait problem, joint swelling, myalgias, neck pain and neck stiffness.   Skin: Negative for color change, pallor, rash and wound.   Allergic/Immunologic: Negative for environmental allergies, food allergies and immunocompromised state.   Neurological: Negative for dizziness, tremors, seizures, syncope, facial asymmetry, speech difficulty, weakness, light-headedness, numbness and headaches.   Hematological: Negative for adenopathy. Does not bruise/bleed easily.   Psychiatric/Behavioral: Negative for agitation, behavioral problems, confusion, decreased concentration, dysphoric mood, hallucinations, self-injury, sleep disturbance and suicidal ideas. The patient is not nervous/anxious and is not hyperactive.         Objective      Physical Exam  /74   Pulse 71   Ht 165.1 cm (65\")   Wt 62.6 kg (138 lb)   BMI 22.96 kg/m²     Body mass index is 22.96 kg/m².    General:   Mental Status:  Alert   Appearance: Cooperative, in no acute distress   Build and Nutrition: Well-nourished well-developed female   Orientation: Alert and oriented to person, place and time   Posture: Normal   Gait: Normal    Integument:   Right hip: Incision well-healed   Left hip: Incision well-healed    Lower Extremities:   Right Hip:    Tenderness:  None    Swelling:  None    Crepitus: None    Atrophy:  None    Range of motion:  External Rotation: 30°       Internal Rotation: 30°       Flexion:  100°       Extension:  0°   Instability:  None  Deformities:  None  Functional [facility]  No leg length discrepancy   Left Hip:    Tenderness:  None    Swelling:  None    Crepitus:  None    Atrophy:  None    Range of motion:  External Rotation: 30°       Internal " Rotation: 30°       Flexion:  100°       Extension:  0°   Instability:  None  Deformities:  None  Functional testing: Negative [facility]    No leg length discrepancy      Imaging/Studies     Status: Final result     Visible to patient: Yes (not seen)     Next appt: 01/05/2022 at 08:00 AM in Endocrinology (Judi Mae MD)     Dx: S/P bilateral hip replacements    Specimen Information: Blood         0 Result Notes    Component   Ref Range & Units 6 d ago 6 mo ago   Cobalt   0.0 - 0.9 ug/L 3.8 High   2.6 High  CM    Comment:                        Occupational Exposure: ANGÉLICA 1.0                                   Detection Limit = 1.0   Resulting Agency [facility]             Narrative  Performed by: [facility]  Test(s) 071506-Cobalt, Plasma   was developed and its performance characteristics determined   by [facility]. It has not been cleared or approved by the Food   and Drug Administration.   Performed at:  [facility]  [id number]   : Chacho Wilkinson MD, Phone:  [phone]      Specimen Collected: 10/12/21 08:46             Contains abnormal data Chromium Level  Order: [id number]   Status: Final result     Visible to patient: Yes (seen)     Next appt: 01/05/2022 at 08:00 AM in Endocrinology (Judi Mae MD)     Dx: S/P bilateral hip replacements    Specimen Information: Blood         0 Result Notes    Component   Ref Range & Units 6 d ago 6 mo ago   Chromium, Plasma   0.1 - 2.1 ug/L 4.1 High   5.6 High  CM    Comment:                                 Detection Limit = 0.1   Resulting Agency [facility]             Narrative  Performed by: [facility]  Test(s) 071546-Chromium, Plasma   was developed and its performance characteristics determined   by [facility]. It has not been cleared or approved by the Food   and Drug Administration.   Performed at:  [facility]  [id number]   : Chacho Wilkinson MD, Phone:   [phone]      Specimen Collected: 10/12/21 08:46 Last Resulted: 10/14/21 19:07               Assessment and Plan     Diagnoses and all orders for this visit:    1. S/P bilateral hip replacements (Primary)        1. S/P bilateral hip replacements        I reviewed my findings with the patient.  1.2 point increase in her cobalt level, and decline in her chromium level.  At this point, I would like to repeat the labs in July.  Symptoms are minimal.  Further imaging going forward may be considered if appropriate with MRI suppression.  I will see her back sooner if she has any increase in symptoms or problems.    Return in about 9 months (around 7/18/2022).      Donald Sprague MD  10/18/21  21:54 EDT

## 2021-11-30 ENCOUNTER — TREATMENT (OUTPATIENT)
Dept: PHYSICAL THERAPY | Facility: CLINIC | Age: 66
End: 2021-11-30

## 2021-11-30 DIAGNOSIS — R27.8 DECREASED COORDINATION: ICD-10-CM

## 2021-11-30 DIAGNOSIS — R29.898 DECREASED GRIP STRENGTH: Primary | ICD-10-CM

## 2021-11-30 DIAGNOSIS — Z74.09 IMPAIRED MOBILITY AND ADLS: ICD-10-CM

## 2021-11-30 DIAGNOSIS — Z78.9 IMPAIRED MOBILITY AND ADLS: ICD-10-CM

## 2021-11-30 PROCEDURE — 97166 OT EVAL MOD COMPLEX 45 MIN: CPT | Performed by: OCCUPATIONAL THERAPIST

## 2021-11-30 PROCEDURE — 97530 THERAPEUTIC ACTIVITIES: CPT | Performed by: OCCUPATIONAL THERAPIST

## 2021-12-08 ENCOUNTER — TREATMENT (OUTPATIENT)
Dept: PHYSICAL THERAPY | Facility: CLINIC | Age: 66
End: 2021-12-08

## 2021-12-08 DIAGNOSIS — Z74.09 IMPAIRED MOBILITY AND ADLS: ICD-10-CM

## 2021-12-08 DIAGNOSIS — R29.898 DECREASED GRIP STRENGTH: Primary | ICD-10-CM

## 2021-12-08 DIAGNOSIS — R27.8 DECREASED COORDINATION: ICD-10-CM

## 2021-12-08 DIAGNOSIS — Z78.9 IMPAIRED MOBILITY AND ADLS: ICD-10-CM

## 2021-12-08 PROCEDURE — 97530 THERAPEUTIC ACTIVITIES: CPT | Performed by: OCCUPATIONAL THERAPIST

## 2021-12-08 PROCEDURE — 97110 THERAPEUTIC EXERCISES: CPT | Performed by: OCCUPATIONAL THERAPIST

## 2021-12-08 NOTE — PROGRESS NOTES
"Occupational Therapy Daily Progress Note  Visit: 2  Date of Initial Visit: Type: THERAPY  Noted: 2021      Patient: Carli Bolanos   : 1955  Diagnosis/ICD-10 Code:  Decreased  strength [R29.898]  Referring practitioner: Solange Rangel*  Date of Initial Visit: Type: THERAPY  Noted: 2021  Today's Date: 2021  Patient seen for 2 sessions      Subjective:   Patient reports: \"I thought of some other things I need help with or have trouble with. They include trimming my toe nails, opening cans with the pop top and cutting big fruit like watermelon.\"  Pain: 0/10  Subjective Questionnaire: n/a  Clinical Progress: improved  Home Program Compliance: Yes  Treatment has included: therapeutic exercise and therapeutic activity    Subjective   Objective     OT Neuro          OT Exercises     Row Name 21 0759             Exercise 1    Exercise Name 1 pre-HW activities for increasing DIP/PIP f/e and control  -ST              Exercise 2    Exercise Name 2 HW activities, education on web strengthening  -ST              Exercise 3    Exercise Name 3 modified positioning with LEs to increase independence with toe nail cutting; also suggested to use manicure scissors vs clippers d/t improved control  -ST              Exercise 4    Exercise Name 4 towel rotation twists each direction for strengthening wrist/forearm  -ST              Exercise 5    Exercise Name 5  strenghtening; entire hand  -ST      Equipment 5 Hand Gripper  -ST      Resistance 5 Red  -ST      Sets 5 1  -ST      Reps 5 10  -ST              Exercise 6    Exercise Name 6  strenghtening, individual digits  -ST      Equipment 6 Hand Gripper  -ST      Resistance 6 Red  -ST      Sets 6 1  -ST      Reps 6 10  -ST              Exercise 7    Exercise Name 7 pronation/supination, flexi bar  -ST      Resistance 7 Red  -ST      Sets 7 1  -ST      Reps 7 10  -ST              Exercise 8    Exercise Name 8 prayer stretch  -ST      " Sets 8 1  -ST      Reps 8 5  -ST      Time (Seconds) 8 10  -ST              Exercise 9    Exercise Name 9 wrist extension against wall  -ST      Sets 9 1  -ST      Reps 9 5  -ST      Time (Seconds) 9 10  -ST            User Key  (r) = Recorded By, (t) = Taken By, (c) = Cosigned By    Initials Name Provider Type    Loulou Hoffman OTR Occupational Therapist                 Assessment & Plan     Assessment    Assessment details: Pt demonstrates weakness and tightness bilaterally in wrists which appears to be affecting cutting up fruit and other cooking/meal prep tasks. Recommended, commpleted and issued prayer stretch and wrist extension stretch against wall. Also addressed strengthening of forearms with HEP for carry over. Modified LE position to aid with increased success and independence with toe nail trimming and educated on web strengthening tasks for HW with flexibility to DIP/PIP joints.     Plan  Plan details: Continue POC/goals as est.         Visit Diagnoses:    ICD-10-CM ICD-9-CM   1. Decreased  strength  R29.898 729.89   2. Decreased coordination  R27.8 781.3   3. Impaired mobility and ADLs  Z74.09 V49.89    Z78.9              Timed:  Manual Therapy:    0     mins  25372;  Therapeutic Exercise:    15     mins  15067;     Neuromuscular Sharonda:    0    mins  11221;    Therapeutic Activity:     40     mins  29865;     Self-Care/ADL     0     mins  16949;   Sensory Int. Tech      0     mins 30231;  Ultrasound:     0     mins  31150;    Electrical Stimulation:    0     mins  74307 ( );    Untimed:  Electrical Stimulation:    0     mins  39352 ( );    Timed Treatment:   55   mins   Total Treatment:     55   mins    OT Signature: Loulou Irene MS, OTR/L, CDP  [place] License #: [id number]

## 2021-12-15 ENCOUNTER — TREATMENT (OUTPATIENT)
Dept: PHYSICAL THERAPY | Facility: CLINIC | Age: 66
End: 2021-12-15

## 2021-12-15 DIAGNOSIS — R27.8 DECREASED COORDINATION: ICD-10-CM

## 2021-12-15 DIAGNOSIS — Z78.9 IMPAIRED MOBILITY AND ADLS: ICD-10-CM

## 2021-12-15 DIAGNOSIS — Z74.09 IMPAIRED MOBILITY AND ADLS: ICD-10-CM

## 2021-12-15 DIAGNOSIS — R29.898 DECREASED GRIP STRENGTH: Primary | ICD-10-CM

## 2021-12-15 PROCEDURE — 97530 THERAPEUTIC ACTIVITIES: CPT | Performed by: OCCUPATIONAL THERAPIST

## 2021-12-15 PROCEDURE — 97110 THERAPEUTIC EXERCISES: CPT | Performed by: OCCUPATIONAL THERAPIST

## 2021-12-15 NOTE — PROGRESS NOTES
"Occupational Therapy Daily Progress Note  Visit: 3  Date of Initial Visit: Type: THERAPY  Noted: 2021      Patient: Carli Bolanos   : 1955  Diagnosis/ICD-10 Code:  Decreased  strength [R29.898]  Referring practitioner: Solange Rangel*  Date of Initial Visit: Type: THERAPY  Noted: 2021  Today's Date: 12/15/2021  Patient seen for 3 sessions      Subjective:   Patient reports: \"I can tell my overall strength is getting better in my hands. My thumbs still hurt intermittently.\"  Pain: 2/10 thumbs with movement  Subjective Questionnaire: n/a  Clinical Progress: improved  Home Program Compliance: Yes  Treatment has included: therapeutic exercise and therapeutic activity    Subjective   Objective     OT Neuro          OT Exercises     Row Name 12/15/21 0804             Exercise 1    Exercise Name 1 paraffin bath to improve general ROM/flexibility in B hands and wrists  -ST      Reps 1 --  3  -ST      Time (Minutes) 1 5  -ST              Exercise 2    Exercise Name 2 resistive velcro board, power grasp R/L  -ST              Exercise 3    Exercise Name 3 resistive velcro board, 3 jaw jerry R/L  -ST              Exercise 4    Exercise Name 4 resistive velcro board, tip to tip R/L  -ST              Exercise 5    Exercise Name 5  strengthening; entire hand  -ST      Equipment 5 Hand Gripper  -ST      Resistance 5 Red  -ST      Sets 5 2  -ST      Reps 5 12  -ST              Exercise 6    Exercise Name 6  strengthening; individual digits  -ST      Equipment 6 Hand Gripper  -ST      Resistance 6 Red  -ST      Sets 6 2  -ST      Reps 6 10  -ST            User Key  (r) = Recorded By, (t) = Taken By, (c) = Cosigned By    Initials Name Provider Type    Loulou Hoffman OTR Occupational Therapist                 Assessment & Plan     Assessment    Assessment details: Pt continues to have minimal intermittent pain in bilateral thumbs however improves with modified positioning to utilize " proximal part of hand vs distal. Pt to continue with current stretching and strengthening protocol until next session. Trialed paraffin bath to aid with flexibility and ROM bilaterally in which pt noted immediate improvement.     Plan  Plan details: Continue w/POC and goals as est.         Visit Diagnoses:    ICD-10-CM ICD-9-CM   1. Decreased  strength  R29.898 729.89   2. Decreased coordination  R27.8 781.3   3. Impaired mobility and ADLs  Z74.09 V49.89    Z78.9              Timed:  Manual Therapy:    0     mins  58164;  Therapeutic Exercise:    30     mins  64445;     Neuromuscular Sharonda:    0    mins  43245;    Therapeutic Activity:     10     mins  07974;     Self-Care/ADL     0     mins  24904;   Sensory Int. Tech      0     mins 27815;  Ultrasound:     0     mins  65178;    Electrical Stimulation:    0     mins  62123 ( );    Untimed:  Electrical Stimulation:    0     mins  70715 ( );    Timed Treatment:   40   mins   Total Treatment:     45   mins    OT Signature: Loulou Irene MS, OTR/L, IRAM  [place] License #: [id number]

## 2022-01-05 ENCOUNTER — OFFICE VISIT (OUTPATIENT)
Dept: ENDOCRINOLOGY | Facility: CLINIC | Age: 67
End: 2022-01-05

## 2022-01-05 ENCOUNTER — LAB (OUTPATIENT)
Dept: LAB | Facility: HOSPITAL | Age: 67
End: 2022-01-05

## 2022-01-05 VITALS
HEIGHT: 65 IN | HEART RATE: 61 BPM | BODY MASS INDEX: 23.82 KG/M2 | WEIGHT: 143 LBS | DIASTOLIC BLOOD PRESSURE: 80 MMHG | SYSTOLIC BLOOD PRESSURE: 138 MMHG | OXYGEN SATURATION: 98 %

## 2022-01-05 DIAGNOSIS — E04.2 MULTINODULAR GOITER: Chronic | ICD-10-CM

## 2022-01-05 DIAGNOSIS — D84.9 IMMUNOCOMPROMISED PATIENT: ICD-10-CM

## 2022-01-05 DIAGNOSIS — E05.90 SUBCLINICAL HYPERTHYROIDISM: Primary | Chronic | ICD-10-CM

## 2022-01-05 LAB
T3 SERPL-MCNC: 121 NG/DL (ref 80–200)
T4 FREE SERPL-MCNC: 1.61 NG/DL (ref 0.93–1.7)
TSH SERPL DL<=0.05 MIU/L-ACNC: 0.25 UIU/ML (ref 0.27–4.2)

## 2022-01-05 PROCEDURE — 99213 OFFICE O/P EST LOW 20 MIN: CPT | Performed by: INTERNAL MEDICINE

## 2022-01-05 PROCEDURE — 84439 ASSAY OF FREE THYROXINE: CPT | Performed by: INTERNAL MEDICINE

## 2022-01-05 PROCEDURE — 84443 ASSAY THYROID STIM HORMONE: CPT | Performed by: INTERNAL MEDICINE

## 2022-01-05 PROCEDURE — 86769 SARS-COV-2 COVID-19 ANTIBODY: CPT | Performed by: INTERNAL MEDICINE

## 2022-01-05 PROCEDURE — 84480 ASSAY TRIIODOTHYRONINE (T3): CPT | Performed by: INTERNAL MEDICINE

## 2022-01-05 NOTE — PROGRESS NOTES
Chief Complaint   Patient presents with   • Subclinical hyperthyroidism     follow up       HPI:   Carli Bolanos is a 66 y.o.female who returns to Endocrine Clinic for f/u evaluation of her subclinical hyperthyroidism and multinodular goiter. Last visit 01/17/2021. Her history is as follows:    Interim Events:    - pt's RA is stable.  - held Biotin supplement 2 weeks prior to this appointment  - Pt requesting COVID Abs today    1) subclinical hyperthyroidism:  - per chart review, pt has had a slightly decreased TSH with normal free T4 and T3 levels since 2014. Her TSH has not been suppressed to <0.01. (TSH levels ranging 0.214 - 0.525)  - on further review, pt denies persistent palpitations or tremor. Weight is stable.  - has been off biotin for the past 14 days    2) multinodular goiter:  - first found on physical exam in 2014. Pt has had Thyroid US's completed at  radiology in 04/2014, 06/2015, and 07/2017  - no prior FNA  FMH: no known thyroid cancer, mother had thyroid disease  PMH: no h/o irradiation of head, neck, or chest    Summary of imaging with  radiology:  (04/2014): R mid - 1.9 x 2.7, R inf - 1.5 x 1.9, L mid 1.7 x 2.5 complex nodules  (06/2015): Report states stable nodules. Size and appearance of nodules are not described.  (07/2017): R sup- 2.1 x 1.0 x 1.4, R mid 2.3 x 1.7 x 2.6, L mid 2.3 x 1.8 x 2.3 complex nodules    Initial Endocrine Visit: (01/2019)  Thyroid US completed in clinic (01/02/2019) showed the thyroid gland was enlarged by measured dimensions. Findings were consistent with a multinodular goiter. Multiple isoechoic, spongiform nodules were identified in both lobes as described in the full report. These nodules lacked suspicious US characteristics and did not meet criteria for FNA. These nodules appeared similar to prior outside imaging completed from 2014 - 2017.   RECOMMENDATIONS: These nodules lacked suspicious US characteristics and did not meet criteria for FNA. Given pt's  "h/o subclinical hyperthyroidism, recommended NM thyroid uptake and scan for further characterization of the nodules.     A NM thyroid uptake and scan was completed at  radiology on 02/12/2019:  \"Four hour thyroid uptake at the lower limits of normal at 11%. 24-hour thyroid uptake is also in the lower range of normal at 25%. IMPRESSION: 4 hour and 24 hour thyroid uptake both at lower limits of normal. Please correlate with patient's serum thyroid profile.Heterogeneous thyroid uptake pattern, with relatively large right thyroid lobe, and questionable small cold nodule possibly a cyst of the left lateral mid thyroid pole.\"    (01/2020) Thyroid US completed in clinic 01/06/2020 showed:  The thyroid gland was enlarged by measured dimensions. Findings were consistent with a multinodular goiter.    Multiple isoechoic, spongiform nodules were identified in both lobes as described above. These nodules lacked suspicious US characteristics and did not meet criteria for FNA.   These nodules appeared similar to prior outside imaging completed from 2014 - 2019.   In the left mid lobe, a 2.42(L) x 2.06(AP) x 2.13(T) cm isoechoic, spongiform nodule was again identified. The nodule had a smooth margin, peripheral and intranodal vascularity, and no microcalcifications. Multiple comet tail artifacts consistent with colloid crystals were present. This nodule had a peripheral cystic area that correlated with the \"questionable small cold area\" in the left lateral mid thyroid pole seen on NM thyroid uptake and scan in 02/2019.   RECOMMENDATIONS: These nodules lacked suspicious US characteristics and did not meet criteria for FNA. Repeat Thyroid US recommended if changes in the gland/neck are noted on physical exam.     Discussed plan with patient: follow yearly in regards to her subclinical hyperthyroidism    Review of Systems   Constitutional:        Weight stable   HENT: Negative.    Eyes:        Dry eyes   Respiratory: Negative.  " "  Cardiovascular: Negative for palpitations.   Gastrointestinal: Negative.    Endocrine: Negative.    Genitourinary: Negative.    Musculoskeletal: Positive for arthralgias and myalgias.   Skin: Negative.    Allergic/Immunologic: Negative.    Neurological: Negative.    Hematological: Negative.    Psychiatric/Behavioral: Negative.        The following portions of the patient's history were reviewed and updated as appropriate: allergies, current medications, past family history, past medical history, past social history, past surgical history and problem list.      /80   Pulse 61   Ht 165.1 cm (65\")   Wt 64.9 kg (143 lb)   SpO2 98%   BMI 23.80 kg/m²   Physical Exam   Constitutional: She is oriented to person, place, and time. She appears well-developed. No distress.   HENT:   Head: Normocephalic.   Eyes: Pupils are equal, round, and reactive to light. Conjunctivae are normal.   Neck: No tracheal deviation present. Thyromegaly (mild, right lobe larger than left) present.   2 cm palpable thyroid nodule in left lobe. Stable     Cardiovascular: Normal rate, regular rhythm and normal heart sounds.   No murmur heard.  Pulmonary/Chest: Effort normal and breath sounds normal. No respiratory distress.   Abdominal: Soft. Bowel sounds are normal. She exhibits no mass. There is no abdominal tenderness.   Lymphadenopathy:     She has no cervical adenopathy.   Neurological: She is alert and oriented to person, place, and time. No cranial nerve deficit.   Skin: Skin is warm and dry. She is not diaphoretic. No erythema.   Psychiatric: Her behavior is normal.   Vitals reviewed.    LABS/IMAGING: outside records reviewed and summarized in HPI  Office Visit on 01/05/2022   Component Date Value Ref Range Status   • SARS-COV-2 ANTIBODIES 01/05/2022 Negative  Negative Final    This sample does not contain detectable SARS-CoV-2 antibodies. This  negative result does not rule out SARS-CoV-2 infection. Correlation  with epidemiologic " risk factors and other clinical and laboratory  findings is recommended. Serologic results should not be used as the  sole basis to diagnose or exclude recent SARS-CoV-2 infection.  This assay will not detect antibodies induced by the currently  available SARS-CoV-2 vaccines. The current vaccines elicit  antibodies specific to the viral spike protein. [employer] offers  two test codes that detect viral spike-specific antibodies:  906593 SARS-CoV-2 Semi-Quantitative Total Antibody, [place] and  444499 SARS-CoV-2 Antibody, IgG, Broderick (Qualitative).  Positive results with this SARS-CoV-2 Antibodies, Nucleocapsid  assay suggest recent or previous natural infection with  SARS-CoV-2.   • TSH 01/05/2022 0.250* 0.270 - 4.200 uIU/mL Final   • Free T4 01/05/2022 1.61  0.93 - 1.70 ng/dL Final   • T3, Total 01/05/2022 121.0  80.0 - 200.0 ng/dl Final         ASSESSMENT/PLAN:  1) subclinical hyperthyroidism due to multinodular goiter:  - labs completed today again show a slight decrease in TSH with normal free T4, normal Total T3. Overall stable TFTs  - Pt clinically euthyroid on exam  - Again reviewed with patient that there are no definitive guidelines on the management of subclinical hyperthyroidism as there is very limited clinical data on the long term benefits of treatment. However, for patients with endogenous subclinical hyperthyroidism at high risk for cardiac or skeletal complications (ie, older adults) and who have a TSH concentration less than 0.1 mU/L persistently, treatment is often considered.   - In Ms. Bolanos's case, I do not think treatment is indicated at this time.  - her TFT's can be followed yearly or sooner if concerning symptoms develop  - I will have her RTC in one year for follow-up of her subclinical hyperthyroidism     2) multinodular goiter:  Stable on physical exam  Prior Thyroid US images reviewed.  Will repeat imaging is changes in her gland/neck are noted on physical exam.    RTC in one  year.    Signed: Judi Mae MD

## 2022-01-06 LAB — SARS-COV-2 AB SERPL QL IA: NEGATIVE

## 2022-01-12 ENCOUNTER — TREATMENT (OUTPATIENT)
Dept: PHYSICAL THERAPY | Facility: CLINIC | Age: 67
End: 2022-01-12

## 2022-01-12 DIAGNOSIS — R29.898 DECREASED GRIP STRENGTH: Primary | ICD-10-CM

## 2022-01-12 DIAGNOSIS — Z74.09 IMPAIRED MOBILITY AND ADLS: ICD-10-CM

## 2022-01-12 DIAGNOSIS — R27.8 DECREASED COORDINATION: ICD-10-CM

## 2022-01-12 DIAGNOSIS — Z78.9 IMPAIRED MOBILITY AND ADLS: ICD-10-CM

## 2022-01-12 PROCEDURE — 97530 THERAPEUTIC ACTIVITIES: CPT | Performed by: OCCUPATIONAL THERAPIST

## 2022-01-12 PROCEDURE — 97110 THERAPEUTIC EXERCISES: CPT | Performed by: OCCUPATIONAL THERAPIST

## 2022-01-12 NOTE — PROGRESS NOTES
"OT Progress Note        Patient: Carli Bolanos   : 1955  Diagnosis/ICD-10 Code:  Decreased  strength [R29.898]  Referring practitioner: Solange Rangel*  Date of Initial Visit: Type: THERAPY  Noted: 2021  Today's Date: 2022  Patient seen for 4 sessions      Subjective:   Patient reports: \"Overall, I think both hands and wrists are better. I did have a little flare-up in my L wrist but I think that's the weather.\"  Pain: 1/10 base of L wrist  Subjective Questionnaire: 9HPT RIGHT: 20.66    LEFT: 19.88  Clinical Progress: improved  Home Program Compliance: Yes  Treatment has included: therapeutic exercise and therapeutic activity    Subjective   Objective          Strength/Myotome Testing     Left Wrist/Hand      (2nd hand position)     Trial 1: 45 lbs    Trial 2: 42 lbs    Trial 3: 42 lbs    Average: 43 lbs    Right Wrist/Hand      (2nd hand position)     Trial 1: 40 lbs    Trial 2: 45 lbs    Trial 3: 45 lbs    Average: 43.33 lbs      OT Neuro     Assessment & Plan     Assessment  Impairments: abnormal coordination, activity intolerance, impaired physical strength, lacks appropriate home exercise program, pain with function and safety issue  Functional Limitations: carrying objects, lifting, pushing, uncomfortable because of pain, stooping, reaching behind back, reaching overhead and unable to perform repetitive tasks  Assessment details: OT re-assessment completed per POC. Pt demonstrates significant improvement in bilateral hands, meeting both 9HPT and  strength goals, exhibiting better translation, in-hand manipulation and speed with FMC. Pt issued tputty exercises to aid with opening jars/containers, etc in which pt demonstrates good teach-back. Pt educated on wearing her splint on L hand to provide additional support while performing chores and repetitive tasks. Also modified ways to open jars to hopefully improve independence and efficiency at home. Pt to benefit " from continued skilled OT services that address jt protection, AE/DME needs, home/activity/environemental modifications, strengthening, ROM, FMC/GMC.   Prognosis: good    Goals  Plan Goals:   Pt will score 20 seconds or less on the 9HPT to demonstrate improved accuracy and speed with fine motor coordination by 8 wks.  MET     Pt will increase R/L  strength by 5 # by 8 wks to demonstrate improved strength and function for daily tasks. MET    Pt will be independent with hand strengthening HEP to increase independence with ADL/IADL performance tasks by 4 wks. PROGRESSING DIFFICULTY, PARTIALLY MET    Pt will be independent with hand FMC HEP to increase independence with ADL/IADL performance tasks by 4 wks. PROGRESSING DIFFICULTY, PARTIALLY MET                  Plan  Planned therapy interventions: ADL retraining, balance/weight-bearing training, fine motor coordination training, flexibility, functional ROM exercises, home exercise program, motor coordination training, neuromuscular re-education, postural training, strengthening, stretching, therapeutic activities, transfer training and IADL retraining  Frequency: 1x week  Duration in visits: 8  Treatment plan discussed with: patient  Plan details: Continue w/ OT POC and goals to reflect above deficits and promote increased independence, safety and engagement in daily tasks.       OT Exercises     Row Name 01/12/22 0848             Exercise 1    Exercise Name 1 OT re-assessment completed per POC  -ST              Exercise 2    Exercise Name 2 medium soft tputty exercises, twisting clockwise/counter clockwise R & L hands  -ST              Exercise 3    Exercise Name 3 medium soft tputty exercises, pulling putty apart, entire hand then individual digits R/L hands  -ST              Exercise 4    Exercise Name 4 modified positioning and techniques for opening jars/containers  -ST              Exercise 5    Exercise Name 5  strengthening  -ST      Equipment 5 Hand  Gripper  -ST      Resistance 5 Red  -ST      Sets 5 2  -ST      Reps 5 15  -ST            User Key  (r) = Recorded By, (t) = Taken By, (c) = Cosigned By    Initials Name Provider Type    Loulou Hoffman OTR Occupational Therapist                Visit Diagnoses:    ICD-10-CM ICD-9-CM   1. Decreased  strength  R29.898 729.89   2. Decreased coordination  R27.8 781.3   3. Impaired mobility and ADLs  Z74.09 V49.89    Z78.9        Progress toward previous goals: Partially Met      Recommendations: Continue as planned  Timeframe: 8 visits  Prognosis to achieve goals: good    OT Signature: Loulou Irene MS, OTR/L, [facility] License #: [id number]    Based upon review of the patient's progress and continued therapy plan, it is my medical opinion that Carli Bolanos should continue occupational therapy treatment at [facility] PHYSICAL THERAPY  [facility] [address] [facility] [address] [phone].    Signature: __________________________________  Solange Alejandra APRN    Timed:  Manual Therapy:    0     mins  89287;  Therapeutic Exercise:    12     mins  27636;     Neuromuscular Sharonda:    0    mins  93743;    Therapeutic Activity:     30     mins  47736;     Self-Care/ADL     0     mins  97697;   Sensory Int. Tech      0     mins 17814;  Ultrasound:     0     mins  41598;    Electrical Stimulation:    0     mins  77419 ( );    Untimed:  Electrical Stimulation:    0     mins  55338 ( );    Timed Treatment:   42   mins   Total Treatment:     42   mins

## 2022-01-19 ENCOUNTER — TREATMENT (OUTPATIENT)
Dept: PHYSICAL THERAPY | Facility: CLINIC | Age: 67
End: 2022-01-19

## 2022-01-19 DIAGNOSIS — Z74.09 IMPAIRED MOBILITY AND ADLS: ICD-10-CM

## 2022-01-19 DIAGNOSIS — R27.8 DECREASED COORDINATION: ICD-10-CM

## 2022-01-19 DIAGNOSIS — Z78.9 IMPAIRED MOBILITY AND ADLS: ICD-10-CM

## 2022-01-19 DIAGNOSIS — R29.898 DECREASED GRIP STRENGTH: Primary | ICD-10-CM

## 2022-01-19 PROCEDURE — 97530 THERAPEUTIC ACTIVITIES: CPT | Performed by: OCCUPATIONAL THERAPIST

## 2022-01-19 PROCEDURE — 97110 THERAPEUTIC EXERCISES: CPT | Performed by: OCCUPATIONAL THERAPIST

## 2022-01-19 NOTE — PROGRESS NOTES
"Occupational Therapy Daily Progress Note  Visit: 5  Date of Initial Visit: Type: THERAPY  Noted: 2021      Patient: Carli Bolanos   : 1955  Diagnosis/ICD-10 Code:  Decreased  strength [R29.898]  Referring practitioner: Solange Rangel*  Date of Initial Visit: Type: THERAPY  Noted: 2021  Today's Date: 2022  Patient seen for 5 sessions      Subjective:   Patient reports: \"I feel much better with both my hands\"  Pain: 0/10  Subjective Questionnaire: n/a  Clinical Progress: unchanged  Home Program Compliance: Yes  Treatment has included: therapeutic exercise and therapeutic activity    Subjective   Objective     OT Neuro          OT Exercises     Row Name 22 0845             Exercise 1    Exercise Name 1 digit flex R/L hands  -ST      Resistance 1 Yellow; Red  -ST      Sets 1 2  -ST      Reps 1 10  -ST              Exercise 2    Exercise Name 2 digit web, R/L hands, entire hand  -ST      Resistance 2 Yellow  -ST              Exercise 3    Exercise Name 3 digit web, R/L hands, individual digits  -ST      Resistance 3 Yellow  -ST              Exercise 4    Exercise Name 4 review of chopping modifications with knife cutting-use of B hands with larger handle  -ST              Exercise 5    Exercise Name 5 digit abd/adduction strengthening, use of 7oz then 14 oz hand med ball, each digit, R/L  -ST      Sets 5 1  -ST      Reps 5 10  -ST            User Key  (r) = Recorded By, (t) = Taken By, (c) = Cosigned By    Initials Name Provider Type    Loulou Hoffman OTR Occupational Therapist                 Assessment & Plan     Assessment    Assessment details: Pt demonstrates improving strength and coordination of bilateral hands as demonstrated by increasingly difficult tasks. Pt able to complete repetitive grasp/release w/o discomfort using varying mediums to improve palmar and digit strength in key, pincer and lateral strength. Pt notes ability to now perform chopping/dicing " veggies.    Plan  Plan details: Continue w/POC and goals as est.         Visit Diagnoses:    ICD-10-CM ICD-9-CM   1. Decreased  strength  R29.898 729.89   2. Decreased coordination  R27.8 781.3   3. Impaired mobility and ADLs  Z74.09 V49.89    Z78.9              Timed:  Manual Therapy:    0     mins  48523;  Therapeutic Exercise:    12     mins  85020;     Neuromuscular Sharonda:    0    mins  91730;    Therapeutic Activity:     30     mins  11258;     Self-Care/ADL     0     mins  62461;   Sensory Int. Tech      0     mins 36784;  Ultrasound:     0     mins  36066;    Electrical Stimulation:    0     mins  18677 ( );    Untimed:  Electrical Stimulation:    0     mins  69210 ( );    Timed Treatment:   42   mins   Total Treatment:     42   mins    OT Signature: Loulou Irene MS, OTR/L, CDP  [place] License #: [id number]

## 2022-01-20 NOTE — ASSESSMENT & PLAN NOTE
Repeat BP further worsened; note nl BP at rheum office last wk; rec home monitoring with goal > 130/80 and f/u if consistently > 140/90; cont amlo 5mg QD

## 2022-01-20 NOTE — PROGRESS NOTES
Chief Complaint   Patient presents with   • Hyperlipidemia     Follow Up   • Hypertension   • Osteopenia   • Impaired fasting glucose       History of Present Illness  67 y.o.  woman presents for f/u on sugars. Had endo visit a few wks ago with Dr. Mae with stable thyroid tests. Not checking BPs regularly at home but notes /70 at rheum office last week.    Has had COVID19 exposure and is getting tested with her  at [facility] tonight (PCR). They live with the 5yo grandson, who had headache last Fri, tested negative at home and then positive at the pediatrician's office with dx of ear infection. He likely got it from . Patient did home test on Tuesday and it was neg. Remains asx and is fully vaccinated/boostered.    Review of Systems  Denies CP ,palpitations, SOB, falls. Denies fevers/chills, cold sxs. All other ROS reviewed and negative.      Current Outpatient Medications:   •  amLODIPine (NORVASC) 5 MG QD  •  Biotin 10 MG QD  •  calcium citrate-vitamin d (CITRACAL) 200-250 MG-UNIT QD  •  CBD (cannabidiol) oral oil AD  •  celecoxib (CELEBREX) 200 MG  QD prn  •  cyclobenzaprine (FLEXERIL) 10 MG tablet, 1/2-1 qhs prn muscle spasm  •  folic acid (FOLVITE) 1 MG 2 QD  •  methotrexate 2.5 MG 7tabs weekly  •  simvastatin (ZOCOR) 20 MG QD  •  TURMERIC PO AD  •  vitamin B-6 (PYRIDOXINE) 50 MG QD  •  Xiidra 5 % ophthalmic solution AD      VITALS:  /70   Pulse 62   Temp 99 °F (37.2 °C)   Wt 64.2 kg (141 lb 9.6 oz)   SpO2 99%   BMI 23.56 kg/m²   Repeat BP left 160/74    Physical Exam  Vitals and nursing note reviewed.   Constitutional:       General: She is not in acute distress.     Appearance: Normal appearance. She is not ill-appearing.   Eyes:      Extraocular Movements: Extraocular movements intact.      Conjunctiva/sclera: Conjunctivae normal.   Pulmonary:      Effort: Pulmonary effort is normal. No respiratory distress.   Neurological:      Mental Status: She is alert and oriented to  person, place, and time. Mental status is at baseline.   Psychiatric:         Mood and Affect: Mood normal.         Behavior: Behavior normal.         LABS  Results for orders placed or performed in visit on 01/21/22   POC Glycosylated Hemoglobin (Hb A1C)    Specimen: Blood   Result Value Ref Range    Hemoglobin A1C 5.4 %    Lot Number [id number]     Expiration Date 11/2/2023      Results for orders placed or performed in visit on 01/05/22   SARS-CoV-2 Antibodies (Roche)    Specimen: Blood   Result Value Ref Range    SARS-COV-2 ANTIBODIES Negative Negative   TSH    Specimen: Blood   Result Value Ref Range    TSH 0.250 (L) 0.270 - 4.200 uIU/mL   T4, Free    Specimen: Blood   Result Value Ref Range    Free T4 1.61 0.93 - 1.70 ng/dL   T3    Specimen: Blood   Result Value Ref Range    T3, Total 121.0 80.0 - 200.0 ng/dl     7/21 A1C 5.7    ASSESSMENT/PLAN    Diagnoses and all orders for this visit:    1. Impaired fasting glucose (Primary)  Assessment & Plan:  BG control stable with A1C 5.4; encouraged reg phys activity to decr insulin resistance, moderation in unhealthy starches/sweets; f/u A1C in 6 mos      Orders:  -     POC Glycosylated Hemoglobin (Hb A1C)    2. Hypertension  Assessment & Plan:  Repeat BP further worsened; note nl BP at rheum office last wk; rec home monitoring with goal > 130/80 and f/u if consistently > 140/90; cont amlo 5mg QD      3. Close exposure to COVID-19 virus  Comments:  asx; PCR test at [facility] pending tonight - she will let us know results      FOLLOW-UP  1. Health maintenance - COVID19 vacc done, incl 3rd dose Pfizer; flu vacc 10/21  2. RTC for AWV 7/25/22; fasting labs prior to appt (CBC, CMP, TSH, lipids, UA/micr, A1C, FT4, CPK)    Electronically signed by:    Rosa M Venegas MD, FACP  01/20/2022

## 2022-01-20 NOTE — ASSESSMENT & PLAN NOTE
BG control stable with A1C 5.4; encouraged reg phys activity to decr insulin resistance, moderation in unhealthy starches/sweets; f/u A1C in 6 mos

## 2022-01-21 ENCOUNTER — TELEMEDICINE (OUTPATIENT)
Dept: INTERNAL MEDICINE | Facility: CLINIC | Age: 67
End: 2022-01-21

## 2022-01-21 VITALS
BODY MASS INDEX: 23.56 KG/M2 | DIASTOLIC BLOOD PRESSURE: 70 MMHG | HEART RATE: 62 BPM | OXYGEN SATURATION: 99 % | SYSTOLIC BLOOD PRESSURE: 148 MMHG | WEIGHT: 141.6 LBS | TEMPERATURE: 99 F

## 2022-01-21 DIAGNOSIS — I10 ESSENTIAL HYPERTENSION: Chronic | ICD-10-CM

## 2022-01-21 DIAGNOSIS — R73.01 IMPAIRED FASTING GLUCOSE: Primary | Chronic | ICD-10-CM

## 2022-01-21 DIAGNOSIS — Z20.822 CLOSE EXPOSURE TO COVID-19 VIRUS: ICD-10-CM

## 2022-01-21 LAB
EXPIRATION DATE: NORMAL
HBA1C MFR BLD: 5.4 %
Lab: NORMAL

## 2022-01-21 PROCEDURE — 3044F HG A1C LEVEL LT 7.0%: CPT | Performed by: INTERNAL MEDICINE

## 2022-01-21 PROCEDURE — 99214 OFFICE O/P EST MOD 30 MIN: CPT | Performed by: INTERNAL MEDICINE

## 2022-01-21 PROCEDURE — 83036 HEMOGLOBIN GLYCOSYLATED A1C: CPT | Performed by: INTERNAL MEDICINE

## 2022-02-10 ENCOUNTER — TREATMENT (OUTPATIENT)
Dept: PHYSICAL THERAPY | Facility: CLINIC | Age: 67
End: 2022-02-10

## 2022-02-10 DIAGNOSIS — Z78.9 IMPAIRED MOBILITY AND ADLS: ICD-10-CM

## 2022-02-10 DIAGNOSIS — Z74.09 IMPAIRED MOBILITY AND ADLS: ICD-10-CM

## 2022-02-10 DIAGNOSIS — R29.898 DECREASED GRIP STRENGTH: Primary | ICD-10-CM

## 2022-02-10 DIAGNOSIS — R27.8 DECREASED COORDINATION: ICD-10-CM

## 2022-02-10 PROCEDURE — 97530 THERAPEUTIC ACTIVITIES: CPT | Performed by: OCCUPATIONAL THERAPIST

## 2022-02-10 NOTE — PROGRESS NOTES
"OT Progress & D/C Note        Patient: Carli Bolanos   : 1955  Diagnosis/ICD-10 Code:  Decreased  strength [R29.898]  Referring practitioner: Solange Rangel*  Date of Initial Visit: Type: THERAPY  Noted: 2021  Today's Date: 2/10/2022  Patient seen for 6 sessions      Subjective:   Patient reports: \"I can tell my hands are so much better\"   Pain: 0/10  Subjective Questionnaire: 9HPT RIGHT: 18.50 LEFT: 15.18  PURDUE PEGBOARD RIGHT: 13 LEFT: 15 BILATERAL: 12 ASSEMBLY: 16  Clinical Progress: improved  Home Program Compliance: Yes  Treatment has included: therapeutic activity    Subjective   Objective     OT Neuro     Assessment & Plan     Assessment    Assessment details: Pt has met all goals and achieved max performance with skilled OT services at this time. Pt with significantly improved pain, flexibility,  strength and FMC bilaterally and reports improved ability to perform daily tasks including opening jars/cans/containers. Pt also now able to complete all meal prep with chopping/cutting veggies, etc.   Prognosis: good    Goals  Plan Goals:   Pt will score 20 seconds or less on the 9HPT to demonstrate improved accuracy and speed with fine motor coordination by 8 wks.  MET     Pt will increase R/L  strength by 5 # by 8 wks to demonstrate improved strength and function for daily tasks. MET    Pt will be independent with hand strengthening HEP to increase independence with ADL/IADL performance tasks by 4 wks. MET    Pt will be independent with hand FMC HEP to increase independence with ADL/IADL performance tasks by 4 wks. MET                  Plan  Treatment plan discussed with: patient  Plan details: D/C, met all goals and achieved max performance        OT Exercises     Row Name 02/10/22 0846             Exercise 1    Exercise Name 1 OT re-assessment completed per POC  -ST              Exercise 2    Exercise Name 2 review of all previous HEPs  -ST              Exercise 3    " Exercise Name 3 isolated digit f/e, focus on PIP/DIP joints R/L  -ST              Exercise 4    Exercise Name 4 opposition each digit  -ST            User Key  (r) = Recorded By, (t) = Taken By, (c) = Cosigned By    Initials Name Provider Type    Loulou Hoffman, OTR Occupational Therapist              Visit Diagnoses:    ICD-10-CM ICD-9-CM   1. Decreased  strength  R29.898 729.89   2. Decreased coordination  R27.8 781.3   3. Impaired mobility and ADLs  Z74.09 V49.89    Z78.9        Progress toward previous goals: All Met      Recommendations: Discharge  OT Signature: Loulou Irene MS, OTR/L, CDP  [place] License #: [id number]    Based upon review of the patient's progress and continued therapy plan, it is my medical opinion that Carli Bolanos should continue occupational therapy treatment at [facility] PHYSICAL THERAPY  [facility] [address] [facility] [address] [phone].    Signature: __________________________________  Solange Alejandra APRN    Timed:  Manual Therapy:    0     mins  47110;  Therapeutic Exercise:    0     mins  42924;     Neuromuscular Sharonda:    0    mins  33252;    Therapeutic Activity:     43     mins  60032;     Self-Care/ADL     0     mins  86838;   Sensory Int. Tech      0     mins 99811;  Ultrasound:     0     mins  20885;    Electrical Stimulation:    0     mins  37735 ( );    Untimed:  Electrical Stimulation:    0     mins  76037 ( );    Timed Treatment:   43   mins   Total Treatment:     43   mins

## 2022-04-20 ENCOUNTER — HOSPITAL ENCOUNTER (OUTPATIENT)
Dept: MAMMOGRAPHY | Facility: HOSPITAL | Age: 67
Discharge: HOME | End: 2022-04-20
Admitting: OBSTETRICS & GYNECOLOGY

## 2022-04-20 DIAGNOSIS — Z12.31 ENCOUNTER FOR SCREENING MAMMOGRAM FOR MALIGNANT NEOPLASM OF BREAST: ICD-10-CM

## 2022-04-20 PROCEDURE — 77067 SCR MAMMO BI INCL CAD: CPT

## 2022-04-20 PROCEDURE — 77063 BREAST TOMOSYNTHESIS BI: CPT

## 2022-04-20 PROCEDURE — 77063 BREAST TOMOSYNTHESIS BI: CPT | Performed by: RADIOLOGY

## 2022-04-20 PROCEDURE — 77067 SCR MAMMO BI INCL CAD: CPT | Performed by: RADIOLOGY

## 2022-06-22 ENCOUNTER — TELEPHONE (OUTPATIENT)
Dept: INTERNAL MEDICINE | Facility: CLINIC | Age: 67
End: 2022-06-22

## 2022-06-22 NOTE — TELEPHONE ENCOUNTER
Caller: Carli Bolanos    Relationship to patient: Self    Best call back number: [phone]  Date of exposure: UNSURE    Date of positive COVID19 test: 06/22/2022 IN-HOME TEST     Date if possible COVID19 exposure: UNSURE    COVID19 symptoms: COUGH, HEAD CONGESTION, NASAL CONGESTION     Date of initial quarantine: 06/22/2022    Additional information or concerns: THE PATIENT IS REQUESTING A CALL BACK TO KNOW WHAT DR GALVAN RECOMMENDS AND WHETHER OR NOT SHE SHOULD GET A DIFFERENT TEST THAN THE IN-HOME ONE.     What is the patients preferred pharmacy: [address] [phone] [facility] [phone]

## 2022-07-06 ENCOUNTER — OFFICE VISIT (OUTPATIENT)
Dept: GYNECOLOGIC ONCOLOGY | Facility: CLINIC | Age: 67
End: 2022-07-06

## 2022-07-06 VITALS
TEMPERATURE: 98.4 F | HEART RATE: 55 BPM | BODY MASS INDEX: 23.38 KG/M2 | RESPIRATION RATE: 14 BRPM | DIASTOLIC BLOOD PRESSURE: 70 MMHG | SYSTOLIC BLOOD PRESSURE: 155 MMHG | WEIGHT: 140.5 LBS | OXYGEN SATURATION: 99 %

## 2022-07-06 DIAGNOSIS — Z01.419 WELL WOMAN EXAM WITH ROUTINE GYNECOLOGICAL EXAM: Primary | ICD-10-CM

## 2022-07-06 DIAGNOSIS — Z85.42 HISTORY OF ENDOMETRIAL CANCER: ICD-10-CM

## 2022-07-06 PROCEDURE — 99397 PER PM REEVAL EST PAT 65+ YR: CPT | Performed by: NURSE PRACTITIONER

## 2022-07-06 NOTE — PROGRESS NOTES
GYN ONCOLOGY ANNUAL WELL WOMAN VISIT      Carli Bolanos  [id number]  1955      Chief Complaint: Annual Exam (No complaints)        History of present illness:  Carli Bolanos is a 67 y.o. year old female who is here today for an annual exam. She has a history of endometrial cancer as outlined below. She reports she is feeling very well today and has no complaints. She denies vaginal bleeding, pelvic pain, changes in bowel or bladder function, new or concerning lesions, and breast problems. Well woman screenings all currently UTD.       Cancer History:   Oncology/Hematology History   Endometrial/uterine adenocarcinoma (HCC)   2013 Initial Diagnosis    D&C for PMB and abnormal uterine ultrasound. Pathology revealed well differentiated adenocarcinoma in the background of CAH     3/15/2013 Surgery    RTLH/RSO, bilateral pelvic and periaortic LND, and omental biopsy. Stage IA grade 1 by final path         Obstetric History:  OB History        2    Para   2    Term                AB        Living           SAB        IAB        Ectopic        Molar        Multiple        Live Births                   Menstrual History:     No LMP recorded. Patient is postmenopausal.          Past Medical History:   Diagnosis Date   • Abnormal thyroid ultrasound 2017    stable MNG (17) by u/s   • Abnormal thyroid ultrasound 2015    stable MNG (6/12/15)   • Abnormal thyroid ultrasound 2014    thyr u/s (14): MNG except dominant complex mass mid L. lobe, repeat u/s in 6 mos   • Endometrial cancer (HCC)    • H/O uterine leiomyoma     s/p DUSTY/USO ('13)   • History of CT scan 2016    neg head ct   • Hx of bone density study 2015    DEXA (6/15) L -0.4, H0.7   • Hx of bone density study 2018    nl DEXA (18): L -0.4, H 0.2, repeat 3 yrs   • Hx of bone density study 2021    DEXA (21): L 0.6, A -1.8; per Dr. Giles/LEONOR Villa, APRN - repeat 2-3 yrs   • Hx of  colonoscopy 03/11/2011    colonosc (3/11/11): diverticulosis, sm int hem, repeat 2018; CRS - Dr. Thornton   • Hx of colonoscopy 10/05/2018    colonosc (10/5/18): diverticulosis, redundant colon, int hem, repeat 5 yrs due to h/o polyps; CRS - Dr. Thornton   • Hx of EMG/NCV 02/27/2008    EMG/NCV (2/27/08): right median neuropathy   • Hx of hand x-ray 01/24/2018    R. hand xray (1/24/18): degen changes 1st metacarpal joint and index PIP   • Lumps on the skin 02/20/2013    R. temple lesion; evaluated by Dr. Amelia Gann 02/20/13 with upcoming excision   • Right arm numbness 06/13/2016    Impression: improved after CTS surgery per pt; 03/01/2016 - resolved at this time; consider pinched nerve due to positioning during sleep the night before; if sxs recur, rec updated EMG/NCV for further eval;        Past Surgical History:   Procedure Laterality Date   • APPENDECTOMY  1980    taken out at gallbladder surgery   • CARPAL TUNNEL RELEASE Left     carpel tunnel left    • CERVICAL POLYPECTOMY  03/2005    with h/o fibroids and DUB   • CHOLECYSTECTOMY  1980   • DILATATION AND CURETTAGE  02/22/2013   • EXCISION MASS HEAD/NECK Right 03/05/2013    s/p excision scalp lesion large R. parietal and L. temporal areas; plastics - Dr. Amelia Gann   • HAND SURGERY      carpal tunnel   • HIP ARTHROSCOPY Right 2004    with subsequent [place] resurfacing with hip replacement (5/11)   • HYSTERECTOMY     • JOINT REPLACEMENT  2007. 2011    both hips   • SALPINGO OOPHORECTOMY Left 1991    secondary to tubal pregnancy and ovarian cyst   • SALPINGO OOPHORECTOMY Right     secondary to tubal pregnancy   • TOTAL HIP ARTHROPLASTY Left 06/2011     ortho Dr. Mcdermott   • TOTAL HIP ARTHROPLASTY Right 05/2011   • TOTAL LAPAROSCOPIC HYSTERECTOMY WITH DAVINCI ROBOT  03/05/2013    RTLH/BSO with LND and omental biopsy for endometrial cancer; GYN Onc - Dr. Malin   • US GUIDED FINE NEEDLE ASPIRATION  05/11/2021       MEDICATIONS: The current medication list was  reviewed and reconciled.     Allergies:  is allergic to codeine, lisinopril, and naproxen.    Family History   Problem Relation Age of Onset   • Dementia Mother    • Cancer Mother         GYN   • Hip fracture Mother    • Thyroid disease Mother    • Hypertension Mother    • Cervical cancer Mother    • Heart attack Father    • Heart failure Father    • Diabetes Father    • Heart disease Father         HEart failure   • Prostate cancer Father    • Hypertension Father    • Hypertension Brother    • Heart disease Brother    • Kidney cancer Maternal Grandmother    • Arthritis Maternal Grandmother    • Cancer Maternal Grandmother         Kidney Cancer   • Colon cancer Maternal Grandfather 70   • Cancer Maternal Grandfather         Colon cancer   • Heart failure Paternal Grandmother    • Heart disease Paternal Grandmother    • Arthritis Paternal Grandmother    • Hypertension Paternal Grandmother    • Diabetes Paternal Grandfather    • Colon cancer Paternal Uncle 78   • Breast cancer Neg Hx    • Ovarian cancer Neg Hx        Health Maintenance:  Last mammogram was 4/20/2022. Last colonoscopy was 10/2018, with recommended follow-up in 7 year(s). Last DEXA was 9/27/2021, normal. Last pap smear was 6/30/2020, results were  normal PAP.      Review of Systems   Constitutional: Negative.    Gastrointestinal: Negative.    Genitourinary: Negative.          Physical Exam  Vital Signs: /70   Pulse 55   Temp 98.4 °F (36.9 °C)   Resp 14   Wt 63.7 kg (140 lb 8 oz)   SpO2 99%   BMI 23.38 kg/m²   Vitals:    07/06/22 0956   PainSc: 0-No pain           General Appearance:  alert, cooperative, no apparent distress, appears stated age and normal weight   Neurologic/Psychiatric: A&O x 3, gait steady, appropriate affect   Lungs:   Clear to auscultation bilaterally; respirations regular, even, and unlabored bilaterally   Heart:  Regular rate and rhythm, no murmurs appreciated   Breasts:  Symmetrical, no masses, no lesions and no nipple  discharge   Abdomen:   Soft, non-tender, non-distended and no organomegaly   Lymph nodes: No cervical, supraclavicular, inguinal or axillary adenopathy noted   Extremities: Normal, atraumatic; no clubbing, cyanosis, or edema    Skin: No rashes, ulcers, or suspicious lesions noted   Pelvic: External Genitalia  atrophic, without lesions  Vagina  is pale, atrophic.   Vaginal Cuff  Female Vaginal Cuff: smooth, intact, without visible lesions   Uterus  surgically absent and no palpable masses  Ovaries  surgically absent bilaterally  Parametria  smooth  Rectovaginal  Female rectovaginal: deferred and external acute inflammed hemorrhoid noted     ECOG Performance Status: (0) Fully Active - Able to Carry On All Pre-disease Performance Without Restriction    Procedure Note:       Assessment and Plan:    Diagnoses and all orders for this visit:    1. Well woman exam with routine gynecological exam (Primary)    2. History of endometrial cancer        There is no evidence of disease upon today's exam. She is understanding to call with any changes in pelvic symptoms or general GYN concerns at any time between regularly scheduled visits.     No pap smear needed today.     She was encouraged to get yearly mammograms.  She should report any palpable breast lump(s) or skin changes regardless of mammographic findings.  I explained to Carli that notification regarding her mammogram results will come from the center performing the study.  Our office will not be routinely calling with mammogram results.  It is her responsibility to make sure that the results from the mammogram are communicated to her by the breast center.  If she has any questions about the results, she is welcome to call our office anytime.    Repeat colonoscopy in 2025 or sooner if new problems.     Repeat DEXA in 2021.     Pain assessment was performed today as a part of patient’s care. For patients with pain related to surgery, gynecologic malignancy or cancer  treatment, the plan is as noted in the assessment/plan.  For patients with pain not related to these issues, they are to seek any further needed care from a more appropriate provider, such as PCP.      Return to clinic in 1 year for Annual exam.      Denise Villa, APRN

## 2022-07-07 ENCOUNTER — TELEPHONE (OUTPATIENT)
Dept: INTERNAL MEDICINE | Facility: CLINIC | Age: 67
End: 2022-07-07

## 2022-07-07 DIAGNOSIS — Z00.00 MEDICARE ANNUAL WELLNESS VISIT, SUBSEQUENT: Primary | ICD-10-CM

## 2022-07-07 DIAGNOSIS — I10 ESSENTIAL HYPERTENSION: ICD-10-CM

## 2022-07-07 DIAGNOSIS — E78.00 PURE HYPERCHOLESTEROLEMIA: ICD-10-CM

## 2022-07-07 DIAGNOSIS — R73.01 IMPAIRED FASTING GLUCOSE: ICD-10-CM

## 2022-07-15 ENCOUNTER — LAB (OUTPATIENT)
Dept: LAB | Facility: HOSPITAL | Age: 67
End: 2022-07-15

## 2022-07-15 ENCOUNTER — TRANSCRIBE ORDERS (OUTPATIENT)
Dept: LAB | Facility: HOSPITAL | Age: 67
End: 2022-07-15

## 2022-07-15 DIAGNOSIS — D89.9 DISEASE OF IMMUNE SYSTEM: Primary | ICD-10-CM

## 2022-07-15 DIAGNOSIS — R73.01 IMPAIRED FASTING GLUCOSE: ICD-10-CM

## 2022-07-15 DIAGNOSIS — D89.9 DISEASE OF IMMUNE SYSTEM: ICD-10-CM

## 2022-07-15 DIAGNOSIS — M05.79 SEROPOSITIVE RHEUMATOID ARTHRITIS OF MULTIPLE SITES: ICD-10-CM

## 2022-07-15 DIAGNOSIS — Z00.00 MEDICARE ANNUAL WELLNESS VISIT, SUBSEQUENT: ICD-10-CM

## 2022-07-15 DIAGNOSIS — I10 ESSENTIAL HYPERTENSION: ICD-10-CM

## 2022-07-15 DIAGNOSIS — Z79.899 ENCOUNTER FOR LONG-TERM (CURRENT) USE OF OTHER MEDICATIONS: ICD-10-CM

## 2022-07-15 DIAGNOSIS — E78.00 PURE HYPERCHOLESTEROLEMIA: Chronic | ICD-10-CM

## 2022-07-15 DIAGNOSIS — E78.00 PURE HYPERCHOLESTEROLEMIA: ICD-10-CM

## 2022-07-15 DIAGNOSIS — I10 ESSENTIAL HYPERTENSION: Chronic | ICD-10-CM

## 2022-07-15 LAB
ALBUMIN SERPL-MCNC: 4.8 G/DL (ref 3.5–5.2)
ALBUMIN/GLOB SERPL: 1.5 G/DL
ALP SERPL-CCNC: 78 U/L (ref 39–117)
ALT SERPL W P-5'-P-CCNC: 21 U/L (ref 1–33)
ANION GAP SERPL CALCULATED.3IONS-SCNC: 9.6 MMOL/L (ref 5–15)
AST SERPL-CCNC: 26 U/L (ref 1–32)
BACTERIA UR QL AUTO: NORMAL /HPF
BASOPHILS # BLD AUTO: 0.04 10*3/MM3 (ref 0–0.2)
BASOPHILS NFR BLD AUTO: 0.6 % (ref 0–1.5)
BILIRUB SERPL-MCNC: 0.4 MG/DL (ref 0–1.2)
BILIRUB UR QL STRIP: NEGATIVE
BUN SERPL-MCNC: 18 MG/DL (ref 8–23)
BUN/CREAT SERPL: 24.7 (ref 7–25)
CALCIUM SPEC-SCNC: 9.7 MG/DL (ref 8.6–10.5)
CHLORIDE SERPL-SCNC: 102 MMOL/L (ref 98–107)
CHOLEST SERPL-MCNC: 155 MG/DL (ref 0–200)
CK SERPL-CCNC: 60 U/L (ref 20–180)
CLARITY UR: CLEAR
CO2 SERPL-SCNC: 25.4 MMOL/L (ref 22–29)
COLOR UR: YELLOW
CREAT SERPL-MCNC: 0.73 MG/DL (ref 0.57–1)
CRP SERPL-MCNC: <0.3 MG/DL (ref 0–0.5)
DEPRECATED RDW RBC AUTO: 40.4 FL (ref 37–54)
EGFRCR SERPLBLD CKD-EPI 2021: 89.2 ML/MIN/1.73
EOSINOPHIL # BLD AUTO: 0.19 10*3/MM3 (ref 0–0.4)
EOSINOPHIL NFR BLD AUTO: 2.7 % (ref 0.3–6.2)
ERYTHROCYTE [DISTWIDTH] IN BLOOD BY AUTOMATED COUNT: 12.9 % (ref 12.3–15.4)
ERYTHROCYTE [SEDIMENTATION RATE] IN BLOOD: 41 MM/HR (ref 0–30)
GLOBULIN UR ELPH-MCNC: 3.2 GM/DL
GLUCOSE SERPL-MCNC: 82 MG/DL (ref 65–99)
GLUCOSE UR STRIP-MCNC: NEGATIVE MG/DL
HBA1C MFR BLD: 5.9 % (ref 4.8–5.6)
HCT VFR BLD AUTO: 38.7 % (ref 34–46.6)
HDLC SERPL-MCNC: 78 MG/DL (ref 40–60)
HGB BLD-MCNC: 13 G/DL (ref 12–15.9)
HGB UR QL STRIP.AUTO: NEGATIVE
HYALINE CASTS UR QL AUTO: NORMAL /LPF
IMM GRANULOCYTES # BLD AUTO: 0.02 10*3/MM3 (ref 0–0.05)
IMM GRANULOCYTES NFR BLD AUTO: 0.3 % (ref 0–0.5)
KETONES UR QL STRIP: NEGATIVE
LDLC SERPL CALC-MCNC: 66 MG/DL (ref 0–100)
LDLC/HDLC SERPL: 0.85 {RATIO}
LEUKOCYTE ESTERASE UR QL STRIP.AUTO: NEGATIVE
LYMPHOCYTES # BLD AUTO: 1.76 10*3/MM3 (ref 0.7–3.1)
LYMPHOCYTES NFR BLD AUTO: 25.1 % (ref 19.6–45.3)
MCH RBC QN AUTO: 29.3 PG (ref 26.6–33)
MCHC RBC AUTO-ENTMCNC: 33.6 G/DL (ref 31.5–35.7)
MCV RBC AUTO: 87.2 FL (ref 79–97)
MONOCYTES # BLD AUTO: 0.49 10*3/MM3 (ref 0.1–0.9)
MONOCYTES NFR BLD AUTO: 7 % (ref 5–12)
NEUTROPHILS NFR BLD AUTO: 4.52 10*3/MM3 (ref 1.7–7)
NEUTROPHILS NFR BLD AUTO: 64.3 % (ref 42.7–76)
NITRITE UR QL STRIP: NEGATIVE
NRBC BLD AUTO-RTO: 0 /100 WBC (ref 0–0.2)
PH UR STRIP.AUTO: 6.5 [PH] (ref 5–8)
PLATELET # BLD AUTO: 276 10*3/MM3 (ref 140–450)
PMV BLD AUTO: 9.9 FL (ref 6–12)
POTASSIUM SERPL-SCNC: 5.1 MMOL/L (ref 3.5–5.2)
PROT SERPL-MCNC: 8 G/DL (ref 6–8.5)
PROT UR QL STRIP: NEGATIVE
RBC # BLD AUTO: 4.44 10*6/MM3 (ref 3.77–5.28)
RBC # UR STRIP: NORMAL /HPF
REF LAB TEST METHOD: NORMAL
SODIUM SERPL-SCNC: 137 MMOL/L (ref 136–145)
SP GR UR STRIP: <=1.005 (ref 1–1.03)
SQUAMOUS #/AREA URNS HPF: NORMAL /HPF
T4 FREE SERPL-MCNC: 1.56 NG/DL (ref 0.93–1.7)
TRIGL SERPL-MCNC: 52 MG/DL (ref 0–150)
TSH SERPL DL<=0.05 MIU/L-ACNC: 0.27 UIU/ML (ref 0.27–4.2)
UROBILINOGEN UR QL STRIP: NORMAL
VLDLC SERPL-MCNC: 11 MG/DL (ref 5–40)
WBC # UR STRIP: NORMAL /HPF
WBC NRBC COR # BLD: 7.02 10*3/MM3 (ref 3.4–10.8)

## 2022-07-15 PROCEDURE — 80061 LIPID PANEL: CPT

## 2022-07-15 PROCEDURE — 83036 HEMOGLOBIN GLYCOSYLATED A1C: CPT

## 2022-07-15 PROCEDURE — 86140 C-REACTIVE PROTEIN: CPT

## 2022-07-15 PROCEDURE — 81001 URINALYSIS AUTO W/SCOPE: CPT

## 2022-07-15 PROCEDURE — 85025 COMPLETE CBC W/AUTO DIFF WBC: CPT

## 2022-07-15 PROCEDURE — 85652 RBC SED RATE AUTOMATED: CPT

## 2022-07-15 PROCEDURE — 36415 COLL VENOUS BLD VENIPUNCTURE: CPT

## 2022-07-15 PROCEDURE — 82550 ASSAY OF CK (CPK): CPT

## 2022-07-15 PROCEDURE — 80053 COMPREHEN METABOLIC PANEL: CPT

## 2022-07-15 PROCEDURE — 84439 ASSAY OF FREE THYROXINE: CPT

## 2022-07-15 PROCEDURE — 84443 ASSAY THYROID STIM HORMONE: CPT

## 2022-07-15 RX ORDER — AMLODIPINE BESYLATE 5 MG/1
TABLET ORAL
Qty: 90 TABLET | Refills: 3 | OUTPATIENT
Start: 2022-07-15

## 2022-07-15 RX ORDER — SIMVASTATIN 20 MG
TABLET ORAL
Qty: 90 TABLET | Refills: 3 | OUTPATIENT
Start: 2022-07-15

## 2022-07-20 ENCOUNTER — OFFICE VISIT (OUTPATIENT)
Dept: ORTHOPEDIC SURGERY | Facility: CLINIC | Age: 67
End: 2022-07-20

## 2022-07-20 ENCOUNTER — TELEPHONE (OUTPATIENT)
Dept: ORTHOPEDIC SURGERY | Facility: CLINIC | Age: 67
End: 2022-07-20

## 2022-07-20 ENCOUNTER — LAB (OUTPATIENT)
Dept: LAB | Facility: HOSPITAL | Age: 67
End: 2022-07-20

## 2022-07-20 VITALS
WEIGHT: 140.43 LBS | HEIGHT: 65 IN | BODY MASS INDEX: 23.4 KG/M2 | DIASTOLIC BLOOD PRESSURE: 80 MMHG | SYSTOLIC BLOOD PRESSURE: 140 MMHG

## 2022-07-20 DIAGNOSIS — Z96.643 S/P BILATERAL HIP REPLACEMENTS: Primary | ICD-10-CM

## 2022-07-20 DIAGNOSIS — Z96.643 S/P BILATERAL HIP REPLACEMENTS: ICD-10-CM

## 2022-07-20 PROCEDURE — 99213 OFFICE O/P EST LOW 20 MIN: CPT | Performed by: ORTHOPAEDIC SURGERY

## 2022-07-20 RX ORDER — ERGOCALCIFEROL 1.25 MG/1
CAPSULE ORAL
COMMUNITY
Start: 2022-05-19 | End: 2022-08-29 | Stop reason: DRUGHIGH

## 2022-07-20 RX ORDER — ABATACEPT 250 MG/15ML
INJECTION, POWDER, LYOPHILIZED, FOR SOLUTION INTRAVENOUS
COMMUNITY
Start: 2022-05-23 | End: 2022-11-22 | Stop reason: ALTCHOICE

## 2022-07-20 NOTE — TELEPHONE ENCOUNTER
Called patient to let her know that we did get her message and that once labs are final, I will send on to Dr Sprague to review. She understood.     Marilee

## 2022-07-20 NOTE — PROGRESS NOTES
[facility] [place] [facility] Note    Subjective     Chief Complaint   Patient presents with   • Follow-up     9 month f/u, S/P Bilateral Hip Replacements ([facility] 2007, 2011),         HPI    It has been 9  month(s) since Ms. Bolanos's last visit. She returns to clinic today for follow-up of bilateral hip arthroplasty. The issue has been ongoing for 15 year(s). She rates her pain a 4/10 on the pain scale. Previous/current treatments: NSAIDS. Current symptoms: pain and stiffness. The pain is worse with walking; resting improve the pain. Overall, she is doing better.      Previous hip surgery at the [facility], with a right hip resurfacing in 2007 with Dr. Loving, and a left total hip arthroplasty in 2011 with Dr. Mcdermott.    I have reviewed the following portions of the patient's history and agree with: History of Present Illness and Review of Systems    Patient Active Problem List   Diagnosis   • Carpal tunnel syndrome of right wrist   • Subclinical hyperthyroidism   • Diverticulosis of colon   • Endometrial/uterine adenocarcinoma (HCC)   • Fibrocystic breast changes   • Hyperlipidemia   • Hypertension   • Impaired fasting glucose   • Localized superficial swelling, mass, or lump   • Multinodular goiter   • Osteoarthritis   • Osteopenia    • Rectocele   • Right arm pain   • Rheumatoid arthritis involving multiple sites with positive rheumatoid factor (HCC)   • Medicare annual wellness visit, subsequent   • Menopause   • Internal hemorrhoids   • Insomnia   • Anxiety   • Corn or callus, R. 4th and 5th toes   • Osteoarthritis of both hips   • Axillary lymphadenopathy   • DNR (do not resuscitate) discussion     Past Medical History:   Diagnosis Date   • Abnormal thyroid ultrasound 07/05/2017    stable MNG (7/5/17) by u/s   • Abnormal thyroid ultrasound 06/12/2015    stable MNG (6/12/15)   • Abnormal thyroid ultrasound 04/18/2014    thyr u/s (4/18/14): MNG except dominant complex mass mid L.  lobe, repeat u/s in 6 mos   • Endometrial cancer (HCC)    • H/O uterine leiomyoma     s/p DUSTY/USO ('13)   • History of CT scan 02/27/2016    neg head ct   • Hx of bone density study 06/08/2015    DEXA (6/15) L -0.4, H0.7   • Hx of bone density study 07/17/2018    nl DEXA (7/17/18): L -0.4, H 0.2, repeat 3 yrs   • Hx of bone density study 09/27/2021    DEXA (9/27/21): L 0.6, A -1.8; per Dr. Giles/LEONOR Villa, APRN - repeat 2-3 yrs   • Hx of colonoscopy 03/11/2011    colonosc (3/11/11): diverticulosis, sm int hem, repeat 2018; CRS - Dr. Thornton   • Hx of colonoscopy 10/05/2018    colonosc (10/5/18): diverticulosis, redundant colon, int hem, repeat 5 yrs due to h/o polyps; CRS - Dr. Thronton   • Hx of EMG/NCV 02/27/2008    EMG/NCV (2/27/08): right median neuropathy   • Hx of hand x-ray 01/24/2018    R. hand xray (1/24/18): degen changes 1st metacarpal joint and index PIP   • Lumps on the skin 02/20/2013    R. temple lesion; evaluated by Dr. Amelia Gann 02/20/13 with upcoming excision   • Right arm numbness 06/13/2016    Impression: improved after CTS surgery per pt; 03/01/2016 - resolved at this time; consider pinched nerve due to positioning during sleep the night before; if sxs recur, rec updated EMG/NCV for further eval;       Past Surgical History:   Procedure Laterality Date   • APPENDECTOMY  1980    taken out at gallbladder surgery   • CARPAL TUNNEL RELEASE Left     carpel tunnel left    • CERVICAL POLYPECTOMY  03/2005    with h/o fibroids and DUB   • CHOLECYSTECTOMY  1980   • DILATATION AND CURETTAGE  02/22/2013   • EXCISION MASS HEAD/NECK Right 03/05/2013    s/p excision scalp lesion large R. parietal and L. temporal areas; plastics - Dr. Amelia Gann   • HAND SURGERY      carpal tunnel   • HIP ARTHROSCOPY Right 2004    with subsequent [place] resurfacing with hip replacement (5/11)   • HYSTERECTOMY     • JOINT REPLACEMENT  2007. 2011    both hips   • SALPINGO OOPHORECTOMY Left 1991    secondary to tubal  pregnancy and ovarian cyst   • SALPINGO OOPHORECTOMY Right     secondary to tubal pregnancy   • TOTAL HIP ARTHROPLASTY Left 06/2011    [facility] ortho Dr. Mcdermott   • TOTAL HIP ARTHROPLASTY Right 05/2011   • TOTAL LAPAROSCOPIC HYSTERECTOMY WITH DAVINCI ROBOT  03/05/2013    RTLH/BSO with LND and omental biopsy for endometrial cancer; GYN Onc - Dr. Malin   • US GUIDED FINE NEEDLE ASPIRATION  05/11/2021      Family History   Problem Relation Age of Onset   • Dementia Mother    • Cancer Mother         GYN   • Hip fracture Mother    • Thyroid disease Mother    • Hypertension Mother    • Cervical cancer Mother    • Heart attack Father    • Heart failure Father    • Diabetes Father    • Heart disease Father         HEart failure   • Prostate cancer Father    • Hypertension Father    • Hypertension Brother    • Heart disease Brother    • Kidney cancer Maternal Grandmother    • Arthritis Maternal Grandmother    • Cancer Maternal Grandmother         Kidney Cancer   • Colon cancer Maternal Grandfather 70   • Cancer Maternal Grandfather         Colon cancer   • Heart failure Paternal Grandmother    • Heart disease Paternal Grandmother    • Arthritis Paternal Grandmother    • Hypertension Paternal Grandmother    • Diabetes Paternal Grandfather    • Colon cancer Paternal Uncle 78   • Breast cancer Neg Hx    • Ovarian cancer Neg Hx      Social History     Socioeconomic History   • Marital status:    Tobacco Use   • Smoking status: Never Smoker   • Smokeless tobacco: Never Used   Vaping Use   • Vaping Use: Never used   Substance and Sexual Activity   • Alcohol use: Yes     Alcohol/week: 1.0 standard drink     Types: 1 Glasses of wine per week     Comment: social   • Drug use: No   • Sexual activity: Yes     Partners: Male     Birth control/protection: Surgical      Current Outpatient Medications on File Prior to Visit   Medication Sig Dispense Refill   • abatacept (Orencia) 250 MG injection Administer ORENCIA 750mg IV week 0, 2, 4  then every 4 weeks     • amLODIPine (NORVASC) 5 MG tablet Take 1 tablet by mouth Daily. 90 tablet 3   • Biotin 10 MG capsule one capsule once daily     • calcium citrate-vitamin d (CITRACAL) 200-250 MG-UNIT tablet tablet Take  by mouth Daily.     • CBD (cannabidiol) oral oil Take  by mouth.     • celecoxib (CELEBREX) 200 MG capsule Take 1 capsule by mouth Daily As Needed for Mild Pain .     • cyclobenzaprine (FLEXERIL) 10 MG tablet 1/2-1 qhs prn muscle spasm 20 tablet 0   • folic acid (FOLVITE) 1 MG tablet Take 2 tablets by mouth Daily.     • methotrexate 2.5 MG tablet Take 7 tablets by mouth 1 (One) Time Per Week.     • simvastatin (ZOCOR) 20 MG tablet Take 1 tablet by mouth Every Evening. 90 tablet 3   • TURMERIC PO Take  by mouth.     • vitamin B-6 (PYRIDOXINE) 50 MG tablet Take 100 mg by mouth Daily.     • vitamin D (ERGOCALCIFEROL) 1.25 MG (58896 UT) capsule capsule      • Xiidra 5 % ophthalmic solution        No current facility-administered medications on file prior to visit.      Allergies   Allergen Reactions   • Codeine Other (See Comments)     Other reaction(s): hyperactivity   • Lisinopril Cough   • Naproxen Other (See Comments)     Other reaction(s): mouth sores   • Metaxalone Other (See Comments)     mouth sores/rash        Review of Systems   Constitutional: Negative for activity change, appetite change, chills, diaphoresis, fatigue, fever and unexpected weight change.   HENT: Negative for congestion, dental problem, drooling, ear discharge, ear pain, facial swelling, hearing loss, mouth sores, nosebleeds, postnasal drip, rhinorrhea, sinus pressure, sneezing, sore throat, tinnitus, trouble swallowing and voice change.    Eyes: Negative for photophobia, pain, discharge, redness, itching and visual disturbance.   Respiratory: Negative for apnea, cough, choking, chest tightness, shortness of breath, wheezing and stridor.    Cardiovascular: Negative for chest pain, palpitations and leg swelling.  "  Gastrointestinal: Negative for abdominal distention, abdominal pain, anal bleeding, blood in stool, constipation, diarrhea, nausea, rectal pain and vomiting.   Endocrine: Negative for cold intolerance, heat intolerance, polydipsia, polyphagia and polyuria.   Genitourinary: Negative for decreased urine volume, difficulty urinating, dysuria, enuresis, flank pain, frequency, genital sores, hematuria and urgency.   Musculoskeletal: Positive for arthralgias. Negative for back pain, gait problem, joint swelling, myalgias, neck pain and neck stiffness.   Skin: Negative for color change, pallor, rash and wound.   Allergic/Immunologic: Negative for environmental allergies, food allergies and immunocompromised state.   Neurological: Negative for dizziness, tremors, seizures, syncope, facial asymmetry, speech difficulty, weakness, light-headedness, numbness and headaches.   Hematological: Negative for adenopathy. Does not bruise/bleed easily.   Psychiatric/Behavioral: Negative for agitation, behavioral problems, confusion, decreased concentration, dysphoric mood, hallucinations, self-injury, sleep disturbance and suicidal ideas. The patient is not nervous/anxious and is not hyperactive.         Objective      Physical Exam  /80   Ht 165.1 cm (65\")   Wt 63.7 kg (140 lb 6.9 oz)   BMI 23.37 kg/m²     Body mass index is 23.37 kg/m².    General:   Mental Status:  Alert   Appearance: Cooperative, in no acute distress   Build and Nutrition: Well-nourished well-developed female   Orientation: Alert and oriented to person, place and time   Posture: Normal   Gait: Nonantalgic    Integument:   Right hip: Wound is well-healed with no signs of infection   Left hip: Wound is well-healed with no signs of infection    Lower Extremity:   Right Hip:    Tenderness:  None    Swelling:  None    Crepitus:  None    Range of motion:  External Rotation: 30°       Internal " Rotation: 30°       Flexion:  100°       Extension:  0°    Deformities:  None  Functional testing: Negative Stinchfield    No leg length discrepancy     Left Hip:    Tenderness:  None    Swelling:  None    Crepitus:  None    Range of motion:  External Rotation: 30°       Internal Rotation: 30°       Flexion:  100°       Extension:  0°    Deformities:  None  Functional testing: Negative Stinchfield    No leg length discrepancy      Imaging/Studies    Right Hip Radiographs  Indication: status-post right total hip arthroplasty, outside facility  Views: low AP pelvis and lateral of the right hip     Comparison: no change compared to prior study, 4/5/2021     Findings:   The components are well aligned, with no signs of loosening or failure.     Left Hip Radiographs  Indication: status-post left total hip arthroplasty, outside facility  Views: low AP pelvis and lateral of the left hip     Comparison: no change compared to prior study, 4/5/2020     Findings:   The components are well aligned, with no signs of loosening or failure.      Assessment and Plan     Diagnoses and all orders for this visit:    1. S/P bilateral hip replacements (Primary)  -     XR Hips Bilateral With or Without Pelvis 3-4 View; Future  -     Cobalt; Future  -     Chromium Level; Future        1. S/P bilateral hip replacements        I reviewed my findings with the patient.  Both of her hips appear to be functioning well.  She did not have her cobalt and chromium levels done, and I will go ahead and reorder those.  She will call back afterwards to let us know that she had them drawn, and we can go over the results.  We will make a follow-up plan based off of that, but I suspect that we can see her back in 2 years as long as there is been no significant increase in her cobalt and chromium levels.    Return for She will call back after labs have been drawn.      Donald Sprague MD  07/20/22  10:00 EDT

## 2022-07-20 NOTE — TELEPHONE ENCOUNTER
Provider: OUMAR  Caller: ROYCE  Phone Number: [phone]  Reason for Call: PT HAS HAD HER LAB WORK DONE

## 2022-07-23 PROBLEM — Z00.00 MEDICARE ANNUAL WELLNESS VISIT, SUBSEQUENT: Chronic | Status: ACTIVE | Noted: 2018-06-26

## 2022-07-24 NOTE — ASSESSMENT & PLAN NOTE
Health maintenance - COVID19 vacc done, incl 4th dose booster; flu vacc 10/21 (annually in the fall); Prevnar 6/15; PVX 3/14, Tdap 10/14 (next Td due 2024), HAV and Shingrix done; mammo 4/20/22; GYN w/ Ms. Villa, APRN 7/6/22; DEXA 9/21 per Dr. Giles, repeat 2-3 yrs (rec repeat 2023); colonosc 10/18, repeat 2023 per Dr. Thornton; eye exam done 6/22; dental exam q6 mos, pending 8/3/22; (+) seat belt use    Consultants:  Patient Care Team:  Rosa M Venegas MD as PCP - General  Rosa M Venegas MD as PCP - Internal Medicine  Arthur Thornton MD as Consulting Physician (Colon and Rectal Surgery)  Jeromy Melissa, STERLING (Optometry)  Shalini Malin MD as Consulting Physician (Gynecologic Oncology)  Ernesto Crow MD as Consulting Physician (Rheumatology)  Bree Ware MD as Consulting Physician (Dermatology)  Quincy Fuentes MD as Consulting Physician (Ophthalmology)  Amelia Gann MD as Consulting Physician (Plastic Surgery)  Judi Mae MD as Consulting Physician (Endocrinology)  Solange Alejandra APRN as Nurse Practitioner (Rheumatology)  Dillon Alcantara DPM (Podiatry)  Donald Sprague MD as Consulting Physician (Orthopedic Surgery)  Chavo Vasquez MD as Consulting Physician (Hematology)  Martha Giles MD as Consulting Physician (Gynecologic Oncology)

## 2022-07-24 NOTE — ASSESSMENT & PLAN NOTE
Calcium and vitamin D supplementation with weight-bearing exercise; DEXA 9/21 per Dr. Giles, repeat 2-3 yrs

## 2022-07-24 NOTE — PROGRESS NOTES
ANNUAL WELLNESS VISIT    DRUG AND ALCOHOL USE      no tobacco use, alcohol intake:2 glasses of wine per week and caffeine intake: 2 cups of caffeinated coffee per day    DIET AND PHYSICAL ACTIVITY     Diet: general    Exercise: frequently   Exercise Details: walking    MOOD DISORDER AND COGNITIVE SCREENING   Depression Screening Tool Used yes - see PHQ-9; components reviewed with patient; denies feeling blue, depressed, hopeless or not having pleasure doing things. Screening is NEG for active depression.    Anxiety Screening Tool Used yes     AUDIT screening 2     Mini-Cog Performed   Yes    1. Tell Patient 3 Words car apple pen    2. Administer Clock Test normal    3. Recall 3 words  car apple pen    4. Number Correct Items 3    FUNCTIONAL ABILITY AND LEVEL OF SAFETY   Hearing no hearing loss     Wears Hearing Aids No       Current Activities Independent      none  - see Funct/Cog Status Intake     Fall Risk Assessment       Has difficulty with walking or balance  No         Timed Up and Go (TUG) Test  8 sec.       If >12 sec, normal    ADVANCED DIRECTIVE  Advance Care Planning   ACP discussion was held with the patient during this visit. Patient has an advance directive in EMR which is still valid.   Advance Care Planning Discussion:  16 min or more spent on counseling; patient has advanced directive and living will.  Reviewed desires for end of life care, which is to have comfort care.  Patient does not want extraordinary life-sustaining measures, including no prolonged artificial life support. Encouraged patient to ensure family is aware of desires/preferences. Confirmed  Elvis is her healthcare surrogate. Son Lit is listed as alternate healthcare surrogate.    PAIN SCREENING Do you have pain right now? no      If so, 1-10 scale: 0     Intermittent     Do you have pain every day? No      Probable chronic pain: No     Recent Hospitalizations:  No recent hospitalization(s)..     MEDICATION REVIEW   -  "updated and reviewed (see Medication List).   - reviewed for potentially harmful drug-disease interactions in the elderly.   - reviewed for high risk medications in the elderly.   - aspirin use: No    BMI  Body mass index is 23.13 kg/m².  BMI is within normal parameters. No other follow-up for BMI required.       _________________________________________________________    Chief Complaint   Patient presents with   • Medicare Wellness-subsequent   • Impaired Fasting Glucose   • Hyperlipidemia   • Hypertension       History of Present Illness  67 y.o.  woman presents for updated wellness visit and f/u on BP, cholesterol, and sugars.    Review of Systems  Denies headaches, visual changes, CP, palpitations, SOB, cough, abd pain, n/v/d, difficulty with urination, numbness/tingling, falls, mood changes, lightheadedness, hearing changes, rashes.    Denies vaginal discharge or bleeding (no periods) or breast concerns.   All other ROS reviewed and negative.    Current Outpatient Medications:   •  abatacept (Orencia) 250 MG injection q4wks  •  amLODIPine (NORVASC) 5 MG QD  •  Biotin 10 MG QD  •  calcium citrate-vitamin d (CITRACAL) 200-250 MG-UNIT QD  •  CBD (cannabidiol) oral oil QD  •  celecoxib (CELEBREX) 200 MG QD prn  •  cyclobenzaprine (FLEXERIL) 10 MG 1/2-1 qhs prn muscle spasm  •  folic acid (FOLVITE) 1 MG 2 QD  •  methotrexate 2.5 MG 7 tabs weekly  •  simvastatin (ZOCOR) 20 MG QD  •  TURMERIC QD  •  vitamin B-6 (PYRIDOXINE) 50 MG 2 QD:   •  vitamin D (ERGOCALCIFEROL) 1.25 MG (25429 UT) weekly  •  Xiidra 5 % ophthalmic AD      VITALS:  /68   Pulse 67   Ht 165.1 cm (65\")   Wt 63 kg (139 lb)   SpO2 99%   BMI 23.13 kg/m²     Physical Exam  Vitals and nursing note reviewed.   Constitutional:       General: She is not in acute distress.     Appearance: Normal appearance. She is well-developed.   HENT:      Head: Normocephalic.      Right Ear: Ear canal and external ear normal.      Left Ear: Ear canal " and external ear normal.      Ears:      Comments: Mod cerumen bilat auditory canals; successfully removed with instrumentation with ear scoop     Nose: Nose normal.   Eyes:      Extraocular Movements: Extraocular movements intact.      Conjunctiva/sclera: Conjunctivae normal.      Pupils: Pupils are equal, round, and reactive to light.      Comments: Wearing glasses   Neck:      Vascular: No carotid bruit (bilaterally).   Cardiovascular:      Rate and Rhythm: Normal rate and regular rhythm.      Heart sounds: Normal heart sounds.   Pulmonary:      Effort: Pulmonary effort is normal. No respiratory distress.      Breath sounds: Normal breath sounds.   Abdominal:      General: Bowel sounds are normal. There is no distension.      Palpations: Abdomen is soft. There is no mass.      Tenderness: There is no abdominal tenderness.   Genitourinary:     Comments: Chaperone declined by patient.    Breast exam unremarkable without masses, skin changes, nipple discharge, or axillary adenopathy.      Musculoskeletal:      Cervical back: Normal range of motion and neck supple.   Lymphadenopathy:      Cervical: No cervical adenopathy.   Skin:     General: Skin is warm and dry.      Findings: No rash.   Neurological:      Mental Status: She is alert and oriented to person, place, and time.      Cranial Nerves: No cranial nerve deficit.      Deep Tendon Reflexes: Reflexes normal.   Psychiatric:         Mood and Affect: Mood normal.         Behavior: Behavior normal.           LABS  Results for orders placed or performed in visit on 07/15/22   Comprehensive Metabolic Panel    Specimen: Blood   Result Value Ref Range    Glucose 82 65 - 99 mg/dL    BUN 18 8 - 23 mg/dL    Creatinine 0.73 0.57 - 1.00 mg/dL    Sodium 137 136 - 145 mmol/L    Potassium 5.1 3.5 - 5.2 mmol/L    Chloride 102 98 - 107 mmol/L    CO2 25.4 22.0 - 29.0 mmol/L    Calcium 9.7 8.6 - 10.5 mg/dL    Total Protein 8.0 6.0 - 8.5 g/dL    Albumin 4.80 3.50 - 5.20 g/dL     ALT (SGPT) 21 1 - 33 U/L    AST (SGOT) 26 1 - 32 U/L    Alkaline Phosphatase 78 39 - 117 U/L    Total Bilirubin 0.4 0.0 - 1.2 mg/dL    Globulin 3.2 gm/dL    A/G Ratio 1.5 g/dL    BUN/Creatinine Ratio 24.7 7.0 - 25.0    Anion Gap 9.6 5.0 - 15.0 mmol/L    eGFR 89.2 >60.0 mL/min/1.73   Lipid Panel    Specimen: Blood   Result Value Ref Range    Total Cholesterol 155 0 - 200 mg/dL    Triglycerides 52 0 - 150 mg/dL    HDL Cholesterol 78 (H) 40 - 60 mg/dL    LDL Cholesterol  66 0 - 100 mg/dL    VLDL Cholesterol 11 5 - 40 mg/dL    LDL/HDL Ratio 0.85    TSH    Specimen: Blood   Result Value Ref Range    TSH 0.266 (L) 0.270 - 4.200 uIU/mL   Hemoglobin A1c    Specimen: Blood   Result Value Ref Range    Hemoglobin A1C 5.90 (H) 4.80 - 5.60 %   T4, Free    Specimen: Blood   Result Value Ref Range    Free T4 1.56 0.93 - 1.70 ng/dL   CK    Specimen: Blood   Result Value Ref Range    Creatine Kinase 60 20 - 180 U/L   Sedimentation Rate    Specimen: Blood   Result Value Ref Range    Sed Rate 41 (H) 0 - 30 mm/hr   C-reactive Protein    Specimen: Blood   Result Value Ref Range    C-Reactive Protein <0.30 0.00 - 0.50 mg/dL   CBC Auto Differential    Specimen: Blood   Result Value Ref Range    WBC 7.02 3.40 - 10.80 10*3/mm3    RBC 4.44 3.77 - 5.28 10*6/mm3    Hemoglobin 13.0 12.0 - 15.9 g/dL    Hematocrit 38.7 34.0 - 46.6 %    MCV 87.2 79.0 - 97.0 fL    MCH 29.3 26.6 - 33.0 pg    MCHC 33.6 31.5 - 35.7 g/dL    RDW 12.9 12.3 - 15.4 %    RDW-SD 40.4 37.0 - 54.0 fl    MPV 9.9 6.0 - 12.0 fL    Platelets 276 140 - 450 10*3/mm3    Neutrophil % 64.3 42.7 - 76.0 %    Lymphocyte % 25.1 19.6 - 45.3 %    Monocyte % 7.0 5.0 - 12.0 %    Eosinophil % 2.7 0.3 - 6.2 %    Basophil % 0.6 0.0 - 1.5 %    Immature Grans % 0.3 0.0 - 0.5 %    Neutrophils, Absolute 4.52 1.70 - 7.00 10*3/mm3    Lymphocytes, Absolute 1.76 0.70 - 3.10 10*3/mm3    Monocytes, Absolute 0.49 0.10 - 0.90 10*3/mm3    Eosinophils, Absolute 0.19 0.00 - 0.40 10*3/mm3    Basophils, Absolute  0.04 0.00 - 0.20 10*3/mm3    Immature Grans, Absolute 0.02 0.00 - 0.05 10*3/mm3    nRBC 0.0 0.0 - 0.2 /100 WBC   Urinalysis without microscopic (no culture) - Urine, Clean Catch    Specimen: Urine, Clean Catch   Result Value Ref Range    Color, UA Yellow Yellow, Straw    Appearance, UA Clear Clear    pH, UA 6.5 5.0 - 8.0    Specific Gravity, UA <=1.005 1.005 - 1.030    Glucose, UA Negative Negative    Ketones, UA Negative Negative    Bilirubin, UA Negative Negative    Blood, UA Negative Negative    Protein, UA Negative Negative    Leuk Esterase, UA Negative Negative    Nitrite, UA Negative Negative    Urobilinogen, UA 0.2 E.U./dL 0.2 - 1.0 E.U./dL   Urinalysis, Microscopic Only - Urine, Clean Catch    Specimen: Urine, Clean Catch   Result Value Ref Range    RBC, UA 0-2 None Seen, 0-2 /HPF    WBC, UA 0-2 None Seen, 0-2 /HPF    Bacteria, UA None Seen None Seen /HPF    Squamous Epithelial Cells, UA 0-2 None Seen, 0-2 /HPF    Hyaline Casts, UA None Seen None Seen /LPF    Methodology Automated Microscopy      1/22 A1C 5.4    ECG 12 Lead    Date/Time: 7/25/2022 9:30 AM  Performed by: Rosa M Venegas MD  Authorized by: Rosa M Venegas MD   Comparison: compared with previous ECG from 7/20/2021  Similar to previous ECG  Rhythm: sinus rhythm  Rate: normal  BPM: 59  Conduction: conduction normal  Conduction: non-specific intraventricular conduction delay  ST Segments: ST segments normal  T Waves: T waves normal  QRS axis: normal  Clinical impression comment: stable EKG              ASSESSMENT/PLAN    Diagnoses and all orders for this visit:    1. Medicare annual wellness visit, subsequent (Primary)  Assessment & Plan:  Health maintenance - COVID19 vacc done, incl 4th dose booster; flu vacc 10/21 (annually in the fall); Prevnar 6/15; PVX 3/14, Tdap 10/14 (next Td due 2024), HAV and Shingrix done; mammo 4/20/22; GYN w/ Ms. Villa, APRN 7/6/22; DEXA 9/21 per Dr. Giles, repeat 2-3 yrs (rec repeat 2023); colonosc 10/18, repeat 2023  per Dr. Thornton; eye exam done 6/22; dental exam q6 mos, pending 8/3/22; (+) seat belt use    Consultants:  Patient Care Team:  Rosa M Venegas MD as PCP - General  Rosa M Venegas MD as PCP - Internal Medicine  Arthur Thornton MD as Consulting Physician (Colon and Rectal Surgery)  Jeromy Melissa, STERLING (Optometry)  Shalini Malin MD as Consulting Physician (Gynecologic Oncology)  Ernesto Crow MD as Consulting Physician (Rheumatology)  Bree Ware MD as Consulting Physician (Dermatology)  Quincy Fuentes MD as Consulting Physician (Ophthalmology)  Amelia Gann MD as Consulting Physician (Plastic Surgery)  Judi Mae MD as Consulting Physician (Endocrinology)  Solange Alejandra APRN as Nurse Practitioner (Rheumatology)  Dillon Alcantara, ARMANDO (Podiatry)  Donald Sprague MD as Consulting Physician (Orthopedic Surgery)  Chavo Vasquez MD as Consulting Physician (Hematology)  Martha Giles MD as Consulting Physician (Gynecologic Oncology)          2. Hypertension  Assessment & Plan:  BP stable 128/68; cont amlo 5mg QD #90, 3RF    Orders:  -     ECG 12 Lead  -     amLODIPine (NORVASC) 5 MG tablet; Take 1 tablet by mouth Daily.  Dispense: 90 tablet; Refill: 3    3. Hyperlipidemia  Assessment & Plan:  Lipids stable with LDL 66; cont simva 20mg QD #90, 3RF    Orders:  -     simvastatin (ZOCOR) 20 MG tablet; Take 1 tablet by mouth Every Evening.  Dispense: 90 tablet; Refill: 3    4. Impaired fasting glucose  Assessment & Plan:  BG control stable with A1C 5.9; encouraged reg phys activity to decr insulin resistance, moderation in unhealthy starches/sweets; f/u A1C in 6 mos        5. Subclinical hyperthyroidism  Assessment & Plan:  Unchanged bord TFTs, c/w subclinical hypothyroidism; f/u Dr. Mae as scheduled      6. Osteopenia   Assessment & Plan:  Calcium and vitamin D supplementation with weight-bearing exercise; DEXA 9/21 per Dr. Giles, repeat 2-3 yrs         7.  Hyperkalemia  Comments:  jaydon RAMOS 5.1; rec making sure no potassium in supplements/vitamins    8. Excessive cerumen in both ear canals  Comments:  s/p successful removal w/ instrumentation with ear scoop bilat; tolerated procedure well, no complications; reviewed maint.w/ wkly rinses equal part H2O and H2O      FOLLOW-UP  RTC 6 mos with A1C    Electronically signed by:    Rosa M Venegas MD, FACP  07/25/2022

## 2022-07-25 ENCOUNTER — OFFICE VISIT (OUTPATIENT)
Dept: INTERNAL MEDICINE | Facility: CLINIC | Age: 67
End: 2022-07-25

## 2022-07-25 VITALS
SYSTOLIC BLOOD PRESSURE: 128 MMHG | WEIGHT: 139 LBS | BODY MASS INDEX: 23.16 KG/M2 | OXYGEN SATURATION: 99 % | HEART RATE: 67 BPM | HEIGHT: 65 IN | DIASTOLIC BLOOD PRESSURE: 68 MMHG

## 2022-07-25 DIAGNOSIS — I10 ESSENTIAL HYPERTENSION: Chronic | ICD-10-CM

## 2022-07-25 DIAGNOSIS — M85.89 OSTEOPENIA OF MULTIPLE SITES: Chronic | ICD-10-CM

## 2022-07-25 DIAGNOSIS — R73.01 IMPAIRED FASTING GLUCOSE: Chronic | ICD-10-CM

## 2022-07-25 DIAGNOSIS — H61.23 EXCESSIVE CERUMEN IN BOTH EAR CANALS: ICD-10-CM

## 2022-07-25 DIAGNOSIS — E78.00 PURE HYPERCHOLESTEROLEMIA: Chronic | ICD-10-CM

## 2022-07-25 DIAGNOSIS — E87.5 HYPERKALEMIA: ICD-10-CM

## 2022-07-25 DIAGNOSIS — Z00.00 MEDICARE ANNUAL WELLNESS VISIT, SUBSEQUENT: Primary | Chronic | ICD-10-CM

## 2022-07-25 DIAGNOSIS — E05.90 SUBCLINICAL HYPERTHYROIDISM: Chronic | ICD-10-CM

## 2022-07-25 PROCEDURE — 99497 ADVNCD CARE PLAN 30 MIN: CPT | Performed by: INTERNAL MEDICINE

## 2022-07-25 PROCEDURE — G0439 PPPS, SUBSEQ VISIT: HCPCS | Performed by: INTERNAL MEDICINE

## 2022-07-25 PROCEDURE — 93000 ELECTROCARDIOGRAM COMPLETE: CPT | Performed by: INTERNAL MEDICINE

## 2022-07-25 PROCEDURE — 1170F FXNL STATUS ASSESSED: CPT | Performed by: INTERNAL MEDICINE

## 2022-07-25 PROCEDURE — 1159F MED LIST DOCD IN RCRD: CPT | Performed by: INTERNAL MEDICINE

## 2022-07-25 PROCEDURE — 99214 OFFICE O/P EST MOD 30 MIN: CPT | Performed by: INTERNAL MEDICINE

## 2022-07-25 PROCEDURE — 1126F AMNT PAIN NOTED NONE PRSNT: CPT | Performed by: INTERNAL MEDICINE

## 2022-07-25 RX ORDER — AMLODIPINE BESYLATE 5 MG/1
5 TABLET ORAL DAILY
Qty: 90 TABLET | Refills: 3 | Status: SHIPPED | OUTPATIENT
Start: 2022-07-25

## 2022-07-25 RX ORDER — SIMVASTATIN 20 MG
20 TABLET ORAL EVERY EVENING
Qty: 90 TABLET | Refills: 3 | Status: SHIPPED | OUTPATIENT
Start: 2022-07-25

## 2022-07-25 NOTE — TELEPHONE ENCOUNTER
I called her PCP today to obtain lab results [facility]. The lab explained that there was an issue with the blood that they olinda. Someone was supposed to call her to have new labs. Patient was not contacted. I spoke with patient today and explained that it would be better if she would come here to the lab to have blood drawn. She is having an infusion tomorrow and will go later in the week to get new labs. She will call me once they have been completed.     Marilee

## 2022-07-26 PROBLEM — E55.9 VITAMIN D DEFICIENCY: Status: ACTIVE | Noted: 2022-07-26

## 2022-08-03 ENCOUNTER — LAB (OUTPATIENT)
Dept: LAB | Facility: HOSPITAL | Age: 67
End: 2022-08-03

## 2022-08-03 DIAGNOSIS — Z96.643 S/P BILATERAL HIP REPLACEMENTS: ICD-10-CM

## 2022-08-03 PROCEDURE — 82495 ASSAY OF CHROMIUM: CPT

## 2022-08-03 PROCEDURE — 83018 HEAVY METAL QUAN EACH NES: CPT

## 2022-08-03 PROCEDURE — 36415 COLL VENOUS BLD VENIPUNCTURE: CPT

## 2022-08-05 LAB — COBALT SERPL-MCNC: 2 UG/L (ref 0–0.9)

## 2022-08-10 LAB — CR SERPL-MCNC: 3.9 UG/L (ref 0.1–2.1)

## 2022-08-29 ENCOUNTER — OFFICE VISIT (OUTPATIENT)
Dept: INTERNAL MEDICINE | Facility: CLINIC | Age: 67
End: 2022-08-29

## 2022-08-29 ENCOUNTER — PATIENT MESSAGE (OUTPATIENT)
Dept: INTERNAL MEDICINE | Facility: CLINIC | Age: 67
End: 2022-08-29

## 2022-08-29 VITALS
BODY MASS INDEX: 23.13 KG/M2 | HEIGHT: 65 IN | HEART RATE: 63 BPM | DIASTOLIC BLOOD PRESSURE: 78 MMHG | SYSTOLIC BLOOD PRESSURE: 144 MMHG | OXYGEN SATURATION: 98 %

## 2022-08-29 DIAGNOSIS — I10 ESSENTIAL HYPERTENSION: Chronic | ICD-10-CM

## 2022-08-29 DIAGNOSIS — J02.9 SORE THROAT: ICD-10-CM

## 2022-08-29 DIAGNOSIS — R09.89 TONSIL SYMPTOM: ICD-10-CM

## 2022-08-29 DIAGNOSIS — K13.70 ORAL MUCOSAL LESION: ICD-10-CM

## 2022-08-29 DIAGNOSIS — J35.8 TONSIL ULCER: Primary | ICD-10-CM

## 2022-08-29 DIAGNOSIS — K12.0 APHTHOUS ULCER: Primary | ICD-10-CM

## 2022-08-29 LAB
EXPIRATION DATE: NORMAL
EXPIRATION DATE: NORMAL
FLUAV AG UPPER RESP QL IA.RAPID: NOT DETECTED
FLUBV AG UPPER RESP QL IA.RAPID: NOT DETECTED
INTERNAL CONTROL: NORMAL
INTERNAL CONTROL: NORMAL
Lab: NORMAL
Lab: NORMAL
S PYO AG THROAT QL: NEGATIVE
SARS-COV-2 AG UPPER RESP QL IA.RAPID: NOT DETECTED

## 2022-08-29 PROCEDURE — 87880 STREP A ASSAY W/OPTIC: CPT | Performed by: INTERNAL MEDICINE

## 2022-08-29 PROCEDURE — 99214 OFFICE O/P EST MOD 30 MIN: CPT | Performed by: INTERNAL MEDICINE

## 2022-08-29 PROCEDURE — 87428 SARSCOV & INF VIR A&B AG IA: CPT | Performed by: INTERNAL MEDICINE

## 2022-08-29 RX ORDER — MELATONIN
1000 DAILY
COMMUNITY

## 2022-08-29 NOTE — TELEPHONE ENCOUNTER
From: Carli Bolanos  To: Rosa M Venegas MD  Sent: 8/29/2022 12:40 PM EDT  Subject: Throat Biopsy    The oral surgeon can't get me in until October. Please let me know what I need to do from here.    Thank you,  Carli Bolanos

## 2022-08-29 NOTE — PROGRESS NOTES
"Chief Complaint   Patient presents with   • Mouth Lesions   • Sore Throat       History of Present Illness  67 y.o.  woman presents for further eval of sores in the mouth. HPI started 1 week ago with progressively increased number of sores in her mouth, including now on her right tonsil, right lower gums, and left side of tongue. Has gotten 1 sore here or there in the past, but not this many. Has been using chlorhexadine from the dentist; also using H2O2 gargles. Also using Oragel. Reports some soreness of the tongue lesion but not too much pain with swallowing holger no significant at the right tonsil area. Has had some swollen lymph nodes.    Also started Orencia in June, most recently 3 weeks ago (8/9/22), and scheduled to go monthly. Also remains on MTX and rheum increased folic acid.    Not checking BPs at home.    Review of Systems  ROS (+) for mouth sores and lesions a noted. Notes some pain but not significant with swallowing. Denies fevers.  All other ROS reviewed and negative.    Current Outpatient Medications:   •  abatacept (Orencia) 250 MG inj q4 wks  •  amLODIPine (NORVASC) 5 MG QD  •  Biotin 10 MG QD  •  calcium citrate-vitamin d (CITRACAL) 200-250 MG-UNIT QD  •  CBD (cannabidiol) oral oil QD  •  celecoxib (CELEBREX) 200 MG QD prn  •  cyclobenzaprine (FLEXERIL) 10 MG tablet, 1/2-1 qhs prn   •  folic acid (FOLVITE) 1 MG 2 QD  •  methotrexate 2.5 MG 20mg weekly  •  simvastatin (ZOCOR) 20 MG QD  •  TURMERIC QD  •  vitamin B-6 (PYRIDOXINE) 50 MG 2 QD  •  vitamin D3 25mcg 1000 units QD  •  Xiidra 5 % ophthalmic AD    VITALS:  /78 (BP Location: Left arm, Patient Position: Sitting)   Pulse 63   Ht 165.1 cm (65\")   SpO2 98%   BMI 23.13 kg/m²     Physical Exam  Vitals and nursing note reviewed.   Constitutional:       General: She is not in acute distress.     Appearance: Normal appearance. She is not ill-appearing.   HENT:      Mouth/Throat:      Comments: Lacy irreg bord white-gray patch " about dime-sized diameter right tonsil; 1cm linear thin white exudative area at gumline on right; 3mm white-based ulcer lateral mid-left side of tongue  Eyes:      Extraocular Movements: Extraocular movements intact.      Conjunctiva/sclera: Conjunctivae normal.      Comments: Wearing glasses   Neck:      Comments: Right submental lymphadenopathy, mildly tender, 1cm  Pulmonary:      Effort: Pulmonary effort is normal. No respiratory distress.   Lymphadenopathy:      Cervical: Cervical adenopathy (shotty cervical and submental LAD) present.   Neurological:      Mental Status: She is alert and oriented to person, place, and time. Mental status is at baseline.   Psychiatric:         Mood and Affect: Mood normal.         Behavior: Behavior normal.         LABS  Results for orders placed or performed in visit on 08/29/22   POCT SARS-CoV-2 Antigen LUCY + Flu    Specimen: Swab   Result Value Ref Range    SARS Antigen Not Detected Not Detected, Presumptive Negative    Influenza A Antigen LUCY Not Detected Not Detected    Influenza B Antigen LUCY Not Detected Not Detected    Internal Control Passed Passed    Lot Number [id number]     Expiration Date 01/02/23    POCT rapid strep A    Specimen: Swab   Result Value Ref Range    Rapid Strep A Screen Negative Negative, VALID, INVALID, Not Performed    Internal Control Passed Passed    Lot Number [id number]     Expiration Date 01/16/2023          ASSESSMENT/PLAN    Diagnoses and all orders for this visit:    1. Aphthous ulcer (Primary)  Comments:  left tongue; using oragel    2. Tonsil symptom  Comments:  tonsillar white patch and linear at right gum; note immunocompromised state with medsrec bx and further eval w/ oral surgeon or ENT - she will call oral surgeon    3. Sore throat  Comments:  attributed to mouth sores and shotty lymphadenopathy; neg for strep and COVID today  Orders:  -     POCT SARS-CoV-2 Antigen LUCY + Flu  -     POCT rapid strep A    4. Hypertension  Assessment &  Plan:  BP elevated, due to mouth pain/sores per patient; improved but still elevated on repeat; rec starting home monitoring with goal < 130/80; cont amlo 5mg QD        FOLLOW-UP  1. Health maintenance - COVID19 vacc done, incl 4th dose booster; rec flu vacc in the fall and new COVID19 vacc when available  2. RTC prn; has f/u 1/4/23 with A1C    Electronically signed by:    Rosa M Venegas MD, FACP  08/29/2022

## 2022-08-29 NOTE — ASSESSMENT & PLAN NOTE
BP elevated, due to mouth pain/sores per patient; improved but still elevated on repeat; rec starting home monitoring with goal < 130/80; cont amlo 5mg QD

## 2022-09-01 PROBLEM — K12.30 ORAL MUCOSITIS: Status: ACTIVE | Noted: 2022-09-01

## 2022-11-02 ENCOUNTER — OFFICE VISIT (OUTPATIENT)
Dept: INTERNAL MEDICINE | Facility: CLINIC | Age: 67
End: 2022-11-02

## 2022-11-02 VITALS
WEIGHT: 140 LBS | DIASTOLIC BLOOD PRESSURE: 84 MMHG | BODY MASS INDEX: 23.32 KG/M2 | HEIGHT: 65 IN | OXYGEN SATURATION: 98 % | HEART RATE: 76 BPM | SYSTOLIC BLOOD PRESSURE: 164 MMHG

## 2022-11-02 DIAGNOSIS — J02.9 SORE THROAT: ICD-10-CM

## 2022-11-02 DIAGNOSIS — I10 ESSENTIAL HYPERTENSION: Chronic | ICD-10-CM

## 2022-11-02 DIAGNOSIS — M53.3 SACROILIAC JOINT DYSFUNCTION OF RIGHT SIDE: Primary | ICD-10-CM

## 2022-11-02 PROCEDURE — 99214 OFFICE O/P EST MOD 30 MIN: CPT | Performed by: INTERNAL MEDICINE

## 2022-11-02 NOTE — PROGRESS NOTES
"Chief Complaint   Patient presents with   • Hip Pain       History of Present Illness  67 y.o.  woman presents for further eval of right hip/back pain. HPI started on/off 6 months, worsened over the last 2 mos. Reports right low back pain, worsened when getting up after sitting and worsened if walks too long. Denies radiation into the leg but is worried about sciatic. Denies right leg numbness/tingling. Has had right hip replacement; xrays per Dr. Sprague were normal. Has prn celebrex per rheum.    Reports recurrent white spot at the right throat. Reports seeing ENT and then white spot went away; states ENT thought it was because of her MTX. Reports white spot came back a few wks ago, assoc'd with mild soreness. Has been using leftover Magic mouthwash given to her by Dr. Crow; also using salt water gargles.    Review of Systems  ROS (+) for right low back pain as noted. All other ROS reviewed and negative.    Current Outpatient Medications:   •  abatacept (Orencia) 250 MG IV q 4wks  •  amLODIPine (NORVASC) 5 MG QD  •  Biotin 10 MG qD  •  calcium citrate-vitamin d (CITRACAL) 200-250 MG-UNIT QD  •  CBD (cannabidiol) oral oil QD  •  celecoxib (CELEBREX) 200 MG QD prn  •  cholecalciferol (VITAMIN D3) 25 MCG (1000 UT) QD  •  cyclobenzaprine (FLEXERIL) 10 MG tablet, 1/2-1 qhs prn muscle spasm  •  folic acid (FOLVITE) 1 MG 4 QD  •  methotrexate 2.5 MG #8 weekly  •  simvastatin (ZOCOR) 20 MG QD  •  TURMERIC QD  •  vitamin B-6 (PYRIDOXINE) 50 MG QD  •  Xiidra 5 % ophthalmic solution AD    VITALS:  /84   Pulse 76   Ht 165.1 cm (65\")   Wt 63.5 kg (140 lb)   SpO2 98%   BMI 23.30 kg/m²   Repeat BP left arm 162/70    Physical Exam  Vitals and nursing note reviewed.   Constitutional:       General: She is not in acute distress.     Appearance: Normal appearance. She is not ill-appearing.   HENT:      Mouth/Throat:      Pharynx: Posterior oropharyngeal erythema (erythema at right pharyngeal arch; inferior right " tonsil with opaque lacy lesion, no surr erythema, no exudate) present.   Eyes:      Extraocular Movements: Extraocular movements intact.      Conjunctiva/sclera: Conjunctivae normal.   Pulmonary:      Effort: Pulmonary effort is normal. No respiratory distress.   Musculoskeletal:         General: Tenderness (tend with palpation of the right SI joint area; no point tend at the lumbar vertebrae; trunk FROM) present.      Right lower leg: No edema.      Left lower leg: No edema.      Comments: bilat hips FROM; no tend with palpation of the greater trochanters bilaterally; neg straight leg test bilaterally   Lymphadenopathy:      Cervical: No cervical adenopathy.   Neurological:      Mental Status: She is alert and oriented to person, place, and time. Mental status is at baseline.      Gait: Gait normal.   Psychiatric:         Mood and Affect: Mood normal.         Behavior: Behavior normal.         LABS  Results for orders placed or performed in visit on 08/29/22   POCT SARS-CoV-2 Antigen LUCY + Flu    Specimen: Swab   Result Value Ref Range    SARS Antigen Not Detected Not Detected, Presumptive Negative    Influenza A Antigen LUCY Not Detected Not Detected    Influenza B Antigen LUCY Not Detected Not Detected    Internal Control Passed Passed    Lot Number [id number]     Expiration Date 01/02/23    POCT rapid strep A    Specimen: Swab   Result Value Ref Range    Rapid Strep A Screen Negative Negative, VALID, INVALID, Not Performed    Internal Control Passed Passed    Lot Number [id number]     Expiration Date 01/16/2023      9/15/22 A1C 5.9    ASSESSMENT/PLAN    Diagnoses and all orders for this visit:    1. Sacroiliac joint dysfunction of right side (Primary)  Comments:  right LBP c/w SI dysfxn; rec not sitting in same position for prolonged period of time; ok for prn celebrex; agree with PT, appt pending 11/11/22 ([facility])  Orders:  -     Ambulatory Referral to Physical Therapy Evaluate and treat (and HEP);  Electrotherapy (all modalities indicated), Heat    2. Hypertension  Assessment & Plan:  BP elevated, persistent upon repeat; notes stress with coming here and LBP; cont amlo 5mg QD and start regular home BP monitoring 2-3x/week with goal < 130/80; f/u if consistently > 140/90; o/w will f/u in 1/2023 as scheduled      3. Sore throat  Comments:  w/ right tonsillar lesion; await f/u consultation w/ Dr. Orozco; ok to magic mouthwash and salt water gargles; note she is immunosupressed w/ MTX and orencia      FOLLOW-UP  1. Health maintenance - COVID19 vacc done, incl 4th dose booster and new COVID19 vacc; flu vacc done 9/22  2.  Has PT appt at [facility] pending 11/11/22  3. Has ENT appt pending 11/17/22  4. RTC as scheduled 1/4/23 with A1C and BP check    Electronically signed by:    Rosa M Venegas MD, FACP  11/02/2022

## 2022-11-02 NOTE — ASSESSMENT & PLAN NOTE
BP elevated, persistent upon repeat; notes stress with coming here and LBP; cont amlo 5mg QD and start regular home BP monitoring 2-3x/week with goal < 130/80; f/u if consistently > 140/90; o/w will f/u in 1/2023 as scheduled

## 2022-11-09 ENCOUNTER — TREATMENT (OUTPATIENT)
Dept: PHYSICAL THERAPY | Facility: CLINIC | Age: 67
End: 2022-11-09

## 2022-11-09 DIAGNOSIS — M54.50 LOW BACK PAIN, UNSPECIFIED BACK PAIN LATERALITY, UNSPECIFIED CHRONICITY, UNSPECIFIED WHETHER SCIATICA PRESENT: Primary | ICD-10-CM

## 2022-11-09 PROCEDURE — 97110 THERAPEUTIC EXERCISES: CPT | Performed by: PHYSICAL THERAPIST

## 2022-11-09 PROCEDURE — 97140 MANUAL THERAPY 1/> REGIONS: CPT | Performed by: PHYSICAL THERAPIST

## 2022-11-09 PROCEDURE — 97162 PT EVAL MOD COMPLEX 30 MIN: CPT | Performed by: PHYSICAL THERAPIST

## 2022-11-09 NOTE — PROGRESS NOTES
Physical Therapy Initial Evaluation and Plan of Care    [facility]    [address] [facility], [address] [place] [address]    Patient: Carli Bolanos   : 1955  Diagnosis/ICD-10 Code:  Low back pain, unspecified back pain laterality, unspecified chronicity, unspecified whether sciatica present [M54.50]  Referring practitioner: Self Referring  Date of Initial Visit: 2022  Today's Date: 2022  Patient seen for 1 session         Visit Diagnoses:    ICD-10-CM ICD-9-CM   1. Low back pain, unspecified back pain laterality, unspecified chronicity, unspecified whether sciatica present  M54.50 724.2           Subjective Evaluation    History of Present Illness  Mechanism of injury: Pt states that she has been having pain on the right side of the low back/right hip. She had been using a cane at home because of the pain, but over the past week she feels like she is doing a little better. The pain is usually worse in the morning and seems to ease up throughout the day. Her pain is also worse when she leans forward and then arches her back. She also notices pain when she is getting up from sitting as well. She reports intermittent pain in the front of the hip when walking.     Symptoms have been off and on for about 6 months and progressively got worse, but has now started to improve.     Pt has had both hips replaced. Right partial in  and left       Patient Occupation: NA Quality of life: good    Pain  Current pain ratin  At best pain ratin  At worst pain ratin  Location: right side of the low back and right posterior hip  Quality: dull ache and pressure  Relieving factors: rest, change in position and heat  Aggravating factors: prolonged positioning  Progression: improved    Diagnostic Tests  X-ray: normal    Treatments  No previous or current treatments  Patient Goals  Patient goals for therapy: decreased pain, increased motion, increased strength and return to sport/leisure  activities             Objective          Palpation     Additional Palpation Details  TTP noted at the right SI joint with hypertonicity and tenderness noted at the right piriformis and glut med.     Active Range of Motion     Lumbar   Normal active range of motion    Passive Range of Motion   Left Hip   Flexion: 62 degrees   Abduction: 32 degrees   External rotation (90/90): 42 degrees   Internal rotation (90/90): 25 degrees     Right Hip   Flexion: 69 degrees   Abduction: 35 degrees   External rotation (90/90): 49 degrees   Internal rotation (90/90): 20 degrees     Strength/Myotome Testing     Left Hip   Planes of Motion   Extension: 4  Abduction: 4    Right Hip   Planes of Motion   Extension: 4-  Abduction: 4-    Tests     Lumbar     Right   Negative passive SLR.     Right Pelvic Girdle/Sacrum   Positive: sacral spring.     Additional Tests Details  Supine leg length test + for anterior rotation right innominate           Assessment & Plan     Assessment  Impairments: abnormal muscle tone, abnormal or restricted ROM, activity intolerance, impaired physical strength, lacks appropriate home exercise program and pain with function  Functional Limitations: carrying objects, lifting, pulling, pushing, uncomfortable because of pain, sitting and unable to perform repetitive tasks  Assessment details: Patient is a 67 year old female who comes to physical therapy with c.o pain in the low back and right hip. Signs and symptoms are consistent with general lumbopelvic instability with possible SI joint dysfunction resulting in pain, decreased ROM, decreased strength, and inability to perform all essential functional activities. Pt will benefit from skilled PT services to address the above issues.     Prognosis details:   SHORT TERM GOALS:     2 weeks  1. Pt independent with HEP  2. Pt to demonstrate trunk AROM 50-75% of expected norms to allow for improved ability to perform ADL's  3. Pt to demonstrate bilateral hip strength  4/5 in all planes to improved stability of the core/trunk     LONG TERM GOALS:   6 weeks  1. Pt to demonstrate trunk AROM % of expected norms to allow for improved ability to perform functional activities  2. Pt to demonstrate ability to perform full functional squat with good form and without increased pain in the low back   3. Pt to report being able to work full shift or work in the home without increase in pain in the back      Plan  Therapy options: will be seen for skilled therapy services  Planned modality interventions: cryotherapy, high voltage pulsed current (pain management), iontophoresis, microcurrent electrical stimulation, TENS, thermotherapy (hydrocollator packs), ultrasound and traction  Planned therapy interventions: ADL retraining, body mechanics training, flexibility, functional ROM exercises, home exercise program, IADL retraining, joint mobilization, manual therapy, motor coordination training, neuromuscular re-education, postural training, soft tissue mobilization, spinal/joint mobilization, strengthening, stretching and abdominal trunk stabilization  Frequency: 2x week  Duration in weeks: 12  Treatment plan discussed with: patient        Timed:         Manual Therapy:    10     mins  33462;     Therapeutic Exercise:    18     mins  77738;     Neuromuscular Sharonda:        mins  33131;    Therapeutic Activity:          mins  62861;     Gait Training:           mins  78474;     Ultrasound:          mins  10962;    Ionto                                   mins   02507  Self Care                            mins   58431  Canalith Repos         mins 71709      Un-Timed:  Electrical Stimulation:         mins  23160 ( );  Dry Needling          mins self-pay  Traction          mins 58779  Low Eval          Mins  86527  Mod Eval     28     Mins  81358  High Eval                            Mins  50056        Timed Treatment:   28   mins   Total Treatment:     56   mins          PT: Robert  MARYSE Kaiser, DPT, OCS, Cert. DN   License Number: [id number]  Electronically signed by Robert Kaiser PT, 11/09/22, 11:08 AM EST    Certification Period: 11/9/2022 thru 2/6/2023  I certify that the therapy services are furnished while this patient is under my care.  The services outlined above are required by this patient, and will be reviewed every 90 days.         Physician Signature:__________________________________________________    PHYSICIAN: Referring, Self  NPI: [id number]                                      DATE:      Please sign and return via fax to [email or web address] . Thank you, [facility].

## 2022-11-16 ENCOUNTER — TREATMENT (OUTPATIENT)
Dept: PHYSICAL THERAPY | Facility: CLINIC | Age: 67
End: 2022-11-16

## 2022-11-16 DIAGNOSIS — M54.50 LOW BACK PAIN, UNSPECIFIED BACK PAIN LATERALITY, UNSPECIFIED CHRONICITY, UNSPECIFIED WHETHER SCIATICA PRESENT: Primary | ICD-10-CM

## 2022-11-16 PROCEDURE — 97112 NEUROMUSCULAR REEDUCATION: CPT | Performed by: PHYSICAL THERAPIST

## 2022-11-16 PROCEDURE — 97140 MANUAL THERAPY 1/> REGIONS: CPT | Performed by: PHYSICAL THERAPIST

## 2022-11-16 PROCEDURE — 97110 THERAPEUTIC EXERCISES: CPT | Performed by: PHYSICAL THERAPIST

## 2022-11-21 NOTE — PROGRESS NOTES
Physical Therapy Daily Treatment Note    [facility]   [address], [address] [place] [address]      Patient: Carli Bolanos   : 1955  Referring practitioner: Self Referring  Date of Initial Visit: Type: THERAPY  Noted: 2022  Today's Date: 2022  Patient seen for 2 sessions    Certification Period 2022 thru 2023       Visit Diagnoses:    ICD-10-CM ICD-9-CM   1. Low back pain, unspecified back pain laterality, unspecified chronicity, unspecified whether sciatica present  M54.50 724.2       Subjective     Pt states that she is feeling some slight decrease in pain after the initial visit and with doing the exercises at home.     Objective   See Exercise, Manual, and Modality Logs for complete treatment.       Assessment/Plan     Pt is progressing well and she demonstrates an improvement in her tolerance to exercise in the clinic. She has responded well to initial treatment.       Timed:         Manual Therapy:    12     mins  75047;     Therapeutic Exercise:    14     mins  74633;     Neuromuscular Sharonda:    30    mins  71079;    Therapeutic Activity:          mins  82764;     Gait Training:           mins  68857;     Ultrasound:          mins  34726;    Ionto                                   mins   21086  Self Care                            mins   53742  Canalith Repos         mins 58418  Electrical Stimulation:         mins  92987    Un-Timed:  Electrical Stimulation:         mins  48452 ( );  Dry Needling          mins self-pay  Traction          mins 99158      Timed Treatment:   56   mins   Total Treatment:     56   mins    Robert Kaiser, PT, DPT, OCS, Cert. DN  [place] License: [id number]

## 2022-11-22 PROBLEM — H16.003: Status: ACTIVE | Noted: 2022-11-17

## 2022-12-01 ENCOUNTER — TREATMENT (OUTPATIENT)
Dept: PHYSICAL THERAPY | Facility: CLINIC | Age: 67
End: 2022-12-01

## 2022-12-01 DIAGNOSIS — M54.50 LOW BACK PAIN, UNSPECIFIED BACK PAIN LATERALITY, UNSPECIFIED CHRONICITY, UNSPECIFIED WHETHER SCIATICA PRESENT: Primary | ICD-10-CM

## 2022-12-01 PROCEDURE — 97140 MANUAL THERAPY 1/> REGIONS: CPT | Performed by: PHYSICAL THERAPIST

## 2022-12-01 PROCEDURE — 97112 NEUROMUSCULAR REEDUCATION: CPT | Performed by: PHYSICAL THERAPIST

## 2022-12-01 PROCEDURE — 97110 THERAPEUTIC EXERCISES: CPT | Performed by: PHYSICAL THERAPIST

## 2022-12-07 ENCOUNTER — TREATMENT (OUTPATIENT)
Dept: PHYSICAL THERAPY | Facility: CLINIC | Age: 67
End: 2022-12-07

## 2022-12-07 DIAGNOSIS — M54.50 LOW BACK PAIN, UNSPECIFIED BACK PAIN LATERALITY, UNSPECIFIED CHRONICITY, UNSPECIFIED WHETHER SCIATICA PRESENT: Primary | ICD-10-CM

## 2022-12-07 PROCEDURE — 97112 NEUROMUSCULAR REEDUCATION: CPT | Performed by: PHYSICAL THERAPIST

## 2022-12-07 PROCEDURE — 97140 MANUAL THERAPY 1/> REGIONS: CPT | Performed by: PHYSICAL THERAPIST

## 2022-12-07 PROCEDURE — 97110 THERAPEUTIC EXERCISES: CPT | Performed by: PHYSICAL THERAPIST

## 2022-12-07 NOTE — PROGRESS NOTES
Physical Therapy Daily Treatment Note    [facility]   [address], [address] [place] [address]      Patient: Carli Bolanos   : 1955  Referring practitioner: Self Referring  Date of Initial Visit: Type: THERAPY  Noted: 2022  Today's Date: 2022  Patient seen for 3 sessions    Certification Period 2022 thru 3/5/2023       Visit Diagnoses:    ICD-10-CM ICD-9-CM   1. Low back pain, unspecified back pain laterality, unspecified chronicity, unspecified whether sciatica present  M54.50 724.2       Subjective     Pt states that she feels like [facility] and she feels that she is able to walk without use of an assitvie device and she feels more confident.     Objective   See Exercise, Manual, and Modality Logs for complete treatment.       Assessment/Plan     Pt able to tolerate more exercise in the clinic. Slight anterior rotation noted in the right innominate and a decrease in pain noted after manual therapy.       Timed:         Manual Therapy:    12     mins  55900;     Therapeutic Exercise:    18     mins  18625;     Neuromuscular Sharonda:    24    mins  93393;    Therapeutic Activity:          mins  81332;     Gait Training:           mins  13804;     Ultrasound:          mins  67471;    Ionto                                   mins   75478  Self Care                            mins   16153  Canalith Repos         mins 14426  Electrical Stimulation:         mins  69232    Un-Timed:  Electrical Stimulation:         mins  29654 ( );  Dry Needling          mins self-pay  Traction          mins 48493      Timed Treatment:   54   mins   Total Treatment:     54   mins    Robert Kaiser, PT, DPT, OCS, Cert. DN  [place] License: [id number]

## 2022-12-12 NOTE — PROGRESS NOTES
Physical Therapy Daily Treatment Note    [facility]   [address] [place] [address]      Patient: Carli Bolanos   : 1955  Referring practitioner: Self Referring  Date of Initial Visit: Type: THERAPY  Noted: 2022  Today's Date: 2022  Patient seen for 4 sessions    Certification Period 2022 thru 3/11/2023       Visit Diagnoses:    ICD-10-CM ICD-9-CM   1. Low back pain, unspecified back pain laterality, unspecified chronicity, unspecified whether sciatica present  M54.50 724.2       Subjective     Pt states that she is feeling some improvement since beginning therapy and she has been able to walk more without increasing pain in her low back    Objective   See Exercise, Manual, and Modality Logs for complete treatment.       Assessment/Plan     Pt is progressing well and she was able to perform more exercise in the clinic without exacerbation of symptoms. Will cont to progress as indicated.       Timed:         Manual Therapy:    12     mins  64729;     Therapeutic Exercise:    15     mins  46091;     Neuromuscular Sharonda:    28    mins  51475;    Therapeutic Activity:          mins  86159;     Gait Training:           mins  48553;     Ultrasound:          mins  06707;    Ionto                                   mins   28675  Self Care                            mins   77334  Canalith Repos         mins 39186  Electrical Stimulation:         mins  83821    Un-Timed:  Electrical Stimulation:         mins  85259 ( );  Dry Needling          mins self-pay  Traction          mins 92914      Timed Treatment:   55   mins   Total Treatment:     55   mins    Robert Kaiser, PT, DPT, OCS, Cert. DN  [place] License: [id number]

## 2022-12-16 ENCOUNTER — TREATMENT (OUTPATIENT)
Dept: PHYSICAL THERAPY | Facility: CLINIC | Age: 67
End: 2022-12-16

## 2022-12-16 DIAGNOSIS — M05.79 RHEUMATOID ARTHRITIS INVOLVING MULTIPLE SITES WITH POSITIVE RHEUMATOID FACTOR: ICD-10-CM

## 2022-12-16 DIAGNOSIS — H16.003 PERIPHERAL ULCERATIVE KERATITIS OF BOTH EYES: ICD-10-CM

## 2022-12-16 DIAGNOSIS — K12.30 ORAL MUCOSITIS: Primary | ICD-10-CM

## 2022-12-16 DIAGNOSIS — M54.50 LOW BACK PAIN, UNSPECIFIED BACK PAIN LATERALITY, UNSPECIFIED CHRONICITY, UNSPECIFIED WHETHER SCIATICA PRESENT: Primary | ICD-10-CM

## 2022-12-16 PROCEDURE — 97110 THERAPEUTIC EXERCISES: CPT | Performed by: PHYSICAL THERAPIST

## 2022-12-16 PROCEDURE — 97140 MANUAL THERAPY 1/> REGIONS: CPT | Performed by: PHYSICAL THERAPIST

## 2022-12-16 PROCEDURE — 97112 NEUROMUSCULAR REEDUCATION: CPT | Performed by: PHYSICAL THERAPIST

## 2022-12-16 RX ORDER — INFLIXIMAB 100 MG/10ML
INJECTION, POWDER, LYOPHILIZED, FOR SOLUTION INTRAVENOUS ONCE
COMMUNITY
Start: 2022-11-22

## 2022-12-16 RX ORDER — FOLIC ACID 1 MG/1
4 TABLET ORAL DAILY
Start: 2022-12-16

## 2022-12-16 RX ORDER — LEUCOVORIN CALCIUM 10 MG/1
TABLET ORAL WEEKLY
COMMUNITY

## 2022-12-22 NOTE — PROGRESS NOTES
Physical Therapy Daily Treatment Note    [facility]   [address] [facility], [address] [place] [address]      Patient: Carli Bolanos   : 1955  Referring practitioner: Self Referring  Date of Initial Visit: Type: THERAPY  Noted: 2022  Today's Date: 2022  Patient seen for 5 sessions    Certification Period 2022 thru 3/21/2023       Visit Diagnoses:    ICD-10-CM ICD-9-CM   1. Low back pain, unspecified back pain laterality, unspecified chronicity, unspecified whether sciatica present  M54.50 724.2       Subjective     Pt states that she is still feeling better since beginning therapy, although she has had some return of mild pain in the left hip today. She feels that the exercise and stretching at home has been helpful    Objective   See Exercise, Manual, and Modality Logs for complete treatment.       Assessment/Plan     Pt is progressing well and she has had a positive response to treatment so far. Will cont to progress as indicated.       Timed:         Manual Therapy:    10     mins  82071;     Therapeutic Exercise:    18     mins  53943;     Neuromuscular Sharonda:    28    mins  88268;    Therapeutic Activity:          mins  96266;     Gait Training:           mins  86076;     Ultrasound:          mins  45444;    Ionto                                   mins   96953  Self Care                            mins   76080  Canalith Repos         mins 26262  Electrical Stimulation:         mins  54208    Un-Timed:  Electrical Stimulation:         mins  48394 ( );  Dry Needling          mins self-pay  Traction          mins 39156      Timed Treatment:   56   mins   Total Treatment:     56   mins    Robert Kaiser, PT, DPT, OCS, Cert. DN  [place] License: [id number]

## 2022-12-29 ENCOUNTER — TREATMENT (OUTPATIENT)
Dept: PHYSICAL THERAPY | Facility: CLINIC | Age: 67
End: 2022-12-29

## 2022-12-29 DIAGNOSIS — M54.50 LOW BACK PAIN, UNSPECIFIED BACK PAIN LATERALITY, UNSPECIFIED CHRONICITY, UNSPECIFIED WHETHER SCIATICA PRESENT: Primary | ICD-10-CM

## 2022-12-29 PROCEDURE — 97140 MANUAL THERAPY 1/> REGIONS: CPT | Performed by: PHYSICAL THERAPIST

## 2022-12-29 PROCEDURE — 97110 THERAPEUTIC EXERCISES: CPT | Performed by: PHYSICAL THERAPIST

## 2022-12-29 PROCEDURE — 97112 NEUROMUSCULAR REEDUCATION: CPT | Performed by: PHYSICAL THERAPIST

## 2022-12-29 NOTE — PROGRESS NOTES
Physical Therapy Daily Treatment Note    [facility]   [address] [place] [address]      Patient: Carli Bolanos   : 1955  Referring practitioner: Self Referring  Date of Initial Visit: Type: THERAPY  Noted: 2022  Today's Date: 2022  Patient seen for 6 sessions    Certification Period 2022 thru 3/28/2023       Visit Diagnoses:    ICD-10-CM ICD-9-CM   1. Low back pain, unspecified back pain laterality, unspecified chronicity, unspecified whether sciatica present  M54.50 724.2       Subjective     Pt states that she feels like she is doing well and she has had less pain since beginning therapy. She has a little tightness on the right side of the low back and hip today.     Objective   See Exercise, Manual, and Modality Logs for complete treatment.       Assessment/Plan     Pt initiated exercise in the clinic prior to manual therapy and had some soreness and tightness. After manual treatment she was able to perform remaining exercises without pain.       Timed:         Manual Therapy:    10     mins  83430;     Therapeutic Exercise:    13     mins  47041;     Neuromuscular Sharonda:    30    mins  48679;    Therapeutic Activity:          mins  61459;     Gait Training:           mins  23316;     Ultrasound:          mins  44017;    Ionto                                   mins   08865  Self Care                            mins   31061  Canalith Repos         mins 60875  Electrical Stimulation:         mins  69979    Un-Timed:  Electrical Stimulation:         mins  73273 ( );  Dry Needling          mins self-pay  Traction          mins 03315      Timed Treatment:   53   mins   Total Treatment:     53   mins    Robert Kaiser, PT, DPT, OCS, Cert. DN  [place] License: [id number]

## 2023-01-03 NOTE — PROGRESS NOTES
Chief Complaint   Patient presents with   • subclinical hyperthyroidism     Follow-up         HPI:   Carli Bolanos is a 67 y.o.female who returns to Endocrine Clinic for f/u evaluation of her subclinical hyperthyroidism and multinodular goiter. Last visit 01/05/2022. Her history is as follows:    Interim Events:    - Pt has had RA flair. Also with corneal ulceration that began in October 2022. Is being treated with high dose prednisone. Started at 40 mg prednisone and is now dose to 20 mg daily. Pt's rheumatologist started fosamax.    - Is not on biotin supplement.    1) subclinical hyperthyroidism:  - per chart review, pt has had a slightly decreased TSH with normal free T4 and T3 levels since 2014. Her TSH has not been suppressed to <0.01. (TSH levels ranging 0.214 - 0.525)  - on further review, pt denies palpitations. Wt stable. No changes in bowel habits. No tremor  - is not on a biotin supplement     2) multinodular goiter:  - first found on physical exam in 2014. Pt has had Thyroid US's completed at  radiology in 04/2014, 06/2015, and 07/2017  - no prior FNA  FMH: no known thyroid cancer, mother had thyroid disease  PMH: no h/o irradiation of head, neck, or chest    Summary of imaging with  radiology:  (04/2014): R mid - 1.9 x 2.7, R inf - 1.5 x 1.9, L mid 1.7 x 2.5 complex nodules  (06/2015): Report states stable nodules. Size and appearance of nodules are not described.  (07/2017): R sup- 2.1 x 1.0 x 1.4, R mid 2.3 x 1.7 x 2.6, L mid 2.3 x 1.8 x 2.3 complex nodules    Initial Endocrine Visit: (01/2019)  Thyroid US completed in clinic (01/02/2019) showed the thyroid gland was enlarged by measured dimensions. Findings were consistent with a multinodular goiter. Multiple isoechoic, spongiform nodules were identified in both lobes as described in the full report. These nodules lacked suspicious US characteristics and did not meet criteria for FNA. These nodules appeared similar to prior outside imaging  completed from 2014 - 2017.   RECOMMENDATIONS: These nodules lacked suspicious US characteristics and did not meet criteria for FNA. Given pt's h/o subclinical hyperthyroidism, recommended NM thyroid uptake and scan for further characterization of the nodules.     A NM thyroid uptake and scan was completed at  radiology on 02/12/2019:  \"Four hour thyroid uptake at the lower limits of normal at 11%. 24-hour thyroid uptake is also in the lower range of normal at 25%. IMPRESSION: 4 hour and 24 hour thyroid uptake both at lower limits of normal. Please correlate with patient's serum thyroid profile.Heterogeneous thyroid uptake pattern, with relatively large right thyroid lobe, and questionable small cold nodule possibly a cyst of the left lateral mid thyroid pole.\"    (01/2020) Thyroid US completed in clinic 01/06/2020 showed:  The thyroid gland was enlarged by measured dimensions. Findings were consistent with a multinodular goiter.    Multiple isoechoic, spongiform nodules were identified in both lobes as described above. These nodules lacked suspicious US characteristics and did not meet criteria for FNA.   These nodules appeared similar to prior outside imaging completed from 2014 - 2019.   In the left mid lobe, a 2.42(L) x 2.06(AP) x 2.13(T) cm isoechoic, spongiform nodule was again identified. The nodule had a smooth margin, peripheral and intranodal vascularity, and no microcalcifications. Multiple comet tail artifacts consistent with colloid crystals were present. This nodule had a peripheral cystic area that correlated with the \"questionable small cold area\" in the left lateral mid thyroid pole seen on NM thyroid uptake and scan in 02/2019.   RECOMMENDATIONS: These nodules lacked suspicious US characteristics and did not meet criteria for FNA. Repeat Thyroid US recommended if changes in the gland/neck are noted on physical exam.     Discussed plan with patient: follow yearly in regards to her subclinical  hyperthyroidism    Review of Systems   Constitutional:        Weight stable   HENT: Negative.    Eyes:        Dry eyes   Respiratory: Negative.    Cardiovascular: Negative for palpitations.   Gastrointestinal: Negative.    Endocrine: Negative.    Genitourinary: Negative.    Musculoskeletal: Positive for arthralgias and myalgias.   Skin: Negative.    Allergic/Immunologic: Negative.    Neurological: Negative.    Hematological: Negative.    Psychiatric/Behavioral: Negative.        The following portions of the patient's history were reviewed and updated as appropriate: allergies, current medications, past family history, past medical history, past social history, past surgical history and problem list.        /82   Pulse 61   Ht 165.1 cm (65\")   Wt 63.5 kg (140 lb)   SpO2 98%   BMI 23.30 kg/m²   Physical Exam   Constitutional: She is oriented to person, place, and time. She appears well-developed. No distress.   HENT:   Head: Normocephalic.   Eyes: Pupils are equal, round, and reactive to light. Conjunctivae are normal.   Neck: No tracheal deviation present. Thyromegaly (mild, right lobe larger than left) present.   2 cm palpable thyroid nodule in left lobe. Stable     Cardiovascular: Normal rate, regular rhythm and normal heart sounds.   No murmur heard.  Pulmonary/Chest: Effort normal and breath sounds normal. No respiratory distress.   Abdominal: Soft. Bowel sounds are normal. She exhibits no mass. There is no abdominal tenderness.   Lymphadenopathy:     She has no cervical adenopathy.   Neurological: She is alert and oriented to person, place, and time. No cranial nerve deficit.   Skin: Skin is warm and dry. She is not diaphoretic. No erythema.   Psychiatric: Her behavior is normal.   Vitals reviewed.    LABS/IMAGING: outside records reviewed and summarized in HPI  Office Visit on 01/04/2023   Component Date Value Ref Range Status   • TSH 01/04/2023 0.153 (L)  0.270 - 4.200 uIU/mL Final   • Free T4  01/04/2023 1.79 (H)  0.93 - 1.70 ng/dL Final   • T3, Total 01/04/2023 76.6 (L)  80.0 - 200.0 ng/dl Final         ASSESSMENT/PLAN:  1) subclinical hyperthyroidism due to multinodular goiter:  - labs completed today again show a slight decrease in TSH. The abnormal free T4 and Total T3 levels are most likely related to her current high dose prednisone prescription. Overall she appeared clinically euthyroid on exam.    - Again reviewed with patient that there are no definitive guidelines on the management of subclinical hyperthyroidism as there is very limited clinical data on the long term benefits of treatment. However, for patients with endogenous subclinical hyperthyroidism at high risk for cardiac or skeletal complications (ie, older adults) and who have a TSH concentration less than 0.1 mU/L persistently, treatment is often considered.   - In Ms. Bolanos's case, I do not think treatment is indicated at this time.  - her TFT's can be followed yearly or sooner if concerning symptoms develop  - I will have her RTC in one year for follow-up of her subclinical hyperthyroidism     2) multinodular goiter:  Stable on physical exam  Prior Thyroid US images reviewed.  Will repeat imaging if changes in her gland/neck are noted on physical exam.    RTC in one year.    Signed: Judi Mae MD

## 2023-01-04 ENCOUNTER — OFFICE VISIT (OUTPATIENT)
Dept: ENDOCRINOLOGY | Facility: CLINIC | Age: 68
End: 2023-01-04
Payer: MEDICARE

## 2023-01-04 VITALS
HEART RATE: 61 BPM | HEIGHT: 65 IN | SYSTOLIC BLOOD PRESSURE: 140 MMHG | DIASTOLIC BLOOD PRESSURE: 82 MMHG | BODY MASS INDEX: 23.32 KG/M2 | WEIGHT: 140 LBS | OXYGEN SATURATION: 98 %

## 2023-01-04 DIAGNOSIS — E04.2 MULTINODULAR GOITER: Chronic | ICD-10-CM

## 2023-01-04 DIAGNOSIS — E05.90 SUBCLINICAL HYPERTHYROIDISM: Primary | Chronic | ICD-10-CM

## 2023-01-04 PROCEDURE — 84480 ASSAY TRIIODOTHYRONINE (T3): CPT | Performed by: INTERNAL MEDICINE

## 2023-01-04 PROCEDURE — 84443 ASSAY THYROID STIM HORMONE: CPT | Performed by: INTERNAL MEDICINE

## 2023-01-04 PROCEDURE — 84439 ASSAY OF FREE THYROXINE: CPT | Performed by: INTERNAL MEDICINE

## 2023-01-04 PROCEDURE — 99213 OFFICE O/P EST LOW 20 MIN: CPT | Performed by: INTERNAL MEDICINE

## 2023-01-04 RX ORDER — CYANOCOBALAMIN (VITAMIN B-12) 1000 MCG
2500 TABLET ORAL DAILY
COMMUNITY

## 2023-01-04 RX ORDER — ALENDRONATE SODIUM 70 MG/1
70 TABLET ORAL
COMMUNITY
End: 2023-02-06 | Stop reason: SDUPTHER

## 2023-01-04 RX ORDER — CHLORAL HYDRATE 500 MG
2000 CAPSULE ORAL
COMMUNITY

## 2023-01-05 LAB
T3 SERPL-MCNC: 76.6 NG/DL (ref 80–200)
T4 FREE SERPL-MCNC: 1.79 NG/DL (ref 0.93–1.7)
TSH SERPL DL<=0.05 MIU/L-ACNC: 0.15 UIU/ML (ref 0.27–4.2)

## 2023-01-06 ENCOUNTER — TREATMENT (OUTPATIENT)
Dept: PHYSICAL THERAPY | Facility: CLINIC | Age: 68
End: 2023-01-06
Payer: MEDICARE

## 2023-01-06 DIAGNOSIS — M54.50 LOW BACK PAIN, UNSPECIFIED BACK PAIN LATERALITY, UNSPECIFIED CHRONICITY, UNSPECIFIED WHETHER SCIATICA PRESENT: Primary | ICD-10-CM

## 2023-01-06 PROCEDURE — 97110 THERAPEUTIC EXERCISES: CPT | Performed by: PHYSICAL THERAPIST

## 2023-01-06 PROCEDURE — 97112 NEUROMUSCULAR REEDUCATION: CPT | Performed by: PHYSICAL THERAPIST

## 2023-01-06 PROCEDURE — 97140 MANUAL THERAPY 1/> REGIONS: CPT | Performed by: PHYSICAL THERAPIST

## 2023-01-06 NOTE — PROGRESS NOTES
Physical Therapy Daily Treatment Note    [facility]   [address] [facility] [facility], [address] [place] [address]      Patient: Carli Bolanos   : 1955  Referring practitioner: Self Referring  Date of Initial Visit: Type: THERAPY  Noted: 2022  Today's Date: 2023  Patient seen for 7 sessions    Certification Period 2023 thru 2023       Visit Diagnoses:    ICD-10-CM ICD-9-CM   1. Low back pain, unspecified back pain laterality, unspecified chronicity, unspecified whether sciatica present  M54.50 724.2       Subjective     Pt states that she is feeling improvement since beginning therapy and she usually feels better for 3-4 days after her session.     Objective   See Exercise, Manual, and Modality Logs for complete treatment.       Assessment/Plan     Worked with patient on self correction techniques for the SI. Pt is progressing well and responds well to treatment. Will cont as tolerated.       Timed:         Manual Therapy:    12     mins  37660;     Therapeutic Exercise:    10     mins  07364;     Neuromuscular Sharonda:    34    mins  26986;    Therapeutic Activity:          mins  11079;     Gait Training:           mins  47083;     Ultrasound:          mins  91077;    Ionto                                   mins   95066  Self Care                            mins   03622  Canalith Repos         mins 47162  Electrical Stimulation:         mins  93261    Un-Timed:  Electrical Stimulation:         mins  21904 ( );  Dry Needling          mins self-pay  Traction          mins 40877      Timed Treatment:   56   mins   Total Treatment:     56   mins    Robert Kaiser, PT, DPT, OCS, Cert. DN  [place] License: [id number]

## 2023-01-11 ENCOUNTER — TREATMENT (OUTPATIENT)
Dept: PHYSICAL THERAPY | Facility: CLINIC | Age: 68
End: 2023-01-11
Payer: MEDICARE

## 2023-01-11 DIAGNOSIS — M54.50 LOW BACK PAIN, UNSPECIFIED BACK PAIN LATERALITY, UNSPECIFIED CHRONICITY, UNSPECIFIED WHETHER SCIATICA PRESENT: Primary | ICD-10-CM

## 2023-01-11 PROCEDURE — 97110 THERAPEUTIC EXERCISES: CPT | Performed by: PHYSICAL THERAPIST

## 2023-01-11 PROCEDURE — 97112 NEUROMUSCULAR REEDUCATION: CPT | Performed by: PHYSICAL THERAPIST

## 2023-01-11 PROCEDURE — 97140 MANUAL THERAPY 1/> REGIONS: CPT | Performed by: PHYSICAL THERAPIST

## 2023-01-13 NOTE — PROGRESS NOTES
Physical Therapy Daily Treatment Note    [facility]   [address] [place] [address]      Patient: Carli Bolanos   : 1955  Referring practitioner: Self Referring  Date of Initial Visit: Type: THERAPY  Noted: 2022  Today's Date: 2023  Patient seen for 8 sessions    Certification Period 2023 thru 2023       Visit Diagnoses:    ICD-10-CM ICD-9-CM   1. Low back pain, unspecified back pain laterality, unspecified chronicity, unspecified whether sciatica present  M54.50 724.2       Subjective     Pt states that she is feeling better overall, but over the past couple of days she has had some increased soreness in the right hip.    Objective   See Exercise, Manual, and Modality Logs for complete treatment.       Assessment/Plan     Pt responded well to manual treatment and she reported a decrease in pain. Exercises performed in the clinic without exacerbation of symptoms.       Timed:         Manual Therapy:    12     mins  41601;     Therapeutic Exercise:    14     mins  01806;     Neuromuscular Sharonda:    28    mins  67692;    Therapeutic Activity:          mins  72969;     Gait Training:           mins  55004;     Ultrasound:          mins  65482;    Ionto                                   mins   31072  Self Care                            mins   04633  Canalith Repos         mins 16946  Electrical Stimulation:         mins  06172    Un-Timed:  Electrical Stimulation:         mins  83117 ( );  Dry Needling          mins self-pay  Traction          mins 34077      Timed Treatment:   54   mins   Total Treatment:     54   mins    Robert Kaiser, PT, DPT, OCS, Cert. DN  [place] License: [id number]

## 2023-01-20 ENCOUNTER — TREATMENT (OUTPATIENT)
Dept: PHYSICAL THERAPY | Facility: CLINIC | Age: 68
End: 2023-01-20
Payer: MEDICARE

## 2023-01-20 DIAGNOSIS — M54.50 LOW BACK PAIN, UNSPECIFIED BACK PAIN LATERALITY, UNSPECIFIED CHRONICITY, UNSPECIFIED WHETHER SCIATICA PRESENT: Primary | ICD-10-CM

## 2023-01-20 PROBLEM — M53.3 PAIN OF RIGHT SACROILIAC JOINT: Status: ACTIVE | Noted: 2023-01-20

## 2023-01-20 PROCEDURE — 97140 MANUAL THERAPY 1/> REGIONS: CPT | Performed by: PHYSICAL THERAPIST

## 2023-01-20 PROCEDURE — 97112 NEUROMUSCULAR REEDUCATION: CPT | Performed by: PHYSICAL THERAPIST

## 2023-01-20 PROCEDURE — 97110 THERAPEUTIC EXERCISES: CPT | Performed by: PHYSICAL THERAPIST

## 2023-01-22 NOTE — PROGRESS NOTES
Chief Complaint   Patient presents with   • Impaired Fasting Glucose       History of Present Illness  68 y.o.  woman presents for f/u on sugars and blood pressure.    Developed left eye pain and was dx'd with keratitis. It was decied orencia was not working. Has loaded up on remicade and will now proceed to maintenance infusion every other months. Remains on MTX; also remains on pred 20mg but has rheum appt pending 2/1/23 and hopefully will be able to cut back on pred dose.    Has persistent right LBP despite PT since 11/22 (going to [facility]). Was on pred 40mg initially for keratitis, now down to 20mg dose, and that has not alleviated the back pain. Notes some radiation into the right buttock but not down the legs. Dr. Crow, rheum, has referred her to  pain clinic. She would prefer to go to [facility], [place], operated by [facility] pain clinic d/t better reviews.    Brings rheum labs from last week.    Review of Systems  Denies CP, SOB, visual changes. Left eye pain due to keratitis has resolved but is still on pred 20mg QD. Has persistent right LBP as noted, no radiculopathy, no falls. All other ROS reviewed and negative.      Current Outpatient Medications:   •  alendronate (Fosamax) 70 MG weekly   •  amLODIPine (NORVASC) 5 MG QD  •  calcium citrate-vitamin d (CITRACAL) 200-250 MG-UNIT QD  •  CBD (cannabidiol) oral oil AD  •  celecoxib (CELEBREX) 200 MG QD prn  •  cholecalciferol (VITAMIN D3) 25 MCG (1000 UT) QD  •  Cyanocobalamin (B-12) 1000 MCG 2500mcg QD  •  cyclobenzaprine (FLEXERIL) 10 MG tablet, 1/2-1 qhs prn muscle spasm  •  folic acid (FOLVITE) 1 MG 4 QD  •  inFLIXimab (REMICADE) 100 MG injection AD  •  leucovorin 10 MG weekly 24 hrs after MTX dose (to decr oral ulcers)  •  methotrexate 2.5 MG 8 tabs weekly  •  Omega-3 Fatty Acids (fish oil) 1000 MG 2 QD  •  simvastatin (ZOCOR) 20 MG QD  •  TURMERIC QD  •  vitamin B-6 (PYRIDOXINE) 50 MG 2 QD  •  Xiidra 5 % ophthalmic solution AD      VITALS:  /70   Pulse 66   " Ht 165.1 cm (65\")   Wt 64 kg (141 lb)   SpO2 99%   BMI 23.46 kg/m²     Physical Exam  Vitals and nursing note reviewed.   Constitutional:       General: She is not in acute distress.     Appearance: Normal appearance. She is not ill-appearing.   Eyes:      Extraocular Movements: Extraocular movements intact.      Conjunctiva/sclera: Conjunctivae normal.      Comments: No scleral injection; wearing glasses   Pulmonary:      Effort: Pulmonary effort is normal. No respiratory distress.   Musculoskeletal:         General: Tenderness (tend with palpation of the right SI joint radiating into the right buttock; trunk FROM) present.   Neurological:      Mental Status: She is alert and oriented to person, place, and time. Mental status is at baseline.      Gait: Gait normal.   Psychiatric:         Mood and Affect: Mood normal.         Behavior: Behavior normal.         LABS  Results for orders placed or performed in visit on 01/24/23   POC Glycosylated Hemoglobin (Hb A1C)    Specimen: Blood   Result Value Ref Range    Hemoglobin A1C 5.9 %    Lot Number [id number]     Expiration Date 11/03/2024 1/17/2023 rheum labs -stable CBC, CMP, CRP, ESR 11    7/22 A1C 5.9    ASSESSMENT/PLAN    Diagnoses and all orders for this visit:    1. Impaired fasting glucose (Primary)  Assessment & Plan:  BG control stable with A1C 5.9; encouraged reg phys activity to decr insulin resistance, moderation in unhealthy starches/sweets; f/u A1C in 6 mos      Orders:  -     POC Glycosylated Hemoglobin (Hb A1C)    2. Hypertension  Assessment & Plan:  BP stable 130/70; cont amlo 5mg QD      3. Pain of right sacroiliac joint  Assessment & Plan:  Persistent right LBP despite PT since 11/2022; note also no improvement with high-dose prednisone (Rx'd for keratitis); agree with pain clinic referral for further recommendations but advised imaging prior to consultation; ordered L-sp and SI joint xrays as well as MRI L-spine    Orders:  -     XR Spine " Lumbar 2 or 3 View; Future  -     XR sacroiliac joints 1 or 2 vw; Future  -     MRI Lumbar Spine Without Contrast; Future  -     Ambulatory Referral to Pain Management      FOLLOW-UP  1. Health maintenance - COVID19 vacc done, incl new COVID19 vacc; flu vacc 9/22  2. RTC for AWV 8/1/23; fasting labs prior to appt (CBC, CMP, TSH, lipids, UA/micr, A1C, FT4, CPK, ESR, CRP)    Electronically signed by:    Rosa M Venegas MD, FACP  01/24/2023

## 2023-01-24 ENCOUNTER — OFFICE VISIT (OUTPATIENT)
Dept: INTERNAL MEDICINE | Facility: CLINIC | Age: 68
End: 2023-01-24
Payer: MEDICARE

## 2023-01-24 ENCOUNTER — HOSPITAL ENCOUNTER (OUTPATIENT)
Dept: GENERAL RADIOLOGY | Facility: HOSPITAL | Age: 68
Discharge: HOME | End: 2023-01-24
Admitting: INTERNAL MEDICINE
Payer: MEDICARE

## 2023-01-24 VITALS
WEIGHT: 141 LBS | SYSTOLIC BLOOD PRESSURE: 130 MMHG | HEIGHT: 65 IN | BODY MASS INDEX: 23.49 KG/M2 | DIASTOLIC BLOOD PRESSURE: 70 MMHG | OXYGEN SATURATION: 99 % | HEART RATE: 66 BPM

## 2023-01-24 DIAGNOSIS — I10 ESSENTIAL HYPERTENSION: Chronic | ICD-10-CM

## 2023-01-24 DIAGNOSIS — R73.01 IMPAIRED FASTING GLUCOSE: Primary | Chronic | ICD-10-CM

## 2023-01-24 DIAGNOSIS — M53.3 PAIN OF RIGHT SACROILIAC JOINT: ICD-10-CM

## 2023-01-24 PROBLEM — M51.36 DDD (DEGENERATIVE DISC DISEASE), LUMBAR: Status: ACTIVE | Noted: 2023-01-24

## 2023-01-24 PROBLEM — M51.369 DDD (DEGENERATIVE DISC DISEASE), LUMBAR: Status: ACTIVE | Noted: 2023-01-24

## 2023-01-24 LAB
EXPIRATION DATE: NORMAL
HBA1C MFR BLD: 5.9 %
Lab: NORMAL

## 2023-01-24 PROCEDURE — 99214 OFFICE O/P EST MOD 30 MIN: CPT | Performed by: INTERNAL MEDICINE

## 2023-01-24 PROCEDURE — 72200 X-RAY EXAM SI JOINTS: CPT

## 2023-01-24 PROCEDURE — 83036 HEMOGLOBIN GLYCOSYLATED A1C: CPT | Performed by: INTERNAL MEDICINE

## 2023-01-24 PROCEDURE — 3044F HG A1C LEVEL LT 7.0%: CPT | Performed by: INTERNAL MEDICINE

## 2023-01-24 PROCEDURE — 72100 X-RAY EXAM L-S SPINE 2/3 VWS: CPT

## 2023-01-24 RX ORDER — PREDNISONE 20 MG/1
TABLET ORAL
COMMUNITY
Start: 2023-01-05

## 2023-01-24 NOTE — ASSESSMENT & PLAN NOTE
Persistent right LBP despite PT since 11/2022; note also no improvement with high-dose prednisone (Rx'd for keratitis); agree with pain clinic referral for further recommendations but advised imaging prior to consultation; ordered L-sp and SI joint xrays as well as MRI L-spine

## 2023-01-24 NOTE — PROGRESS NOTES
Physical Therapy Daily Treatment Note    [facility]   [address], [address] [place] [address]      Patient: Carli Bolanos   : 1955  Referring practitioner: Self Referring  Date of Initial Visit: Type: THERAPY  Noted: 2022  Today's Date: 2023  Patient seen for 9 sessions    Certification Period 2023 thru 2023       Visit Diagnoses:    ICD-10-CM ICD-9-CM   1. Low back pain, unspecified back pain laterality, unspecified chronicity, unspecified whether sciatica present  M54.50 724.2       Subjective     Pt states that she feels like she has had some improvement in her symptoms overall, but she has been having some symptoms down into the right LE    Objective   See Exercise, Manual, and Modality Logs for complete treatment.       Assessment/Plan     Worked on correction of lateral shift in the clinic today before extension biased exercises. Pt responded well and reported some decrease in her overall symptom intensity. Will cont to progress as indicated.       Timed:         Manual Therapy:    12     mins  98517;     Therapeutic Exercise:    13     mins  03414;     Neuromuscular Sharonda:    30    mins  76016;    Therapeutic Activity:          mins  00024;     Gait Training:           mins  39972;     Ultrasound:          mins  85641;    Ionto                                   mins   19872  Self Care                            mins   95963  Canalith Repos         mins 62059  Electrical Stimulation:         mins  12475    Un-Timed:  Electrical Stimulation:         mins  83846 ( );  Dry Needling          mins self-pay  Traction          mins 54888      Timed Treatment:   55   mins   Total Treatment:     55   mins    Robert Kaiser, PT, DPT, OCS, Cert. DN  [place] License: [id number]

## 2023-01-25 ENCOUNTER — TELEPHONE (OUTPATIENT)
Dept: INTERNAL MEDICINE | Facility: CLINIC | Age: 68
End: 2023-01-25
Payer: MEDICARE

## 2023-01-25 NOTE — TELEPHONE ENCOUNTER
----- Message from Rosa M Venegas MD sent at 1/24/2023 10:36 PM EST -----  Back xrays show moderate to severe arthritis changes at the mid to lower back, mild arthritis changes at the sacroiliac joints bilaterally    Await MRI spine

## 2023-01-25 NOTE — TELEPHONE ENCOUNTER
Left message for patient to return call to office regarding xray results.  Office Phone number given

## 2023-01-26 ENCOUNTER — TREATMENT (OUTPATIENT)
Dept: PHYSICAL THERAPY | Facility: CLINIC | Age: 68
End: 2023-01-26
Payer: MEDICARE

## 2023-01-26 DIAGNOSIS — M54.50 LOW BACK PAIN, UNSPECIFIED BACK PAIN LATERALITY, UNSPECIFIED CHRONICITY, UNSPECIFIED WHETHER SCIATICA PRESENT: Primary | ICD-10-CM

## 2023-01-26 PROCEDURE — 97112 NEUROMUSCULAR REEDUCATION: CPT | Performed by: PHYSICAL THERAPIST

## 2023-01-26 PROCEDURE — 97110 THERAPEUTIC EXERCISES: CPT | Performed by: PHYSICAL THERAPIST

## 2023-01-26 PROCEDURE — 97140 MANUAL THERAPY 1/> REGIONS: CPT | Performed by: PHYSICAL THERAPIST

## 2023-01-31 NOTE — PROGRESS NOTES
Physical Therapy Daily Treatment Note    [facility]   [address], [address] [place] [address]      Patient: Carli Bolanos   : 1955  Referring practitioner: Self Referring  Date of Initial Visit: Type: THERAPY  Noted: 2022  Today's Date: 2023  Patient seen for 10 sessions    Certification Period 2023 thru 2023       Visit Diagnoses:    ICD-10-CM ICD-9-CM   1. Low back pain, unspecified back pain laterality, unspecified chronicity, unspecified whether sciatica present  M54.50 724.2       Subjective     Pt states that she feels like she has made some improvement in her overall pain level and she has noticed a decrease in pain intensity over the past couple of weeks especially.     Objective   See Exercise, Manual, and Modality Logs for complete treatment.       Assessment/Plan     Improvement noted as we have been correcting for lateral shift and possible underlying disc pathology. Will cont to progress treatment as indicated       Timed:         Manual Therapy:    12     mins  43010;     Therapeutic Exercise:    12     mins  42989;     Neuromuscular Sharonda:    30    mins  22302;    Therapeutic Activity:          mins  54086;     Gait Training:           mins  18886;     Ultrasound:          mins  31037;    Ionto                                   mins   05729  Self Care                            mins   77501  Canalith Repos         mins 51904  Electrical Stimulation:         mins  03834    Un-Timed:  Electrical Stimulation:         mins  84224 ( );  Dry Needling          mins self-pay  Traction          mins 48540      Timed Treatment:   54   mins   Total Treatment:     54   mins    Robert Kaiser, PT, DPT, OCS, Cert. DN  [place] License: [id number]

## 2023-02-03 ENCOUNTER — TREATMENT (OUTPATIENT)
Dept: PHYSICAL THERAPY | Facility: CLINIC | Age: 68
End: 2023-02-03
Payer: MEDICARE

## 2023-02-03 ENCOUNTER — HOSPITAL ENCOUNTER (OUTPATIENT)
Dept: MRI IMAGING | Facility: HOSPITAL | Age: 68
Discharge: HOME | End: 2023-02-03
Payer: MEDICARE

## 2023-02-03 DIAGNOSIS — M53.3 PAIN OF RIGHT SACROILIAC JOINT: ICD-10-CM

## 2023-02-03 DIAGNOSIS — M54.50 LOW BACK PAIN, UNSPECIFIED BACK PAIN LATERALITY, UNSPECIFIED CHRONICITY, UNSPECIFIED WHETHER SCIATICA PRESENT: Primary | ICD-10-CM

## 2023-02-03 PROCEDURE — 97112 NEUROMUSCULAR REEDUCATION: CPT | Performed by: PHYSICAL THERAPIST

## 2023-02-03 PROCEDURE — 97110 THERAPEUTIC EXERCISES: CPT | Performed by: PHYSICAL THERAPIST

## 2023-02-03 PROCEDURE — 72148 MRI LUMBAR SPINE W/O DYE: CPT

## 2023-02-03 PROCEDURE — 97140 MANUAL THERAPY 1/> REGIONS: CPT | Performed by: PHYSICAL THERAPIST

## 2023-02-05 PROBLEM — M48.062 SPINAL STENOSIS OF LUMBAR REGION WITH NEUROGENIC CLAUDICATION: Status: ACTIVE | Noted: 2023-02-05

## 2023-02-06 ENCOUNTER — TELEPHONE (OUTPATIENT)
Dept: INTERNAL MEDICINE | Facility: CLINIC | Age: 68
End: 2023-02-06
Payer: MEDICARE

## 2023-02-06 ENCOUNTER — OFFICE VISIT (OUTPATIENT)
Dept: PAIN MEDICINE | Facility: CLINIC | Age: 68
End: 2023-02-06
Payer: MEDICARE

## 2023-02-06 VITALS
HEIGHT: 65 IN | WEIGHT: 143.6 LBS | OXYGEN SATURATION: 97 % | TEMPERATURE: 97.8 F | HEART RATE: 64 BPM | BODY MASS INDEX: 23.93 KG/M2 | RESPIRATION RATE: 14 BRPM | SYSTOLIC BLOOD PRESSURE: 136 MMHG | DIASTOLIC BLOOD PRESSURE: 74 MMHG

## 2023-02-06 DIAGNOSIS — M46.1 SACROILIITIS: Primary | ICD-10-CM

## 2023-02-06 DIAGNOSIS — M47.816 SPONDYLOSIS OF LUMBAR REGION WITHOUT MYELOPATHY OR RADICULOPATHY: ICD-10-CM

## 2023-02-06 PROCEDURE — 99204 OFFICE O/P NEW MOD 45 MIN: CPT | Performed by: STUDENT IN AN ORGANIZED HEALTH CARE EDUCATION/TRAINING PROGRAM

## 2023-02-06 RX ORDER — AMOXICILLIN 500 MG
1 CAPSULE ORAL 2 TIMES DAILY
COMMUNITY

## 2023-02-06 RX ORDER — ALENDRONATE SODIUM 70 MG/1
1 TABLET ORAL
COMMUNITY
Start: 2023-01-18

## 2023-02-06 NOTE — TELEPHONE ENCOUNTER
----- Message from Rosa M Venegas MD sent at 2/5/2023  1:12 PM EST -----  MRI of the low back shows diffuse moderate arthritis changes, but there is also narrowing on the spinal canal causing pinched nerves, holegr at the L4-5 level on the right. This is what is probably causing your right-sided pain. Options are neurosurg referral to see if there are surgical options, or pain clinic referral (as she previously discussed with me) for consideration of injection options. What does she want to do?

## 2023-02-06 NOTE — PROGRESS NOTES
Physical Therapy Daily Treatment Note    [facility]   [address], [address] [place] [address]      Patient: Carli Bolanos   : 1955  Referring practitioner: Self Referring  Date of Initial Visit: Type: THERAPY  Noted: 2022  Today's Date: 2023  Patient seen for 11 sessions    Certification Period 2023 thru 2023       Visit Diagnoses:    ICD-10-CM ICD-9-CM   1. Low back pain, unspecified back pain laterality, unspecified chronicity, unspecified whether sciatica present  M54.50 724.2       Subjective     Pt states that she is feeling improvement in her low back pain and overall ability to perform exercise at home, although she does still has some intermittent flare-up's on pain.     Objective   See Exercise, Manual, and Modality Logs for complete treatment.       Assessment/Plan     Pt is progressing well and she demonstrates an improvement in her tolerance to standing and higher level activity in the clinic. Will cont to progress as indicated.       Timed:         Manual Therapy:    10     mins  98574;     Therapeutic Exercise:    16     mins  20615;     Neuromuscular Sharonda:    30    mins  52150;    Therapeutic Activity:          mins  83499;     Gait Training:           mins  52441;     Ultrasound:          mins  36216;    Ionto                                   mins   16240  Self Care                            mins   58554  Canalith Repos         mins 81041  Electrical Stimulation:         mins  93908    Un-Timed:  Electrical Stimulation:         mins  21949 ( );  Dry Needling          mins self-pay  Traction          mins 76689      Timed Treatment:   56   mins   Total Treatment:     56   mins    Robert Kaiser, PT, DPT, OCS, Cert. DN  [place] License: [id number]

## 2023-02-06 NOTE — PROGRESS NOTES
02/06/2023      Referring Physician: Rosa M Venegas MD  [address] [place] [address]    Primary Physician: Rosa M Venegas MD    CHIEF COMPLAINT or REASON FOR VISIT: Hip Pain (New patient)      HISTORY OF PRESENT ILLNESS:  Ms. Carli Bolanos is 68 y.o. female who presents as a new patient referral for evaluation and treatment of chronic right-sided low back pain with radiation into the buttock.  Patient states that she has had this pain since November 2022.  She has engaged in physical therapy, continues to perform the exercises that she was instructed.  Patient denies any bowel or bladder dysfunction, lower extremity weakness, new onset saddle anesthesia or unexplained weight loss.     Pain is exacerbated with prolonged standing, ameliorated with sitting and rest.  Not associated with ambulation.  Ameliorated with Tylenol.  She did have lumbar and sacroiliac joint x-rays, lumbar MRI demonstrating degenerative disc disease and facet arthropathy.  She denies any pain in her legs beyond the buttock.        Objective Pain Scoring:   BRIEF PAIN INVENTORY:  Total score:   Pain Score    02/06/23 1023   PainSc:   4   PainLoc: Back      PHQ-2: PHQ-2 Total Score: 0  PHQ-9: PHQ-9: Brief Depression Severity Measure Score: 0  Opioid Risk Tool:         Review of Systems:   ROS negative except as otherwise noted     Past Medical History:   Past Medical History:   Diagnosis Date   • Abnormal thyroid ultrasound 07/05/2017    stable MNG (7/5/17) by u/s   • Abnormal thyroid ultrasound 06/12/2015    stable MNG (6/12/15)   • Abnormal thyroid ultrasound 04/18/2014    thyr u/s (4/18/14): MNG except dominant complex mass mid L. lobe, repeat u/s in 6 mos   • Central corneal ulcer of left eye    • Central corneal ulcer of left eye 11/18/2022   • Endometrial cancer (HCC)    • H/O uterine leiomyoma     s/p DUSTY/USO ('13)   • History of CT scan 02/27/2016    neg head ct   • Hx of bone density study 06/08/2015    DEXA (6/15) L -0.4, H0.7    • Hx of bone density study 07/17/2018    nl DEXA (7/17/18): L -0.4, H 0.2, repeat 3 yrs   • Hx of bone density study 09/27/2021    DEXA (9/27/21): L 0.6, A -1.8; per Dr. Giles/LEONOR Villa, APRN - repeat 2-3 yrs   • Hx of colonoscopy 03/11/2011    colonosc (3/11/11): diverticulosis, sm int hem, repeat 2018; CRS - Dr. Thornton   • Hx of colonoscopy 10/05/2018    colonosc (10/5/18): diverticulosis, redundant colon, int hem, repeat 5 yrs due to h/o polyps; CRS - Dr. Thornton   • Hx of EMG/NCV 02/27/2008    EMG/NCV (2/27/08): right median neuropathy   • Hx of hand x-ray 01/24/2018    R. hand xray (1/24/18): degen changes 1st metacarpal joint and index PIP   • Hx of MRI L-spine 02/03/2023    MRI L-spine (2/3/23): mod DDD with mod-severe L3-4 spinal stenosis and mild bilat NFS, mild-mod L4-5 spinal stenosis with severe R NFS   • Joint pain 2018    RA   • Low back pain 09/2022   • Lumps on the skin 02/20/2013    R. temple lesion; evaluated by Dr. Amelia Gann 02/20/13 with upcoming excision   • Osteoarthritis     Not sure of date   • Rheumatoid arthritis (HCC) 02/2018   • Right arm numbness 06/13/2016    Impression: improved after CTS surgery per pt; 03/01/2016 - resolved at this time; consider pinched nerve due to positioning during sleep the night before; if sxs recur, rec updated EMG/NCV for further eval;          Past Surgical History:   Past Surgical History:   Procedure Laterality Date   • APPENDECTOMY  1980    taken out at gallbladder surgery   • CARPAL TUNNEL RELEASE Left     carpel tunnel left    • CERVICAL POLYPECTOMY  03/2005    with h/o fibroids and DUB   • CHOLECYSTECTOMY  1980   • DILATATION AND CURETTAGE  02/22/2013   • EXCISION MASS HEAD/NECK Right 03/05/2013    s/p excision scalp lesion large R. parietal and L. temporal areas; plastics - Dr. Amelia Gann   • HAND SURGERY      carpal tunnel   • HIP ARTHROSCOPY Right 2004    with subsequent [place] resurfacing with hip replacement (5/11)   • HYSTERECTOMY      • JOINT REPLACEMENT  2007. 2011    both hips   • ORTHOPEDIC SURGERY     • SALPINGO OOPHORECTOMY Left 1991    secondary to tubal pregnancy and ovarian cyst   • SALPINGO OOPHORECTOMY Right     secondary to tubal pregnancy   • TOTAL HIP ARTHROPLASTY Left 06/2011     ortho Dr. Mcdermott   • TOTAL HIP ARTHROPLASTY Right 05/2011   • TOTAL LAPAROSCOPIC HYSTERECTOMY WITH DAVINCI ROBOT  03/05/2013    RTLH/BSO with LND and omental biopsy for endometrial cancer; GYN Onc - Dr. Malin   • US GUIDED FINE NEEDLE ASPIRATION  05/11/2021         Family History   Family History   Problem Relation Age of Onset   • Dementia Mother    • Cancer Mother         GYN   • Hip fracture Mother    • Thyroid disease Mother    • Hypertension Mother    • Cervical cancer Mother    • Heart attack Father    • Heart failure Father    • Diabetes Father    • Heart disease Father         heart failure   • Prostate cancer Father    • Hypertension Father    • Hypertension Brother    • Heart disease Brother    • Kidney cancer Maternal Grandmother    • Arthritis Maternal Grandmother    • Cancer Maternal Grandmother         Kidney Cancer   • Colon cancer Maternal Grandfather 70   • Cancer Maternal Grandfather         Colon cancer   • Heart failure Paternal Grandmother    • Heart disease Paternal Grandmother    • Arthritis Paternal Grandmother    • Hypertension Paternal Grandmother    • Diabetes Paternal Grandfather    • Colon cancer Paternal Uncle 78   • Breast cancer Neg Hx    • Ovarian cancer Neg Hx          Social History   Social History     Socioeconomic History   • Marital status:    Tobacco Use   • Smoking status: Never   • Smokeless tobacco: Never   Vaping Use   • Vaping Use: Never used   Substance and Sexual Activity   • Alcohol use: Yes     Alcohol/week: 1.0 standard drink     Types: 1 Glasses of wine per week     Comment: social   • Drug use: No   • Sexual activity: Yes     Partners: Male     Birth control/protection: Surgical         Medications:     Current Outpatient Medications:   •  alendronate (FOSAMAX) 70 MG tablet, Take 1 tablet by mouth Every 7 (Seven) Days., Disp: , Rfl:   •  amLODIPine (NORVASC) 5 MG tablet, Take 1 tablet by mouth Daily., Disp: 90 tablet, Rfl: 3  •  ascorbic acid (VITAMIN C) 500 MG capsule controlled-release CR capsule, , Disp: , Rfl:   •  calcium citrate-vitamin d (CITRACAL) 200-250 MG-UNIT tablet tablet, Take  by mouth Daily., Disp: , Rfl:   •  CBD (cannabidiol) oral oil, Take  by mouth., Disp: , Rfl:   •  celecoxib (CELEBREX) 200 MG capsule, Take 1 capsule by mouth Daily As Needed for Mild Pain ., Disp: , Rfl:   •  cholecalciferol (VITAMIN D3) 25 MCG (1000 UT) tablet, Take 1,000 Units by mouth Daily., Disp: , Rfl:   •  Cyanocobalamin (B-12) 1000 MCG tablet, Take 2,500 mcg by mouth Daily., Disp: , Rfl:   •  cyclobenzaprine (FLEXERIL) 10 MG tablet, 1/2-1 qhs prn muscle spasm, Disp: 20 tablet, Rfl: 0  •  folic acid (FOLVITE) 1 MG tablet, Take 4 tablets by mouth Daily., Disp: , Rfl:   •  inFLIXimab (REMICADE) 100 MG injection, Infuse  into a venous catheter 1 (One) Time., Disp: , Rfl:   •  leucovorin 10 MG tablet, Take  by mouth 1 (One) Time Per Week. 24 hrs after MTX dose (to decr oral ulcers), Disp: , Rfl:   •  methotrexate 2.5 MG tablet, Take 8 tablets by mouth 1 (One) Time Per Week., Disp: , Rfl:   •  Omega-3 Fatty Acids (fish oil) 1200 MG capsule capsule, Take 1 capsule by mouth 2 (Two) Times a Day., Disp: , Rfl:   •  predniSONE (DELTASONE) 20 MG tablet, , Disp: , Rfl:   •  simvastatin (ZOCOR) 20 MG tablet, Take 1 tablet by mouth Every Evening., Disp: 90 tablet, Rfl: 3  •  TURMERIC PO, Take  by mouth., Disp: , Rfl:   •  vitamin B-6 (PYRIDOXINE) 50 MG tablet, Take 100 mg by mouth Daily., Disp: , Rfl:   •  Xiidra 5 % ophthalmic solution, , Disp: , Rfl:   •  Omega-3 Fatty Acids (fish oil) 1000 MG capsule capsule, Take 2,000 mg by mouth Daily With Breakfast., Disp: , Rfl:         Physical Exam:     Vitals:     "02/06/23 1023   BP: 136/74   BP Location: Left arm   Patient Position: Sitting   Cuff Size: Adult   Pulse: 64   Resp: 14   Temp: 97.8 °F (36.6 °C)   TempSrc: Infrared   SpO2: 97%   Weight: 65.1 kg (143 lb 9.6 oz)   Height: 165.1 cm (65\")   PainSc:   4   PainLoc: Back        General: Alert and oriented, No acute distress.   HEENT: Normocephalic, atraumatic.   Cardiovascular: No gross edema  Respiratory: Respirations are non-labored    Lumbar Spine:   No masses or atrophy  Range of motion - Flexion normal. Extension normal. Right Lat Bending normal. Left Lat Bending normal  Facet Loading: Positive right  Facet Palpation - positive right  PSIS tenderness -positive right  Son's/DANIELA/Thigh thrust -positive right  Straight leg raise: Negative bilaterally  Slump test: Negative bilaterally    Motor Exam:  Strength: Rate on 1-5 scale Right Left    L1/2- hip flexion 5 5    L3- knee extension 5 5    L4- ankle dorsiflexion 5 5    L5- great toe extension 5 5    S1- ankle plantarflexion 5 5    Sensory Exam: Full and equal sensation to light touch throughout.  Neurologic: Cranial Nerves II-XII are grossly intact.   Clonus -negative bilaterally  Psychiatric: Cooperative.   Gait: Normal   Assistive Devices: None    Imaging Studies:   Results for orders placed during the hospital encounter of 02/03/23    MRI Lumbar Spine Without Contrast    Narrative  MRI LUMBAR SPINE WO CONTRAST    Date of Exam: 2/3/2023 4:11 PM EST    Indication: Low back pain, symptoms persist with > 6 wks treatment.    Comparison: Lumbar spine radiographs 1/24/2023    Technique:  Routine multiplanar/multisequence sequence images of the lumbar spine were obtained without contrast administration.    Findings:  There are 5 lumbar-type vertebral bodies. There is similar smooth levoconvex curvature of the lower lumbar spine centered on L4. No significant spondylolisthesis. The bone marrow signal intensity is within normal limits without focal or suspicious " bony  lesion or evidence of bone marrow edema. No acute fracture. The vertebral body heights are preserved. There are multilevel degenerative changes with level by level assessment as follows:    T12-L1: Normal.    L1-L2: Normal.    L2-L3: Moderate degenerative disc disease with small left eccentric circumferential disc bulge. Mild spinal canal stenosis. No significant neuroforaminal stenosis.    L3-L4: Moderate degenerative disc disease with circumferential disc bulge. Moderate to severe bilateral facet arthropathy and thickening of the ligamentum flavum. Moderate to severe spinal canal stenosis. Mild bilateral neuroforaminal stenosis.    L4-L5: Moderate to severe degenerative disc disease with circumferential disc bulge and moderate right and mild left facet arthropathy and mild thickening of the ligamentum flavum. Mild to moderate spinal canal stenosis. Severe right-sided neuroforaminal  stenosis.    L5-S1: Moderate degenerative disc disease with trace posterior disc bulge. No significant facet arthropathy. No significant spinal canal stenosis. No significant neuroforaminal stenosis.    Normal morphology and signal intensity of the cauda equina and conus medullaris. The conus terminates at the superior endplate of L1. The paravertebral soft tissues are unremarkable. Grossly unremarkable appearance of the partially imaged portions of the  posterior abdomen and pelvis.    Impression  Impression:  Moderate to severe degenerative disc disease and facet arthropathy, worse in the mid and lower lumbar spine contribute into moderate to severe spinal canal stenosis at L3-L4 and mild to moderate spinal canal stenosis at L4-L5. There is severe right-sided  neuroforaminal stenosis at L4-L5. Additional findings above.    Electronically Signed: Shane Beckham  2/3/2023 5:48 PM EST  Workstation ID: [id number]      Impression & Plan:   Ms. Carli Bolanos is a 68 y.o. female with past medical history significant for osteoarthritis  who presents to the pain clinic for evaluation and treatment of chronic right-sided low back pain.  I personally reviewed the patient's lumbar MRI which demonstrates multilevel degenerative disc disease with L3/4 canal stenosis, bilateral neuroforaminal stenosis at L4/5, facet arthropathy worst at right L4/5 with juxtafacet cyst encroaching on the neuroforamen.    On clinical exam her pain is more consistent with SI versus facet pathology.  We will first trial right diagnostic and therapeutic sacroiliac joint injection.  Additionally we discussed right L4/L5/S1 medial branch blocks in the event her SIJ injection is not diagnostic.  If the first blocks provide greater than 80% relief will schedule a second set of medial branch blocks.  If second set of medial branch blocks provides at least 80% relief will schedule rhizotomy.    1. Sacroiliitis (HCC)    2. Spondylosis of lumbar region without myelopathy or radiculopathy        PLAN:  1. Medication Recommendations: Recommend Voltaren topical, NSAIDs, Tylenol.  Can trial turmeric 500 mg twice daily if NSAID contraindicated.    2. Physical Therapy: Continue PT, HEP    3. Psychological: defer    4. Complementary and alternative (CAM) Therapies:     5. Labs: None indicated     6. Imaging: Personally reviewed lumbar MRI with patient and  in room using 3D model to explain pathology    7. Interventions: Schedule right sacroiliac joint steroid injection (CPT 82109).  If ineffective we will proceed with right L4, L5, S1 medial branch blocks.    8. Referrals: None indicated     9. Records requested: n/a    10. Lifestyle goals:    Follow-up 1 month after injection      [facility]  Esa Calle MD

## 2023-02-06 NOTE — TELEPHONE ENCOUNTER
Pt notified and verbalized understanding. She was seen at the pain clinic today for consult and to start injections.

## 2023-02-10 ENCOUNTER — TREATMENT (OUTPATIENT)
Dept: PHYSICAL THERAPY | Facility: CLINIC | Age: 68
End: 2023-02-10
Payer: MEDICARE

## 2023-02-10 DIAGNOSIS — M54.50 LOW BACK PAIN, UNSPECIFIED BACK PAIN LATERALITY, UNSPECIFIED CHRONICITY, UNSPECIFIED WHETHER SCIATICA PRESENT: Primary | ICD-10-CM

## 2023-02-10 PROCEDURE — 97110 THERAPEUTIC EXERCISES: CPT | Performed by: PHYSICAL THERAPIST

## 2023-02-10 PROCEDURE — 97140 MANUAL THERAPY 1/> REGIONS: CPT | Performed by: PHYSICAL THERAPIST

## 2023-02-10 PROCEDURE — 97530 THERAPEUTIC ACTIVITIES: CPT | Performed by: PHYSICAL THERAPIST

## 2023-02-14 NOTE — PROGRESS NOTES
Physical Therapy Daily Treatment Note    [facility]   [address], [address] [place] [address]      Patient: Carli Bolanos   : 1955  Referring practitioner: Self Referring  Date of Initial Visit: Type: THERAPY  Noted: 2022  Today's Date: 2023  Patient seen for 12 sessions    Certification Period 2023 thru 2023       Visit Diagnoses:    ICD-10-CM ICD-9-CM   1. Low back pain, unspecified back pain laterality, unspecified chronicity, unspecified whether sciatica present  M54.50 724.2       Subjective     Pt states that she feels like she is making progress with therapy and she consistently has more good than bad days, but at times she still has some flare-up of symptoms.     Objective   See Exercise, Manual, and Modality Logs for complete treatment.       Assessment/Plan     Pt continues to demonstrate a mild lateral shift, but the severity is decreased. She has a good understanding of self correction techniques and is continuing to progress with her strength and tolerance to activity.       Timed:         Manual Therapy:    12     mins  02519;     Therapeutic Exercise:    10     mins  40069;     Neuromuscular Sharonda:        mins  20176;    Therapeutic Activity:     32     mins  72409;     Gait Training:           mins  31486;     Ultrasound:          mins  97428;    Ionto                                   mins   43158  Self Care                            mins   09877  Canalith Repos         mins 55253  Electrical Stimulation:         mins  26729    Un-Timed:  Electrical Stimulation:         mins  89577 ( );  Dry Needling          mins self-pay  Traction          mins 61176      Timed Treatment:   54   mins   Total Treatment:     54   mins    Robert Kaiser, PT, DPT, OCS, Cert. DN  [place] License: [id number]

## 2023-02-16 ENCOUNTER — TREATMENT (OUTPATIENT)
Dept: PHYSICAL THERAPY | Facility: CLINIC | Age: 68
End: 2023-02-16
Payer: MEDICARE

## 2023-02-16 DIAGNOSIS — M54.50 LOW BACK PAIN, UNSPECIFIED BACK PAIN LATERALITY, UNSPECIFIED CHRONICITY, UNSPECIFIED WHETHER SCIATICA PRESENT: Primary | ICD-10-CM

## 2023-02-16 PROCEDURE — 97530 THERAPEUTIC ACTIVITIES: CPT | Performed by: PHYSICAL THERAPIST

## 2023-02-16 PROCEDURE — 97140 MANUAL THERAPY 1/> REGIONS: CPT | Performed by: PHYSICAL THERAPIST

## 2023-02-16 PROCEDURE — 97112 NEUROMUSCULAR REEDUCATION: CPT | Performed by: PHYSICAL THERAPIST

## 2023-02-22 NOTE — PROGRESS NOTES
Physical Therapy Daily Treatment Note    [facility]   [address] [place] [address]      Patient: Carli Bolanos   : 1955  Referring practitioner: Self Referring  Date of Initial Visit: Type: THERAPY  Noted: 2022  Today's Date: 2023  Patient seen for 13 sessions    Certification Period 2023 thru 2023       Visit Diagnoses:    ICD-10-CM ICD-9-CM   1. Low back pain, unspecified back pain laterality, unspecified chronicity, unspecified whether sciatica present  M54.50 724.2       Subjective     Pt states that she is feeling imrpvoement over the past 2-3 weeks and she has not had any symptoms radiating in to the LE's. Her low back pain has been decreased as well.     Objective   See Exercise, Manual, and Modality Logs for complete treatment.       Assessment/Plan     Pt is progressing well and she continues to demonstrate an improvement in her overall pain level and tolerance to activity. Will cont to progress as indicated.       Timed:         Manual Therapy:    12     mins  86286;     Therapeutic Exercise:         mins  19581;     Neuromuscular Sharonda:    30    mins  06245;    Therapeutic Activity:     13     mins  96833;     Gait Training:           mins  08295;     Ultrasound:          mins  43101;    Ionto                                   mins   34260  Self Care                            mins   94591  Canalith Repos         mins 98674  Electrical Stimulation:         mins  39237    Un-Timed:  Electrical Stimulation:         mins  73518 ( );  Dry Needling          mins self-pay  Traction          mins 75410      Timed Treatment:   55   mins   Total Treatment:     55   mins    Robert Kaiser, PT, DPT, OCS, Cert. DN  [place] License: [id number]

## 2023-02-23 ENCOUNTER — TELEPHONE (OUTPATIENT)
Dept: PAIN MEDICINE | Facility: CLINIC | Age: 68
End: 2023-02-23
Payer: MEDICARE

## 2023-02-23 ENCOUNTER — TREATMENT (OUTPATIENT)
Dept: PHYSICAL THERAPY | Facility: CLINIC | Age: 68
End: 2023-02-23
Payer: MEDICARE

## 2023-02-23 DIAGNOSIS — M54.50 LOW BACK PAIN, UNSPECIFIED BACK PAIN LATERALITY, UNSPECIFIED CHRONICITY, UNSPECIFIED WHETHER SCIATICA PRESENT: Primary | ICD-10-CM

## 2023-02-23 PROCEDURE — 97110 THERAPEUTIC EXERCISES: CPT | Performed by: PHYSICAL THERAPIST

## 2023-02-23 PROCEDURE — 97112 NEUROMUSCULAR REEDUCATION: CPT | Performed by: PHYSICAL THERAPIST

## 2023-02-23 PROCEDURE — 97140 MANUAL THERAPY 1/> REGIONS: CPT | Performed by: PHYSICAL THERAPIST

## 2023-02-23 PROCEDURE — 97530 THERAPEUTIC ACTIVITIES: CPT | Performed by: PHYSICAL THERAPIST

## 2023-02-23 NOTE — TELEPHONE ENCOUNTER
Caller: ROYCE PACHECO    Relationship to patient: SELF    Best call back number: [phone]    Chief complaint: JOINT PAIN    Type of visit: SI INJECTION    Requested date: ???     If rescheduling, when is the original appointment: 02/28/2023 @8:45    Additional notes:PATIENT STATES SHE LEFT A MESSAGE WITH FEDER[facility] ON 02/22/2023

## 2023-02-27 NOTE — PROGRESS NOTES
Physical Therapy Daily Treatment Note    [facility]   [address], [address] [place] [address]      Patient: Carli Bolanos   : 1955  Referring practitioner: Self Referring  Date of Initial Visit: Type: THERAPY  Noted: 2022  Today's Date: 2023  Patient seen for 14 sessions    Certification Period 2023 thru 2023       Visit Diagnoses:    ICD-10-CM ICD-9-CM   1. Low back pain, unspecified back pain laterality, unspecified chronicity, unspecified whether sciatica present  M54.50 724.2       Subjective     Pt states that she is feeling improvement overall and she has less pain in the low back, holger over the past couple of weeks. She reports compliance with her HEP.     Objective   See Exercise, Manual, and Modality Logs for complete treatment.       Assessment/Plan     Pt is progressing well and she tolerates exercise in the clinic without exacerbation of symptoms. Will cont to progress as indicated.       Timed:         Manual Therapy:    10     mins  84553;     Therapeutic Exercise:    13     mins  27068;     Neuromuscular Sharonda:    15    mins  16626;    Therapeutic Activity:     15     mins  30363;     Gait Training:           mins  29962;     Ultrasound:          mins  00498;    Ionto                                   mins   95268  Self Care                            mins   59122  Canalith Repos         mins 85541  Electrical Stimulation:         mins  10088    Un-Timed:  Electrical Stimulation:         mins  99240 ( );  Dry Needling          mins self-pay  Traction          mins 60293      Timed Treatment:   53   mins   Total Treatment:     53   mins    Robert Kaiser, PT, DPT, OCS, Cert. DN  [place] License: [id number]

## 2023-03-02 ENCOUNTER — TREATMENT (OUTPATIENT)
Dept: PHYSICAL THERAPY | Facility: CLINIC | Age: 68
End: 2023-03-02
Payer: MEDICARE

## 2023-03-02 DIAGNOSIS — M54.50 LOW BACK PAIN, UNSPECIFIED BACK PAIN LATERALITY, UNSPECIFIED CHRONICITY, UNSPECIFIED WHETHER SCIATICA PRESENT: Primary | ICD-10-CM

## 2023-03-02 PROCEDURE — 97112 NEUROMUSCULAR REEDUCATION: CPT | Performed by: PHYSICAL THERAPIST

## 2023-03-02 PROCEDURE — 97140 MANUAL THERAPY 1/> REGIONS: CPT | Performed by: PHYSICAL THERAPIST

## 2023-03-02 PROCEDURE — 97110 THERAPEUTIC EXERCISES: CPT | Performed by: PHYSICAL THERAPIST

## 2023-03-02 NOTE — PROGRESS NOTES
Physical Therapy Daily Treatment Note    [facility]   [address], [address] [place] [address]      Patient: Carli Bolanos   : 1955  Referring practitioner: Self Referring  Date of Initial Visit: Type: THERAPY  Noted: 2022  Today's Date: 3/2/2023  Patient seen for 15 sessions    Certification Period 3/2/2023 thru 2023       Visit Diagnoses:    ICD-10-CM ICD-9-CM   1. Low back pain, unspecified back pain laterality, unspecified chronicity, unspecified whether sciatica present  M54.50 724.2       Subjective     Pt states that she has been feeling much better over the past several weeks, but on  she started to have an increase in pain in the right hip/buttock. She did a lot of the side stepping exercise at home and thinks this may have been a factor.     Objective   See Exercise, Manual, and Modality Logs for complete treatment.       Assessment/Plan     Pt responded well to SI correction, stretching, and light exercise in the clinic today and she had a decrease in symptom intensity. No limitation noted with right hip ROM that would indicate any issue with joint prosthesis.       Timed:         Manual Therapy:    10     mins  68014;     Therapeutic Exercise:    30     mins  84668;     Neuromuscular Sharonda:    14    mins  93504;    Therapeutic Activity:          mins  33729;     Gait Training:           mins  92565;     Ultrasound:          mins  58405;    Ionto                                   mins   54466  Self Care                            mins   00904  Canalith Repos         mins 91095  Electrical Stimulation:         mins  38960    Un-Timed:  Electrical Stimulation:         mins  02727 ( );  Dry Needling          mins self-pay  Traction          mins 64082      Timed Treatment:   54   mins   Total Treatment:     54   mins    Robert Kaiser, PT, DPT, OCS, Cert. DN  [place] License: [id number]

## 2023-03-07 ENCOUNTER — OUTSIDE FACILITY SERVICE (OUTPATIENT)
Dept: PAIN MEDICINE | Facility: CLINIC | Age: 68
End: 2023-03-07
Payer: MEDICARE

## 2023-03-07 ENCOUNTER — DOCUMENTATION (OUTPATIENT)
Dept: PAIN MEDICINE | Facility: CLINIC | Age: 68
End: 2023-03-07

## 2023-03-07 PROCEDURE — 27096 INJECT SACROILIAC JOINT: CPT | Performed by: STUDENT IN AN ORGANIZED HEALTH CARE EDUCATION/TRAINING PROGRAM

## 2023-03-07 NOTE — PROGRESS NOTES
.[facility]  [address] [place] [address]    PROCEDURE: Fluoroscopically-Guided Sacroiliac Joint Injection - RIGHT  Sided    PRE-OP DIAGNOSIS: Sacroiliac joint pain  POST-OP DIAGNOSIS: Sacroiliac joint pain    BLOOD THINNERS (ANTIPLATELETS/ANTICOAGULANTS): Were discussed with the patient and KOTA Guidelines were followed.    CONSENT: Risks, benefits and options were explained to the patient, all questions were answered and written informed consent was obtained.     ANESTHESIA: See Anesthesia note.    PROCEDURE NOTE: The patient was placed prone with the abdomen supported by a pillow and all pressure points were padded. Standard ASA monitors were applied. A timeout protocol was performed. Proper protective gear was worn by the physician including a mask, scrub cap, and sterile gloves. The patient's low back and sacral region were prepped with chlorhexidine and draped in a sterile fashion. Fluoroscopic guidance was used to identify the inferior and posterior opening to the right sacroiliac joint. Under intermittent fluoroscopic guidance, the tip of a 25-gauge, 3.5-inch spinal needle with bent tip was advanced toward the inferior aspect of the sacroiliac joint opening and advanced slightly into the joint space. Appropriate needle tip position was confirmed in the AP and lateral view. After negative aspiration, a total of 0.5 mL of Omnipaque was injected with an acceptable arthrogram outlining the sacroiliac joint and no vascular uptake. Next, a mixture containing 40mg methylprednisolone with 2cc lidocaine 1% for a total volume of 3 ml was injected without any complications. The needles were removed and sterile bandages applied at the needle sites. The patient was transferred to the stretcher and taken to recovery in stable condition.    EBL: None    COMPLICATIONS: None    The patient tolerated the procedure well. Vital signs were stable. Sensory and motor exam was unchanged from  baseline. The patient was observed in recovery for 30 minutes and was ambulating at baseline upon leaving the procedure area.    FOLLOW UP: as scheduled   ADDITIONAL NOTES: [n/a]    [facility]  Esa Calle MD

## 2023-03-10 ENCOUNTER — TREATMENT (OUTPATIENT)
Dept: PHYSICAL THERAPY | Facility: CLINIC | Age: 68
End: 2023-03-10
Payer: MEDICARE

## 2023-03-10 DIAGNOSIS — M54.50 LOW BACK PAIN, UNSPECIFIED BACK PAIN LATERALITY, UNSPECIFIED CHRONICITY, UNSPECIFIED WHETHER SCIATICA PRESENT: Primary | ICD-10-CM

## 2023-03-10 PROCEDURE — 97530 THERAPEUTIC ACTIVITIES: CPT | Performed by: PHYSICAL THERAPIST

## 2023-03-10 PROCEDURE — 97112 NEUROMUSCULAR REEDUCATION: CPT | Performed by: PHYSICAL THERAPIST

## 2023-03-10 PROCEDURE — 97110 THERAPEUTIC EXERCISES: CPT | Performed by: PHYSICAL THERAPIST

## 2023-03-13 NOTE — PROGRESS NOTES
Physical Therapy Daily Treatment Note    [facility]   [address] [facility] [address] [place] [address]      Patient: Carli Bolanos   : 1955  Referring practitioner: Self Referring  Date of Initial Visit: Type: THERAPY  Noted: 2022  Today's Date: 3/13/2023  Patient seen for 16 sessions    Certification Period 3/13/2023 thru 6/10/2023       Visit Diagnoses:    ICD-10-CM ICD-9-CM   1. Low back pain, unspecified back pain laterality, unspecified chronicity, unspecified whether sciatica present  M54.50 724.2       Subjective     Pt states that she feels that she is making steady improvement and she has been able to control her symptoms a little better at home.     Objective   See Exercise, Manual, and Modality Logs for complete treatment.       Assessment/Plan     Pt is progressing well and she demonstrates an improvement in her overall strength and tolerace to activity. Will cont to progress as indicated.       Timed:         Manual Therapy:         mins  27612;     Therapeutic Exercise:    14     mins  01438;     Neuromuscular Sharonda:    30    mins  89577;    Therapeutic Activity:     12     mins  71024;     Gait Training:           mins  00756;     Ultrasound:          mins  97561;    Ionto                                   mins   33421  Self Care                            mins   55115  Canalith Repos         mins 61847  Electrical Stimulation:         mins  95073    Un-Timed:  Electrical Stimulation:         mins  84346 ( );  Dry Needling          mins self-pay  Traction          mins 49845      Timed Treatment:   56   mins   Total Treatment:     56   mins    Robert Kaiser, PT, DPT, OCS, Cert. DN  [place] License: [id number]

## 2023-03-17 ENCOUNTER — TREATMENT (OUTPATIENT)
Dept: PHYSICAL THERAPY | Facility: CLINIC | Age: 68
End: 2023-03-17
Payer: MEDICARE

## 2023-03-17 DIAGNOSIS — M54.50 LOW BACK PAIN, UNSPECIFIED BACK PAIN LATERALITY, UNSPECIFIED CHRONICITY, UNSPECIFIED WHETHER SCIATICA PRESENT: Primary | ICD-10-CM

## 2023-03-17 PROCEDURE — 97530 THERAPEUTIC ACTIVITIES: CPT | Performed by: PHYSICAL THERAPIST

## 2023-03-17 PROCEDURE — 97110 THERAPEUTIC EXERCISES: CPT | Performed by: PHYSICAL THERAPIST

## 2023-03-17 PROCEDURE — 97112 NEUROMUSCULAR REEDUCATION: CPT | Performed by: PHYSICAL THERAPIST

## 2023-03-21 NOTE — PROGRESS NOTES
Physical Therapy Daily Treatment Note    [facility]   [address] [facility], [address] [place] [address]      Patient: Carli Bolanos   : 1955  Referring practitioner: Self Referring  Date of Initial Visit: Type: THERAPY  Noted: 2022  Today's Date: 3/20/2023  Patient seen for 17 sessions    Certification Period 3/20/2023 thru 2023       Visit Diagnoses:    ICD-10-CM ICD-9-CM   1. Low back pain, unspecified back pain laterality, unspecified chronicity, unspecified whether sciatica present  M54.50 724.2       Subjective     Pt states that she is feeling a little stiff today but she still feels that overall she has had some relief with treatment.     Objective   See Exercise, Manual, and Modality Logs for complete treatment.       Assessment/Plan     Pt is progressing well and she continues to have a good response to treatment so far. Continuing to progress exercise in the clinic as indicated.       Timed:         Manual Therapy:         mins  10661;     Therapeutic Exercise:    13     mins  61091;     Neuromuscular Sharonda:    30    mins  03828;    Therapeutic Activity:     10     mins  93849;     Gait Training:           mins  91951;     Ultrasound:          mins  99123;    Ionto                                   mins   18492  Self Care                            mins   32987  Canalith Repos         mins 77327  Electrical Stimulation:         mins  69607    Un-Timed:  Electrical Stimulation:         mins  85188 ( );  Dry Needling          mins self-pay  Traction          mins 58569      Timed Treatment:   53   mins   Total Treatment:     53   mins    Robert Kaiser, PT, DPT, OCS, Cert. DN  [place] License: [id number]

## 2023-03-24 ENCOUNTER — TREATMENT (OUTPATIENT)
Dept: PHYSICAL THERAPY | Facility: CLINIC | Age: 68
End: 2023-03-24
Payer: MEDICARE

## 2023-03-24 DIAGNOSIS — M54.50 LOW BACK PAIN, UNSPECIFIED BACK PAIN LATERALITY, UNSPECIFIED CHRONICITY, UNSPECIFIED WHETHER SCIATICA PRESENT: Primary | ICD-10-CM

## 2023-03-24 PROCEDURE — 97110 THERAPEUTIC EXERCISES: CPT | Performed by: PHYSICAL THERAPIST

## 2023-03-24 PROCEDURE — 97112 NEUROMUSCULAR REEDUCATION: CPT | Performed by: PHYSICAL THERAPIST

## 2023-03-24 PROCEDURE — 97140 MANUAL THERAPY 1/> REGIONS: CPT | Performed by: PHYSICAL THERAPIST

## 2023-03-24 NOTE — PROGRESS NOTES
Physical Therapy Daily Treatment Note    [facility]   [address] [facility], [address] [place] [address]      Patient: Carli Bolanos   : 1955  Referring practitioner: Self Referring  Date of Initial Visit: Type: THERAPY  Noted: 2022  Today's Date: 3/24/2023  Patient seen for 18 sessions    Certification Period 3/24/2023 thru 2023       Visit Diagnoses:    ICD-10-CM ICD-9-CM   1. Low back pain, unspecified back pain laterality, unspecified chronicity, unspecified whether sciatica present  M54.50 724.2       Subjective     Pt states that she is feeling good overall and she has had less pain in the low back recently. She has noticed some mild-moderate pain in the front of the right hip, but it has been intermittent.     Objective   See Exercise, Manual, and Modality Logs for complete treatment.       Assessment/Plan     Pt is progressing well with therapy and she had a decrease in anterior hip pain with manual therapy and exercise. Will cont to progress and address hip pain and stenosis symptoms moving forward.       Timed:         Manual Therapy:    12     mins  28655;     Therapeutic Exercise:    10     mins  41394;     Neuromuscular Sharonda:    32    mins  84094;    Therapeutic Activity:          mins  73327;     Gait Training:           mins  68753;     Ultrasound:          mins  44219;    Ionto                                   mins   70294  Self Care                            mins   42327  Canalith Repos         mins 85577  Electrical Stimulation:         mins  01591    Un-Timed:  Electrical Stimulation:         mins  90522 ( );  Dry Needling          mins self-pay  Traction          mins 21546      Timed Treatment:   54   mins   Total Treatment:     54   mins    Robert Kaiser, PT, DPT, OCS, Cert. DN  [place] License: [id number]

## 2023-03-29 ENCOUNTER — OFFICE VISIT (OUTPATIENT)
Dept: PAIN MEDICINE | Facility: CLINIC | Age: 68
End: 2023-03-29
Payer: MEDICARE

## 2023-03-29 VITALS
HEART RATE: 75 BPM | SYSTOLIC BLOOD PRESSURE: 134 MMHG | RESPIRATION RATE: 16 BRPM | TEMPERATURE: 97.1 F | HEIGHT: 65 IN | OXYGEN SATURATION: 98 % | WEIGHT: 142.8 LBS | BODY MASS INDEX: 23.79 KG/M2 | DIASTOLIC BLOOD PRESSURE: 86 MMHG

## 2023-03-29 DIAGNOSIS — M47.816 SPONDYLOSIS OF LUMBAR REGION WITHOUT MYELOPATHY OR RADICULOPATHY: ICD-10-CM

## 2023-03-29 DIAGNOSIS — M46.1 SACROILIITIS: Primary | ICD-10-CM

## 2023-03-29 PROCEDURE — 1125F AMNT PAIN NOTED PAIN PRSNT: CPT | Performed by: STUDENT IN AN ORGANIZED HEALTH CARE EDUCATION/TRAINING PROGRAM

## 2023-03-29 PROCEDURE — 3079F DIAST BP 80-89 MM HG: CPT | Performed by: STUDENT IN AN ORGANIZED HEALTH CARE EDUCATION/TRAINING PROGRAM

## 2023-03-29 PROCEDURE — 3075F SYST BP GE 130 - 139MM HG: CPT | Performed by: STUDENT IN AN ORGANIZED HEALTH CARE EDUCATION/TRAINING PROGRAM

## 2023-03-29 PROCEDURE — 99213 OFFICE O/P EST LOW 20 MIN: CPT | Performed by: STUDENT IN AN ORGANIZED HEALTH CARE EDUCATION/TRAINING PROGRAM

## 2023-03-29 NOTE — PROGRESS NOTES
Primary Physician: Rosa M Venegas MD    CHIEF COMPLAINT or REASON FOR VISIT: Back Pain and Follow-up (Post SI injection)      Initial HPI 2/6/2023:  Ms. Carli Bolanos is 68 y.o. female who presents as a new patient referral for evaluation and treatment of chronic right-sided low back pain with radiation into the buttock.  Patient states that she has had this pain since November 2022.  She has engaged in physical therapy, continues to perform the exercises that she was instructed.  Patient denies any bowel or bladder dysfunction, lower extremity weakness, new onset saddle anesthesia or unexplained weight loss.     Pain is exacerbated with prolonged standing, ameliorated with sitting and rest.  Not associated with ambulation.  Ameliorated with Tylenol.  She did have lumbar and sacroiliac joint x-rays, lumbar MRI demonstrating degenerative disc disease and facet arthropathy.  She denies any pain in her legs beyond the buttock.    Interval history: Patient returns to clinic after undergoing a right SIJ injection on 3/7/2023.  She is accompanied by her .  Does not believe that the injection helped much.    Interventions:  3/7/2023: Right SIJ with minimal transient benefit.    Objective Pain Scoring:   BRIEF PAIN INVENTORY:  Total score:   Pain Score    03/29/23 1258   PainSc:   3   PainLoc: Back  Comment: right hip      PHQ-2: PHQ-2 Total Score: 0  PHQ-9: PHQ-9: Brief Depression Severity Measure Score: 0  Opioid Risk Tool:         Review of Systems:   ROS negative except as otherwise noted     Past Medical History:   Past Medical History:   Diagnosis Date   • Abnormal thyroid ultrasound 07/05/2017    stable MNG (7/5/17) by u/s   • Abnormal thyroid ultrasound 06/12/2015    stable MNG (6/12/15)   • Abnormal thyroid ultrasound 04/18/2014    thyr u/s (4/18/14): MNG except dominant complex mass mid L. lobe, repeat u/s in 6 mos   • Central corneal ulcer of left eye    • Central corneal ulcer of left eye 11/18/2022    • Chronic pain disorder    • Endometrial cancer (HCC)    • H/O uterine leiomyoma     s/p DUSTY/USO ('13)   • History of CT scan 02/27/2016    neg head ct   • Hx of bone density study 06/08/2015    DEXA (6/15) L -0.4, H0.7   • Hx of bone density study 07/17/2018    nl DEXA (7/17/18): L -0.4, H 0.2, repeat 3 yrs   • Hx of bone density study 09/27/2021    DEXA (9/27/21): L 0.6, A -1.8; per Dr. Giles/LEONOR Villa, APRN - repeat 2-3 yrs   • Hx of colonoscopy 03/11/2011    colonosc (3/11/11): diverticulosis, sm int hem, repeat 2018; CRS - Dr. Thornton   • Hx of colonoscopy 10/05/2018    colonosc (10/5/18): diverticulosis, redundant colon, int hem, repeat 5 yrs due to h/o polyps; CRS - Dr. Thornton   • Hx of EMG/NCV 02/27/2008    EMG/NCV (2/27/08): right median neuropathy   • Hx of hand x-ray 01/24/2018    R. hand xray (1/24/18): degen changes 1st metacarpal joint and index PIP   • Hx of MRI L-spine 02/03/2023    MRI L-spine (2/3/23): mod DDD with mod-severe L3-4 spinal stenosis and mild bilat NFS, mild-mod L4-5 spinal stenosis with severe R NFS   • Joint pain 2018    RA   • Low back pain 09/2022   • Lumps on the skin 02/20/2013    R. temple lesion; evaluated by Dr. Amelia Gann 02/20/13 with upcoming excision   • Osteoarthritis     Not sure of date   • Rheumatoid arthritis (HCC) 02/2018   • Right arm numbness 06/13/2016    Impression: improved after CTS surgery per pt; 03/01/2016 - resolved at this time; consider pinched nerve due to positioning during sleep the night before; if sxs recur, rec updated EMG/NCV for further eval;    • Spinal stenosis          Past Surgical History:   Past Surgical History:   Procedure Laterality Date   • APPENDECTOMY  1980    taken out at gallbladder surgery   • CARPAL TUNNEL RELEASE Left     carpel tunnel left    • CERVICAL POLYPECTOMY  03/2005    with h/o fibroids and DUB   • CHOLECYSTECTOMY  1980   • DILATATION AND CURETTAGE  02/22/2013   • EXCISION MASS HEAD/NECK Right 03/05/2013    s/p  excision scalp lesion large R. parietal and L. temporal areas; plastics - Dr. Amelia Gann   • HAND SURGERY      carpal tunnel   • HIP ARTHROSCOPY Right 2004    with subsequent Ivory resurfacing with hip replacement (5/11)   • HYSTERECTOMY     • JOINT REPLACEMENT  2007. 2011    both hips   • ORTHOPEDIC SURGERY     • SALPINGO OOPHORECTOMY Left 1991    secondary to tubal pregnancy and ovarian cyst   • SALPINGO OOPHORECTOMY Right     secondary to tubal pregnancy   • TOTAL HIP ARTHROPLASTY Left 06/2011     ortho Dr. Mcdermott   • TOTAL HIP ARTHROPLASTY Right 05/2011   • TOTAL LAPAROSCOPIC HYSTERECTOMY WITH DAVINCI ROBOT  03/05/2013    RTLH/BSO with LND and omental biopsy for endometrial cancer; GYN Onc - Dr. Malin   • US GUIDED FINE NEEDLE ASPIRATION  05/11/2021         Family History   Family History   Problem Relation Age of Onset   • Dementia Mother    • Cancer Mother         GYN   • Hip fracture Mother    • Thyroid disease Mother    • Hypertension Mother    • Cervical cancer Mother    • Heart attack Father    • Heart failure Father    • Diabetes Father    • Heart disease Father         heart failure   • Prostate cancer Father    • Hypertension Father    • Hypertension Brother    • Heart disease Brother    • Kidney cancer Maternal Grandmother    • Arthritis Maternal Grandmother    • Cancer Maternal Grandmother         Kidney Cancer   • Colon cancer Maternal Grandfather 70   • Cancer Maternal Grandfather         Colon cancer   • Heart failure Paternal Grandmother    • Heart disease Paternal Grandmother    • Arthritis Paternal Grandmother    • Hypertension Paternal Grandmother    • Diabetes Paternal Grandfather    • Colon cancer Paternal Uncle 78   • Breast cancer Neg Hx    • Ovarian cancer Neg Hx          Social History   Social History     Socioeconomic History   • Marital status:    Tobacco Use   • Smoking status: Never   • Smokeless tobacco: Never   Vaping Use   • Vaping Use: Never used   Substance  and Sexual Activity   • Alcohol use: Yes     Alcohol/week: 1.0 standard drink     Types: 1 Glasses of wine per week     Comment: social   • Drug use: No   • Sexual activity: Yes     Partners: Male     Birth control/protection: Surgical        Medications:     Current Outpatient Medications:   •  alendronate (FOSAMAX) 70 MG tablet, Take 1 tablet by mouth Every 7 (Seven) Days., Disp: , Rfl:   •  amLODIPine (NORVASC) 5 MG tablet, Take 1 tablet by mouth Daily., Disp: 90 tablet, Rfl: 3  •  calcium citrate-vitamin d (CITRACAL) 200-250 MG-UNIT tablet tablet, Take  by mouth Daily., Disp: , Rfl:   •  CBD (cannabidiol) oral oil, Take  by mouth., Disp: , Rfl:   •  celecoxib (CELEBREX) 200 MG capsule, Take 1 capsule by mouth Daily As Needed for Mild Pain ., Disp: , Rfl:   •  cholecalciferol (VITAMIN D3) 25 MCG (1000 UT) tablet, Take 1 tablet by mouth Daily., Disp: , Rfl:   •  Cyanocobalamin (B-12) 1000 MCG tablet, Take 2,500 mcg by mouth Daily., Disp: , Rfl:   •  cyclobenzaprine (FLEXERIL) 10 MG tablet, 1/2-1 qhs prn muscle spasm, Disp: 20 tablet, Rfl: 0  •  folic acid (FOLVITE) 1 MG tablet, Take 4 tablets by mouth Daily., Disp: , Rfl:   •  inFLIXimab (REMICADE) 100 MG injection, Infuse  into a venous catheter 1 (One) Time., Disp: , Rfl:   •  leucovorin 10 MG tablet, Take  by mouth 1 (One) Time Per Week. 24 hrs after MTX dose (to decr oral ulcers), Disp: , Rfl:   •  methotrexate 2.5 MG tablet, Take 8 tablets by mouth 1 (One) Time Per Week., Disp: , Rfl:   •  Omega-3 Fatty Acids (fish oil) 1000 MG capsule capsule, Take 2 capsules by mouth Daily With Breakfast., Disp: , Rfl:   •  predniSONE (DELTASONE) 20 MG tablet, , Disp: , Rfl:   •  simvastatin (ZOCOR) 20 MG tablet, Take 1 tablet by mouth Every Evening., Disp: 90 tablet, Rfl: 3  •  TURMERIC PO, Take  by mouth., Disp: , Rfl:   •  vitamin B-6 (PYRIDOXINE) 50 MG tablet, Take 2 tablets by mouth Daily., Disp: , Rfl:   •  Xiidra 5 % ophthalmic solution, , Disp: , Rfl:   •  ascorbic  "acid (VITAMIN C) 500 MG capsule controlled-release CR capsule, , Disp: , Rfl:   •  Omega-3 Fatty Acids (fish oil) 1200 MG capsule capsule, Take 1 capsule by mouth 2 (Two) Times a Day., Disp: , Rfl:         Physical Exam:     Vitals:    03/29/23 1258   BP: 134/86   BP Location: Left arm   Patient Position: Sitting   Cuff Size: Adult   Pulse: 75   Resp: 16   Temp: 97.1 °F (36.2 °C)   TempSrc: Infrared   SpO2: 98%   Weight: 64.8 kg (142 lb 12.8 oz)   Height: 165.1 cm (65\")   PainSc:   3   PainLoc: Back  Comment: right hip        General: Alert and oriented, No acute distress.   HEENT: Normocephalic, atraumatic.   Cardiovascular: No gross edema  Respiratory: Respirations are non-labored    Lumbar Spine:   No masses or atrophy  Range of motion - Flexion normal. Extension normal. Right Lat Bending normal. Left Lat Bending normal  Facet Loading: Positive right  Facet Palpation - positive right  PSIS tenderness -positive right  Son's/DANIELA/Thigh thrust -positive right  Straight leg raise: Negative bilaterally  Slump test: Negative bilaterally    Motor Exam:  Strength: Rate on 1-5 scale Right Left    L1/2- hip flexion 5 5    L3- knee extension 5 5    L4- ankle dorsiflexion 5 5    L5- great toe extension 5 5    S1- ankle plantarflexion 5 5    Sensory Exam: Full and equal sensation to light touch throughout.  Neurologic: Cranial Nerves II-XII are grossly intact.   Clonus -negative bilaterally  Psychiatric: Cooperative.   Gait: Normal   Assistive Devices: None    Imaging Studies:   Results for orders placed during the hospital encounter of 02/03/23    MRI Lumbar Spine Without Contrast    Narrative  MRI LUMBAR SPINE WO CONTRAST    Date of Exam: 2/3/2023 4:11 PM EST    Indication: Low back pain, symptoms persist with > 6 wks treatment.    Comparison: Lumbar spine radiographs 1/24/2023    Technique:  Routine multiplanar/multisequence sequence images of the lumbar spine were obtained without contrast " administration.    Findings:  There are 5 lumbar-type vertebral bodies. There is similar smooth levoconvex curvature of the lower lumbar spine centered on L4. No significant spondylolisthesis. The bone marrow signal intensity is within normal limits without focal or suspicious bony  lesion or evidence of bone marrow edema. No acute fracture. The vertebral body heights are preserved. There are multilevel degenerative changes with level by level assessment as follows:    T12-L1: Normal.    L1-L2: Normal.    L2-L3: Moderate degenerative disc disease with small left eccentric circumferential disc bulge. Mild spinal canal stenosis. No significant neuroforaminal stenosis.    L3-L4: Moderate degenerative disc disease with circumferential disc bulge. Moderate to severe bilateral facet arthropathy and thickening of the ligamentum flavum. Moderate to severe spinal canal stenosis. Mild bilateral neuroforaminal stenosis.    L4-L5: Moderate to severe degenerative disc disease with circumferential disc bulge and moderate right and mild left facet arthropathy and mild thickening of the ligamentum flavum. Mild to moderate spinal canal stenosis. Severe right-sided neuroforaminal  stenosis.    L5-S1: Moderate degenerative disc disease with trace posterior disc bulge. No significant facet arthropathy. No significant spinal canal stenosis. No significant neuroforaminal stenosis.    Normal morphology and signal intensity of the cauda equina and conus medullaris. The conus terminates at the superior endplate of L1. The paravertebral soft tissues are unremarkable. Grossly unremarkable appearance of the partially imaged portions of the  posterior abdomen and pelvis.    Impression  Impression:  Moderate to severe degenerative disc disease and facet arthropathy, worse in the mid and lower lumbar spine contribute into moderate to severe spinal canal stenosis at L3-L4 and mild to moderate spinal canal stenosis at L4-L5. There is severe  right-sided  neuroforaminal stenosis at L4-L5. Additional findings above.    Electronically Signed: Shane Beckham  2/3/2023 5:48 PM EST  Workstation ID: [id number]      Impression & Plan:   2/6/2023: Carli Bolanos is a 68 y.o. female with past medical history significant for osteoarthritis who presents to the pain clinic for evaluation and treatment of chronic right-sided low back pain.  I personally reviewed the patient's lumbar MRI which demonstrates multilevel degenerative disc disease with L3/4 canal stenosis, bilateral neuroforaminal stenosis at L4/5, facet arthropathy worst at right L4/5 with juxtafacet cyst encroaching on the neuroforamen.    On clinical exam her pain is more consistent with SI versus facet pathology.  We will first trial right diagnostic and therapeutic sacroiliac joint injection.  Additionally we discussed right L4/L5/S1 medial branch blocks in the event her SIJ injection is not diagnostic.   3/29/2023: No benefit from right SIJ.  We will proceed with right L4/L5/S1 MBB.    1. Sacroiliitis (HCC)    2. Spondylosis of lumbar region without myelopathy or radiculopathy        PLAN:  1. Medication Recommendations: Recommend Voltaren topical, NSAIDs, Tylenol.  Can trial turmeric 500 mg twice daily if NSAID contraindicated.    2. Physical Therapy: Continue PT, HEP    3. Psychological: defer    4. Complementary and alternative (CAM) Therapies:     5. Labs: None indicated     6. Imaging: None indicated    7. Interventions: Schedule #1 right L4, L5, S1 medial branch blocks (CPT 33066, 35686). If the first blocks provide greater than 80% relief will schedule a second set of medial branch blocks.  If second set of medial branch blocks provides at least 80% relief will schedule rhizotomy.    8. Referrals: None indicated     9. Records requested: n/a    10. Lifestyle goals:    Follow-up 2 to 3 months after rhizotomy      [facility]  Esa Calle MD

## 2023-04-07 ENCOUNTER — TREATMENT (OUTPATIENT)
Dept: PHYSICAL THERAPY | Facility: CLINIC | Age: 68
End: 2023-04-07
Payer: MEDICARE

## 2023-04-07 DIAGNOSIS — M54.50 LOW BACK PAIN, UNSPECIFIED BACK PAIN LATERALITY, UNSPECIFIED CHRONICITY, UNSPECIFIED WHETHER SCIATICA PRESENT: Primary | ICD-10-CM

## 2023-04-07 PROCEDURE — 97110 THERAPEUTIC EXERCISES: CPT | Performed by: PHYSICAL THERAPIST

## 2023-04-07 PROCEDURE — 97112 NEUROMUSCULAR REEDUCATION: CPT | Performed by: PHYSICAL THERAPIST

## 2023-04-07 NOTE — PROGRESS NOTES
Physical Therapy Daily Treatment Note    [facility]   [address], [address] [place] [address]      Patient: Carli Bolanos   : 1955  Referring practitioner: Self Referring  Date of Initial Visit: Type: THERAPY  Noted: 2022  Today's Date: 2023  Patient seen for 19 sessions    Certification Period 2023 thru 2023       Visit Diagnoses:    ICD-10-CM ICD-9-CM   1. Low back pain, unspecified back pain laterality, unspecified chronicity, unspecified whether sciatica present  M54.50 724.2       Subjective     Pt states that she feels like she is doing well overall but she has some soreness on the right side of the low back/hip today. She feels that she walks very stiffly at times and this may contribute to her pain.     Objective   See Exercise, Manual, and Modality Logs for complete treatment.       Assessment/Plan     Worked on improving trunk and hip mobility in the clinic today with focused exercise on the table. Pt responded well and she did not have any exacerbations of pain in the clinic. Will cont to progress as indicated.       Timed:         Manual Therapy:         mins  14477;     Therapeutic Exercise:    24     mins  38961;     Neuromuscular Sharonda:    30    mins  71134;    Therapeutic Activity:          mins  08599;     Gait Training:           mins  43717;     Ultrasound:          mins  74908;    Ionto                                   mins   79484  Self Care                            mins   16409  Canalith Repos         mins 25142  Electrical Stimulation:         mins  28425    Un-Timed:  Electrical Stimulation:         mins  87689 ( );  Dry Needling          mins self-pay  Traction          mins 09551      Timed Treatment:   54   mins   Total Treatment:     54   mins    Robert Kaiser, PT, DPT, OCS, Cert. DN  [place] License: [id number]

## 2023-04-18 ENCOUNTER — TRANSCRIBE ORDERS (OUTPATIENT)
Dept: ADMINISTRATIVE | Facility: HOSPITAL | Age: 68
End: 2023-04-18
Payer: MEDICARE

## 2023-04-18 DIAGNOSIS — Z12.31 BREAST CANCER SCREENING BY MAMMOGRAM: Primary | ICD-10-CM

## 2023-04-21 ENCOUNTER — TREATMENT (OUTPATIENT)
Dept: PHYSICAL THERAPY | Facility: CLINIC | Age: 68
End: 2023-04-21
Payer: MEDICARE

## 2023-04-21 DIAGNOSIS — M54.50 LOW BACK PAIN, UNSPECIFIED BACK PAIN LATERALITY, UNSPECIFIED CHRONICITY, UNSPECIFIED WHETHER SCIATICA PRESENT: Primary | ICD-10-CM

## 2023-04-21 PROCEDURE — 97530 THERAPEUTIC ACTIVITIES: CPT | Performed by: PHYSICAL THERAPIST

## 2023-04-21 PROCEDURE — 97112 NEUROMUSCULAR REEDUCATION: CPT | Performed by: PHYSICAL THERAPIST

## 2023-04-21 PROCEDURE — 97110 THERAPEUTIC EXERCISES: CPT | Performed by: PHYSICAL THERAPIST

## 2023-04-25 ENCOUNTER — DOCUMENTATION (OUTPATIENT)
Dept: PAIN MEDICINE | Facility: CLINIC | Age: 68
End: 2023-04-25

## 2023-04-25 ENCOUNTER — OUTSIDE FACILITY SERVICE (OUTPATIENT)
Dept: PAIN MEDICINE | Facility: CLINIC | Age: 68
End: 2023-04-25
Payer: MEDICARE

## 2023-04-25 DIAGNOSIS — M47.816 SPONDYLOSIS OF LUMBAR REGION WITHOUT MYELOPATHY OR RADICULOPATHY: Primary | ICD-10-CM

## 2023-04-25 NOTE — PROGRESS NOTES
Physical Therapy Daily Treatment Note    [facility]   [address] [facility], [address] [place] [address]      Patient: Carli Bolanos   : 1955  Referring practitioner: Self Referring  Date of Initial Visit: Type: THERAPY  Noted: 2022  Today's Date: 2023  Patient seen for 20 sessions    Certification Period 2023 thru 2023       Visit Diagnoses:    ICD-10-CM ICD-9-CM   1. Low back pain, unspecified back pain laterality, unspecified chronicity, unspecified whether sciatica present  M54.50 724.2       Subjective     Pt states that she is feeling overall improvement, but she continues to have some good and bad days. She usually always feels better after she has been to therapy/     Objective   See Exercise, Manual, and Modality Logs for complete treatment.       Assessment/Plan     Pt is progressing well and she continues to have a positive response, although she is not pain free at this time. Moving forward, we will focus on reinforcing her HEP so that she gets more relief between sessions.       Timed:         Manual Therapy:         mins  97589;     Therapeutic Exercise:    14     mins  30683;     Neuromuscular Sharonda:    30    mins  80433;    Therapeutic Activity:     12     mins  45582;     Gait Training:           mins  13318;     Ultrasound:          mins  37415;    Ionto                                   mins   72249  Self Care                            mins   20011  Canalith Repos         mins 83259  Electrical Stimulation:         mins  64207    Un-Timed:  Electrical Stimulation:         mins  42001 ( );  Dry Needling          mins self-pay  Traction          mins 19550      Timed Treatment:   56   mins   Total Treatment:     56   mins    Robert Kaiser, PT, DPT, OCS, Cert. DN  [place] License: [id number]

## 2023-04-25 NOTE — PROGRESS NOTES
[facility]  [address] [place] [id number]            Esa Calle MD    PROCEDURE: Fluoroscopically-guided right L3, L4, L5 lumbar Medial Branch Nerve Blocks targeting the right L4/5 and L5/S1 facet joints  PRE-OP DIAGNOSIS: Lumbar spondylosis  POST-OP DIAGNOSIS: Lumbar spondylosis    ANTIPLATELET/ANTICOAGULANT STOP DATE: Discussed with the patient held according to KOTA guidelines    CONSENT: Risks, benefits and options were explained to the patient, all questions were answered and written informed consent was obtained.  ANESTHESIA: See anesthesia note  PROCEDURE NOTE:  A pre-procedural time out was performed to confirm the correct patient, procedure, side, and site. A sterile field was prepped in standard fashion using Chlorhexidine and draped with sterile towels. The selected medial branch nerves were identified using an ipsilateral oblique fluoroscopic view. A 25 gauge 3.5 inch spinal needle was advanced using intermittent fluoroscopy toward the junction of the transverse process and superior articulating process targeting the above listed medial branch nerves.  The right L5 primary dorsal ramus nerve was targeted at the junction of the sacral ala and the sacral articular process. Needle placement was confirmed with biplanar fluoroscopic imaging. Following negative aspiration, 1 mL of bupivacaine 0.5% was injected at each location. The needles were re-styletted and withdrawn. The patient's skin was cleaned with alcohol and the injection sites covered with bandages.   EBL: None  COMPLICATIONS: None      The patient was monitored for at least 30 minutes prior to discharge. Vital signs remained stable throughout the procedure and in the recovery area. There were no immediate complications and the patient tolerated the procedure well. Sensory and motor exam was unchanged from baseline. The patient received written discharge instructions prior to discharge.  FOLLOW UP: As  scheduled  ADDITIONAL NOTES: Clinic staff will call to schedule #2 right L4/5 and L5/S1 facet medial branch blocks    [facility]    Esa Calle MD

## 2023-04-27 ENCOUNTER — TELEPHONE (OUTPATIENT)
Dept: PAIN MEDICINE | Facility: CLINIC | Age: 68
End: 2023-04-27
Payer: MEDICARE

## 2023-04-27 NOTE — TELEPHONE ENCOUNTER
FOLLOW-UP CALL AFTER PROCEDURE     I spoke with the patient regarding how she is feeling after her procedure with Dr. Calle. Patient reports she is doing well.      Carli Bolanos underwent a Fluoroscopically-guided right L3, L4, L5 lumbar Medial Branch Nerve Blocks targeting the right L4/5 and L5/S1 facet joints on 4/25/2023.       Pain level before procedure: 8/10    Pain level after procedure: 0/10    Patient reports that the pain relief lasted for multiple hours.    Today, the patient reports pain has returned to baseline after 24 hours      Patient denies side effects or complications   Patient does not have any questions or concerns at this time     Disposition:      I provided information regarding date and time of next procedure. Patient verbalized understanding      Location of procedure: [address] [facility], 1st floor ([facility]) Patient instructed to check in at the registration desk. Patient verbalized understanding      I advised that patient must have a , and that the  must remain in the premises until after the procedure is completed and the patient is ready to be discharged. Patient verbalized understanding.        Reminded NPO status: Nothing by mouth (eat or drink) for at least 8 hours prior to the procedure. Patient can take medications with a sip of water. Patient verbalized understanding

## 2023-04-28 ENCOUNTER — TREATMENT (OUTPATIENT)
Dept: PHYSICAL THERAPY | Facility: CLINIC | Age: 68
End: 2023-04-28
Payer: MEDICARE

## 2023-04-28 DIAGNOSIS — M54.50 LOW BACK PAIN, UNSPECIFIED BACK PAIN LATERALITY, UNSPECIFIED CHRONICITY, UNSPECIFIED WHETHER SCIATICA PRESENT: Primary | ICD-10-CM

## 2023-04-28 PROCEDURE — 97110 THERAPEUTIC EXERCISES: CPT | Performed by: PHYSICAL THERAPIST

## 2023-04-28 PROCEDURE — 97140 MANUAL THERAPY 1/> REGIONS: CPT | Performed by: PHYSICAL THERAPIST

## 2023-04-28 PROCEDURE — 97112 NEUROMUSCULAR REEDUCATION: CPT | Performed by: PHYSICAL THERAPIST

## 2023-05-02 NOTE — PROGRESS NOTES
Physical Therapy Daily Treatment Note    [facility]   [address] [facility], [address] [place] [address]      Patient: Carli Bolanos   : 1955  Referring practitioner: Self Referring  Date of Initial Visit: Type: THERAPY  Noted: 2022  Today's Date: 2023  Patient seen for 21 sessions    Certification Period 2023 thru 2023       Visit Diagnoses:    ICD-10-CM ICD-9-CM   1. Low back pain, unspecified back pain laterality, unspecified chronicity, unspecified whether sciatica present  M54.50 724.2       Subjective     Pt states that she is feeling overall improvement, but she continued to have intermittent pain in the low back and right posterior hip. She reports compliance with her HEP.     Objective   See Exercise, Manual, and Modality Logs for complete treatment.       Assessment/Plan     Pt consistently responds well to manual intervention and to exercise to address possible disc pathology and hip weakness. No exacerbation of symptoms noted with treatment today.       Timed:         Manual Therapy:    10     mins  88498;     Therapeutic Exercise:    14     mins  74246;     Neuromuscular Sharonda:    30    mins  86100;    Therapeutic Activity:          mins  20694;     Gait Training:           mins  57034;     Ultrasound:          mins  18975;    Ionto                                   mins   77675  Self Care                            mins   91055  Canalith Repos         mins 37574  Electrical Stimulation:         mins  15268    Un-Timed:  Electrical Stimulation:         mins  90963 ( );  Dry Needling          mins self-pay  Traction          mins 50796      Timed Treatment:   54   mins   Total Treatment:     54   mins    Robert Kaiser, PT, DPT, OCS, Cert. DN  [place] License: [id number]

## 2023-05-03 ENCOUNTER — TREATMENT (OUTPATIENT)
Dept: PHYSICAL THERAPY | Facility: CLINIC | Age: 68
End: 2023-05-03
Payer: MEDICARE

## 2023-05-03 DIAGNOSIS — M54.50 LOW BACK PAIN, UNSPECIFIED BACK PAIN LATERALITY, UNSPECIFIED CHRONICITY, UNSPECIFIED WHETHER SCIATICA PRESENT: Primary | ICD-10-CM

## 2023-05-03 PROCEDURE — 97110 THERAPEUTIC EXERCISES: CPT | Performed by: PHYSICAL THERAPIST

## 2023-05-03 PROCEDURE — 97530 THERAPEUTIC ACTIVITIES: CPT | Performed by: PHYSICAL THERAPIST

## 2023-05-03 PROCEDURE — 97112 NEUROMUSCULAR REEDUCATION: CPT | Performed by: PHYSICAL THERAPIST

## 2023-05-09 ENCOUNTER — HOSPITAL ENCOUNTER (OUTPATIENT)
Dept: MAMMOGRAPHY | Facility: HOSPITAL | Age: 68
Discharge: HOME | End: 2023-05-09
Admitting: OBSTETRICS & GYNECOLOGY
Payer: MEDICARE

## 2023-05-09 DIAGNOSIS — Z12.31 BREAST CANCER SCREENING BY MAMMOGRAM: ICD-10-CM

## 2023-05-09 PROCEDURE — 77063 BREAST TOMOSYNTHESIS BI: CPT

## 2023-05-09 PROCEDURE — 77067 SCR MAMMO BI INCL CAD: CPT

## 2023-05-09 NOTE — PROGRESS NOTES
Physical Therapy Daily Treatment Note    [facility]   [address] [facility], [address] [place] [address]      Patient: Carli Bolanos   : 1955  Referring practitioner: Self Referring  Date of Initial Visit: Type: THERAPY  Noted: 2022  Today's Date: 2023  Patient seen for 22 sessions    Certification Period 2023 thru 2023       Visit Diagnoses:    ICD-10-CM ICD-9-CM   1. Low back pain, unspecified back pain laterality, unspecified chronicity, unspecified whether sciatica present  M54.50 724.2       Subjective     Pt states that she feels like she is continuing to make improvement with therapy and with her exercises at home. She has less intense pain in the low back and less frequent symptoms in the right LE.     Objective   See Exercise, Manual, and Modality Logs for complete treatment.       Assessment/Plan     Pt is progressing well and she demonstrates a good understanding of her HEP. Reviewed order of exercises for decreasing radicular symptoms. Will cont to progress as indicated.       Timed:         Manual Therapy:         mins  64946;     Therapeutic Exercise:    13     mins  95468;     Neuromuscular Sharonda:    30    mins  05134;    Therapeutic Activity:     12     mins  28358;     Gait Training:           mins  96207;     Ultrasound:          mins  96743;    Ionto                                   mins   74520  Self Care                            mins   85271  Canalith Repos         mins 93376  Electrical Stimulation:         mins  65008    Un-Timed:  Electrical Stimulation:         mins  62860 ( );  Dry Needling          mins self-pay  Traction          mins 65459      Timed Treatment:   55   mins   Total Treatment:     55   mins    Robert Kaiser, PT, DPT, OCS, Cert. DN  [place] License: [id number]

## 2023-05-16 ENCOUNTER — OUTSIDE FACILITY SERVICE (OUTPATIENT)
Dept: PAIN MEDICINE | Facility: CLINIC | Age: 68
End: 2023-05-16
Payer: MEDICARE

## 2023-05-16 ENCOUNTER — DOCUMENTATION (OUTPATIENT)
Dept: PAIN MEDICINE | Facility: CLINIC | Age: 68
End: 2023-05-16

## 2023-05-16 DIAGNOSIS — M47.816 SPONDYLOSIS OF LUMBAR REGION WITHOUT MYELOPATHY OR RADICULOPATHY: Primary | ICD-10-CM

## 2023-05-16 NOTE — PROGRESS NOTES
[facility]  [address] [place] [address]    PROCEDURE: #2 Fluoroscopically-guided right L3, L4, L5 lumbar Medial Branch Nerve Blocks targeting the right L4/5 and L5/S1 facet joints  PRE-OP DIAGNOSIS: Lumbar spondylosis  POST-OP DIAGNOSIS: Lumbar spondylosis    ANTIPLATELET/ANTICOAGULANT STOP DATE: Discussed with the patient held according to KOTA guidelines    CONSENT: Risks, benefits and options were explained to the patient, all questions were answered and written informed consent was obtained.  ANESTHESIA: See anesthesia note  PROCEDURE NOTE:  A pre-procedural time out was performed to confirm the correct patient, procedure, side, and site. A sterile field was prepped in standard fashion using Chlorhexidine and draped with sterile towels. The selected medial branch nerves were identified using an ipsilateral oblique fluoroscopic view. A 25 gauge 3.5 inch spinal needle was advanced using intermittent fluoroscopy toward the junction of the transverse process and superior articulating process targeting the above listed medial branch nerves.  The right L5 primary dorsal ramus nerve was targeted at the junction of the sacral ala and the sacral articular process. Needle placement was confirmed with biplanar fluoroscopic imaging. Following negative aspiration, 1 mL of bupivacaine 0.5% was injected at each location. The needles were re-styletted and withdrawn. The patient's skin was cleaned with alcohol and the injection sites covered with bandages.       EBL: None    COMPLICATIONS: None      The patient was monitored for at least 30 minutes prior to discharge. Vital signs remained stable throughout the procedure and in the recovery area. There were no immediate complications and the patient tolerated the procedure well. Sensory and motor exam was unchanged from baseline. The patient received written discharge instructions prior to discharge.  FOLLOW UP: As scheduled  ADDITIONAL  NOTES: Clinic staff will call to scheduleright L4/5 and L5/S1 facet RFA    [facility]    Esa Calle MD

## 2023-05-17 ENCOUNTER — TELEPHONE (OUTPATIENT)
Dept: PAIN MEDICINE | Facility: CLINIC | Age: 68
End: 2023-05-17
Payer: MEDICARE

## 2023-05-17 NOTE — TELEPHONE ENCOUNTER
FOLLOW-UP CALL AFTER PROCEDURE     I spoke with the patient regarding how he/she is feeling after his/her procedure with Dr. Calle. Patient reports he/she is doing well.      Carli Bolanos underwent a #2 right L4/5 and L5/S1 facet medial branch blocks  on 5/16/2023.       Today, the patient reports 80% pain relief after yesterday's procedure. Patient reports that the pain relief lasted for multiple hours.      Today, the patient reports pain has returned to baseline after 24 hours    Patient denies side effects or complications   Patient does not have any questions or concerns at this time     Disposition:      I provided information regarding date and time of next procedure. Patient verbalized understanding    Location of procedure: [address] [facility], 1st floor ([facility])Patient instructed to check in at the registration desk. Patient verbalized understanding      I advised that patient must have a , and that the  must remain in the premises until after the procedure is completed and the patient is ready to be discharged. Patient verbalized understanding.       Reminded NPO status: Nothing by mouth (eat or drink) for at least 8 hours prior to the procedure. Patient can take medications with a sip of water. Patient verbalized understanding

## 2023-05-19 ENCOUNTER — TREATMENT (OUTPATIENT)
Dept: PHYSICAL THERAPY | Facility: CLINIC | Age: 68
End: 2023-05-19
Payer: MEDICARE

## 2023-05-19 DIAGNOSIS — M54.50 LOW BACK PAIN, UNSPECIFIED BACK PAIN LATERALITY, UNSPECIFIED CHRONICITY, UNSPECIFIED WHETHER SCIATICA PRESENT: Primary | ICD-10-CM

## 2023-05-19 PROCEDURE — 97110 THERAPEUTIC EXERCISES: CPT | Performed by: PHYSICAL THERAPIST

## 2023-05-19 PROCEDURE — 97112 NEUROMUSCULAR REEDUCATION: CPT | Performed by: PHYSICAL THERAPIST

## 2023-05-19 NOTE — PROGRESS NOTES
Physical Therapy Daily Treatment Note    [facility]   [address] [facility], [address] [place] [address]      Patient: Carli Bolanos   : 1955  Referring practitioner: Rosa M Venegas MD  Date of Initial Visit: Type: THERAPY  Noted: 2022  Today's Date: 2023  Patient seen for 23 sessions    Certification Period 2023 thru 2023       Visit Diagnoses:    ICD-10-CM ICD-9-CM   1. Low back pain, unspecified back pain laterality, unspecified chronicity, unspecified whether sciatica present  M54.50 724.2       Subjective     Pt states that she had 3 injections in the low back earlier this week and she feels like she has had a little more pressure in the low back. She has not noticed any decrease in symptoms yet. She will be having a RFA soon as well.     Objective   See Exercise, Manual, and Modality Logs for complete treatment.       Assessment/Plan     Limited extension and weight bearing exercise due to patient having a recent injection. She tolerated treatment well and did not report any increase in symptoms, expect minimal increase with bridges.       Timed:         Manual Therapy:         mins  60739;     Therapeutic Exercise:    25     mins  89113;     Neuromuscular Sharonda:    28    mins  81647;    Therapeutic Activity:          mins  01509;     Gait Training:           mins  27430;     Ultrasound:          mins  84913;    Ionto                                   mins   36214  Self Care                            mins   68916  Canalith Repos         mins 33037  Electrical Stimulation:         mins  66081    Un-Timed:  Electrical Stimulation:         mins  40195 (MC );  Dry Needling          mins self-pay  Traction          mins 11133      Timed Treatment:   53   mins   Total Treatment:     53   mins    Robert Kaiser, PT, DPT, OCS, Cert. DN  [place] License: [id number]

## 2023-05-26 ENCOUNTER — TELEPHONE (OUTPATIENT)
Dept: PAIN MEDICINE | Facility: CLINIC | Age: 68
End: 2023-05-26
Payer: MEDICARE

## 2023-05-26 ENCOUNTER — TREATMENT (OUTPATIENT)
Dept: PHYSICAL THERAPY | Facility: CLINIC | Age: 68
End: 2023-05-26
Payer: MEDICARE

## 2023-05-26 DIAGNOSIS — M54.50 LOW BACK PAIN, UNSPECIFIED BACK PAIN LATERALITY, UNSPECIFIED CHRONICITY, UNSPECIFIED WHETHER SCIATICA PRESENT: Primary | ICD-10-CM

## 2023-05-26 PROCEDURE — 97110 THERAPEUTIC EXERCISES: CPT | Performed by: PHYSICAL THERAPIST

## 2023-05-26 PROCEDURE — 97112 NEUROMUSCULAR REEDUCATION: CPT | Performed by: PHYSICAL THERAPIST

## 2023-05-26 PROCEDURE — 97530 THERAPEUTIC ACTIVITIES: CPT | Performed by: PHYSICAL THERAPIST

## 2023-05-26 NOTE — PROGRESS NOTES
Physical Therapy Daily Treatment Note    [facility]   [address] [facility], [address] [place] [address]      Patient: Carli Bolanos   : 1955  Referring practitioner: Rosa M Venegas MD  Date of Initial Visit: Type: THERAPY  Noted: 2022  Today's Date: 2023  Patient seen for 24 sessions    Certification Period 2023 thru 2023       Visit Diagnoses:    ICD-10-CM ICD-9-CM   1. Low back pain, unspecified back pain laterality, unspecified chronicity, unspecified whether sciatica present  M54.50 724.2       Subjective     Pt states that she is feeling about the same level of pain as before she had the injections in her low back. She is gong to have an ablation next week and will resume therapy after that procedure.     Objective   See Exercise, Manual, and Modality Logs for complete treatment.       Assessment/Plan     Pt was able to perform exercise in the clinic today with minimal increase in discomfort in the low back. She demonstrates a good understanding of corrective procedures and of her established HEP. Will cont as indicated.       Timed:         Manual Therapy:         mins  81479;     Therapeutic Exercise:    14     mins  22538;     Neuromuscular Sharonda:    30    mins  85084;    Therapeutic Activity:     12     mins  38051;     Gait Training:           mins  44791;     Ultrasound:          mins  62116;    Ionto                                   mins   07535  Self Care                            mins   43711  Canalith Repos         mins 61108  Electrical Stimulation:         mins  36588    Un-Timed:  Electrical Stimulation:         mins  12322 (MC );  Dry Needling          mins self-pay  Traction          mins 80055      Timed Treatment:   56   mins   Total Treatment:     56   mins    Robert Kaiser, PT, DPT, OCS, Cert. DN  [place] License: [id number]

## 2023-05-26 NOTE — TELEPHONE ENCOUNTER
"LVM for pt advising that sedation \"isn't required\" and that she has the choice as it is used to make pts comfortable, but it is not required and it is her choice. Any other questions to call the office.    OKAY FOR HUB TO READ ABOVE.  "

## 2023-05-26 NOTE — TELEPHONE ENCOUNTER
Caller: ROYCE PACHECO    Relationship to patient: SELF    Best call back number: [phone]    Patient is needing: PATIENT IS CALLING TO DISCUSS WHY SEDATION IS NECESSARY FOR HER UPCOMING PROEDURE ON 05/30/20/2023

## 2023-05-30 ENCOUNTER — OUTSIDE FACILITY SERVICE (OUTPATIENT)
Dept: PAIN MEDICINE | Facility: CLINIC | Age: 68
End: 2023-05-30

## 2023-05-30 ENCOUNTER — DOCUMENTATION (OUTPATIENT)
Dept: NEUROSURGERY | Facility: CLINIC | Age: 68
End: 2023-05-30

## 2023-05-30 NOTE — PROGRESS NOTES
[facility] [place], operated by [facility]  [address] [place] [address]    PROCEDURE: Fluoroscopically-guided Right L3,4,5 Lumbar Medial Branch Nerve Radiofrequency Ablation (RFA) targeting the Right L4/5 and L5/S1 facet joints     PRE-OP DIAGNOSIS: [Lumbar spondylosis]   POST-OP DIAGNOSIS: [Lumbar spondylosis]     BLOOD THINNERS (ANTIPLATELETS/ANTICOAGULANTS): Were discussed with the patient and KOTA Guidelines were followed.     CONSENT: Risks, benefits and options were explained to the patient, all questions were answered and written informed consent was obtained.     ANESTHESIA:  Moderate sedation was required to maintain comfort, safety, and cooperation during the procedure.  The duration of sedation service was over 10 minutes.  Patient received 2mg IV Versed and 50mcg IV fentanyl.  Independent observation and monitoring was performed by Radha Fontanez RN.  The patient's level of consciousness and physiologic status was continually monitored with pulse oximetry, EKG.  There were no complications or adverse events during sedation.  After the sedation patient was taken to the recovery area.      PROCEDURE NOTE: A pre-procedural time out was performed to confirm the correct patient, procedure, side, and site. A sterile field was prepped in standard fashion using Chlorhexidine and draped with sterile towels. The selected medial branch nerves were identified using an ipsilateral oblique fluoroscopic view. The overlying skin and subcutaneous tissue was anesthetized with 1% lidocaine. A 18 gauge curved 10 cm RFA needle with 10 mm active tip was advanced using intermittent fluoroscopy toward the junction of the transverse process and superior articulating process at the target medial branch nerves. The right L5 dorsal ramus nerve was targeted at the junction of the sacral ala and the sacral articular process. Testing was performed at each level with motor 2 Hz stimulation to ensure absence of nerve root  stimulation. Then 2 ml of 0.5% bupivacaine was injected to anesthetize each medial branch nerve. Continuous lesions were then performed at 80?C for 90 seconds at each location. One lesion was performed at each medial branch nerve location. The needles were withdrawn. The patient’s skin was cleaned with alcohol and the injection sites covered with bandages.     EBL: None     COMPLICATIONS: None       The patient was monitored for at least 30 minutes prior to discharge. Vital signs remained stable throughout the procedure and in the recovery area. There were no immediate complications and the patient tolerated the procedure well. Sensory and motor exam was unchanged from baseline. The patient received written discharge instructions prior to discharge.     FOLLOW UP: As scheduled     ADDITIONAL NOTES:       [facility]  Esa Calle MD

## 2023-06-02 ENCOUNTER — TREATMENT (OUTPATIENT)
Dept: PHYSICAL THERAPY | Facility: CLINIC | Age: 68
End: 2023-06-02

## 2023-06-02 DIAGNOSIS — M54.50 LOW BACK PAIN, UNSPECIFIED BACK PAIN LATERALITY, UNSPECIFIED CHRONICITY, UNSPECIFIED WHETHER SCIATICA PRESENT: Primary | ICD-10-CM

## 2023-06-02 PROCEDURE — 97110 THERAPEUTIC EXERCISES: CPT | Performed by: PHYSICAL THERAPIST

## 2023-06-02 PROCEDURE — 97112 NEUROMUSCULAR REEDUCATION: CPT | Performed by: PHYSICAL THERAPIST

## 2023-06-02 NOTE — PROGRESS NOTES
Physical Therapy Daily Treatment Note    [facility]   [address] [facility], [address] [place] [address]      Patient: Carli Bolanos   : 1955  Referring practitioner: Rosa M Venegas MD  Date of Initial Visit: Type: THERAPY  Noted: 2022  Today's Date: 2023  Patient seen for 25 sessions    Certification Period 2023 thru 2023       Visit Diagnoses:    ICD-10-CM ICD-9-CM   1. Low back pain, unspecified back pain laterality, unspecified chronicity, unspecified whether sciatica present  M54.50 724.2       Subjective     Pt states that she is a little sore from the ablation procedure from earlier this week, but she feels like the soreness is decreasing and may be realizing some decrease in her low back pain intensity.     Objective   See Exercise, Manual, and Modality Logs for complete treatment.       Assessment/Plan     Decreased intensity of exercise in the clinic today due to recent ablation procedure. Pt was given an updated HEP today to complete while she is on vacation for the next couple of weeks.       Timed:         Manual Therapy:         mins  32988;     Therapeutic Exercise:    24     mins  48543;     Neuromuscular Sharonda:    30    mins  70819;    Therapeutic Activity:          mins  65316;     Gait Training:           mins  03427;     Ultrasound:          mins  92350;    Ionto                                   mins   12123  Self Care                            mins   65842  Canalith Repos         mins 88939  Electrical Stimulation:         mins  50652    Un-Timed:  Electrical Stimulation:         mins  69260 (MC );  Dry Needling          mins self-pay  Traction          mins 45307      Timed Treatment:   54   mins   Total Treatment:     54   mins    Robert Kaiser, PT, DPT, OCS, Cert. DN  [place] License: [id number]

## 2023-07-25 ENCOUNTER — LAB (OUTPATIENT)
Dept: LAB | Facility: HOSPITAL | Age: 68
End: 2023-07-25
Payer: MEDICARE

## 2023-07-25 DIAGNOSIS — E05.90 SUBCLINICAL HYPERTHYROIDISM: Chronic | ICD-10-CM

## 2023-07-25 DIAGNOSIS — R73.01 IMPAIRED FASTING GLUCOSE: ICD-10-CM

## 2023-07-25 DIAGNOSIS — M05.79 RHEUMATOID ARTHRITIS INVOLVING MULTIPLE SITES WITH POSITIVE RHEUMATOID FACTOR: ICD-10-CM

## 2023-07-25 DIAGNOSIS — E78.00 PURE HYPERCHOLESTEROLEMIA: Chronic | ICD-10-CM

## 2023-07-25 DIAGNOSIS — E55.9 VITAMIN D DEFICIENCY: ICD-10-CM

## 2023-07-25 LAB
25(OH)D3 SERPL-MCNC: 47.1 NG/ML (ref 30–100)
ALBUMIN SERPL-MCNC: 4.3 G/DL (ref 3.5–5.2)
ALBUMIN/GLOB SERPL: 1.5 G/DL
ALP SERPL-CCNC: 51 U/L (ref 39–117)
ALT SERPL W P-5'-P-CCNC: 15 U/L (ref 1–33)
AMORPH URATE CRY URNS QL MICRO: ABNORMAL /HPF
ANION GAP SERPL CALCULATED.3IONS-SCNC: 10 MMOL/L (ref 5–15)
AST SERPL-CCNC: 16 U/L (ref 1–32)
BACTERIA UR QL AUTO: ABNORMAL /HPF
BASOPHILS # BLD AUTO: 0.04 10*3/MM3 (ref 0–0.2)
BASOPHILS NFR BLD AUTO: 0.5 % (ref 0–1.5)
BILIRUB SERPL-MCNC: 0.2 MG/DL (ref 0–1.2)
BILIRUB UR QL STRIP: NEGATIVE
BUN SERPL-MCNC: 20 MG/DL (ref 8–23)
BUN/CREAT SERPL: 27 (ref 7–25)
CALCIUM SPEC-SCNC: 9.5 MG/DL (ref 8.6–10.5)
CHLORIDE SERPL-SCNC: 107 MMOL/L (ref 98–107)
CHOLEST SERPL-MCNC: 170 MG/DL (ref 0–200)
CK SERPL-CCNC: 32 U/L (ref 20–180)
CLARITY UR: ABNORMAL
CO2 SERPL-SCNC: 27 MMOL/L (ref 22–29)
COD CRY URNS QL: ABNORMAL /HPF
COLOR UR: YELLOW
CREAT SERPL-MCNC: 0.74 MG/DL (ref 0.57–1)
CRP SERPL-MCNC: <0.3 MG/DL (ref 0–0.5)
DEPRECATED RDW RBC AUTO: 41.7 FL (ref 37–54)
EGFRCR SERPLBLD CKD-EPI 2021: 87.2 ML/MIN/1.73
EOSINOPHIL # BLD AUTO: 0.03 10*3/MM3 (ref 0–0.4)
EOSINOPHIL NFR BLD AUTO: 0.4 % (ref 0.3–6.2)
ERYTHROCYTE [DISTWIDTH] IN BLOOD BY AUTOMATED COUNT: 13.2 % (ref 12.3–15.4)
ERYTHROCYTE [SEDIMENTATION RATE] IN BLOOD: 41 MM/HR (ref 0–30)
GLOBULIN UR ELPH-MCNC: 2.9 GM/DL
GLUCOSE SERPL-MCNC: 81 MG/DL (ref 65–99)
GLUCOSE UR STRIP-MCNC: NEGATIVE MG/DL
HBA1C MFR BLD: 5.9 % (ref 4.8–5.6)
HCT VFR BLD AUTO: 38 % (ref 34–46.6)
HDLC SERPL-MCNC: 93 MG/DL (ref 40–60)
HGB BLD-MCNC: 12.6 G/DL (ref 12–15.9)
HGB UR QL STRIP.AUTO: NEGATIVE
HYALINE CASTS UR QL AUTO: ABNORMAL /LPF
IMM GRANULOCYTES # BLD AUTO: 0.05 10*3/MM3 (ref 0–0.05)
IMM GRANULOCYTES NFR BLD AUTO: 0.6 % (ref 0–0.5)
KETONES UR QL STRIP: ABNORMAL
LDLC SERPL CALC-MCNC: 63 MG/DL (ref 0–100)
LDLC/HDLC SERPL: 0.67 {RATIO}
LEUKOCYTE ESTERASE UR QL STRIP.AUTO: ABNORMAL
LYMPHOCYTES # BLD AUTO: 2.58 10*3/MM3 (ref 0.7–3.1)
LYMPHOCYTES NFR BLD AUTO: 31.2 % (ref 19.6–45.3)
MCH RBC QN AUTO: 29.1 PG (ref 26.6–33)
MCHC RBC AUTO-ENTMCNC: 33.2 G/DL (ref 31.5–35.7)
MCV RBC AUTO: 87.8 FL (ref 79–97)
MONOCYTES # BLD AUTO: 0.57 10*3/MM3 (ref 0.1–0.9)
MONOCYTES NFR BLD AUTO: 6.9 % (ref 5–12)
NEUTROPHILS NFR BLD AUTO: 4.99 10*3/MM3 (ref 1.7–7)
NEUTROPHILS NFR BLD AUTO: 60.4 % (ref 42.7–76)
NITRITE UR QL STRIP: NEGATIVE
NRBC BLD AUTO-RTO: 0 /100 WBC (ref 0–0.2)
PH UR STRIP.AUTO: 5.5 [PH] (ref 5–8)
PLATELET # BLD AUTO: 293 10*3/MM3 (ref 140–450)
PMV BLD AUTO: 9.8 FL (ref 6–12)
POTASSIUM SERPL-SCNC: 4.2 MMOL/L (ref 3.5–5.2)
PROT SERPL-MCNC: 7.2 G/DL (ref 6–8.5)
PROT UR QL STRIP: ABNORMAL
RBC # BLD AUTO: 4.33 10*6/MM3 (ref 3.77–5.28)
RBC # UR STRIP: ABNORMAL /HPF
REF LAB TEST METHOD: ABNORMAL
SODIUM SERPL-SCNC: 144 MMOL/L (ref 136–145)
SP GR UR STRIP: 1.02 (ref 1–1.03)
SQUAMOUS #/AREA URNS HPF: ABNORMAL /HPF
T4 FREE SERPL-MCNC: 1.5 NG/DL (ref 0.93–1.7)
TRIGL SERPL-MCNC: 75 MG/DL (ref 0–150)
TSH SERPL DL<=0.05 MIU/L-ACNC: 0.09 UIU/ML (ref 0.27–4.2)
UROBILINOGEN UR QL STRIP: ABNORMAL
VLDLC SERPL-MCNC: 14 MG/DL (ref 5–40)
WBC # UR STRIP: ABNORMAL /HPF
WBC NRBC COR # BLD: 8.26 10*3/MM3 (ref 3.4–10.8)

## 2023-07-25 PROCEDURE — 82550 ASSAY OF CK (CPK): CPT

## 2023-07-25 PROCEDURE — 80061 LIPID PANEL: CPT

## 2023-07-25 PROCEDURE — 80053 COMPREHEN METABOLIC PANEL: CPT

## 2023-07-25 PROCEDURE — 84443 ASSAY THYROID STIM HORMONE: CPT

## 2023-07-25 PROCEDURE — 82306 VITAMIN D 25 HYDROXY: CPT

## 2023-07-25 PROCEDURE — 86140 C-REACTIVE PROTEIN: CPT

## 2023-07-25 PROCEDURE — 85025 COMPLETE CBC W/AUTO DIFF WBC: CPT

## 2023-07-25 PROCEDURE — 83036 HEMOGLOBIN GLYCOSYLATED A1C: CPT

## 2023-07-25 PROCEDURE — 81001 URINALYSIS AUTO W/SCOPE: CPT

## 2023-07-25 PROCEDURE — 84439 ASSAY OF FREE THYROXINE: CPT

## 2023-07-25 PROCEDURE — 85652 RBC SED RATE AUTOMATED: CPT

## 2023-07-26 DIAGNOSIS — E05.90 SUBCLINICAL HYPERTHYROIDISM: ICD-10-CM

## 2023-07-26 DIAGNOSIS — E05.90 SUBCLINICAL HYPERTHYROIDISM: Primary | ICD-10-CM

## 2023-08-01 ENCOUNTER — OFFICE VISIT (OUTPATIENT)
Dept: INTERNAL MEDICINE | Facility: CLINIC | Age: 68
End: 2023-08-01
Payer: MEDICARE

## 2023-08-01 ENCOUNTER — TELEPHONE (OUTPATIENT)
Dept: INTERNAL MEDICINE | Facility: CLINIC | Age: 68
End: 2023-08-01

## 2023-08-01 VITALS
DIASTOLIC BLOOD PRESSURE: 82 MMHG | SYSTOLIC BLOOD PRESSURE: 132 MMHG | HEIGHT: 65 IN | BODY MASS INDEX: 23.32 KG/M2 | WEIGHT: 140 LBS | OXYGEN SATURATION: 99 % | HEART RATE: 64 BPM

## 2023-08-01 DIAGNOSIS — G89.29 CHRONIC PAIN OF BOTH SHOULDERS: ICD-10-CM

## 2023-08-01 DIAGNOSIS — M25.511 CHRONIC PAIN OF BOTH SHOULDERS: ICD-10-CM

## 2023-08-01 DIAGNOSIS — R21: ICD-10-CM

## 2023-08-01 DIAGNOSIS — I10 ESSENTIAL HYPERTENSION: Chronic | ICD-10-CM

## 2023-08-01 DIAGNOSIS — R73.01 IMPAIRED FASTING GLUCOSE: Chronic | ICD-10-CM

## 2023-08-01 DIAGNOSIS — L82.1 SEBORRHEIC KERATOSIS: ICD-10-CM

## 2023-08-01 DIAGNOSIS — M25.512 CHRONIC PAIN OF BOTH SHOULDERS: ICD-10-CM

## 2023-08-01 DIAGNOSIS — M85.89 OSTEOPENIA OF MULTIPLE SITES: Chronic | ICD-10-CM

## 2023-08-01 DIAGNOSIS — H16.003 PERIPHERAL ULCERATIVE KERATITIS OF BOTH EYES: ICD-10-CM

## 2023-08-01 DIAGNOSIS — E05.90 SUBCLINICAL HYPERTHYROIDISM: Chronic | ICD-10-CM

## 2023-08-01 DIAGNOSIS — Z00.00 MEDICARE ANNUAL WELLNESS VISIT, SUBSEQUENT: Primary | Chronic | ICD-10-CM

## 2023-08-01 DIAGNOSIS — E55.9 VITAMIN D DEFICIENCY: ICD-10-CM

## 2023-08-01 DIAGNOSIS — E78.00 PURE HYPERCHOLESTEROLEMIA: Chronic | ICD-10-CM

## 2023-08-01 DIAGNOSIS — H61.21 EXCESSIVE CERUMEN IN EAR CANAL, RIGHT: ICD-10-CM

## 2023-08-01 PROCEDURE — 99214 OFFICE O/P EST MOD 30 MIN: CPT | Performed by: INTERNAL MEDICINE

## 2023-08-01 PROCEDURE — 93000 ELECTROCARDIOGRAM COMPLETE: CPT | Performed by: INTERNAL MEDICINE

## 2023-08-01 PROCEDURE — 3079F DIAST BP 80-89 MM HG: CPT | Performed by: INTERNAL MEDICINE

## 2023-08-01 PROCEDURE — 3075F SYST BP GE 130 - 139MM HG: CPT | Performed by: INTERNAL MEDICINE

## 2023-08-01 PROCEDURE — G0439 PPPS, SUBSEQ VISIT: HCPCS | Performed by: INTERNAL MEDICINE

## 2023-08-01 PROCEDURE — 1159F MED LIST DOCD IN RCRD: CPT | Performed by: INTERNAL MEDICINE

## 2023-08-01 PROCEDURE — 1170F FXNL STATUS ASSESSED: CPT | Performed by: INTERNAL MEDICINE

## 2023-08-01 PROCEDURE — 1160F RVW MEDS BY RX/DR IN RCRD: CPT | Performed by: INTERNAL MEDICINE

## 2023-08-01 PROCEDURE — 69210 REMOVE IMPACTED EAR WAX UNI: CPT | Performed by: INTERNAL MEDICINE

## 2023-08-01 RX ORDER — CYCLOBENZAPRINE HCL 10 MG
TABLET ORAL
Qty: 20 TABLET | Refills: 0 | Status: SHIPPED | OUTPATIENT
Start: 2023-08-01

## 2023-08-01 RX ORDER — PREDNISONE 10 MG/1
10 TABLET ORAL DAILY
COMMUNITY

## 2023-08-01 RX ORDER — SIMVASTATIN 20 MG
20 TABLET ORAL EVERY EVENING
Qty: 90 TABLET | Refills: 3 | Status: SHIPPED | OUTPATIENT
Start: 2023-08-01

## 2023-08-01 RX ORDER — AMLODIPINE BESYLATE 5 MG/1
5 TABLET ORAL DAILY
Qty: 90 TABLET | Refills: 3 | Status: SHIPPED | OUTPATIENT
Start: 2023-08-01

## 2023-08-31 ENCOUNTER — LAB (OUTPATIENT)
Dept: LAB | Facility: HOSPITAL | Age: 68
End: 2023-08-31
Payer: MEDICARE

## 2023-08-31 DIAGNOSIS — E05.90 SUBCLINICAL HYPERTHYROIDISM: ICD-10-CM

## 2023-08-31 LAB — TSH SERPL DL<=0.05 MIU/L-ACNC: 0.18 UIU/ML (ref 0.27–4.2)

## 2023-08-31 PROCEDURE — 84443 ASSAY THYROID STIM HORMONE: CPT

## 2023-09-25 ENCOUNTER — TELEPHONE (OUTPATIENT)
Dept: PAIN MEDICINE | Facility: CLINIC | Age: 68
End: 2023-09-25

## 2023-09-25 NOTE — TELEPHONE ENCOUNTER
----- Message from Esa Calle MD sent at 9/21/2023  1:10 PM EDT -----  Please schedule patient for repeat RFA 6 months from her original RFA (November).  SVETLANA

## 2023-09-26 DIAGNOSIS — M47.816 SPONDYLOSIS OF LUMBAR REGION WITHOUT MYELOPATHY OR RADICULOPATHY: Primary | ICD-10-CM

## 2023-09-27 ENCOUNTER — TELEPHONE (OUTPATIENT)
Dept: PAIN MEDICINE | Facility: CLINIC | Age: 68
End: 2023-09-27
Payer: MEDICARE

## 2023-09-27 NOTE — TELEPHONE ENCOUNTER
Called the patient and provided her with the date and time of her follow up visit with Dr. Calle after her RFTC. Patient voiced understanding.

## 2023-10-23 PROBLEM — D12.6 TUBULAR ADENOMA OF COLON: Status: ACTIVE | Noted: 2023-10-23

## 2023-10-23 PROBLEM — D12.6 TUBULAR ADENOMA OF COLON: Chronic | Status: ACTIVE | Noted: 2023-10-23

## 2023-12-07 ENCOUNTER — OUTSIDE FACILITY SERVICE (OUTPATIENT)
Dept: PAIN MEDICINE | Facility: CLINIC | Age: 68
End: 2023-12-07
Payer: MEDICARE

## 2023-12-07 ENCOUNTER — DOCUMENTATION (OUTPATIENT)
Dept: PAIN MEDICINE | Facility: CLINIC | Age: 68
End: 2023-12-07

## 2023-12-07 NOTE — PROGRESS NOTES
PROCEDURE: Fluoroscopically-guided right L3, L4, L5 lumbar Medial Branch Nerve Radiofrequency Ablation (RFA) targeting the right L4/5 and L5/S1 facet joints     PRE-OP DIAGNOSIS: [Lumbar spondylosis]   POST-OP DIAGNOSIS: [Lumbar spondylosis]     BLOOD THINNERS (ANTIPLATELETS/ANTICOAGULANTS): Were discussed with the patient and KOTA Guidelines were followed.     CONSENT: Risks, benefits and options were explained to the patient, all questions were answered and written informed consent was obtained.     ANESTHESIA: Moderate sedation was required to maintain comfort, safety, and cooperation during the procedure.  The duration of sedation service was over 10 minutes.  Patient received 1 mg IV Versed and 50 mcg IV fentanyl.  Independent observation and monitoring was performed by JOCELYN Mckeon, RN.  The patient's level of consciousness and physiologic status was continually monitored with pulse oximetry, EKG from 749 to 813.  There were no complications or adverse events during sedation.  After the sedation patient was taken to the recovery area.      PROCEDURE NOTE: A pre-procedural time out was performed to confirm the correct patient, procedure, side, and site. A sterile field was prepped in standard fashion using Chlorhexidine and draped with sterile towels. The selected medial branch nerves were identified using an ipsilateral oblique fluoroscopic view. The overlying skin and subcutaneous tissue was anesthetized with 1% lidocaine. A 18 gauge curved 10 cm RFA needle with 10 mm active tip was advanced using intermittent fluoroscopy toward the junction of the transverse process and superior articulating process at the target medial branch nerves.  The right L5 dorsal ramus nerve was targeted at the junction of the sacral ala and the sacral articular process. Testing was performed at each level with motor 2 Hz stimulation to ensure absence of nerve  root stimulation. Then 2 ml of 0.5% bupivacaine was injected to  anesthetize each medial branch nerve. Continuous lesions were then performed at 80?C for 90 seconds at each location.  2 lesions were performed at each medial branch nerve location. The needles were withdrawn. The patient’s skin was cleaned with alcohol and the injection sites covered with bandages.     EBL: None     COMPLICATIONS: None       The patient was monitored in the recovery area until appropriate discharge criteria were met. Vital signs remained stable throughout the procedure and in the recovery area. There were no immediate complications and the patient tolerated the procedure well. Sensory and motor exam was unchanged from baseline. The patient received written discharge instructions prior to discharge.     FOLLOW UP: As scheduled     ADDITIONAL NOTES:       [facility]  Esa Calle MD       Codes:  76078  49333  32457

## 2023-12-13 ENCOUNTER — TELEPHONE (OUTPATIENT)
Dept: PAIN MEDICINE | Facility: CLINIC | Age: 68
End: 2023-12-13
Payer: MEDICARE

## 2023-12-13 NOTE — TELEPHONE ENCOUNTER
Called the patient and explained to her that her follow up visit with Dr. Calle had been moved due to the fact that she had rescheduled her ablation, per our converstaion documented in the telephone encounter on 9/27/2023.

## 2024-01-09 ENCOUNTER — TELEPHONE (OUTPATIENT)
Dept: PAIN MEDICINE | Facility: CLINIC | Age: 69
End: 2024-01-09
Payer: MEDICARE

## 2024-01-09 NOTE — TELEPHONE ENCOUNTER
----- Message from Esa Calle MD sent at 1/9/2024  4:57 PM EST -----  Regarding: FW: Sacroiliac Nerve Ablation  Contact: [phone]  Please get her an appointment as soon as possible with meliton  ----- Message -----  From: Akiko Ambrosio MA  Sent: 1/9/2024   3:27 PM EST  To: Esa Calle MD  Subject: FW: Sacroiliac Nerve Ablation                      ----- Message -----  From: Carli Bolanos  Sent: 1/9/2024  12:31 PM EST  To: [facility] Clinical [facility]  Subject: Sacroiliac Nerve Ablation                        The nerve ablation of my right sacroiliac that was done on December 7th did not help alleviate the pain this time.  I waited a month to see if it would get better, but it hasn't.  I get really bad spasms just standing or walking.  The pain is about an 8 on the pain scale.   Please let me know if there's anything that can be done at this point to alleviate the pain.      Thank you,  Carli Bolanos

## 2024-01-10 ENCOUNTER — OFFICE VISIT (OUTPATIENT)
Dept: ENDOCRINOLOGY | Facility: CLINIC | Age: 69
End: 2024-01-10
Payer: MEDICARE

## 2024-01-10 VITALS
BODY MASS INDEX: 24.16 KG/M2 | OXYGEN SATURATION: 99 % | WEIGHT: 145 LBS | SYSTOLIC BLOOD PRESSURE: 120 MMHG | DIASTOLIC BLOOD PRESSURE: 66 MMHG | HEIGHT: 65 IN | HEART RATE: 67 BPM

## 2024-01-10 DIAGNOSIS — E04.2 MULTINODULAR GOITER: ICD-10-CM

## 2024-01-10 DIAGNOSIS — E05.90 SUBCLINICAL HYPERTHYROIDISM: Primary | ICD-10-CM

## 2024-01-10 LAB
T3 SERPL-MCNC: 92.6 NG/DL (ref 80–200)
T4 FREE SERPL-MCNC: 1.29 NG/DL (ref 0.93–1.7)
TSH SERPL DL<=0.05 MIU/L-ACNC: 0.21 UIU/ML (ref 0.27–4.2)

## 2024-01-10 PROCEDURE — 36415 COLL VENOUS BLD VENIPUNCTURE: CPT | Performed by: INTERNAL MEDICINE

## 2024-01-10 PROCEDURE — 3078F DIAST BP <80 MM HG: CPT | Performed by: INTERNAL MEDICINE

## 2024-01-10 PROCEDURE — 3074F SYST BP LT 130 MM HG: CPT | Performed by: INTERNAL MEDICINE

## 2024-01-10 PROCEDURE — 84439 ASSAY OF FREE THYROXINE: CPT | Performed by: INTERNAL MEDICINE

## 2024-01-10 PROCEDURE — 99213 OFFICE O/P EST LOW 20 MIN: CPT | Performed by: INTERNAL MEDICINE

## 2024-01-10 PROCEDURE — 84443 ASSAY THYROID STIM HORMONE: CPT | Performed by: INTERNAL MEDICINE

## 2024-01-10 PROCEDURE — 84480 ASSAY TRIIODOTHYRONINE (T3): CPT | Performed by: INTERNAL MEDICINE

## 2024-01-10 RX ORDER — PILOCARPINE HYDROCHLORIDE 5 MG/1
5 TABLET, FILM COATED ORAL 3 TIMES DAILY
COMMUNITY

## 2024-01-10 NOTE — PROGRESS NOTES
Chief Complaint   Patient presents with    Subclinical hyperthyroidism     Follow up       HPI:   Carli Bolanos is a 68 y.o.female who returns to Endocrine Clinic for f/u evaluation of her subclinical hyperthyroidism and multinodular goiter. Last visit 01/04/2023. Her history is as follows:    Interim Events:   - pt is now followed at [facility]. Is on Remicade q 8 weeks.   - Corneal ulceration has healed. Still on prednisone taper that began in 10/2022. Currently taking prednisone 2 mg daily  - is on Fosamax  - Is s/p right L4/5 and L5/S1 facet radio frequency ablation twice in 2023    1) subclinical hyperthyroidism due to MNG:  - per chart review, pt has had a slightly decreased TSH with normal free T4 and T3 levels since 2014. TSH levels ranging (0.171 - 0.525)  (07/25/2023) Her TSH was suppressed to 0.092; however, pt was taking prednisone 20 mg daily at that time.     - on further review, pt denies palpitations. Wt stable. No changes in bowel habits. No tremor  - is not on a biotin supplement     2) multinodular goiter:  - first found on physical exam in 2014. Pt has had Thyroid US's completed at  radiology in 04/2014, 06/2015, and 07/2017  - no prior FNA  FMH: no known thyroid cancer, mother had thyroid disease  PMH: no h/o irradiation of head, neck, or chest    Summary of imaging with  radiology:  (04/2014): R mid - 1.9 x 2.7, R inf - 1.5 x 1.9, L mid 1.7 x 2.5 complex nodules  (06/2015): Report states stable nodules. Size and appearance of nodules are not described.  (07/2017): R sup- 2.1 x 1.0 x 1.4, R mid 2.3 x 1.7 x 2.6, L mid 2.3 x 1.8 x 2.3 complex nodules    Initial Endocrine Visit: (01/2019)  Thyroid US completed in clinic (01/02/2019) showed the thyroid gland was enlarged by measured dimensions. Findings were consistent with a multinodular goiter. Multiple isoechoic, spongiform nodules were identified in both lobes as described in the full report. These nodules lacked suspicious US  "characteristics and did not meet criteria for FNA. These nodules appeared similar to prior outside imaging completed from 2014 - 2017.   RECOMMENDATIONS: These nodules lacked suspicious US characteristics and did not meet criteria for FNA. Given pt's h/o subclinical hyperthyroidism, recommended NM thyroid uptake and scan for further characterization of the nodules.     A NM thyroid uptake and scan was completed at  radiology on 02/12/2019:  \"Four hour thyroid uptake at the lower limits of normal at 11%. 24-hour thyroid uptake is also in the lower range of normal at 25%. IMPRESSION: 4 hour and 24 hour thyroid uptake both at lower limits of normal. Please correlate with patient's serum thyroid profile.Heterogeneous thyroid uptake pattern, with relatively large right thyroid lobe, and questionable small cold nodule possibly a cyst of the left lateral mid thyroid pole.\"    (01/2020) Thyroid US completed in clinic 01/06/2020 showed:  The thyroid gland was enlarged by measured dimensions. Findings were consistent with a multinodular goiter.    Multiple isoechoic, spongiform nodules were identified in both lobes as described above. These nodules lacked suspicious US characteristics and did not meet criteria for FNA.   These nodules appeared similar to prior outside imaging completed from 2014 - 2019.   In the left mid lobe, a 2.42(L) x 2.06(AP) x 2.13(T) cm isoechoic, spongiform nodule was again identified. The nodule had a smooth margin, peripheral and intranodal vascularity, and no microcalcifications. Multiple comet tail artifacts consistent with colloid crystals were present. This nodule had a peripheral cystic area that correlated with the \"questionable small cold area\" in the left lateral mid thyroid pole seen on NM thyroid uptake and scan in 02/2019.   RECOMMENDATIONS: These nodules lacked suspicious US characteristics and did not meet criteria for FNA. Repeat Thyroid US recommended if changes in the gland/neck " "are noted on physical exam.     Discussed plan with patient: follow yearly in regards to her subclinical hyperthyroidism    Other Medical History:  - 10/2022: developed a corneal ulceration. Was treated with high dose prednisone. Started at 40 mg prednisone and slowly tapered over > 15 months.  Pt's rheumatologist started fosamax in 2022.    - Is s/p right L4/5 and L5/S1 facet radio frequency ablation twice in 2023      Review of Systems   Constitutional:         Weight stable   HENT: Negative.     Eyes:         Dry eyes   Respiratory: Negative.     Cardiovascular:  Negative for palpitations.   Gastrointestinal: Negative.    Endocrine: Negative.    Genitourinary: Negative.    Musculoskeletal:  Positive for arthralgias and myalgias.   Skin: Negative.    Allergic/Immunologic: Negative.    Neurological: Negative.    Hematological: Negative.    Psychiatric/Behavioral: Negative.       The following portions of the patient's history were reviewed and updated as appropriate: allergies, current medications, past family history, past medical history, past social history, past surgical history and problem list.    /66   Pulse 67   Ht 165.1 cm (65\")   Wt 65.8 kg (145 lb)   SpO2 99%   BMI 24.13 kg/m²   Physical Exam   Constitutional: She is oriented to person, place, and time. She appears well-developed. No distress.   HENT:   Head: Normocephalic.   Eyes: Pupils are equal, round, and reactive to light. Conjunctivae are normal.   Neck: No tracheal deviation present. Thyromegaly present.   Distinct nodules difficult to discern. Diffuse goiter with right lobe larger than the left lobe.     Cardiovascular: Normal rate, regular rhythm and normal heart sounds.   No murmur heard.  Pulmonary/Chest: Effort normal and breath sounds normal. No respiratory distress.   Abdominal: Soft. Bowel sounds are normal. She exhibits no mass. There is no abdominal tenderness.   Lymphadenopathy:     She has no cervical adenopathy. "   Neurological: She is alert and oriented to person, place, and time. No cranial nerve deficit.   Skin: Skin is warm and dry. She is not diaphoretic. No erythema.   Psychiatric: Her behavior is normal.   Vitals reviewed.    LABS/IMAGING: outside records reviewed and summarized in HPI  Office Visit on 01/10/2024   Component Date Value Ref Range Status    TSH 01/10/2024 0.211 (L)  0.270 - 4.200 uIU/mL Final    Free T4 01/10/2024 1.29  0.93 - 1.70 ng/dL Final    T3, Total 01/10/2024 92.6  80.0 - 200.0 ng/dl Final       ASSESSMENT/PLAN:  1) subclinical hyperthyroidism due to multinodular goiter:  - labs completed today again show a slight decrease in TSH. The free T4 and Total T3 levels are normal. Overall she appeared clinically euthyroid on exam.    - Have reviewed with patient that there are no definitive guidelines on the management of subclinical hyperthyroidism as there is very limited clinical data on the long term benefits of treatment. However, for patients with endogenous subclinical hyperthyroidism at high risk for cardiac or skeletal complications (ie, older adults) and who have a TSH concentration less than 0.1 mU/L persistently, treatment is often considered.   - In Ms. Bolanos's case, I do not think treatment is indicated at this time.  - her TFT's can be followed yearly or sooner if concerning symptoms develop  - I will have her RTC in one year for follow-up of her subclinical hyperthyroidism     2) multinodular goiter:  Stable on physical exam  Prior Thyroid US images reviewed.  I briefly looked a her gland today with clinic US which showed similar findings of MNG with bilateral isoechoic spongiform nodules and mixed cystic and solid nodules.   Will repeat formal imaging if changes in her gland/neck are noted on physical exam.    RTC in one year.    Electronically Signed: Judi Mae MD

## 2024-01-15 ENCOUNTER — OFFICE VISIT (OUTPATIENT)
Dept: PAIN MEDICINE | Facility: CLINIC | Age: 69
End: 2024-01-15
Payer: MEDICARE

## 2024-01-15 VITALS — BODY MASS INDEX: 24.19 KG/M2 | HEIGHT: 65 IN | WEIGHT: 145.2 LBS

## 2024-01-15 DIAGNOSIS — M46.1 SACROILIITIS: ICD-10-CM

## 2024-01-15 DIAGNOSIS — M47.816 SPONDYLOSIS OF LUMBAR REGION WITHOUT MYELOPATHY OR RADICULOPATHY: Primary | ICD-10-CM

## 2024-01-15 PROCEDURE — 1125F AMNT PAIN NOTED PAIN PRSNT: CPT

## 2024-01-15 PROCEDURE — 99213 OFFICE O/P EST LOW 20 MIN: CPT

## 2024-01-15 RX ORDER — CYCLOBENZAPRINE HCL 10 MG
10 TABLET ORAL DAILY
Qty: 30 TABLET | Refills: 2 | Status: SHIPPED | OUTPATIENT
Start: 2024-01-15

## 2024-01-15 NOTE — PROGRESS NOTES
Primary Physician: Rosa M Venegas MD    CHIEF COMPLAINT or REASON FOR VISIT: Back Pain and Follow-up      Initial HPI 2/6/2023:  Ms. Carli Bolanos is 69 y.o. female who presents as a new patient referral for evaluation and treatment of chronic right-sided low back pain with radiation into the buttock.  Patient states that she has had this pain since November 2022.  She has engaged in physical therapy, continues to perform the exercises that she was instructed.  Patient denies any bowel or bladder dysfunction, lower extremity weakness, new onset saddle anesthesia or unexplained weight loss.     Pain is exacerbated with prolonged standing, ameliorated with sitting and rest.  Not associated with ambulation.  Ameliorated with Tylenol.  She did have lumbar and sacroiliac joint x-rays, lumbar MRI demonstrating degenerative disc disease and facet arthropathy.  She denies any pain in her legs beyond the buttock.    Interval history: Patient returns to clinic today after undergoing right L3-5 ablation.  Patient states she did not receive any benefit from this repeat ablation.  She has received benefit from an ablation in the past.  Today, patient continues to complain of right sided low back pain with radiation into the buttock and hip.  Pain is worse when arising from a seated position, and when standing.  Additionally, patient complains of muscle spasms within her right lower back. she denies any new injuries or events since her prior visit.     Interventions:  3/7/2023: Right SIJ with minimal transient benefit.  4/25/2023: Right L4/5 and L5/S1  MBB with 100% relief 1 day  5/16/2023: Right L4/5 and L5/S1  MBB with 80% relief for several hours  5/30/2023: Right L4/5 and L5/S1  RFA with 70% relief for 4 months  12/7/2023: Right L3/4/5 RFA with 0% benefit    Objective Pain Scoring:   BRIEF PAIN INVENTORY:  Total score:   Pain Score    01/15/24 1116   PainSc:   4   PainLoc: Back        PHQ-2: PHQ-2 Total Score: 0  PHQ-9:  PHQ-9: Brief Depression Severity Measure Score: 0  Opioid Risk Tool:         Review of Systems:   ROS negative except as otherwise noted     Past Medical History:   Past Medical History:   Diagnosis Date    Abnormal thyroid ultrasound 07/05/2017    stable MNG (7/5/17) by u/s    Abnormal thyroid ultrasound 06/12/2015    stable MNG (6/12/15)    Abnormal thyroid ultrasound 04/18/2014    thyr u/s (4/18/14): MNG except dominant complex mass mid L. lobe, repeat u/s in 6 mos    Central corneal ulcer of left eye     Central corneal ulcer of left eye 11/18/2022    Endometrial cancer     H/O uterine leiomyoma     s/p DUSTY/USO ('13)    History of CT scan 02/27/2016    neg head ct    Hx of bone density study 06/08/2015    DEXA (6/15) L -0.4, H0.7    Hx of bone density study 07/17/2018    nl DEXA (7/17/18): L -0.4, H 0.2, repeat 3 yrs    Hx of bone density study 09/27/2021    DEXA (9/27/21): L 0.6, A -1.8; per Dr. Giles/LEONOR Villa, APRN - repeat 2-3 yrs    Hx of colonoscopy 03/11/2011    colonosc (3/11/11): diverticulosis, sm int hem, repeat 2018; CRS - Dr. Thornton    Hx of colonoscopy 10/05/2018    colonosc (10/5/18): diverticulosis, redundant colon, int hem, repeat 5 yrs due to h/o polyps; CRS Juani Thornton    Hx of colonoscopy 10/18/2023    colonosc (10/18/23): tubular adenoma, diverticulosis, int hem, repeat 5 yrs; CRS Juani Thornton    Hx of EMG/NCV 02/27/2008    EMG/NCV (2/27/08): right median neuropathy    Hx of hand x-ray 01/24/2018    R. hand xray (1/24/18): degen changes 1st metacarpal joint and index PIP    Hx of MRI L-spine 02/03/2023    MRI L-spine (2/3/23): mod DDD with mod-severe L3-4 spinal stenosis and mild bilat NFS, mild-mod L4-5 spinal stenosis with severe R NFS    Joint pain     Low back pain          Past Surgical History:   Past Surgical History:   Procedure Laterality Date    APPENDECTOMY  1980    taken out at gallbladder surgery    CARPAL TUNNEL RELEASE Left     carpel tunnel left     CERVICAL POLYPECTOMY   03/2005    with h/o fibroids and DUB    CHOLECYSTECTOMY  1980    DILATATION AND CURETTAGE  02/22/2013    EXCISION MASS HEAD/NECK Right 03/05/2013    s/p excision scalp lesion large R. parietal and L. temporal areas; plastics - Dr. Amelia Gann    HAND SURGERY      carpal tunnel    HIP ARTHROSCOPY Right 2004    with subsequent [place] resurfacing with hip replacement (5/11)    HYSTERECTOMY      JOINT REPLACEMENT  2007. 2011    both hips    ORTHOPEDIC SURGERY      SALPINGO OOPHORECTOMY Left 1991    secondary to tubal pregnancy and ovarian cyst    SALPINGO OOPHORECTOMY Right     secondary to tubal pregnancy    TOTAL HIP ARTHROPLASTY Left 06/2011     ortho Dr. Mcdermott    TOTAL HIP ARTHROPLASTY Right 05/2011    TOTAL LAPAROSCOPIC HYSTERECTOMY WITH DAVINCI ROBOT  03/05/2013    RTLH/BSO with LND and omental biopsy for endometrial cancer; GYN Onc - Dr. Malin    US GUIDED FINE NEEDLE ASPIRATION  05/11/2021         Family History   Family History   Problem Relation Age of Onset    Dementia Mother     Cancer Mother         GYN    Hip fracture Mother     Thyroid disease Mother     Hypertension Mother     Cervical cancer Mother     Heart attack Father     Heart failure Father     Diabetes Father     Heart disease Father         heart failure    Prostate cancer Father     Hypertension Father     Hypertension Brother     Heart disease Brother     Kidney cancer Maternal Grandmother     Arthritis Maternal Grandmother     Cancer Maternal Grandmother         Kidney Cancer    Colon cancer Maternal Grandfather 70    Cancer Maternal Grandfather         Colon cancer    Heart failure Paternal Grandmother     Heart disease Paternal Grandmother     Arthritis Paternal Grandmother     Hypertension Paternal Grandmother     Diabetes Paternal Grandfather     Colon cancer Paternal Uncle 78    Breast cancer Neg Hx     Ovarian cancer Neg Hx          Social History   Social History     Socioeconomic History    Marital status:    Tobacco  Use    Smoking status: Never    Smokeless tobacco: Never   Vaping Use    Vaping Use: Never used   Substance and Sexual Activity    Alcohol use: Yes     Alcohol/week: 1.0 standard drink of alcohol     Types: 1 Glasses of wine per week     Comment: social    Drug use: No    Sexual activity: Yes     Partners: Male     Birth control/protection: Surgical        Medications:     Current Outpatient Medications:     alendronate (FOSAMAX) 70 MG tablet, Take 1 tablet by mouth Every 7 (Seven) Days., Disp: , Rfl:     amLODIPine (NORVASC) 5 MG tablet, Take 1 tablet by mouth Daily., Disp: 90 tablet, Rfl: 3    Calcium-Cholecalciferol 200-6.25 MG-MCG tablet, Take  by mouth Daily., Disp: , Rfl:     cyclobenzaprine (FLEXERIL) 10 MG tablet, 1/2-1 qhs prn muscle spasm, Disp: 20 tablet, Rfl: 0    folic acid (FOLVITE) 1 MG tablet, Take 4 tablets by mouth Daily. (Patient taking differently: Take 5 tablets by mouth Daily.), Disp: , Rfl:     inFLIXimab (REMICADE) 100 MG injection, Infuse  into a venous catheter 1 (One) Time., Disp: , Rfl:     methotrexate 2.5 MG tablet, Take 8 tablets by mouth 1 (One) Time Per Week., Disp: , Rfl:     Omega-3 Fatty Acids (fish oil) 1000 MG capsule capsule, Take 2 capsules by mouth Daily With Breakfast., Disp: , Rfl:     pilocarpine (SALAGEN) 5 MG tablet, Take 1 tablet by mouth 3 (Three) Times a Day. For dry mouth, Disp: , Rfl:     predniSONE (DELTASONE) 10 MG tablet, Take 2 mg by mouth Daily., Disp: , Rfl:     simvastatin (ZOCOR) 20 MG tablet, Take 1 tablet by mouth Every Evening., Disp: 90 tablet, Rfl: 3    TURMERIC PO, Take  by mouth., Disp: , Rfl:     vitamin B-6 (PYRIDOXINE) 50 MG tablet, Take 2 tablets by mouth Daily., Disp: , Rfl:     Xiidra 5 % ophthalmic solution, , Disp: , Rfl:     Cannabidiol 100 MG/ML solution, Take  by mouth., Disp: , Rfl:     cholecalciferol (VITAMIN D3) 25 MCG (1000 UT) tablet, Take 1 tablet by mouth Daily., Disp: , Rfl:     cyclobenzaprine (FLEXERIL) 10 MG tablet, Take 1  "tablet by mouth Daily., Disp: 30 tablet, Rfl: 2        Physical Exam:     Vitals:    01/15/24 1116   Weight: 65.9 kg (145 lb 3.2 oz)   Height: 165.1 cm (65\")   PainSc:   4   PainLoc: Back          General: Alert and oriented, No acute distress.   HEENT: Normocephalic, atraumatic.   Cardiovascular: No gross edema  Respiratory: Respirations are non-labored    Lumbar Spine:   No masses or atrophy  Range of motion - Flexion normal. Extension normal. Right Lat Bending normal. Left Lat Bending normal  Facet Loading: Positive right  Facet Palpation - positive right  PSIS tenderness -positive right  Son's/DANIELA/Thigh thrust -positive right  Straight leg raise: Negative bilaterally  Slump test: Negative bilaterally    Motor Exam:  Strength: Rate on 1-5 scale Right Left    L1/2- hip flexion 5 5    L3- knee extension 5 5    L4- ankle dorsiflexion 5 5    L5- great toe extension 5 5    S1- ankle plantarflexion 5 5    Sensory Exam: Full and equal sensation to light touch throughout.  Neurologic: Cranial Nerves II-XII are grossly intact.   Clonus -negative bilaterally  Psychiatric: Cooperative.   Gait: Normal   Assistive Devices: None    Physical exam is accurate and consistent as of 1/15/2024    Imaging Studies:   Results for orders placed during the hospital encounter of 02/03/23    MRI Lumbar Spine Without Contrast    Narrative  MRI LUMBAR SPINE WO CONTRAST    Date of Exam: 2/3/2023 4:11 PM EST    Indication: Low back pain, symptoms persist with > 6 wks treatment.    Comparison: Lumbar spine radiographs 1/24/2023    Technique:  Routine multiplanar/multisequence sequence images of the lumbar spine were obtained without contrast administration.    Findings:  There are 5 lumbar-type vertebral bodies. There is similar smooth levoconvex curvature of the lower lumbar spine centered on L4. No significant spondylolisthesis. The bone marrow signal intensity is within normal limits without focal or suspicious bony  lesion or evidence " of bone marrow edema. No acute fracture. The vertebral body heights are preserved. There are multilevel degenerative changes with level by level assessment as follows:    T12-L1: Normal.    L1-L2: Normal.    L2-L3: Moderate degenerative disc disease with small left eccentric circumferential disc bulge. Mild spinal canal stenosis. No significant neuroforaminal stenosis.    L3-L4: Moderate degenerative disc disease with circumferential disc bulge. Moderate to severe bilateral facet arthropathy and thickening of the ligamentum flavum. Moderate to severe spinal canal stenosis. Mild bilateral neuroforaminal stenosis.    L4-L5: Moderate to severe degenerative disc disease with circumferential disc bulge and moderate right and mild left facet arthropathy and mild thickening of the ligamentum flavum. Mild to moderate spinal canal stenosis. Severe right-sided neuroforaminal  stenosis.    L5-S1: Moderate degenerative disc disease with trace posterior disc bulge. No significant facet arthropathy. No significant spinal canal stenosis. No significant neuroforaminal stenosis.    Normal morphology and signal intensity of the cauda equina and conus medullaris. The conus terminates at the superior endplate of L1. The paravertebral soft tissues are unremarkable. Grossly unremarkable appearance of the partially imaged portions of the  posterior abdomen and pelvis.    Impression  Impression:  Moderate to severe degenerative disc disease and facet arthropathy, worse in the mid and lower lumbar spine contribute into moderate to severe spinal canal stenosis at L3-L4 and mild to moderate spinal canal stenosis at L4-L5. There is severe right-sided  neuroforaminal stenosis at L4-L5. Additional findings above.    Electronically Signed: Shane Beckham  2/3/2023 5:48 PM EST  Workstation ID: [id number]      Impression & Plan:   2/6/2023: Carli Bolanos is a 69 y.o. female with past medical history significant for osteoarthritis who presents to the  pain clinic for evaluation and treatment of chronic right-sided low back pain.  I personally reviewed the patient's lumbar MRI which demonstrates multilevel degenerative disc disease with L3/4 canal stenosis, bilateral neuroforaminal stenosis at L4/5, facet arthropathy worst at right L4/5 with juxtafacet cyst encroaching on the neuroforamen.    On clinical exam her pain is more consistent with SI versus facet pathology.  We will first trial right diagnostic and therapeutic sacroiliac joint injection.  Additionally we discussed right L4/L5/S1 medial branch blocks in the event her SIJ injection is not diagnostic.   3/29/2023: No benefit from right SIJ.  We will proceed with right L4/L5/S1 MBB.  6/28/2023: Good ongoing benefit after rhizotomy.  Can repeat as needed every 6 months.  1/15/2024: Benefit from repeat rhizotomy.  Will schedule lumbar TFESI.  Start Flexeril 10 mg.  Can consider referral to neurosurgery if minimal or transient benefit.    1. Spondylosis of lumbar region without myelopathy or radiculopathy    2. Sacroiliitis          PLAN:  1. Medication Recommendations: Recommend Voltaren topical, NSAIDs, Tylenol.  Can trial turmeric 500 mg twice daily if NSAID contraindicated.  Start Flexeril 10 mg daily at night, 2 refills.  Patient states she has taken Flexeril before, and believes it does help with back spasms.     2. Physical Therapy: [facility]    3. Psychological: Consider referral to [facility].     4. Complementary and alternative (CAM) Therapies:     5. Labs: None indicated     6. Imaging: None indicated    7. Interventions: Schedule right L4/5 and L5/S1 transforaminal epidural steroid injection (88066, [id number]).  Can repeat 3-4 times per year as needed.  May consider SCS trial.    8. Referrals: None indicated     9. Records requested: n/a    10. Lifestyle goals:    Follow-up 6 weeks after injection    Patient denies allergies to iodine or contrast, and taking any anticoagulants or blood thinners.       [employer]  [facility]  Jeni Alan PA-C

## 2024-01-16 DIAGNOSIS — M47.816 SPONDYLOSIS OF LUMBAR REGION WITHOUT MYELOPATHY OR RADICULOPATHY: Primary | ICD-10-CM

## 2024-01-24 ENCOUNTER — OUTSIDE FACILITY SERVICE (OUTPATIENT)
Dept: PAIN MEDICINE | Facility: CLINIC | Age: 69
End: 2024-01-24
Payer: MEDICARE

## 2024-01-24 ENCOUNTER — DOCUMENTATION (OUTPATIENT)
Dept: PAIN MEDICINE | Facility: CLINIC | Age: 69
End: 2024-01-24

## 2024-01-24 NOTE — PROGRESS NOTES
[facility]  [address] [place] [id number]      PROCEDURE: Fluoroscopically-guided  Lumbar Transforaminal Epidural Steroid Injection -right L4/L5 and L5/S1    PRE-OP DIAGNOSIS: Lumbar radiculopathy  POST-OP DIAGNOSIS: Lumbar radiculopathy    BLOOD THINNERS (ANTIPLATELETS/ANTICOAGULANTS): Were discussed with the patient and KOTA Guidelines were followed.     CONSENT: Risks, benefits and options were explained to the patient, all questions were answered and written informed consent was obtained.     ANESTHESIA: Moderate sedation was required to maintain comfort, safety, and cooperation during the procedure.  The duration of sedation service was over 10 minutes.  Patient received 1 mg IV Versed and 50 mcg IV fentanyl.  Independent observation and monitoring was performed by JOCELYN Mckeon, RN.  The patient's level of consciousness and physiologic status was continually monitored with pulse oximetry, EKG from 736 to 750.  There were no complications or adverse events during sedation.  After the sedation patient was taken to the recovery area.    PROCEDURE NOTE: A pre-procedural time out was performed to confirm the correct patient, procedure, side, and site. Standard ASA monitors were applied and oxygen via nasal cannula was provided. All proceduralists donned sterile gloves with masks and surgical hats. The patient was placed prone with pillow under the abdomen and all pressure points padded. The patient's lumbar spine was prepped in standard fashion using Chlorhexidine and draped with sterile towels. The target neuroforamen was identified using oblique fluoroscopy and the superior vertebral body endplate squared. The overlying skin and subcutaneous tissue was anesthetized with 1% lidocaine. A 22 gauge 3.5 inch spinal needle with bent tip was incrementally advanced using intermittent fluoroscopy to the 6 o'clock position of the target pedicle in the mid-neuroforamen using  oblique, AP and lateral intermittent fluoroscopy. After negative aspiration of blood and cerebrospinal fluid, needle placement was confirmed with 1 ml of omnipaque 180 mgI/ml contrast using AP fluoroscopic imaging. Imaging revealed a clear outline of the target spinal nerve with proximal spread of agent through the neuroforamen medially to the epidural space, without evidence of intravascular or intrathecal spread. After negative aspiration, a mixture containing dexamethasone 5 mg steroid and lidocaine 1% - 1 ml local anesthetic for a total volume of 1.5 ml was injected under direct visualization with fluoroscopy. The needle was flushed, removed and a bandage applied.  The same procedure utilizing the same technique was performed at each target level listed above.    EBL: None     COMPLICATIONS: None     The patient was monitored in recovery area until all discharge criteria were met. Vital signs remained stable throughout the procedure and in the recovery area. There were no immediate complications and the patient tolerated the procedure well. Sensory and motor exam was unchanged from baseline. The patient received written discharge instructions prior to discharge.     FOLLOW UP: As scheduled     ADDITIONAL NOTES: []        [facility]       Esa Calle MD     CODES:  [id number]  [id number]  [id number]

## 2024-02-05 NOTE — PROGRESS NOTES
"Chief Complaint   Patient presents with    Hyperglycemia     Follow up       History of Present Illness  69 y.o.  female presents for sugar follow-up. Reports fluid sensation in left ear. Denies earlobe pain. Usually wears ear rings.     Review of Systems  Denies CP, SOB, falls. All other ROS reviewed and negative.    Current Outpatient Medications:     alendronate (FOSAMAX) 70 MG weekly    amLODIPine 5 MG QD    Calcium-Cholecalciferol 200-6.25 MG-MCG QD    cyclobenzaprine 10 MG QD    folic acid 1 MG 5 tabs QD    inFLIXimab (REMICADE) 100 MG injection    methotrexate 2.5 MG 8 tabs weekly    Omega-3 Fatty Acids (fish oil) 1000 MG 2 QD    pilocarpine (SALAGEN) 5 MG BID for dry mouth    predniSONE 10 MG 2 QD    simvastatin 20 MG QD    TURMERIC QD    vitamin B-6 (PYRIDOXINE) 50 MG 2 QD    Xiidra 5 % ophthalmic solution, AD    VITALS:  /68   Pulse 63   Resp 16   Ht 165.1 cm (65\")   Wt 64.9 kg (143 lb)   SpO2 98%   BMI 23.80 kg/m²     Physical Exam  Vitals and nursing note reviewed.   Constitutional:       General: She is not in acute distress.     Appearance: Normal appearance. She is not ill-appearing.   HENT:      Right Ear: Tympanic membrane and external ear normal. There is impacted cerumen.      Left Ear: Tympanic membrane, ear canal and external ear normal. There is impacted cerumen.      Ears:      Comments: Mild fluid behind bilat TMs without erythema, bulging or perforation after cerumen successful removed  Eyes:      Extraocular Movements: Extraocular movements intact.      Conjunctiva/sclera: Conjunctivae normal.   Pulmonary:      Effort: Pulmonary effort is normal. No respiratory distress.   Skin:     Findings: Erythema (left earlobe and extending 1/2-way up ear including the cartilage is mildly swollen, erythemarous, mildly flaky, minimally increased warmth, nontender to touch) present.   Neurological:      Mental Status: She is alert and oriented to person, place, and time. Mental " status is at baseline.   Psychiatric:         Mood and Affect: Mood normal.         Behavior: Behavior normal.         LABS  Results for orders placed or performed in visit on 02/06/24   POC Glycosylated Hemoglobin (Hb A1C)    Specimen: Blood   Result Value Ref Range    Hemoglobin A1C 5.9 (A) 4.5 - 5.7 %    Lot Number [id number]     Expiration Date 9/28/25 7/23 A1C 5.9    ASSESSMENT/PLAN    Diagnoses and all orders for this visit:    1. Impaired fasting glucose (Primary)  Assessment & Plan:  BG control stable with A1C 5.9; encouraged reg phys activity to decr insulin resistance, moderation in unhealthy starches/sweets; f/u A1C in 6 mos      Orders:  -     POC Glycosylated Hemoglobin (Hb A1C)    2. Hypertension  Assessment & Plan:  BP stable 116/68; cont amlo 5mg QD      3. Bilateral impacted cerumen  Comments:  s/p successful removal w/ instrumentation w/ ear scoop, tolerated proced well, no complications; rec equal parts H2O2 and H2O weekly rinses    4. Infection of skin of left ear lobe  Comments:  poss cellulitis left earlobe, poss from pierced ear; rec ice and neosporin BID; if not improved/resolved by wkend, consider abx; if worsens, OV for re-eval    5. Vaccine counseling  Comments:  rec updated Td - get at pharmacy, good for 10 yrs        FOLLOW-UP  Health maintenance - flu vacc, COVID19 vacc, and RSV vacc all completed this season; rec updated Td this year (Tdap done 10/14)  RTC for AWV 8/9/24; fasting labs prior to appt (CBC, CMP, TSH, lipids, UA/micr, A1C, FT4, CPK, ESR, CRP, vit D)    Electronically signed by:    Rosa M Venegas MD, FACP  02/06/2024

## 2024-02-06 ENCOUNTER — OFFICE VISIT (OUTPATIENT)
Dept: INTERNAL MEDICINE | Facility: CLINIC | Age: 69
End: 2024-02-06
Payer: MEDICARE

## 2024-02-06 VITALS
HEIGHT: 65 IN | DIASTOLIC BLOOD PRESSURE: 68 MMHG | OXYGEN SATURATION: 98 % | BODY MASS INDEX: 23.82 KG/M2 | RESPIRATION RATE: 16 BRPM | WEIGHT: 143 LBS | SYSTOLIC BLOOD PRESSURE: 116 MMHG | HEART RATE: 63 BPM

## 2024-02-06 DIAGNOSIS — H60.392 INFECTION OF SKIN OF LEFT EAR LOBE: ICD-10-CM

## 2024-02-06 DIAGNOSIS — H61.23 BILATERAL IMPACTED CERUMEN: ICD-10-CM

## 2024-02-06 DIAGNOSIS — I10 ESSENTIAL HYPERTENSION: Chronic | ICD-10-CM

## 2024-02-06 DIAGNOSIS — R73.01 IMPAIRED FASTING GLUCOSE: Primary | Chronic | ICD-10-CM

## 2024-02-06 DIAGNOSIS — Z71.85 VACCINE COUNSELING: ICD-10-CM

## 2024-02-06 LAB
EXPIRATION DATE: ABNORMAL
HBA1C MFR BLD: 5.9 % (ref 4.5–5.7)
Lab: ABNORMAL

## 2024-02-06 PROCEDURE — G2211 COMPLEX E/M VISIT ADD ON: HCPCS | Performed by: INTERNAL MEDICINE

## 2024-02-06 PROCEDURE — 3074F SYST BP LT 130 MM HG: CPT | Performed by: INTERNAL MEDICINE

## 2024-02-06 PROCEDURE — 3078F DIAST BP <80 MM HG: CPT | Performed by: INTERNAL MEDICINE

## 2024-02-06 PROCEDURE — 99214 OFFICE O/P EST MOD 30 MIN: CPT | Performed by: INTERNAL MEDICINE

## 2024-02-06 PROCEDURE — 1159F MED LIST DOCD IN RCRD: CPT | Performed by: INTERNAL MEDICINE

## 2024-02-06 PROCEDURE — 69210 REMOVE IMPACTED EAR WAX UNI: CPT | Performed by: INTERNAL MEDICINE

## 2024-02-06 PROCEDURE — 3044F HG A1C LEVEL LT 7.0%: CPT | Performed by: INTERNAL MEDICINE

## 2024-02-06 PROCEDURE — 1160F RVW MEDS BY RX/DR IN RCRD: CPT | Performed by: INTERNAL MEDICINE

## 2024-02-06 PROCEDURE — 83036 HEMOGLOBIN GLYCOSYLATED A1C: CPT | Performed by: INTERNAL MEDICINE

## 2024-03-05 ENCOUNTER — OFFICE VISIT (OUTPATIENT)
Dept: PAIN MEDICINE | Facility: CLINIC | Age: 69
End: 2024-03-05
Payer: MEDICARE

## 2024-03-05 VITALS — BODY MASS INDEX: 23.82 KG/M2 | HEIGHT: 65 IN | WEIGHT: 143 LBS

## 2024-03-05 DIAGNOSIS — R20.0 NUMBNESS OF RIGHT FOOT: Primary | ICD-10-CM

## 2024-03-05 DIAGNOSIS — Z51.81 ENCOUNTER FOR THERAPEUTIC DRUG LEVEL MONITORING: Primary | ICD-10-CM

## 2024-03-05 DIAGNOSIS — M47.816 SPONDYLOSIS OF LUMBAR REGION WITHOUT MYELOPATHY OR RADICULOPATHY: ICD-10-CM

## 2024-03-05 DIAGNOSIS — R20.0 NUMBNESS OF RIGHT FOOT: ICD-10-CM

## 2024-03-05 DIAGNOSIS — M47.816 SPONDYLOSIS OF LUMBAR REGION WITHOUT MYELOPATHY OR RADICULOPATHY: Primary | ICD-10-CM

## 2024-03-05 DIAGNOSIS — M46.1 SACROILIITIS: ICD-10-CM

## 2024-03-05 RX ORDER — HYDROXYCHLOROQUINE SULFATE 200 MG/1
TABLET, FILM COATED ORAL
COMMUNITY
Start: 2024-02-12

## 2024-03-05 NOTE — TELEPHONE ENCOUNTER
Start gabapentin 100 mg nightly for isolated right foot neuropathy/numbness complaint.  30 total pills, 0 refills.   Controlled substance agreement signed in office today.  Compliance UDS ordered  Shabbir reviewed and compliant.

## 2024-03-05 NOTE — PROGRESS NOTES
Primary Physician: Rosa M Venegas MD    CHIEF COMPLAINT or REASON FOR VISIT: Back Pain and Follow-up      Initial HPI 2/6/2023:  Ms. Carli Bolanos is 69 y.o. female who presents as a new patient referral for evaluation and treatment of chronic right-sided low back pain with radiation into the buttock.  Patient states that she has had this pain since November 2022.  She has engaged in physical therapy, continues to perform the exercises that she was instructed.  Patient denies any bowel or bladder dysfunction, lower extremity weakness, new onset saddle anesthesia or unexplained weight loss.     Pain is exacerbated with prolonged standing, ameliorated with sitting and rest.  Not associated with ambulation.  Ameliorated with Tylenol.  She did have lumbar and sacroiliac joint x-rays, lumbar MRI demonstrating degenerative disc disease and facet arthropathy.  She denies any pain in her legs beyond the buttock.    Interval history: Patient returns to clinic today after undergoing a right-sided lumbar transforaminal epidural steroid injection.  Today, she reports good benefit from this injection.  She is now able to walk longer distances when exercising compared to before.  She does complain of some bilateral groin pain after standing for prolonged period of time.  She has had bilateral hip replacements.  She also complains of isolated neuropathy within her right foot that happens at night.  This started without any inciting injury or event.        Interventions:  3/7/2023: Right SIJ with minimal transient benefit.  4/25/2023: Right L4/5 and L5/S1  MBB with 100% relief 1 day  5/16/2023: Right L4/5 and L5/S1  MBB with 80% relief for several hours  5/30/2023: Right L4/5 and L5/S1  RFA with 70% relief for 4 months  12/7/2023: Right L4/5 and L5/S1 RFA with 0% benefit  1/24/2024: Right L4/5 and L5/S1 TFESI with 65% relief ongoing    Objective Pain Scoring:   BRIEF PAIN INVENTORY:  Total score:   Pain Score    03/05/24 1113    PainSc:   3   PainLoc: Back          PHQ-2: PHQ-2 Total Score: 0  PHQ-9: PHQ-9: Brief Depression Severity Measure Score: 0  Opioid Risk Tool:         Review of Systems:   ROS negative except as otherwise noted     Past Medical History:   Past Medical History:   Diagnosis Date    Abnormal thyroid ultrasound 07/05/2017    stable MNG (7/5/17) by u/s    Abnormal thyroid ultrasound 06/12/2015    stable MNG (6/12/15)    Abnormal thyroid ultrasound 04/18/2014    thyr u/s (4/18/14): MNG except dominant complex mass mid L. lobe, repeat u/s in 6 mos    Central corneal ulcer of left eye     Central corneal ulcer of left eye 11/18/2022    Endometrial cancer     H/O uterine leiomyoma     s/p DUSTY/USO ('13)    History of CT scan 02/27/2016    neg head ct    Hx of bone density study 06/08/2015    DEXA (6/15) L -0.4, H0.7    Hx of bone density study 07/17/2018    nl DEXA (7/17/18): L -0.4, H 0.2, repeat 3 yrs    Hx of bone density study 09/27/2021    DEXA (9/27/21): L 0.6, A -1.8; per Dr. Giles/LEONOR Villa, APRN - repeat 2-3 yrs    Hx of colonoscopy 03/11/2011    colonosc (3/11/11): diverticulosis, sm int hem, repeat 2018; CRS - Dr. Thornton    Hx of colonoscopy 10/05/2018    colonosc (10/5/18): diverticulosis, redundant colon, int hem, repeat 5 yrs due to h/o polyps; CRS - Dr. Thornton    Hx of colonoscopy 10/18/2023    colonosc (10/18/23): tubular adenoma, diverticulosis, int hem, repeat 5 yrs; CRS Juani Thornton    Hx of EMG/NCV 02/27/2008    EMG/NCV (2/27/08): right median neuropathy    Hx of hand x-ray 01/24/2018    R. hand xray (1/24/18): degen changes 1st metacarpal joint and index PIP    Hx of MRI L-spine 02/03/2023    MRI L-spine (2/3/23): mod DDD with mod-severe L3-4 spinal stenosis and mild bilat NFS, mild-mod L4-5 spinal stenosis with severe R NFS    Joint pain     Low back pain          Past Surgical History:   Past Surgical History:   Procedure Laterality Date    APPENDECTOMY  1980    taken out at gallbladder surgery     CARPAL TUNNEL RELEASE Left     carpel tunnel left     CERVICAL POLYPECTOMY  03/2005    with h/o fibroids and DUB    CHOLECYSTECTOMY  1980    DILATATION AND CURETTAGE  02/22/2013    EXCISION MASS HEAD/NECK Right 03/05/2013    s/p excision scalp lesion large R. parietal and L. temporal areas; plastics - Dr. Amelia Gann    HAND SURGERY      carpal tunnel    HIP ARTHROSCOPY Right 2004    with subsequent [place] resurfacing with hip replacement (5/11)    HYSTERECTOMY      JOINT REPLACEMENT  2007. 2011    both hips    ORTHOPEDIC SURGERY      SALPINGO OOPHORECTOMY Left 1991    secondary to tubal pregnancy and ovarian cyst    SALPINGO OOPHORECTOMY Right     secondary to tubal pregnancy    TOTAL HIP ARTHROPLASTY Left 06/2011     ortho Dr. Mcdermott    TOTAL HIP ARTHROPLASTY Right 05/2011    TOTAL LAPAROSCOPIC HYSTERECTOMY WITH DAVINCI ROBOT  03/05/2013    RTLH/BSO with LND and omental biopsy for endometrial cancer; GYN Onc - Dr. Malin    US GUIDED FINE NEEDLE ASPIRATION  05/11/2021         Family History   Family History   Problem Relation Age of Onset    Dementia Mother     Cancer Mother         GYN    Hip fracture Mother     Thyroid disease Mother     Hypertension Mother     Cervical cancer Mother     Heart attack Father     Heart failure Father     Diabetes Father     Heart disease Father         heart failure    Prostate cancer Father     Hypertension Father     Hypertension Brother     Heart disease Brother     Kidney cancer Maternal Grandmother     Arthritis Maternal Grandmother     Cancer Maternal Grandmother         Kidney Cancer    Colon cancer Maternal Grandfather 70    Cancer Maternal Grandfather         Colon cancer    Heart failure Paternal Grandmother     Heart disease Paternal Grandmother     Arthritis Paternal Grandmother     Hypertension Paternal Grandmother     Diabetes Paternal Grandfather     Colon cancer Paternal Uncle 78    Breast cancer Neg Hx     Ovarian cancer Neg Hx          Social History    Social History     Socioeconomic History    Marital status:    Tobacco Use    Smoking status: Never    Smokeless tobacco: Never   Vaping Use    Vaping status: Never Used   Substance and Sexual Activity    Alcohol use: Yes     Alcohol/week: 1.0 standard drink of alcohol     Types: 1 Glasses of wine per week     Comment: social    Drug use: No    Sexual activity: Yes     Partners: Male     Birth control/protection: Surgical        Medications:     Current Outpatient Medications:     alendronate (FOSAMAX) 70 MG tablet, Take 1 tablet by mouth Every 7 (Seven) Days., Disp: , Rfl:     amLODIPine (NORVASC) 5 MG tablet, Take 1 tablet by mouth Daily., Disp: 90 tablet, Rfl: 3    Calcium-Cholecalciferol 200-6.25 MG-MCG tablet, Take  by mouth Daily., Disp: , Rfl:     cyclobenzaprine (FLEXERIL) 10 MG tablet, Take 1 tablet by mouth Daily., Disp: 30 tablet, Rfl: 2    folic acid (FOLVITE) 1 MG tablet, Take 4 tablets by mouth Daily. (Patient taking differently: Take 5 tablets by mouth Daily.), Disp: , Rfl:     hydroxychloroquine (PLAQUENIL) 200 MG tablet, , Disp: , Rfl:     inFLIXimab (REMICADE) 100 MG injection, Infuse  into a venous catheter 1 (One) Time., Disp: , Rfl:     methotrexate 2.5 MG tablet, Take 8 tablets by mouth 1 (One) Time Per Week., Disp: , Rfl:     Omega-3 Fatty Acids (fish oil) 1000 MG capsule capsule, Take 2 capsules by mouth Daily With Breakfast., Disp: , Rfl:     simvastatin (ZOCOR) 20 MG tablet, Take 1 tablet by mouth Every Evening., Disp: 90 tablet, Rfl: 3    TURMERIC PO, Take  by mouth., Disp: , Rfl:     vitamin B-6 (PYRIDOXINE) 50 MG tablet, Take 2 tablets by mouth Daily., Disp: , Rfl:     Xiidra 5 % ophthalmic solution, , Disp: , Rfl:     pilocarpine (SALAGEN) 5 MG tablet, Take 1 tablet by mouth 3 (Three) Times a Day. For dry mouth, Disp: , Rfl:     predniSONE (DELTASONE) 10 MG tablet, Take 2 mg by mouth Daily. (Patient not taking: Reported on 3/5/2024), Disp: , Rfl:         Physical Exam:  "    Vitals:    03/05/24 1113   Weight: 64.9 kg (143 lb)   Height: 165.1 cm (65\")   PainSc:   3   PainLoc: Back            General: Alert and oriented, No acute distress.   HEENT: Normocephalic, atraumatic.   Cardiovascular: No gross edema  Respiratory: Respirations are non-labored    Lumbar Spine:   No masses or atrophy  Range of motion - Flexion normal. Extension normal. Right Lat Bending normal. Left Lat Bending normal  Facet Loading: Positive right  Facet Palpation - positive right  PSIS tenderness -positive right  Son's/DANIELA/Thigh thrust -positive right  Straight leg raise: Negative bilaterally  Slump test: Negative bilaterally    Motor Exam:  Strength: Rate on 1-5 scale Right Left    L1/2- hip flexion 5 5    L3- knee extension 5 5    L4- ankle dorsiflexion 5 5    L5- great toe extension 5 5    S1- ankle plantarflexion 5 5    Sensory Exam: Full and equal sensation to light touch throughout.  Neurologic: Cranial Nerves II-XII are grossly intact.   Clonus -negative bilaterally  Psychiatric: Cooperative.   Gait: Normal   Assistive Devices: None    Physical exam is accurate and consistent as of 3/5/2024    Imaging Studies:   Results for orders placed during the hospital encounter of 02/03/23    MRI Lumbar Spine Without Contrast    Narrative  MRI LUMBAR SPINE WO CONTRAST    Date of Exam: 2/3/2023 4:11 PM EST    Indication: Low back pain, symptoms persist with > 6 wks treatment.    Comparison: Lumbar spine radiographs 1/24/2023    Technique:  Routine multiplanar/multisequence sequence images of the lumbar spine were obtained without contrast administration.    Findings:  There are 5 lumbar-type vertebral bodies. There is similar smooth levoconvex curvature of the lower lumbar spine centered on L4. No significant spondylolisthesis. The bone marrow signal intensity is within normal limits without focal or suspicious bony  lesion or evidence of bone marrow edema. No acute fracture. The vertebral body heights are " preserved. There are multilevel degenerative changes with level by level assessment as follows:    T12-L1: Normal.    L1-L2: Normal.    L2-L3: Moderate degenerative disc disease with small left eccentric circumferential disc bulge. Mild spinal canal stenosis. No significant neuroforaminal stenosis.    L3-L4: Moderate degenerative disc disease with circumferential disc bulge. Moderate to severe bilateral facet arthropathy and thickening of the ligamentum flavum. Moderate to severe spinal canal stenosis. Mild bilateral neuroforaminal stenosis.    L4-L5: Moderate to severe degenerative disc disease with circumferential disc bulge and moderate right and mild left facet arthropathy and mild thickening of the ligamentum flavum. Mild to moderate spinal canal stenosis. Severe right-sided neuroforaminal  stenosis.    L5-S1: Moderate degenerative disc disease with trace posterior disc bulge. No significant facet arthropathy. No significant spinal canal stenosis. No significant neuroforaminal stenosis.    Normal morphology and signal intensity of the cauda equina and conus medullaris. The conus terminates at the superior endplate of L1. The paravertebral soft tissues are unremarkable. Grossly unremarkable appearance of the partially imaged portions of the  posterior abdomen and pelvis.    Impression  Impression:  Moderate to severe degenerative disc disease and facet arthropathy, worse in the mid and lower lumbar spine contribute into moderate to severe spinal canal stenosis at L3-L4 and mild to moderate spinal canal stenosis at L4-L5. There is severe right-sided  neuroforaminal stenosis at L4-L5. Additional findings above.    Electronically Signed: Shane Beckham  2/3/2023 5:48 PM EST  Workstation ID: [id number]      Impression & Plan:   2/6/2023: Carli Bolanos is a 69 y.o. female with past medical history significant for osteoarthritis who presents to the pain clinic for evaluation and treatment of chronic right-sided low  back pain.  I personally reviewed the patient's lumbar MRI which demonstrates multilevel degenerative disc disease with L3/4 canal stenosis, bilateral neuroforaminal stenosis at L4/5, facet arthropathy worst at right L4/5 with juxtafacet cyst encroaching on the neuroforamen.    On clinical exam her pain is more consistent with SI versus facet pathology.  We will first trial right diagnostic and therapeutic sacroiliac joint injection.  Additionally we discussed right L4/L5/S1 medial branch blocks in the event her SIJ injection is not diagnostic.   3/29/2023: No benefit from right SIJ.  We will proceed with right L4/L5/S1 MBB.  6/28/2023: Good ongoing benefit after rhizotomy.  Can repeat as needed every 6 months.  1/15/2024: Benefit from repeat rhizotomy.  Will schedule lumbar TFESI.  Start Flexeril 10 mg.  Can consider referral to neurosurgery if minimal or transient benefit.  3/5/2024: Good benefit from TFESI.  Will trial gabapentin for neuropathy-like complaint in her right foot.    1. Spondylosis of lumbar region without myelopathy or radiculopathy    2. Sacroiliitis            PLAN:  1. Medication Recommendations: Recommend Voltaren topical, NSAIDs, Tylenol.  Can trial turmeric 500 mg twice daily if NSAID contraindicated.  Continue Flexeril 10 mg daily at night as needed.   Start gabapentin 100 mg nightly, 30 pills, #0 refills.   As part of this patient's treatment plan, patient will be prescribed controlled substances.  The patient has been made aware of appropriate use of such medications, including potential risk of somnolent, limited ability to drive and/or work safely, and potential for dependence or overdose.  He has been made clear that his medications refused by this patient only, without concomitant use of alcohol or other substances as prescribed.  Controlled substance status of medication discussed with patient, discussed risk of medication including abuse potential and diversion potential and need to  follow-up for reevaluation appointment in order to receive further refills.  Shabbir was reviewed and compliant.     2. Physical Therapy: HEP    3. Psychological: Consider clearance for SCS.     4. Complementary and alternative (CAM) Therapies:     5. Labs: None indicated     6. Imaging: None indicated    7. Interventions: Can consider repeat right L4/5 and L5/S1 transforaminal epidural steroid injection (67498, [id number]).  Can repeat 3-4 times per year as needed.  May consider SCS trial.    8. Referrals: Consider referral to neurosurgery.     9. Records requested: n/a    10. Lifestyle goals:    Follow-up 1 month for medication management        [facility]  Jeni Alan PA-C

## 2024-03-06 RX ORDER — GABAPENTIN 100 MG/1
100 CAPSULE ORAL NIGHTLY
Qty: 30 CAPSULE | Refills: 0 | Status: SHIPPED | OUTPATIENT
Start: 2024-03-06 | End: 2024-04-05

## 2024-03-14 ENCOUNTER — LAB (OUTPATIENT)
Dept: LAB | Facility: HOSPITAL | Age: 69
End: 2024-03-14
Payer: MEDICARE

## 2024-03-14 DIAGNOSIS — Z51.81 ENCOUNTER FOR THERAPEUTIC DRUG LEVEL MONITORING: ICD-10-CM

## 2024-03-14 LAB
AMPHET+METHAMPHET UR QL: NEGATIVE
AMPHETAMINES UR QL: NEGATIVE
BARBITURATES UR QL SCN: NEGATIVE
BENZODIAZ UR QL SCN: NEGATIVE
BUPRENORPHINE SERPL-MCNC: NEGATIVE NG/ML
CANNABINOIDS SERPL QL: NEGATIVE
COCAINE UR QL: NEGATIVE
FENTANYL UR-MCNC: NEGATIVE NG/ML
METHADONE UR QL SCN: NEGATIVE
OPIATES UR QL: NEGATIVE
OXYCODONE UR QL SCN: NEGATIVE
PCP UR QL SCN: NEGATIVE
TRICYCLICS UR QL SCN: NEGATIVE

## 2024-03-14 PROCEDURE — 80307 DRUG TEST PRSMV CHEM ANLYZR: CPT

## 2024-03-27 ENCOUNTER — OFFICE VISIT (OUTPATIENT)
Dept: PAIN MEDICINE | Facility: CLINIC | Age: 69
End: 2024-03-27
Payer: MEDICARE

## 2024-03-27 VITALS — BODY MASS INDEX: 23.84 KG/M2 | HEIGHT: 65 IN | WEIGHT: 143.1 LBS

## 2024-03-27 DIAGNOSIS — R20.0 NUMBNESS OF RIGHT FOOT: ICD-10-CM

## 2024-03-27 DIAGNOSIS — M47.816 SPONDYLOSIS OF LUMBAR REGION WITHOUT MYELOPATHY OR RADICULOPATHY: ICD-10-CM

## 2024-03-27 DIAGNOSIS — M54.16 LUMBAR RADICULOPATHY: ICD-10-CM

## 2024-03-27 DIAGNOSIS — Z00.8 PRE-SURGICAL PSYCHOLOGICAL ASSESSMENT, ENCOUNTER FOR: Primary | ICD-10-CM

## 2024-03-27 DIAGNOSIS — M54.16 LUMBAR RADICULOPATHY: Primary | ICD-10-CM

## 2024-03-27 NOTE — TELEPHONE ENCOUNTER
Refill gabapentin 100 mg nightly, #2 refills  Controlled substance agreement previously signed in office  Compliant UDS dated 3/14/2024  Appropriate follow-up scheduled  Shabbir reviewed and compliant

## 2024-03-27 NOTE — PROGRESS NOTES
"  Primary Physician: Rosa M Venegas MD    CHIEF COMPLAINT or REASON FOR VISIT: Follow-up and Back Pain      Initial HPI 2/6/2023:  Ms. Carli Bolanos is 69 y.o. female who presents as a new patient referral for evaluation and treatment of chronic right-sided low back pain with radiation into the buttock.  Patient states that she has had this pain since November 2022.  She has engaged in physical therapy, continues to perform the exercises that she was instructed.  Patient denies any bowel or bladder dysfunction, lower extremity weakness, new onset saddle anesthesia or unexplained weight loss.     Pain is exacerbated with prolonged standing, ameliorated with sitting and rest.  Not associated with ambulation.  Ameliorated with Tylenol.  She did have lumbar and sacroiliac joint x-rays, lumbar MRI demonstrating degenerative disc disease and facet arthropathy.  She denies any pain in her legs beyond the buttock.    Interval history: Patient returns to clinic today.  She reports good benefit for her neuropathy-like complaint in her right foot after starting gabapentin 100 mg nightly.  Continues to complain of right-sided low back pain with radiation into the buttock, occasionally into the hip and down the lateral portion of her right lower extremity ending proximal to the knee.  Reports a \"numbness\" along the lateral portion of her right thigh.  She complains of some pain on the left side of her lower back, however, her most significant complaint is right-sided low back pain.  She reports moderate benefit from the transforaminal epidural steroid injection she received in January.  Her pain is significantly exacerbated by standing or ambulation.  She denies any new injuries or events since her previous appointment.  She has never had any spine surgery or neurosurgical evaluation.       Interventions:  3/7/2023: Right SIJ with minimal transient benefit.  4/25/2023: Right L4/5 and L5/S1  MBB with 100% relief 1 " day  5/16/2023: Right L4/5 and L5/S1  MBB with 80% relief for several hours  5/30/2023: Right L4/5 and L5/S1  RFA with 70% relief for 4 months  12/7/2023: Right L4/5 and L5/S1 RFA with 0% benefit  1/24/2024: Right L4/5 and L5/S1 TFESI with 60% relief ongoing    Objective Pain Scoring:   BRIEF PAIN INVENTORY:  Total score:   Pain Score    03/27/24 1249   PainSc: 2  Comment: Patient states 2 when she sitting, 10 when she is walking a lot.   PainLoc: Back          PHQ-2: PHQ-2 Total Score: 0  PHQ-9: PHQ-9: Brief Depression Severity Measure Score: 0  Opioid Risk Tool:         Review of Systems:   ROS negative except as otherwise noted     Past Medical History:   Past Medical History:   Diagnosis Date    Abnormal thyroid ultrasound 07/05/2017    stable MNG (7/5/17) by u/s    Abnormal thyroid ultrasound 06/12/2015    stable MNG (6/12/15)    Abnormal thyroid ultrasound 04/18/2014    thyr u/s (4/18/14): MNG except dominant complex mass mid L. lobe, repeat u/s in 6 mos    Central corneal ulcer of left eye     Central corneal ulcer of left eye 11/18/2022    Endometrial cancer     H/O uterine leiomyoma     s/p DUSTY/USO ('13)    History of CT scan 02/27/2016    neg head ct    Hx of bone density study 06/08/2015    DEXA (6/15) L -0.4, H0.7    Hx of bone density study 07/17/2018    nl DEXA (7/17/18): L -0.4, H 0.2, repeat 3 yrs    Hx of bone density study 09/27/2021    DEXA (9/27/21): L 0.6, A -1.8; per Dr. Giles/LEONOR Villa, APRN - repeat 2-3 yrs    Hx of colonoscopy 03/11/2011    colonosc (3/11/11): diverticulosis, sm int hem, repeat 2018; CRS - Dr. Thornton    Hx of colonoscopy 10/05/2018    colonosc (10/5/18): diverticulosis, redundant colon, int hem, repeat 5 yrs due to h/o polyps; CRS - Dr. Thornton    Hx of colonoscopy 10/18/2023    colonosc (10/18/23): tubular adenoma, diverticulosis, int hem, repeat 5 yrs; CRS - Dr. Thornton    Hx of EMG/NCV 02/27/2008    EMG/NCV (2/27/08): right median neuropathy    Hx of hand x-ray 01/24/2018     R. hand xray (1/24/18): degen changes 1st metacarpal joint and index PIP    Hx of MRI L-spine 02/03/2023    MRI L-spine (2/3/23): mod DDD with mod-severe L3-4 spinal stenosis and mild bilat NFS, mild-mod L4-5 spinal stenosis with severe R NFS    Joint pain     Low back pain          Past Surgical History:   Past Surgical History:   Procedure Laterality Date    APPENDECTOMY  1980    taken out at gallbladder surgery    CARPAL TUNNEL RELEASE Left     carpel tunnel left     CERVICAL POLYPECTOMY  03/2005    with h/o fibroids and DUB    CHOLECYSTECTOMY  1980    DILATATION AND CURETTAGE  02/22/2013    EXCISION MASS HEAD/NECK Right 03/05/2013    s/p excision scalp lesion large R. parietal and L. temporal areas; plastics - Dr. Amelia Gann    HAND SURGERY      carpal tunnel    HIP ARTHROSCOPY Right 2004    with subsequent Ivory resurfacing with hip replacement (5/11)    HYSTERECTOMY      JOINT REPLACEMENT  2007. 2011    both hips    ORTHOPEDIC SURGERY      SALPINGO OOPHORECTOMY Left 1991    secondary to tubal pregnancy and ovarian cyst    SALPINGO OOPHORECTOMY Right     secondary to tubal pregnancy    TOTAL HIP ARTHROPLASTY Left 06/2011     ortho Dr. Mcdermott    TOTAL HIP ARTHROPLASTY Right 05/2011    TOTAL LAPAROSCOPIC HYSTERECTOMY WITH DAVINCI ROBOT  03/05/2013    RTLH/BSO with LND and omental biopsy for endometrial cancer; GYN Onc - Dr. Malin    US GUIDED FINE NEEDLE ASPIRATION  05/11/2021         Family History   Family History   Problem Relation Age of Onset    Dementia Mother     Cancer Mother         GYN    Hip fracture Mother     Thyroid disease Mother     Hypertension Mother     Cervical cancer Mother     Heart attack Father     Heart failure Father     Diabetes Father     Heart disease Father         heart failure    Prostate cancer Father     Hypertension Father     Hypertension Brother     Heart disease Brother     Kidney cancer Maternal Grandmother     Arthritis Maternal Grandmother     Cancer Maternal  Grandmother         Kidney Cancer    Colon cancer Maternal Grandfather 70    Cancer Maternal Grandfather         Colon cancer    Heart failure Paternal Grandmother     Heart disease Paternal Grandmother     Arthritis Paternal Grandmother     Hypertension Paternal Grandmother     Diabetes Paternal Grandfather     Colon cancer Paternal Uncle 78    Breast cancer Neg Hx     Ovarian cancer Neg Hx          Social History   Social History     Socioeconomic History    Marital status:    Tobacco Use    Smoking status: Never    Smokeless tobacco: Never   Vaping Use    Vaping status: Never Used   Substance and Sexual Activity    Alcohol use: Yes     Alcohol/week: 1.0 standard drink of alcohol     Types: 1 Glasses of wine per week     Comment: social    Drug use: No    Sexual activity: Yes     Partners: Male     Birth control/protection: Surgical        Medications:     Current Outpatient Medications:     alendronate (FOSAMAX) 70 MG tablet, Take 1 tablet by mouth Every 7 (Seven) Days., Disp: , Rfl:     amLODIPine (NORVASC) 5 MG tablet, Take 1 tablet by mouth Daily., Disp: 90 tablet, Rfl: 3    Calcium-Cholecalciferol 200-6.25 MG-MCG tablet, Take  by mouth Daily., Disp: , Rfl:     cyclobenzaprine (FLEXERIL) 10 MG tablet, Take 1 tablet by mouth Daily., Disp: 30 tablet, Rfl: 2    folic acid (FOLVITE) 1 MG tablet, Take 4 tablets by mouth Daily. (Patient taking differently: Take 5 tablets by mouth Daily.), Disp: , Rfl:     gabapentin (NEURONTIN) 100 MG capsule, Take 1 capsule by mouth Every Night for 30 days., Disp: 30 capsule, Rfl: 0    hydroxychloroquine (PLAQUENIL) 200 MG tablet, , Disp: , Rfl:     inFLIXimab (REMICADE) 100 MG injection, Infuse  into a venous catheter 1 (One) Time., Disp: , Rfl:     methotrexate 2.5 MG tablet, Take 8 tablets by mouth 1 (One) Time Per Week., Disp: , Rfl:     Omega-3 Fatty Acids (fish oil) 1000 MG capsule capsule, Take 2 capsules by mouth Daily With Breakfast., Disp: , Rfl:     simvastatin  "(ZOCOR) 20 MG tablet, Take 1 tablet by mouth Every Evening., Disp: 90 tablet, Rfl: 3    TURMERIC PO, Take  by mouth., Disp: , Rfl:     vitamin B-6 (PYRIDOXINE) 50 MG tablet, Take 2 tablets by mouth Daily., Disp: , Rfl:     Xiidra 5 % ophthalmic solution, , Disp: , Rfl:         Physical Exam:     Vitals:    03/27/24 1249   Weight: 64.9 kg (143 lb 1.6 oz)   Height: 165.1 cm (65\")   PainSc: 2  Comment: Patient states 2 when she sitting, 10 when she is walking a lot.   PainLoc: Back            General: Alert and oriented, No acute distress.   HEENT: Normocephalic, atraumatic.   Cardiovascular: No gross edema  Respiratory: Respirations are non-labored    Lumbar Spine:   No masses or atrophy  Range of motion - Flexion normal. Extension normal. Right Lat Bending normal. Left Lat Bending normal  Facet Loading: Positive right  Facet Palpation - positive right  PSIS tenderness -positive right  Son's/DANIELA/Thigh thrust -positive right  Straight leg raise: Negative bilaterally  Slump test: Negative bilaterally    Motor Exam:  Strength: Rate on 1-5 scale Right Left    L1/2- hip flexion 5 5    L3- knee extension 5 5    L4- ankle dorsiflexion 5 5    L5- great toe extension 5 5    S1- ankle plantarflexion 5 5    Sensory Exam: Full and equal sensation to light touch throughout.  Neurologic: Cranial Nerves II-XII are grossly intact.   Clonus -negative bilaterally  Psychiatric: Cooperative.   Gait: Normal   Assistive Devices: None    Physical exam is accurate and consistent as of 3/27/2024    Imaging Studies:   Results for orders placed during the hospital encounter of 02/03/23    MRI Lumbar Spine Without Contrast    Narrative  MRI LUMBAR SPINE WO CONTRAST    Date of Exam: 2/3/2023 4:11 PM EST    Indication: Low back pain, symptoms persist with > 6 wks treatment.    Comparison: Lumbar spine radiographs 1/24/2023    Technique:  Routine multiplanar/multisequence sequence images of the lumbar spine were obtained without contrast " administration.    Findings:  There are 5 lumbar-type vertebral bodies. There is similar smooth levoconvex curvature of the lower lumbar spine centered on L4. No significant spondylolisthesis. The bone marrow signal intensity is within normal limits without focal or suspicious bony  lesion or evidence of bone marrow edema. No acute fracture. The vertebral body heights are preserved. There are multilevel degenerative changes with level by level assessment as follows:    T12-L1: Normal.    L1-L2: Normal.    L2-L3: Moderate degenerative disc disease with small left eccentric circumferential disc bulge. Mild spinal canal stenosis. No significant neuroforaminal stenosis.    L3-L4: Moderate degenerative disc disease with circumferential disc bulge. Moderate to severe bilateral facet arthropathy and thickening of the ligamentum flavum. Moderate to severe spinal canal stenosis. Mild bilateral neuroforaminal stenosis.    L4-L5: Moderate to severe degenerative disc disease with circumferential disc bulge and moderate right and mild left facet arthropathy and mild thickening of the ligamentum flavum. Mild to moderate spinal canal stenosis. Severe right-sided neuroforaminal  stenosis.    L5-S1: Moderate degenerative disc disease with trace posterior disc bulge. No significant facet arthropathy. No significant spinal canal stenosis. No significant neuroforaminal stenosis.    Normal morphology and signal intensity of the cauda equina and conus medullaris. The conus terminates at the superior endplate of L1. The paravertebral soft tissues are unremarkable. Grossly unremarkable appearance of the partially imaged portions of the  posterior abdomen and pelvis.    Impression  Impression:  Moderate to severe degenerative disc disease and facet arthropathy, worse in the mid and lower lumbar spine contribute into moderate to severe spinal canal stenosis at L3-L4 and mild to moderate spinal canal stenosis at L4-L5. There is severe  right-sided  neuroforaminal stenosis at L4-L5. Additional findings above.    Electronically Signed: Shane Beckham  2/3/2023 5:48 PM EST  Workstation ID: [id number]      Impression & Plan:   2/6/2023: Carli Bolanos is a 69 y.o. female with past medical history significant for osteoarthritis who presents to the pain clinic for evaluation and treatment of chronic right-sided low back pain.  I personally reviewed the patient's lumbar MRI which demonstrates multilevel degenerative disc disease with L3/4 canal stenosis, bilateral neuroforaminal stenosis at L4/5, facet arthropathy worst at right L4/5 with juxtafacet cyst encroaching on the neuroforamen.    On clinical exam her pain is more consistent with SI versus facet pathology.  We will first trial right diagnostic and therapeutic sacroiliac joint injection.  Additionally we discussed right L4/L5/S1 medial branch blocks in the event her SIJ injection is not diagnostic.   3/29/2023: No benefit from right SIJ.  We will proceed with right L4/L5/S1 MBB.  6/28/2023: Good ongoing benefit after rhizotomy.  Can repeat as needed every 6 months.  1/15/2024: No benefit from repeat rhizotomy.  Will schedule lumbar TFESI.  Start Flexeril 10 mg.  Can consider referral to neurosurgery if minimal or transient benefit.  3/5/2024: Good benefit from TFESI.  Will trial gabapentin for neuropathy-like complaint in her right foot.  3/27/2024: Modest benefit from TFESI.  Will refill gabapentin.  NSA referral for consideration of SCS versus surgery.  Will obtain psych clearance for SCS trial    1. Lumbar radiculopathy    2. Numbness of right foot    3. Spondylosis of lumbar region without myelopathy or radiculopathy              PLAN:  1. Medication Recommendations: Recommend Voltaren topical, NSAIDs, Tylenol.  Can trial turmeric 500 mg twice daily if NSAID contraindicated.  Continue Flexeril 10 mg daily at night as needed.   Continue gabapentin 100 mg nightly, 30 pills, #2 refills.   As part  of this patient's treatment plan, patient will be prescribed controlled substances.  The patient has been made aware of appropriate use of such medications, including potential risk of somnolent, limited ability to drive and/or work safely, and potential for dependence or overdose.  He has been made clear that his medications refused by this patient only, without concomitant use of alcohol or other substances as prescribed.  Controlled substance status of medication discussed with patient, discussed risk of medication including abuse potential and diversion potential and need to follow-up for reevaluation appointment in order to receive further refills.  Shabbir was reviewed and compliant.     2. Physical Therapy: [facility]    3. Psychological: Obtain psychiatric clearance from [facility] for SCS trial    4. Complementary and alternative (CAM) Therapies:     5. Labs: None indicated     6. Imaging: None indicated    7. Interventions: Consider spinal cord stimulator trial (63650 x2) with [employer] for lumbar radiculopathy given appropriate psychiatric clearance and no neurosurgical evaluation    8. Referrals: New referral to NSA for consideration of surgery versus SCS    9. Records requested: n/a    10. Lifestyle goals:    Follow-up after NSA appointment (approximately 1 month), follow-up 3 months for medication management.         [facility]  Jeni Alan PA-C

## 2024-03-28 RX ORDER — GABAPENTIN 100 MG/1
100 CAPSULE ORAL NIGHTLY
Qty: 30 CAPSULE | Refills: 2 | Status: SHIPPED | OUTPATIENT
Start: 2024-04-04 | End: 2024-07-03

## 2024-04-30 ENCOUNTER — OFFICE VISIT (OUTPATIENT)
Dept: INTERNAL MEDICINE | Facility: CLINIC | Age: 69
End: 2024-04-30
Payer: MEDICARE

## 2024-04-30 VITALS
HEART RATE: 65 BPM | BODY MASS INDEX: 23.76 KG/M2 | TEMPERATURE: 98.3 F | OXYGEN SATURATION: 99 % | DIASTOLIC BLOOD PRESSURE: 78 MMHG | HEIGHT: 65 IN | SYSTOLIC BLOOD PRESSURE: 142 MMHG | WEIGHT: 142.6 LBS

## 2024-04-30 DIAGNOSIS — R10.9 RIGHT LATERAL ABDOMINAL PAIN: Primary | ICD-10-CM

## 2024-04-30 DIAGNOSIS — I10 ESSENTIAL HYPERTENSION: Chronic | ICD-10-CM

## 2024-04-30 DIAGNOSIS — Z71.85 VACCINE COUNSELING: ICD-10-CM

## 2024-04-30 DIAGNOSIS — R10.13 DYSPEPSIA: ICD-10-CM

## 2024-04-30 PROCEDURE — 3077F SYST BP >= 140 MM HG: CPT | Performed by: INTERNAL MEDICINE

## 2024-04-30 PROCEDURE — 1159F MED LIST DOCD IN RCRD: CPT | Performed by: INTERNAL MEDICINE

## 2024-04-30 PROCEDURE — 1160F RVW MEDS BY RX/DR IN RCRD: CPT | Performed by: INTERNAL MEDICINE

## 2024-04-30 PROCEDURE — 3078F DIAST BP <80 MM HG: CPT | Performed by: INTERNAL MEDICINE

## 2024-04-30 PROCEDURE — 99214 OFFICE O/P EST MOD 30 MIN: CPT | Performed by: INTERNAL MEDICINE

## 2024-04-30 PROCEDURE — 93000 ELECTROCARDIOGRAM COMPLETE: CPT | Performed by: INTERNAL MEDICINE

## 2024-04-30 RX ORDER — CLOTRIMAZOLE AND BETAMETHASONE DIPROPIONATE 10; .64 MG/G; MG/G
1 CREAM TOPICAL
COMMUNITY
Start: 2024-04-25

## 2024-04-30 NOTE — ASSESSMENT & PLAN NOTE
Reports increased has with hydrochloroquine; reviewed potential food triggers as well as habits that increase gas; rec prn GasX; consider probiotic if no better

## 2024-04-30 NOTE — ASSESSMENT & PLAN NOTE
BP elevated, worsened on repeat; rec home BP monitoring with goal < 130/80 and advised pt to bring BP cuff to next appt to verify accuracy; cont amlo 5mg QD

## 2024-04-30 NOTE — PROGRESS NOTES
"Chief Complaint   Patient presents with    Pain in right side       History of Present Illness  69 y.o.  female presents for further evaluation of right side pain. HPI started out of the blue 2 nights ago while watching TV, noting a twinge of discomfort on the right side. It got worse through the night, described as more sharp, worsened with deep inspiration. Was not short of breath but felt herself taking shallow breaths due tot he pain. Denies radiation of pain nor assoc'd n/v or CP.  Went to lie down, did not feel better lying on right side, but pain dissipated lying on her back.    Was asx the next morning. Yesterday, felt twinges of same pain but nowhere similar in severity. Last BM was this AM. Denies bowel changes. Denies change in what she eats.    Reports baseline increased has after taking plaquenil for RA.    Not checking BPs at home.    Review of Systems  ROS (+) for R side pain as noted with pleuritic component, spont resolved. No assoc'd n/v/d, CP, SOB.  Denies fevers/chills. All other ROS reviewed and negative.    Current Outpatient Medications:     alendronate 70 MG weekly    amLODIPine 5 MG QD    Calcium-Cholecalciferol 200-6.25 MG-MCG QD    cyclobenzaprine (FLEXERIL) 10 MG QD    folic acid (FOLVITE) 1 MG 4 QD    gabapentin 100 MG QHS    hydroxychloroquine (PLAQUENIL) 200 MG QD    inFLIXimab (REMICADE) 100 MG injection AD    methotrexate 2.5 MG tablet, Take 8 tablets weekly    Omega-3 Fatty Acids (fish oil) 1000 MG 2 QD    simvastatin (ZOCOR) 20 MG QD    TURMERIC QD    vitamin B-6 (PYRIDOXINE) 50 MG 2 QD    Xiidra 5 % ophthalmic AD    VITALS:  /78   Pulse 65   Temp 98.3 °F (36.8 °C)   Ht 165.1 cm (65\")   Wt 64.7 kg (142 lb 9.6 oz)   SpO2 99%   BMI 23.73 kg/m²   Repeat BP left arm 162/60    Physical Exam  Vitals and nursing note reviewed.   Constitutional:       General: She is not in acute distress.     Appearance: Normal appearance. She is not ill-appearing.   Eyes:      " Extraocular Movements: Extraocular movements intact.      Conjunctiva/sclera: Conjunctivae normal.      Comments: Wearing glasses   Cardiovascular:      Rate and Rhythm: Normal rate and regular rhythm.      Heart sounds: Normal heart sounds.   Pulmonary:      Effort: Pulmonary effort is normal. No respiratory distress.      Breath sounds: Normal breath sounds. No wheezing, rhonchi or rales.   Chest:      Chest wall: No tenderness.   Abdominal:      Comments: Hypoactive BS, abd mod firm, nontender, no rebound/guaridng   Neurological:      Mental Status: She is alert and oriented to person, place, and time. Mental status is at baseline.      Gait: Gait normal.   Psychiatric:         Mood and Affect: Mood normal.         Behavior: Behavior normal.         LABS  Results for orders placed or performed in visit on 03/14/24   Urine Drug Screen - Urine, Clean Catch    Specimen: Urine, Clean Catch   Result Value Ref Range    THC, Screen, Urine Negative Negative    Phencyclidine (PCP), Urine Negative Negative    Cocaine Screen, Urine Negative Negative    Methamphetamine, Ur Negative Negative    Opiate Screen Negative Negative    Amphetamine Screen, Urine Negative Negative    Benzodiazepine Screen, Urine Negative Negative    Tricyclic Antidepressants Screen Negative Negative    Methadone Screen, Urine Negative Negative    Barbiturates Screen, Urine Negative Negative    Oxycodone Screen, Urine Negative Negative    Buprenorphine, Screen, Urine Negative Negative   Fentanyl, Urine - Urine, Clean Catch    Specimen: Urine, Clean Catch   Result Value Ref Range    Fentanyl, Urine Negative Negative       ECG 12 Lead    Date/Time: 4/30/2024 8:38 AM  Performed by: Rosa M Venegas MD    Authorized by: Rosa M Venegas MD  Comparison: compared with previous ECG from 8/1/2023  Similar to previous ECG  Rhythm: sinus rhythm  Rate: normal  BPM: 69  Conduction: non-specific intraventricular conduction delay  ST Segments: ST segments normal  T  Waves: T waves normal  QRS axis: normal  Clinical impression comment: stable EKG            ASSESSMENT/PLAN    Diagnoses and all orders for this visit:    1. Right lateral abdominal pain (Primary)  Comments:  self-limited; ddx gas pains, intestinal spasm, constipation; has had damion; rec bowel cleanout with firm abd, rec prn Gas X; f/u prn    2. Dyspepsia  Assessment & Plan:  Reports increased has with hydrochloroquine; reviewed potential food triggers as well as habits that increase gas; rec prn GasX; consider probiotic if no better      3. Hypertension  Assessment & Plan:  BP elevated, worsened on repeat; rec home BP monitoring with goal < 130/80 and advised pt to bring BP cuff to next appt to verify accuracy; cont amlo 5mg QD    Orders:  -     ECG 12 Lead    4. Vaccine counseling  Comments:  rec 2nd COVID19 vacc; reminder overdue for Tdap        FOLLOW-UP  Health maintenance - flu vacc, COVID19 vacc, and RSV vacc completed this season, rec consideration for 2nd dose COVID19 vacc; rec updated Tdap (last 10/14), counseling given  RTC for AWV 8/9/24; fasting labs prior to appt (CBC, CMP, TSH, lipids, UA/micr, A1C, FT4, CPK, ESR, CRP, vit D) and BP check    Electronically signed by:    Rosa M Venegas MD, FACP  04/30/2024

## 2024-05-02 ENCOUNTER — OFFICE VISIT (OUTPATIENT)
Dept: NEUROSURGERY | Facility: CLINIC | Age: 69
End: 2024-05-02
Payer: MEDICARE

## 2024-05-02 VITALS
HEIGHT: 65 IN | WEIGHT: 142 LBS | SYSTOLIC BLOOD PRESSURE: 120 MMHG | DIASTOLIC BLOOD PRESSURE: 80 MMHG | TEMPERATURE: 98.2 F | BODY MASS INDEX: 23.66 KG/M2

## 2024-05-02 DIAGNOSIS — M51.36 DDD (DEGENERATIVE DISC DISEASE), LUMBAR: Primary | ICD-10-CM

## 2024-05-02 DIAGNOSIS — M48.062 SPINAL STENOSIS OF LUMBAR REGION WITH NEUROGENIC CLAUDICATION: ICD-10-CM

## 2024-05-02 PROCEDURE — 3074F SYST BP LT 130 MM HG: CPT | Performed by: PHYSICIAN ASSISTANT

## 2024-05-02 PROCEDURE — 99204 OFFICE O/P NEW MOD 45 MIN: CPT | Performed by: PHYSICIAN ASSISTANT

## 2024-05-02 PROCEDURE — 3079F DIAST BP 80-89 MM HG: CPT | Performed by: PHYSICIAN ASSISTANT

## 2024-05-02 NOTE — PROGRESS NOTES
Patient: Carli Bolanos  : 1955    Primary Care Provider: Rosa M Venegsa MD      Chief Complaint: Low back right buttock and hip pain    History of Present Illness:       Patient is a very sweet 69-year-old female who has rheumatoid arthritis.  She is on Plaquenil, methotrexate, Fosamax, Remicade and gabapentin.    Patient recently saw Dr. Calle and received some medial branch blocks since had about 60% overall reduction in her pain.  Patient was sent to us for consideration of spinal cord stimulator.    Patient presents with MRI and complains of pain in the right medial gluteal area radiating around to the right hip.  She also has a little bit of numbness and tingling in her right foot.    Patient states the pain is little bit worse when she is up and walking she does lean forward on a shopping cart.  Overall however she is a lot better after the injections    Review of Systems   Constitutional:  Negative for activity change, appetite change, chills, diaphoresis, fatigue, fever and unexpected weight change.   HENT:  Positive for tinnitus. Negative for congestion, dental problem, drooling, ear discharge, ear pain, facial swelling, hearing loss, mouth sores, nosebleeds, postnasal drip, rhinorrhea, sinus pressure, sinus pain, sneezing, sore throat, trouble swallowing and voice change.    Eyes:  Negative for photophobia, pain, discharge, redness, itching and visual disturbance.   Respiratory:  Negative for apnea, cough, choking, chest tightness, shortness of breath, wheezing and stridor.    Cardiovascular:  Negative for chest pain, palpitations and leg swelling.   Gastrointestinal:  Negative for abdominal distention, abdominal pain, anal bleeding, blood in stool, constipation, diarrhea, nausea, rectal pain and vomiting.   Endocrine: Negative for cold intolerance, heat intolerance, polydipsia, polyphagia and polyuria.   Genitourinary:  Negative for decreased urine volume, difficulty urinating, dyspareunia,  dysuria, enuresis, flank pain, frequency, genital sores, hematuria, menstrual problem, pelvic pain, urgency, vaginal bleeding, vaginal discharge and vaginal pain.   Musculoskeletal:  Positive for arthralgias and back pain. Negative for gait problem, joint swelling, myalgias, neck pain and neck stiffness.   Skin:  Negative for color change, pallor, rash and wound.   Allergic/Immunologic: Negative for environmental allergies, food allergies and immunocompromised state.   Neurological:  Negative for dizziness, tremors, seizures, syncope, facial asymmetry, speech difficulty, weakness, light-headedness, numbness and headaches.   Hematological:  Negative for adenopathy. Does not bruise/bleed easily.   Psychiatric/Behavioral:  Negative for agitation, behavioral problems, confusion, decreased concentration, dysphoric mood, hallucinations, self-injury, sleep disturbance and suicidal ideas. The patient is not nervous/anxious and is not hyperactive.        Past Medical History:     Past Medical History:   Diagnosis Date    Abnormal thyroid ultrasound 07/05/2017    stable MNG (7/5/17) by u/s    Abnormal thyroid ultrasound 06/12/2015    stable MNG (6/12/15)    Abnormal thyroid ultrasound 04/18/2014    thyr u/s (4/18/14): MNG except dominant complex mass mid L. lobe, repeat u/s in 6 mos    Arthritis     Central corneal ulcer of left eye     Central corneal ulcer of left eye 11/18/2022    Endometrial cancer     H/O uterine leiomyoma     s/p DUSTY/USO ('13)    History of CT scan 02/27/2016    neg head ct    Hx of bone density study 06/08/2015    DEXA (6/15) L -0.4, H0.7    Hx of bone density study 07/17/2018    nl DEXA (7/17/18): L -0.4, H 0.2, repeat 3 yrs    Hx of bone density study 09/27/2021    DEXA (9/27/21): L 0.6, A -1.8; per Dr. Giles/LEONOR Villa, APRN - repeat 2-3 yrs    Hx of colonoscopy 03/11/2011    colonosc (3/11/11): diverticulosis, sm int hem, repeat 2018; [facility] - Dr. Thornton    Hx of colonoscopy 10/05/2018    colonosc  (10/5/18): diverticulosis, redundant colon, int hem, repeat 5 yrs due to h/o polyps; [facility] Thornton    Hx of colonoscopy 10/18/2023    colonosc (10/18/23): tubular adenoma, diverticulosis, int hem, repeat 5 yrs; [facility] Thornton    Hx of EMG/NCV 02/27/2008    EMG/NCV (2/27/08): right median neuropathy    Hx of hand x-ray 01/24/2018    R. hand xray (1/24/18): degen changes 1st metacarpal joint and index PIP    Hx of MRI L-spine 02/03/2023    MRI L-spine (2/3/23): mod DDD with mod-severe L3-4 spinal stenosis and mild bilat NFS, mild-mod L4-5 spinal stenosis with severe R NFS    Hypertension     Joint pain     Low back pain        Family History:     Family History   Problem Relation Age of Onset    Dementia Mother     Cancer Mother         GYN    Hip fracture Mother     Thyroid disease Mother     Hypertension Mother     Cervical cancer Mother     Heart attack Father     Heart failure Father     Diabetes Father     Heart disease Father         heart failure    Prostate cancer Father     Hypertension Father     Hypertension Brother     Heart disease Brother     Kidney cancer Maternal Grandmother     Arthritis Maternal Grandmother     Cancer Maternal Grandmother         Kidney Cancer    Colon cancer Maternal Grandfather 70    Cancer Maternal Grandfather         Colon cancer    Heart failure Paternal Grandmother     Heart disease Paternal Grandmother     Arthritis Paternal Grandmother     Hypertension Paternal Grandmother     Diabetes Paternal Grandfather     Colon cancer Paternal Uncle 78    Breast cancer Neg Hx     Ovarian cancer Neg Hx        Social History:    reports that she has never smoked. She has never been exposed to tobacco smoke. She has never used smokeless tobacco. She reports current alcohol use of about 1.0 standard drink of alcohol per week. She reports that she does not use drugs.   SMOKING STATUS: Non-smoker    Surgical History:     Past Surgical History:   Procedure Laterality Date    APPENDECTOMY   "1980    taken out at gallbladder surgery    CARPAL TUNNEL RELEASE Left     carpel tunnel left     CERVICAL POLYPECTOMY  03/2005    with h/o fibroids and DUB    CHOLECYSTECTOMY  1980    DILATATION AND CURETTAGE  02/22/2013    EXCISION MASS HEAD/NECK Right 03/05/2013    s/p excision scalp lesion large R. parietal and L. temporal areas; plastics - Dr. Amelia Gann    HIP ARTHROSCOPY Right 2004    with subsequent Tok resurfacing with hip replacement (5/11)    SALPINGO OOPHORECTOMY Left 1991    secondary to tubal pregnancy and ovarian cyst    SALPINGO OOPHORECTOMY Right     secondary to tubal pregnancy    TOTAL HIP ARTHROPLASTY Left 06/2011     ortho Dr. Mcdermott    TOTAL HIP ARTHROPLASTY Right 05/2011    TOTAL LAPAROSCOPIC HYSTERECTOMY WITH DAVINCI ROBOT  03/05/2013    RTLH/BSO with LND and omental biopsy for endometrial cancer; GYN Onc - Dr. Malin    US GUIDED FINE NEEDLE ASPIRATION  05/11/2021       Allergies:   Codeine, Lisinopril, Naproxen, and Metaxalone    Physical Exam:    Vital Signs:/80 (BP Location: Right arm, Patient Position: Sitting, Cuff Size: Adult)   Temp 98.2 °F (36.8 °C) (Infrared)   Ht 165.1 cm (65\")   Wt 64.4 kg (142 lb)   BMI 23.63 kg/m²    BMI: Body mass index is 23.63 kg/m².     GENERAL:           The patient is in no acute distress, and is able to answer all questions appropriately.      Musculoskeletal:            strength is 5 out of 5 bilaterally.        Shoulder abduction is 5 out of 5.         Dorsiflexion is 5/5 Bilaterally       Plantarflexion is 5/5 bilaterally       Hip Flexion 5/5 bilaterally.         The patient´s gait is normal without antalgia.    Neurologic:          The patient is alert and oriented by 3.          Pupils are equal and reactive to light.              Sensation is equal bilaterally with no deficit.           Reflexes:  2+ through out      Medical Decision Making    Data Review:   MRI of the lumbar spine showing some dextroscoliosis with some " "concavity of the right side at the L3-4 and L4-5 levels.  There is some severe foraminal stenosis at L4-5 on the right as well as at L3-4 but more of a moderate nature    Central canal is widely patent.  Generalized loss of disc height noted throughout the lumbar spine    Diagnosis:   Scoliosis  Lumbar degenerative disc disease  Chronic pain with RA  Right L5 radiculitis    Treatment Options:   I think the patient is suffering from the right medial gluteal pain as opposed to SI joint pain secondary to compression of the L5 nerve root.  This does get worse when she is up and walking but is at a tolerable level right now.    I told the patient to always come back if the symptoms were to worsen however she would likely need to discontinue the Remicade and methotrexate prior to any surgical intervention.    I would suspect to \"fix\" the L4-5 foraminal compression we would consider a unilateral transforaminal fusion for decompression.  Patient understands what this entails and the indications for that.  She will call us back if anything changes.    I would try to avoid any spinal cord stimulators or permanent implants of foreign body secondary to her chronic use of the disease modifying rheumatologic agents.     BMI is within normal parameters. No other follow-up for BMI required.    No diagnosis found.  "

## 2024-05-15 ENCOUNTER — TELEPHONE (OUTPATIENT)
Dept: PAIN MEDICINE | Facility: CLINIC | Age: 69
End: 2024-05-15
Payer: MEDICARE

## 2024-05-15 NOTE — TELEPHONE ENCOUNTER
Patient called to speak to Warren about appointment. I told patient that she has appointment with KEVIN Alan on 06/27 at 1:30. I will have Akiko to call her back.

## 2024-05-15 NOTE — TELEPHONE ENCOUNTER
Attempted to contact patient to schedule an appointment but no answer. LVM to call the office back.

## 2024-05-16 NOTE — TELEPHONE ENCOUNTER
S/w patient and she would like to know why she needs a sooner appointment? Her original appointment is set for 06/27/2024 with Yves.

## 2024-05-16 NOTE — TELEPHONE ENCOUNTER
S/w patient and she would like to keep her appointment on 06/27/2024 with Yves.       Patient states she is going on vacation on 06/04/2024 and needs permission for pharmacy from Dr. Calle that it is okay for her to  Gabapentin 4 days earlier from pharmacy. She will be out of town on 06/08/2024 when she is allowed to  next refill.     Is it okay for patient to  Gabapentin early?

## 2024-05-24 ENCOUNTER — PATIENT MESSAGE (OUTPATIENT)
Dept: PAIN MEDICINE | Facility: CLINIC | Age: 69
End: 2024-05-24
Payer: MEDICARE

## 2024-06-27 ENCOUNTER — OFFICE VISIT (OUTPATIENT)
Dept: PAIN MEDICINE | Facility: CLINIC | Age: 69
End: 2024-06-27
Payer: MEDICARE

## 2024-06-27 VITALS — BODY MASS INDEX: 23.34 KG/M2 | HEIGHT: 65 IN | WEIGHT: 140.1 LBS

## 2024-06-27 DIAGNOSIS — M54.16 LUMBAR RADICULOPATHY: Primary | ICD-10-CM

## 2024-06-27 DIAGNOSIS — M47.816 SPONDYLOSIS OF LUMBAR REGION WITHOUT MYELOPATHY OR RADICULOPATHY: ICD-10-CM

## 2024-06-27 DIAGNOSIS — R20.0 NUMBNESS OF RIGHT FOOT: ICD-10-CM

## 2024-06-27 NOTE — PROGRESS NOTES
Primary Physician: Rosa M Venegas MD    CHIEF COMPLAINT or REASON FOR VISIT: Follow-up, Back Pain (Pain is worse when standing for long period of times. ), and Med Management      Initial HPI 2/6/2023:  Ms. Carli Bolanos is 69 y.o. female who presents as a new patient referral for evaluation and treatment of chronic right-sided low back pain with radiation into the buttock.  Patient states that she has had this pain since November 2022.  She has engaged in physical therapy, continues to perform the exercises that she was instructed.  Patient denies any bowel or bladder dysfunction, lower extremity weakness, new onset saddle anesthesia or unexplained weight loss.     Pain is exacerbated with prolonged standing, ameliorated with sitting and rest.  Not associated with ambulation.  Ameliorated with Tylenol.  She did have lumbar and sacroiliac joint x-rays, lumbar MRI demonstrating degenerative disc disease and facet arthropathy.  She denies any pain in her legs beyond the buttock.    Interval history: Patient returns to clinic today for medication management she continues to take gabapentin nightly with pretty good benefit.  She has noticed she is still experiencing some discomfort but overall her symptoms are improved.  Primary complains of chronic right-sided low back pain with radiation into the right lower extremity.  She has now undergone multiple procedures which have all failed to provide prolonged pain relief.  She has recently met with neurosurgery, Adrián Potts PA-C, who identified patient as a potential neurosurgical candidate if her symptoms were to worsen.  At this time her symptoms are pretty tolerable and she is not interested in surgery.  We have discussed a potential spinal cord stimulator trial however patient is not particularly interested in a trial at this time.  We will continue conservative measures for now.  She is on multiple disease modifying rheumatologic agents.      Interventions:  3/7/2023: Right SIJ with minimal transient benefit.  4/25/2023: Right L4/5 and L5/S1  MBB with 100% relief 1 day  5/16/2023: Right L4/5 and L5/S1  MBB with 80% relief for several hours  5/30/2023: Right L4/5 and L5/S1  RFA with 70% relief for 4 months  12/7/2023: Right L4/5 and L5/S1 RFA with 0% benefit  1/24/2024: Right L4/5 and L5/S1 TFESI with 60% relief ongoing    Objective Pain Scoring:   BRIEF PAIN INVENTORY:  Total score:   Pain Score    06/27/24 1315   PainSc:   1   PainLoc: Back          PHQ-2: PHQ-2 Total Score: 0  PHQ-9: PHQ-9: Brief Depression Severity Measure Score: 0  Opioid Risk Tool:         Review of Systems:   ROS negative except as otherwise noted     Past Medical History:   Past Medical History:   Diagnosis Date    Abnormal thyroid ultrasound 07/05/2017    stable MNG (7/5/17) by u/s    Abnormal thyroid ultrasound 06/12/2015    stable MNG (6/12/15)    Abnormal thyroid ultrasound 04/18/2014    thyr u/s (4/18/14): MNG except dominant complex mass mid L. lobe, repeat u/s in 6 mos    Arthritis     Central corneal ulcer of left eye     Central corneal ulcer of left eye 11/18/2022    Endometrial cancer     H/O uterine leiomyoma     s/p DUSTY/USO ('13)    History of CT scan 02/27/2016    neg head ct    Hx of bone density study 06/08/2015    DEXA (6/15) L -0.4, H0.7    Hx of bone density study 07/17/2018    nl DEXA (7/17/18): L -0.4, H 0.2, repeat 3 yrs    Hx of bone density study 09/27/2021    DEXA (9/27/21): L 0.6, A -1.8; per Dr. Giles/LEONOR Villa, APRN - repeat 2-3 yrs    Hx of colonoscopy 03/11/2011    colonosc (3/11/11): diverticulosis, sm int hem, repeat 2018; CRS - Dr. Thornton    Hx of colonoscopy 10/05/2018    colonosc (10/5/18): diverticulosis, redundant colon, int hem, repeat 5 yrs due to h/o polyps; CRS - Dr. Hillary Long of colonoscopy 10/18/2023    colonosc (10/18/23): tubular adenoma, diverticulosis, int hem, repeat 5 yrs; CRS - Dr. Hillary Long of EMG/NCV 02/27/2008     EMG/NCV (2/27/08): right median neuropathy    Hx of hand x-ray 01/24/2018    R. hand xray (1/24/18): degen changes 1st metacarpal joint and index PIP    Hx of MRI L-spine 02/03/2023    MRI L-spine (2/3/23): mod DDD with mod-severe L3-4 spinal stenosis and mild bilat NFS, mild-mod L4-5 spinal stenosis with severe R NFS    Hypertension     Joint pain     Low back pain     Osteoporosis 2024         Past Surgical History:   Past Surgical History:   Procedure Laterality Date    APPENDECTOMY  1980    taken out at gallbladder surgery    CARPAL TUNNEL RELEASE Left     carpel tunnel left     CERVICAL POLYPECTOMY  03/2005    with h/o fibroids and DUB    CHOLECYSTECTOMY  1980    DILATATION AND CURETTAGE  02/22/2013    EXCISION MASS HEAD/NECK Right 03/05/2013    s/p excision scalp lesion large R. parietal and L. temporal areas; plastics - Dr. Amelia Gann    HIP ARTHROSCOPY Right 2004    with subsequent Henderson resurfacing with hip replacement (5/11)    JOINT REPLACEMENT  2007. 2011    both hips    ORTHOPEDIC SURGERY      SALPINGO OOPHORECTOMY Left 1991    secondary to tubal pregnancy and ovarian cyst    SALPINGO OOPHORECTOMY Right     secondary to tubal pregnancy    TOTAL HIP ARTHROPLASTY Left 06/2011     ortho Dr. Mcdermott    TOTAL HIP ARTHROPLASTY Right 05/2011    TOTAL LAPAROSCOPIC HYSTERECTOMY WITH DAVINCI ROBOT  03/05/2013    RTLH/BSO with LND and omental biopsy for endometrial cancer; GYN Onc - Dr. Malin    US GUIDED FINE NEEDLE ASPIRATION  05/11/2021         Family History   Family History   Problem Relation Age of Onset    Dementia Mother     Cancer Mother         GYN    Hip fracture Mother     Thyroid disease Mother     Hypertension Mother     Cervical cancer Mother     Heart attack Father     Heart failure Father     Diabetes Father     Heart disease Father         heart failure    Prostate cancer Father     Hypertension Father     Hypertension Brother     Heart disease Brother     Kidney cancer Maternal  Grandmother     Arthritis Maternal Grandmother     Cancer Maternal Grandmother         Kidney Cancer    Colon cancer Maternal Grandfather 70    Cancer Maternal Grandfather         Colon cancer    Heart failure Paternal Grandmother     Heart disease Paternal Grandmother     Arthritis Paternal Grandmother     Hypertension Paternal Grandmother     Diabetes Paternal Grandfather     Colon cancer Paternal Uncle 78    Breast cancer Neg Hx     Ovarian cancer Neg Hx          Social History   Social History     Socioeconomic History    Marital status:    Tobacco Use    Smoking status: Never     Passive exposure: Never    Smokeless tobacco: Never   Vaping Use    Vaping status: Never Used   Substance and Sexual Activity    Alcohol use: Yes     Alcohol/week: 1.0 standard drink of alcohol     Types: 1 Glasses of wine per week     Comment: social    Drug use: No    Sexual activity: Yes     Partners: Male     Birth control/protection: Hysterectomy        Medications:     Current Outpatient Medications:     alendronate (FOSAMAX) 70 MG tablet, Take 1 tablet by mouth Every 7 (Seven) Days., Disp: , Rfl:     amLODIPine (NORVASC) 5 MG tablet, Take 1 tablet by mouth Daily., Disp: 90 tablet, Rfl: 3    Calcium-Cholecalciferol 200-6.25 MG-MCG tablet, Take  by mouth Daily., Disp: , Rfl:     clotrimazole-betamethasone (LOTRISONE) 1-0.05 % cream, Apply 1 Application topically to the appropriate area as directed., Disp: , Rfl:     cyclobenzaprine (FLEXERIL) 10 MG tablet, Take 1 tablet by mouth Daily., Disp: 30 tablet, Rfl: 2    folic acid (FOLVITE) 1 MG tablet, Take 4 tablets by mouth Daily. (Patient taking differently: Take 5 tablets by mouth Daily.), Disp: , Rfl:     gabapentin (NEURONTIN) 100 MG capsule, Take 1 capsule by mouth Every Night for 90 days., Disp: 30 capsule, Rfl: 2    hydroxychloroquine (PLAQUENIL) 200 MG tablet, , Disp: , Rfl:     inFLIXimab (REMICADE) 100 MG injection, Infuse  into a venous catheter 1 (One) Time., Disp:  ", Rfl:     methotrexate 2.5 MG tablet, Take 8 tablets by mouth 1 (One) Time Per Week., Disp: , Rfl:     Omega-3 Fatty Acids (fish oil) 1000 MG capsule capsule, Take 2 capsules by mouth Daily With Breakfast., Disp: , Rfl:     simvastatin (ZOCOR) 20 MG tablet, Take 1 tablet by mouth Every Evening., Disp: 90 tablet, Rfl: 3    TURMERIC PO, Take  by mouth., Disp: , Rfl:     vitamin B-6 (PYRIDOXINE) 50 MG tablet, Take 2 tablets by mouth Daily., Disp: , Rfl:     Xiidra 5 % ophthalmic solution, , Disp: , Rfl:         Physical Exam:     Vitals:    06/27/24 1315   Weight: 63.5 kg (140 lb 1.6 oz)   Height: 165.1 cm (65\")   PainSc:   1   PainLoc: Back            General: Alert and oriented, No acute distress.   HEENT: Normocephalic, atraumatic.   Cardiovascular: No gross edema  Respiratory: Respirations are non-labored    Lumbar Spine:   No masses or atrophy  Range of motion - Flexion normal. Extension normal. Right Lat Bending normal. Left Lat Bending normal  Facet Loading: Positive right  Facet Palpation - positive right  PSIS tenderness -positive right  Son's/DANIELA/Thigh thrust -positive right  Straight leg raise: Negative bilaterally  Slump test: Negative bilaterally    Motor Exam:  Strength: Rate on 1-5 scale Right Left    L1/2- hip flexion 5 5    L3- knee extension 5 5    L4- ankle dorsiflexion 5 5    L5- great toe extension 5 5    S1- ankle plantarflexion 5 5    Sensory Exam: Full and equal sensation to light touch throughout.  Neurologic: Cranial Nerves II-XII are grossly intact.   Clonus -negative bilaterally  Psychiatric: Cooperative.   Gait: Normal   Assistive Devices: None    Imaging Studies:   Results for orders placed during the hospital encounter of 02/03/23    MRI Lumbar Spine Without Contrast    Narrative  MRI LUMBAR SPINE WO CONTRAST    Date of Exam: 2/3/2023 4:11 PM EST    Indication: Low back pain, symptoms persist with > 6 wks treatment.    Comparison: Lumbar spine radiographs 1/24/2023    Technique:  " Routine multiplanar/multisequence sequence images of the lumbar spine were obtained without contrast administration.    Findings:  There are 5 lumbar-type vertebral bodies. There is similar smooth levoconvex curvature of the lower lumbar spine centered on L4. No significant spondylolisthesis. The bone marrow signal intensity is within normal limits without focal or suspicious bony  lesion or evidence of bone marrow edema. No acute fracture. The vertebral body heights are preserved. There are multilevel degenerative changes with level by level assessment as follows:    T12-L1: Normal.    L1-L2: Normal.    L2-L3: Moderate degenerative disc disease with small left eccentric circumferential disc bulge. Mild spinal canal stenosis. No significant neuroforaminal stenosis.    L3-L4: Moderate degenerative disc disease with circumferential disc bulge. Moderate to severe bilateral facet arthropathy and thickening of the ligamentum flavum. Moderate to severe spinal canal stenosis. Mild bilateral neuroforaminal stenosis.    L4-L5: Moderate to severe degenerative disc disease with circumferential disc bulge and moderate right and mild left facet arthropathy and mild thickening of the ligamentum flavum. Mild to moderate spinal canal stenosis. Severe right-sided neuroforaminal  stenosis.    L5-S1: Moderate degenerative disc disease with trace posterior disc bulge. No significant facet arthropathy. No significant spinal canal stenosis. No significant neuroforaminal stenosis.    Normal morphology and signal intensity of the cauda equina and conus medullaris. The conus terminates at the superior endplate of L1. The paravertebral soft tissues are unremarkable. Grossly unremarkable appearance of the partially imaged portions of the  posterior abdomen and pelvis.    Impression  Impression:  Moderate to severe degenerative disc disease and facet arthropathy, worse in the mid and lower lumbar spine contribute into moderate to severe spinal  canal stenosis at L3-L4 and mild to moderate spinal canal stenosis at L4-L5. There is severe right-sided  neuroforaminal stenosis at L4-L5. Additional findings above.    Electronically Signed: Shane Beckham  2/3/2023 5:48 PM EST  Workstation ID: [id number]      Impression & Plan:   2/6/2023: Carli Bolanos is a 69 y.o. female with past medical history significant for osteoarthritis who presents to the pain clinic for evaluation and treatment of chronic right-sided low back pain.  I personally reviewed the patient's lumbar MRI which demonstrates multilevel degenerative disc disease with L3/4 canal stenosis, bilateral neuroforaminal stenosis at L4/5, facet arthropathy worst at right L4/5 with juxtafacet cyst encroaching on the neuroforamen.    On clinical exam her pain is more consistent with SI versus facet pathology.  We will first trial right diagnostic and therapeutic sacroiliac joint injection.  Additionally we discussed right L4/L5/S1 medial branch blocks in the event her SIJ injection is not diagnostic.   3/29/2023: No benefit from right SIJ.  We will proceed with right L4/L5/S1 MBB.  6/28/2023: Good ongoing benefit after rhizotomy.  Can repeat as needed every 6 months.  1/15/2024: No benefit from repeat rhizotomy.  Will schedule lumbar TFESI.  Start Flexeril 10 mg.  Can consider referral to neurosurgery if minimal or transient benefit.  3/5/2024: Good benefit from TFESI.  Will trial gabapentin for neuropathy-like complaint in her right foot.  3/27/2024: Modest benefit from TFESI.  Will refill gabapentin.  NSA referral for consideration of SCS versus surgery.  Will obtain psych clearance for SCS trial  6/27/2024: Will increase gabapentin to 300 mg.  Continue conservative measures for now.  Can consider SCS trial versus surgery if symptoms worsen.    1. Lumbar radiculopathy    2. Numbness of right foot    3. Spondylosis of lumbar region without myelopathy or radiculopathy                PLAN:  1. Medication  Recommendations: Recommend Voltaren topical, NSAIDs, Tylenol.  Can trial turmeric 500 mg twice daily if NSAID contraindicated.    -Continue Flexeril  -Increase gabapentin  Gabapentin 300 mg nightly, 30 pills, #3 refills.   As part of this patient's treatment plan, patient will be prescribed controlled substances.  The patient has been made aware of appropriate use of such medications, including potential risk of somnolent, limited ability to drive and/or work safely, and potential for dependence or overdose.  He has been made clear that his medications refused by this patient only, without concomitant use of alcohol or other substances as prescribed.  Controlled substance status of medication discussed with patient, discussed risk of medication including abuse potential and diversion potential and need to follow-up for reevaluation appointment in order to receive further refills.  Shabbir was reviewed and compliant.     2. Physical Therapy: [facility]    3. Psychological: Can consider psychiatric clearance from [facility] for SCS trial    4. Complementary and alternative (CAM) Therapies:     5. Labs: None indicated     6. Imaging: None indicated    7. Interventions: Can consider SCS trial if patient would like.  Politely declines at this time    8. Referrals:     9. Records requested: Neurosurgery notes reviewed    10. Lifestyle goals:    Follow-up 4 months for medication management        [facility]  Jeni Alan PA-C

## 2024-06-27 NOTE — TELEPHONE ENCOUNTER
Increase gabapentin  Gabapentin 300 mg nightly, 30 total, #3 refill  Shabbir reviewed and compliant  Controlled substance agreement signed in office  Follow-up visit scheduled    Tolerating medication without side effect  UDS compliant as of 3/14/2024.

## 2024-06-28 RX ORDER — GABAPENTIN 300 MG/1
300 CAPSULE ORAL NIGHTLY
Qty: 30 CAPSULE | Refills: 3 | Status: SHIPPED | OUTPATIENT
Start: 2024-06-28

## 2024-07-09 ENCOUNTER — TELEPHONE (OUTPATIENT)
Dept: GYNECOLOGIC ONCOLOGY | Facility: CLINIC | Age: 69
End: 2024-07-09
Payer: MEDICARE

## 2024-07-09 NOTE — TELEPHONE ENCOUNTER
Caller: Carli Bolanos    Relationship to patient: Self    Best call back number: [phone]    Chief complaint:     Type of visit: PAP SMEAR    Requested date: CALL TO R/S     If rescheduling, when is the original appointment: 7/16/2024    Additional notes

## 2024-07-09 NOTE — TELEPHONE ENCOUNTER
Hub staff attempted to follow warm transfer process and was unsuccessful     Caller: Carli Bolanos    Relationship to patient: Self    Best call back number: [phone]    Patient is needing: RETURNING MISSED CALL TO RESCHEDULE PAP SMEAR

## 2024-07-09 NOTE — TELEPHONE ENCOUNTER
LVM for pt to call to reschedule. Patient can be rescheduled for any time that is convenient for her.

## 2024-07-17 ENCOUNTER — OFFICE VISIT (OUTPATIENT)
Dept: GYNECOLOGIC ONCOLOGY | Facility: CLINIC | Age: 69
End: 2024-07-17
Payer: MEDICARE

## 2024-07-17 VITALS
BODY MASS INDEX: 23.32 KG/M2 | WEIGHT: 140 LBS | OXYGEN SATURATION: 95 % | TEMPERATURE: 97.6 F | DIASTOLIC BLOOD PRESSURE: 66 MMHG | SYSTOLIC BLOOD PRESSURE: 140 MMHG | RESPIRATION RATE: 18 BRPM | HEIGHT: 65 IN | HEART RATE: 86 BPM

## 2024-07-17 DIAGNOSIS — Z01.419 WELL WOMAN EXAM WITH ROUTINE GYNECOLOGICAL EXAM: Primary | ICD-10-CM

## 2024-07-17 DIAGNOSIS — Z85.42 HISTORY OF ENDOMETRIAL CANCER: ICD-10-CM

## 2024-07-17 DIAGNOSIS — Z12.31 ENCOUNTER FOR SCREENING MAMMOGRAM FOR MALIGNANT NEOPLASM OF BREAST: ICD-10-CM

## 2024-07-17 RX ORDER — PERFLUOROHEXYLOCTANE 1 MG/MG
1 SOLUTION OPHTHALMIC 4 TIMES DAILY
COMMUNITY
Start: 2024-07-01 | End: 2024-07-31

## 2024-07-17 NOTE — PROGRESS NOTES
GYN ONCOLOGY ANNUAL WELL WOMAN VISIT      Carli Bolanos  [id number]  1955      Subjective   Chief Complaint: Annual Exam       Answers submitted by the patient for this visit:  Other (Submitted on 7/10/2024)  Please describe your symptoms.: Annual exam.  No symptoms to report.  Have you had these symptoms before?: No  How long have you been having these symptoms?: 1-4 days  Please list any medications you are currently taking for this condition.: None  - no symptoms  Please describe any probable cause for these symptoms. : None  Primary Reason for Visit (Submitted on 7/10/2024)  What is the primary reason for your visit?: Other      History of present illness:    Carli Bolanos is a 69 y.o. year old female who is here today for an annual exam. She has a history of stage IA grade 1 endometrial cancer as outlined below She completed treatment with surgery alone in  by Dr Malin, s/p RTLH/BSO, bilateral pelvic and periaortic LND, and omental biopsy. She reports she is feeling very well today and has no complaints. She denies vaginal bleeding, pelvic pain, changes in bowel or bladder function, new or concerning lesions, and breast problems. Well woman screenings listed below in health maintenance.  She is being followed closely by her rheumatologist for RA.    Cancer History:   Oncology/Hematology History   Endometrial/uterine adenocarcinoma   2013 Initial Diagnosis    D&C for PMB and abnormal uterine ultrasound. Pathology revealed well differentiated adenocarcinoma in the background of CAH     3/15/2013 Surgery    RTLH/RSO, bilateral pelvic and periaortic LND, and omental biopsy. Stage IA grade 1 by final path         Obstetric History:  OB History          2    Para   2    Term                AB        Living             SAB        IAB        Ectopic        Molar        Multiple        Live Births                   Menstrual History:     No LMP recorded. Patient is postmenopausal.    "       The current medication list and allergy list were reviewed and reconciled.     Past Medical History, Past Surgical History, Social History, Family History have been reviewed and are without significant changes except as mentioned.    Health Maintenance:  see EMR.  -Last [facility] 10- [facility] with recommendation to repeat in 5 years  -Last mammogram 5-9-2023, birads1  -Last pap smear 6-, normal. No h/o abnormal paps.   -Last DEXA 5-, osteoporosis. Followed by specialist at       Review of Systems   Constitutional: Negative.    Gastrointestinal: Negative.    Genitourinary: Negative.    Psychiatric/Behavioral: Negative.           Objective   Physical Exam  Vital Signs: /66   Pulse 86   Temp 97.6 °F (36.4 °C) (Temporal)   Resp 18   Ht 165.1 cm (65\")   Wt 63.5 kg (140 lb)   SpO2 95%   BMI 23.30 kg/m²   Vitals:    07/17/24 0902   PainSc: 0-No pain           General Appearance:  alert, cooperative, no apparent distress and appears stated age   Neurologic/Psych: A&O x 3, gait steady, appropriate affect   Lungs:   Clear to auscultation bilaterally; respirations regular, even, and unlabored bilaterally   Heart:  Regular rate and rhythm, no murmurs appreciated   Breasts:  Symmetrical, no masses, no lesions, and no nipple discharge   Abdomen:   Soft, non-tender, non-distended, and no organomegaly   Lymph nodes: No cervical, supraclavicular, inguinal or axillary adenopathy noted   Extremities: Normal, atraumatic; no clubbing, cyanosis, or edema    Skin: No rashes, ulcers, or suspicious lesions noted   Pelvic: External Genitalia  atrophic, without lesions  Vagina  is pale, atrophic. Narrow introitus, pediatric speculum used.   Vaginal Cuff  Female Vaginal Cuff: smooth, intact, and without visible lesions  Uterus  surgically absent and no palpable masses  Ovaries  surgically absent bilaterally and without palpable masses or fullness  Parametria  smooth       ECOG score: 0      " "       Result Review :  -all health maintenance items noted above  -last gyn onc note     Tumor marker:  No results found for: \"\"    PHQ-9 Total Score:      Procedure Note:          Assessment and Plan:      -There is no evidence of disease upon today's exam. She is understanding to call with any changes in pelvic symptoms or general GYN concerns at any time between regularly scheduled visits.   -Discussed with pt that she is now 11 years out from her cancer diagnosis. Offered to refer to general GYN moving forward for annual gynecologic exam. She wishes to continue coming here.   -pap smears no longer indicated   -encouraged yearly mammograms. Repeat bilateral screening mmg order placed.  -Repeat colonoscopy due 10/2028, or sooner if new problems. Dr Thornton  -PITO due 2027, Dr Stout     Diagnoses and all orders for this visit:    1. Well woman exam with routine gynecological exam (Primary)    2. History of endometrial cancer    3. Encounter for screening mammogram for malignant neoplasm of breast  -     Mammo Screening Digital Tomosynthesis Bilateral With CAD; Future        Pain assessment was performed today as a part of patient’s care. For patients with pain related to surgery, gynecologic malignancy or cancer treatment, the plan is as noted in the assessment/plan.  For patients with pain not related to these issues, they are to seek any further needed care from a more appropriate provider, such as PCP.           Follow-up:    Return to clinic in 1 year for Annual exam.      Electronically signed by GHADA Lopez on 07/17/2024          "

## 2024-07-24 DIAGNOSIS — E78.00 PURE HYPERCHOLESTEROLEMIA: Chronic | ICD-10-CM

## 2024-07-24 DIAGNOSIS — I10 ESSENTIAL HYPERTENSION: Chronic | ICD-10-CM

## 2024-07-24 RX ORDER — AMLODIPINE BESYLATE 5 MG/1
5 TABLET ORAL DAILY
Qty: 90 TABLET | Refills: 3 | OUTPATIENT
Start: 2024-07-24

## 2024-07-24 RX ORDER — SIMVASTATIN 20 MG
20 TABLET ORAL EVERY EVENING
Qty: 90 TABLET | Refills: 3 | OUTPATIENT
Start: 2024-07-24

## 2024-07-31 DIAGNOSIS — E78.00 PURE HYPERCHOLESTEROLEMIA: Chronic | ICD-10-CM

## 2024-07-31 DIAGNOSIS — I10 ESSENTIAL HYPERTENSION: ICD-10-CM

## 2024-07-31 DIAGNOSIS — E55.9 VITAMIN D DEFICIENCY: Chronic | ICD-10-CM

## 2024-07-31 DIAGNOSIS — Z00.00 MEDICARE ANNUAL WELLNESS VISIT, SUBSEQUENT: Primary | Chronic | ICD-10-CM

## 2024-07-31 DIAGNOSIS — M05.79 RHEUMATOID ARTHRITIS INVOLVING MULTIPLE SITES WITH POSITIVE RHEUMATOID FACTOR: Chronic | ICD-10-CM

## 2024-07-31 DIAGNOSIS — E78.00 PURE HYPERCHOLESTEROLEMIA: ICD-10-CM

## 2024-07-31 DIAGNOSIS — R73.01 IMPAIRED FASTING GLUCOSE: ICD-10-CM

## 2024-07-31 DIAGNOSIS — R59.0 AXILLARY LYMPHADENOPATHY: ICD-10-CM

## 2024-07-31 RX ORDER — SIMVASTATIN 20 MG
20 TABLET ORAL EVERY EVENING
Qty: 15 TABLET | Refills: 0 | Status: SHIPPED | OUTPATIENT
Start: 2024-07-31

## 2024-07-31 NOTE — TELEPHONE ENCOUNTER
Caller: Carli Bolanos    Relationship: Self    Best call back number: [phone]    Requested Prescriptions:   Requested Prescriptions     Pending Prescriptions Disp Refills    simvastatin (ZOCOR) 20 MG tablet 90 tablet 3     Sig: Take 1 tablet by mouth Every Evening.        Pharmacy where request should be sent:  [facility] [id number] [place] [id number] - [phone]  - [phone] FX     Last office visit with prescribing clinician: 4/30/2024   Last telemedicine visit with prescribing clinician: Visit date not found   Next office visit with prescribing clinician: 8/9/2024     Additional details provided by patient:     Does the patient have less than a 3 day supply:  [x] Yes  [] No    Would you like a call back once the refill request has been completed: [x] Yes [] No    If the office needs to give you a call back, can they leave a voicemail: [x] Yes [] No    Jose Manuel Alfaro Rep   07/31/24 16:13 EDT

## 2024-07-31 NOTE — TELEPHONE ENCOUNTER
Caller: Carli Bolanos    Relationship: Self    Best call back number: [phone]    What orders are you requesting (i.e. lab or imaging):     LABS    Additional notes:     PATIENT THINKS SHE IS DUE TO HAVE LAB WORK.  SHE HAS A SUBSEQUENT [employer] [facility]LL[employer] APPOINTMENT ON 8/9. SHE WOULD LIKE TO HAVE BLOOD DRAWN BEFORE THEN TO HAVE RESULTS AT APPOINTMENT    PLEASE CALL PATIENT WHEN LAB ORDERS HAVE BEEN PUT IN SO SHE KNOWS WHEN TO GO GET BLOOD DRAWN

## 2024-08-02 ENCOUNTER — LAB (OUTPATIENT)
Dept: LAB | Facility: HOSPITAL | Age: 69
End: 2024-08-02
Payer: MEDICARE

## 2024-08-02 DIAGNOSIS — E55.9 VITAMIN D DEFICIENCY: Chronic | ICD-10-CM

## 2024-08-02 DIAGNOSIS — I10 ESSENTIAL HYPERTENSION: ICD-10-CM

## 2024-08-02 DIAGNOSIS — Z00.00 MEDICARE ANNUAL WELLNESS VISIT, SUBSEQUENT: ICD-10-CM

## 2024-08-02 DIAGNOSIS — M05.79 RHEUMATOID ARTHRITIS INVOLVING MULTIPLE SITES WITH POSITIVE RHEUMATOID FACTOR: Chronic | ICD-10-CM

## 2024-08-02 DIAGNOSIS — R73.01 IMPAIRED FASTING GLUCOSE: ICD-10-CM

## 2024-08-02 DIAGNOSIS — E78.00 PURE HYPERCHOLESTEROLEMIA: ICD-10-CM

## 2024-08-02 LAB
25(OH)D3 SERPL-MCNC: 44.2 NG/ML (ref 30–100)
ALBUMIN SERPL-MCNC: 4.4 G/DL (ref 3.5–5.2)
ALBUMIN/GLOB SERPL: 1.5 G/DL
ALP SERPL-CCNC: 47 U/L (ref 39–117)
ALT SERPL W P-5'-P-CCNC: 28 U/L (ref 1–33)
ANION GAP SERPL CALCULATED.3IONS-SCNC: 10 MMOL/L (ref 5–15)
AST SERPL-CCNC: 30 U/L (ref 1–32)
BACTERIA UR QL AUTO: ABNORMAL /HPF
BASOPHILS # BLD AUTO: 0.03 10*3/MM3 (ref 0–0.2)
BASOPHILS NFR BLD AUTO: 0.6 % (ref 0–1.5)
BILIRUB SERPL-MCNC: 0.4 MG/DL (ref 0–1.2)
BILIRUB UR QL STRIP: NEGATIVE
BUN SERPL-MCNC: 23 MG/DL (ref 8–23)
BUN/CREAT SERPL: 30.7 (ref 7–25)
CALCIUM SPEC-SCNC: 9.4 MG/DL (ref 8.6–10.5)
CHLORIDE SERPL-SCNC: 105 MMOL/L (ref 98–107)
CHOLEST SERPL-MCNC: 152 MG/DL (ref 0–200)
CK SERPL-CCNC: 74 U/L (ref 20–180)
CLARITY UR: CLEAR
CO2 SERPL-SCNC: 30 MMOL/L (ref 22–29)
COLOR UR: YELLOW
CREAT SERPL-MCNC: 0.75 MG/DL (ref 0.57–1)
CRP SERPL-MCNC: <0.3 MG/DL (ref 0–0.5)
DEPRECATED RDW RBC AUTO: 50.7 FL (ref 37–54)
EGFRCR SERPLBLD CKD-EPI 2021: 85.2 ML/MIN/1.73
EOSINOPHIL # BLD AUTO: 0.09 10*3/MM3 (ref 0–0.4)
EOSINOPHIL NFR BLD AUTO: 1.8 % (ref 0.3–6.2)
ERYTHROCYTE [DISTWIDTH] IN BLOOD BY AUTOMATED COUNT: 15.3 % (ref 12.3–15.4)
ERYTHROCYTE [SEDIMENTATION RATE] IN BLOOD: 16 MM/HR (ref 0–30)
GLOBULIN UR ELPH-MCNC: 3 GM/DL
GLUCOSE SERPL-MCNC: 91 MG/DL (ref 65–99)
GLUCOSE UR STRIP-MCNC: NEGATIVE MG/DL
HBA1C MFR BLD: 5.4 % (ref 4.8–5.6)
HCT VFR BLD AUTO: 40.3 % (ref 34–46.6)
HDLC SERPL-MCNC: 77 MG/DL (ref 40–60)
HGB BLD-MCNC: 13.1 G/DL (ref 12–15.9)
HGB UR QL STRIP.AUTO: NEGATIVE
HYALINE CASTS UR QL AUTO: ABNORMAL /LPF
IMM GRANULOCYTES # BLD AUTO: 0.01 10*3/MM3 (ref 0–0.05)
IMM GRANULOCYTES NFR BLD AUTO: 0.2 % (ref 0–0.5)
KETONES UR QL STRIP: NEGATIVE
LDLC SERPL CALC-MCNC: 62 MG/DL (ref 0–100)
LDLC/HDLC SERPL: 0.8 {RATIO}
LEUKOCYTE ESTERASE UR QL STRIP.AUTO: NEGATIVE
LYMPHOCYTES # BLD AUTO: 1.46 10*3/MM3 (ref 0.7–3.1)
LYMPHOCYTES NFR BLD AUTO: 29.1 % (ref 19.6–45.3)
MCH RBC QN AUTO: 29.4 PG (ref 26.6–33)
MCHC RBC AUTO-ENTMCNC: 32.5 G/DL (ref 31.5–35.7)
MCV RBC AUTO: 90.6 FL (ref 79–97)
MONOCYTES # BLD AUTO: 0.43 10*3/MM3 (ref 0.1–0.9)
MONOCYTES NFR BLD AUTO: 8.6 % (ref 5–12)
NEUTROPHILS NFR BLD AUTO: 2.99 10*3/MM3 (ref 1.7–7)
NEUTROPHILS NFR BLD AUTO: 59.7 % (ref 42.7–76)
NITRITE UR QL STRIP: NEGATIVE
NRBC BLD AUTO-RTO: 0 /100 WBC (ref 0–0.2)
PH UR STRIP.AUTO: 6 [PH] (ref 5–8)
PLATELET # BLD AUTO: 216 10*3/MM3 (ref 140–450)
PMV BLD AUTO: 9.8 FL (ref 6–12)
POTASSIUM SERPL-SCNC: 4.6 MMOL/L (ref 3.5–5.2)
PROT SERPL-MCNC: 7.4 G/DL (ref 6–8.5)
PROT UR QL STRIP: ABNORMAL
RBC # BLD AUTO: 4.45 10*6/MM3 (ref 3.77–5.28)
RBC # UR STRIP: ABNORMAL /HPF
REF LAB TEST METHOD: ABNORMAL
SODIUM SERPL-SCNC: 145 MMOL/L (ref 136–145)
SP GR UR STRIP: 1.02 (ref 1–1.03)
SQUAMOUS #/AREA URNS HPF: ABNORMAL /HPF
T4 FREE SERPL-MCNC: 1.34 NG/DL (ref 0.92–1.68)
TRIGL SERPL-MCNC: 66 MG/DL (ref 0–150)
TSH SERPL DL<=0.05 MIU/L-ACNC: 0.21 UIU/ML (ref 0.27–4.2)
UROBILINOGEN UR QL STRIP: ABNORMAL
VLDLC SERPL-MCNC: 13 MG/DL (ref 5–40)
WBC # UR STRIP: ABNORMAL /HPF
WBC NRBC COR # BLD AUTO: 5.01 10*3/MM3 (ref 3.4–10.8)

## 2024-08-02 PROCEDURE — 83036 HEMOGLOBIN GLYCOSYLATED A1C: CPT

## 2024-08-02 PROCEDURE — 86140 C-REACTIVE PROTEIN: CPT

## 2024-08-02 PROCEDURE — 80053 COMPREHEN METABOLIC PANEL: CPT

## 2024-08-02 PROCEDURE — 36415 COLL VENOUS BLD VENIPUNCTURE: CPT

## 2024-08-02 PROCEDURE — 80061 LIPID PANEL: CPT

## 2024-08-02 PROCEDURE — 81001 URINALYSIS AUTO W/SCOPE: CPT

## 2024-08-02 PROCEDURE — 84443 ASSAY THYROID STIM HORMONE: CPT

## 2024-08-02 PROCEDURE — 85025 COMPLETE CBC W/AUTO DIFF WBC: CPT

## 2024-08-02 PROCEDURE — 85652 RBC SED RATE AUTOMATED: CPT

## 2024-08-02 PROCEDURE — 82306 VITAMIN D 25 HYDROXY: CPT

## 2024-08-02 PROCEDURE — 82550 ASSAY OF CK (CPK): CPT

## 2024-08-02 PROCEDURE — 84439 ASSAY OF FREE THYROXINE: CPT

## 2024-08-08 NOTE — ASSESSMENT & PLAN NOTE
Health maintenance - COVID19 vacc 9/23; flu vacc 9/23 (rec new flu vacc and COVID19 vacc in Sept/Oct); RSV 9/23; Prevnar 6/15; PVX 3/14,  Tdap 3/24 (next Tdap due 2034), HAV and Shingrix done; mammo 5/9/23, next pending 8/21/24; GYN w/ E Hocker, APRN 7/17/24; DEXA 5/20/24 () with new osteoporosis (clinic f/u pending 10/24 per pt); colonosc 10/23, repeat 2028 per Dr. Thornton; eye exam q6 mos for plaquenil; dental exam q6 mos; (+) seat belt use    Consultants:  Patient Care Team:  Rosa M Venegas MD as PCP - General  Rosa M Venegas MD as PCP - Internal Medicine  Arthur Thornton MD as Consulting Physician (Colon and Rectal Surgery)  Jeromy Melissa, STERLING (Optometry)  Shalini Malin MD as Consulting Physician (Gynecologic Oncology)  Ernesto Crow MD as Consulting Physician (Rheumatology)  Bree Ware MD as Consulting Physician (Dermatology)  Quincy Fuentes MD as Consulting Physician (Ophthalmology)  Amelia Gann MD as Consulting Physician (Plastic Surgery)  Judi Mae MD as Consulting Physician (Endocrinology)  Solange Alejandra APRN as Nurse Practitioner (Rheumatology)  Dillon Alcantara DPM (Podiatry)  Donald Sprague MD as Consulting Physician (Orthopedic Surgery)  Chavo Vasquez MD as Consulting Physician (Hematology)  Lit Orozco MD as Consulting Physician (Otolaryngology)  Avani Fuentes MD as Consulting Physician (Ophthalmology)  Esa Calle MD as Consulting Physician (Pain Medicine)  Sylvester Morrison (Dermatology)  Jeni Alan PA-C as Physician Assistant (Physical Medicine and Rehabilitation)

## 2024-08-08 NOTE — ASSESSMENT & PLAN NOTE
Rec calcium intake 1000mg/day between diet and supplements; rec calcium and vitamin D supplementation with weight-bearing exercise; also remains on alendronate 70mg weekly per rheum; DEXA 5/24 ([place]) with new osteoporosis, which means she has failed alendronate therapy; discussed other options would be Prolia vs bone-building med such as Evenity; she has bone clinic f/u pending 10/24; fall precautions

## 2024-08-08 NOTE — PROGRESS NOTES
ANNUAL WELLNESS VISIT    DRUG AND ALCOHOL USE      no tobacco use, alcohol intake:2 beers per week, and caffeine intake: 2 cups of caffeinated coffee per day    DIET AND PHYSICAL ACTIVITY     Diet: general    Exercise: daily   Exercise Details: walking    MOOD DISORDER AND COGNITIVE SCREENING     PHQ-2 Depression Screening - components reviewed with patient; screening is negative for active depression.    Little interest or pleasure in doing things? 0-->not at all   Feeling down, depressed, or hopeless? 0-->not at all   PHQ-2 Total Score 0      Anxiety Screening Tool Used YES     AUDIT screening 1     Mini-Cog Performed   Yes    1. Tell Patient 3 Words Car house boat     2. Administer Clock Test normal    3. Recall 3 words  Car house boat     4. Number Correct Items 3    FUNCTIONAL ABILITY AND LEVEL OF SAFETY   Hearing Mild hearing loss     Wears Hearing Aids No       Current Activities Independent      none  - see Funct/Cog Status Intake     Fall Risk Assessment       Has difficulty with walking or balance  No         Timed Up and Go (TUG) Test  9 sec.       If >12 sec, normal    ADVANCED DIRECTIVE  Advance Care Planning   ACP discussion was held with the patient during this visit. Patient has an advance directive in EMR which is still valid.   Advance Care Planning Discussion:  Patient has advanced directive and living will.  Reviewed desires for end of life care, which is to have comfort care.  Patient does not want extraordinary life-sustaining measures, including no prolonged artificial life support. Encouraged patient to ensure family is aware of desires/preferences. Confirmed  is her healthcare surrogate.    PAIN SCREENING Do you have pain right now? yes      If so, 1-10 scale: 4     Intermittent     Do you have pain every day? Yes      Probable chronic pain: Yes     Recent Hospitalizations:  No hospitalization(s) within the last year..     MEDICATION REVIEW   - updated and reviewed (see Medication  List).   - reviewed for potentially harmful drug-disease interactions in the elderly.   - reviewed for high risk medications in the elderly.   - aspirin use: No    BMI  Body mass index is 24.12 kg/m².  BMI is within normal parameters. No other follow-up for BMI required.       ________________________________________  Chief Complaint   Patient presents with    Medicare Wellness-subsequent    Hypertension    Hyperlipidemia    Impaired fasting glucose       History of Present Illness  69 y.o.  female presents for updated wellness visit and f/u on BP, cholesterol, sugars, and thyroid.  Home BPs have been 110-120s/70-80s.    Concerned about worsened bone density, now placing her in the osteoporosis range.  Notes that she had follow-up with the bone clinic but this has been rescheduled until October.  She is very concerned about her bone health and notes worsened bone density despite alendronate.  Remains on calcium and vitamin D supplement as well as already pursuing weightbearing exercises.    Reports she has been off of prednisone.    Reports gabapentin and cyclobenzaprine for back pain.  Concerned about potential side effects.  Does not feel the medications are working.    Has been    Review of Systems  Denies headaches, visual changes, CP, palpitations, SOB, cough, abd pain, n/v/d, difficulty with urination, numbness/tingling, falls, mood changes, lightheadedness, hearing changes, rashes.  Denies vaginal discharge or bleeding (no periods) or breast concerns. ROS (+) for low back pain as noted, denies significant radiculopathy.    All other ROS reviewed and negative.    Current Outpatient Medications:     alendronate 70 MG  weekly     amLODIPine 5 MG QD    Calcium-Cholecalciferol 200-6.25 MG-MCG QD    clotrimazole-betamethasone (LOTRISONE) 1-0.05 % cream AD    cyclobenzaprine 10 MG QHS prn    folic acid 1 MG 5 QD    gabapentin 300 MG QHS    hydroxychloroquine (PLAQUENIL) 200 MG QD    inFLIXimab (REMICADE)  "100 MG injection AD    methotrexate 2.5 MG  #9 weekly    Omega-3 Fatty Acids (fish oil) 1000 MG 2 QD    simvastatin 20 MG  QD    TURMERIC QD    Xiidra 5 % ophthalmic AD    OPIOID SCREENING:  The patient does not have opioid prescription on her medication list however note sedating effects of gabapentin and cyclobenzaprine. Medication list has been reviewed with the patient regarding potentially high risk medications and harmful drug interactions in patients over age 65.    VITALS:  /82   Pulse 81   Temp 99 °F (37.2 °C)   Ht 163.2 cm (64.25\")   Wt 64.2 kg (141 lb 9.6 oz)   SpO2 97%   BMI 24.12 kg/m²     Physical Exam  Vitals and nursing note reviewed.   Constitutional:       General: She is not in acute distress.     Appearance: Normal appearance. She is well-developed.   HENT:      Head: Normocephalic.      Right Ear: Tympanic membrane, ear canal and external ear normal.      Left Ear: Tympanic membrane, ear canal and external ear normal.      Nose: Nose normal.   Eyes:      Extraocular Movements: Extraocular movements intact.      Conjunctiva/sclera: Conjunctivae normal.      Pupils: Pupils are equal, round, and reactive to light.   Neck:      Vascular: No carotid bruit (bilaterally).   Cardiovascular:      Rate and Rhythm: Normal rate and regular rhythm.      Heart sounds: Normal heart sounds.   Pulmonary:      Effort: Pulmonary effort is normal. No respiratory distress.      Breath sounds: Normal breath sounds. No wheezing, rhonchi or rales.   Abdominal:      General: Bowel sounds are normal. There is no distension.      Palpations: Abdomen is soft. There is no mass.      Tenderness: There is no abdominal tenderness.   Genitourinary:     Comments: Breast/pelvic exams deferred to GYN    Musculoskeletal:      Cervical back: Normal range of motion and neck supple.      Right lower leg: No edema.      Left lower leg: No edema.   Lymphadenopathy:      Cervical: No cervical adenopathy.   Skin:     General: " Skin is warm and dry.      Findings: No rash.   Neurological:      Mental Status: She is alert and oriented to person, place, and time.   Psychiatric:         Mood and Affect: Mood normal.         Behavior: Behavior normal.         LABS  Results for orders placed or performed in visit on 08/02/24   Comprehensive metabolic panel    Specimen: Blood   Result Value Ref Range    Glucose 91 65 - 99 mg/dL    BUN 23 8 - 23 mg/dL    Creatinine 0.75 0.57 - 1.00 mg/dL    Sodium 145 136 - 145 mmol/L    Potassium 4.6 3.5 - 5.2 mmol/L    Chloride 105 98 - 107 mmol/L    CO2 30.0 (H) 22.0 - 29.0 mmol/L    Calcium 9.4 8.6 - 10.5 mg/dL    Total Protein 7.4 6.0 - 8.5 g/dL    Albumin 4.4 3.5 - 5.2 g/dL    ALT (SGPT) 28 1 - 33 U/L    AST (SGOT) 30 1 - 32 U/L    Alkaline Phosphatase 47 39 - 117 U/L    Total Bilirubin 0.4 0.0 - 1.2 mg/dL    Globulin 3.0 gm/dL    A/G Ratio 1.5 g/dL    BUN/Creatinine Ratio 30.7 (H) 7.0 - 25.0    Anion Gap 10.0 5.0 - 15.0 mmol/L    eGFR 85.2 >60.0 mL/min/1.73   TSH    Specimen: Blood   Result Value Ref Range    TSH 0.208 (L) 0.270 - 4.200 uIU/mL   Lipid panel    Specimen: Blood   Result Value Ref Range    Total Cholesterol 152 0 - 200 mg/dL    Triglycerides 66 0 - 150 mg/dL    HDL Cholesterol 77 (H) 40 - 60 mg/dL    LDL Cholesterol  62 0 - 100 mg/dL    VLDL Cholesterol 13 5 - 40 mg/dL    LDL/HDL Ratio 0.80    Hemoglobin A1c    Specimen: Blood   Result Value Ref Range    Hemoglobin A1C 5.40 4.80 - 5.60 %   T4, free    Specimen: Blood   Result Value Ref Range    Free T4 1.34 0.92 - 1.68 ng/dL   CK    Specimen: Blood   Result Value Ref Range    Creatine Kinase 74 20 - 180 U/L   C-reactive protein    Specimen: Blood   Result Value Ref Range    C-Reactive Protein <0.30 0.00 - 0.50 mg/dL   Vitamin D 25 hydroxy    Specimen: Blood   Result Value Ref Range    25 Hydroxy, Vitamin D 44.2 30.0 - 100.0 ng/ml   Sedimentation rate, automated    Specimen: Blood   Result Value Ref Range    Sed Rate 16 0 - 30 mm/hr   CBC Auto  Differential    Specimen: Blood   Result Value Ref Range    WBC 5.01 3.40 - 10.80 10*3/mm3    RBC 4.45 3.77 - 5.28 10*6/mm3    Hemoglobin 13.1 12.0 - 15.9 g/dL    Hematocrit 40.3 34.0 - 46.6 %    MCV 90.6 79.0 - 97.0 fL    MCH 29.4 26.6 - 33.0 pg    MCHC 32.5 31.5 - 35.7 g/dL    RDW 15.3 12.3 - 15.4 %    RDW-SD 50.7 37.0 - 54.0 fl    MPV 9.8 6.0 - 12.0 fL    Platelets 216 140 - 450 10*3/mm3    Neutrophil % 59.7 42.7 - 76.0 %    Lymphocyte % 29.1 19.6 - 45.3 %    Monocyte % 8.6 5.0 - 12.0 %    Eosinophil % 1.8 0.3 - 6.2 %    Basophil % 0.6 0.0 - 1.5 %    Immature Grans % 0.2 0.0 - 0.5 %    Neutrophils, Absolute 2.99 1.70 - 7.00 10*3/mm3    Lymphocytes, Absolute 1.46 0.70 - 3.10 10*3/mm3    Monocytes, Absolute 0.43 0.10 - 0.90 10*3/mm3    Eosinophils, Absolute 0.09 0.00 - 0.40 10*3/mm3    Basophils, Absolute 0.03 0.00 - 0.20 10*3/mm3    Immature Grans, Absolute 0.01 0.00 - 0.05 10*3/mm3    nRBC 0.0 0.0 - 0.2 /100 WBC   Urinalysis without microscopic (no culture) - Urine, Clean Catch    Specimen: Urine, Clean Catch   Result Value Ref Range    Color, UA Yellow Yellow, Straw    Appearance, UA Clear Clear    pH, UA 6.0 5.0 - 8.0    Specific Gravity, UA 1.024 1.001 - 1.030    Glucose, UA Negative Negative    Ketones, UA Negative Negative    Bilirubin, UA Negative Negative    Blood, UA Negative Negative    Protein, UA Trace (A) Negative    Leuk Esterase, UA Negative Negative    Nitrite, UA Negative Negative    Urobilinogen, UA 0.2 E.U./dL 0.2 - 1.0 E.U./dL   Urinalysis, Microscopic Only - Urine, Clean Catch    Specimen: Urine, Clean Catch   Result Value Ref Range    RBC, UA 3-5 (A) None Seen, 0-2 /HPF    WBC, UA 3-5 (A) None Seen, 0-2 /HPF    Bacteria, UA None Seen None Seen, Trace /HPF    Squamous Epithelial Cells, UA 3-6 (A) None Seen, 0-2 /HPF    Hyaline Casts, UA 7-12 0 - 6 /LPF    Methodology Automated Microscopy      4/30/24 EKG - sinus rhythm, mod IVCD    2/6/24 A1C 5.9    ASSESSMENT/PLAN    Diagnoses and all orders  for this visit:    1. Medicare annual wellness visit, subsequent (Primary)  Assessment & Plan:  Health maintenance - COVID19 vacc 9/23; flu vacc 9/23 (rec new flu vacc and COVID19 vacc in Sept/Oct); RSV 9/23; Prevnar 6/15; PVX 3/14,  Tdap 3/24 (next Tdap due 2034), HAV and Shingrix done; mammo 5/9/23, next pending 8/21/24; GYN w/ E Hocker, APRN 7/17/24; DEXA 5/20/24 () with new osteoporosis (clinic f/u pending 10/24 per pt); colonosc 10/23, repeat 2028 per Dr. Thornton; eye exam q6 mos for plaquenil; dental exam q6 mos; (+) seat belt use    Consultants:  Patient Care Team:  Rosa M Venegas MD as PCP - General  Rosa M Venegas MD as PCP - Internal Medicine  Arthur Thornton MD as Consulting Physician (Colon and Rectal Surgery)  Jeromy Melissa, STERLING (Optometry)  Shalini Malin MD as Consulting Physician (Gynecologic Oncology)  Ernesto Crow MD as Consulting Physician (Rheumatology)  Bree Ware MD as Consulting Physician (Dermatology)  Quincy Fuentes MD as Consulting Physician (Ophthalmology)  Amelia Gann MD as Consulting Physician (Plastic Surgery)  Judi Mae MD as Consulting Physician (Endocrinology)  Solange Alejandra APRN as Nurse Practitioner (Rheumatology)  Dillno Alcantara DPM (Podiatry)  Donald Sprague MD as Consulting Physician (Orthopedic Surgery)  Chavo Vasquez MD as Consulting Physician (Hematology)  Lit Orozco MD as Consulting Physician (Otolaryngology)  Avani Fuentes MD as Consulting Physician (Ophthalmology)  Esa Calle MD as Consulting Physician (Pain Medicine)  Sylvester Morrison (Dermatology)  Jeni Alan PA-C as Physician Assistant (Physical Medicine and Rehabilitation)        2. Hypertension  Assessment & Plan:  BP stable 120/82; cont amlo 5mg QD #90, 3RF    Orders:  -     amLODIPine (NORVASC) 5 MG tablet; Take 1 tablet by mouth Daily.  Dispense: 90 tablet; Refill: 3    3. Hyperlipidemia  Assessment & Plan:  Lipids  stable with LDL 62; cont simva 20mg QD #90, 3RF    Orders:  -     simvastatin (ZOCOR) 20 MG tablet; Take 1 tablet by mouth Every Evening.  Dispense: 90 tablet; Refill: 3    4. Impaired fasting glucose  Assessment & Plan:  BG control stable/improved with A1C 5.4; encouraged reg phys activity to decr insulin resistance, moderation in unhealthy starches/sweets; f/u A1C in 6 mos        5. Subclinical hyperthyroidism  Assessment & Plan:  Unchanged low TSH; f/u endo as scheduled      6. Vitamin D deficiency  Assessment & Plan:  Normal vit D level 44.2 with goal > 30; no current supplementation except for Ca/vit D      7. Age-related osteoporosis without current pathological fracture  Assessment & Plan:  Rec calcium intake 1000mg/day between diet and supplements; rec calcium and vitamin D supplementation with weight-bearing exercise; also remains on alendronate 70mg weekly per rheum; DEXA 5/24 () with new osteoporosis, which means she has failed alendronate therapy; discussed other options would be Prolia vs bone-building med such as Evenity; she has bone clinic f/u pending 10/24; fall precautions        8. Spinal stenosis of lumbar region with neurogenic claudication  Assessment & Plan:  Reports ineffectiveness of gabapentin with cyclobenzaprine; discussed sedating risks of combining these medications; she thinks she will stop cyclobenzaprine, continue gabapentin, and follow-up with Dr. Calle for injection options          FOLLOW-UP  RTC 6 mos with A1C    Electronically signed by:    Rosa M Venegas MD, FACP  08/09/2024    EMR Dragon/Transcription Utilization  Please note that portions of this note were completed with a voice recognition program.

## 2024-08-09 ENCOUNTER — OFFICE VISIT (OUTPATIENT)
Dept: INTERNAL MEDICINE | Facility: CLINIC | Age: 69
End: 2024-08-09
Payer: MEDICARE

## 2024-08-09 VITALS
OXYGEN SATURATION: 97 % | HEIGHT: 64 IN | WEIGHT: 141.6 LBS | HEART RATE: 81 BPM | TEMPERATURE: 99 F | SYSTOLIC BLOOD PRESSURE: 120 MMHG | DIASTOLIC BLOOD PRESSURE: 82 MMHG | BODY MASS INDEX: 24.17 KG/M2

## 2024-08-09 DIAGNOSIS — E55.9 VITAMIN D DEFICIENCY: Chronic | ICD-10-CM

## 2024-08-09 DIAGNOSIS — M81.0 AGE-RELATED OSTEOPOROSIS WITHOUT CURRENT PATHOLOGICAL FRACTURE: ICD-10-CM

## 2024-08-09 DIAGNOSIS — E78.00 PURE HYPERCHOLESTEROLEMIA: Chronic | ICD-10-CM

## 2024-08-09 DIAGNOSIS — M48.062 SPINAL STENOSIS OF LUMBAR REGION WITH NEUROGENIC CLAUDICATION: ICD-10-CM

## 2024-08-09 DIAGNOSIS — E05.90 SUBCLINICAL HYPERTHYROIDISM: Chronic | ICD-10-CM

## 2024-08-09 DIAGNOSIS — R73.01 IMPAIRED FASTING GLUCOSE: Chronic | ICD-10-CM

## 2024-08-09 DIAGNOSIS — Z00.00 MEDICARE ANNUAL WELLNESS VISIT, SUBSEQUENT: Primary | ICD-10-CM

## 2024-08-09 DIAGNOSIS — I10 ESSENTIAL HYPERTENSION: Chronic | ICD-10-CM

## 2024-08-09 RX ORDER — SIMVASTATIN 20 MG
20 TABLET ORAL EVERY EVENING
Qty: 90 TABLET | Refills: 3 | Status: SHIPPED | OUTPATIENT
Start: 2024-08-09

## 2024-08-09 RX ORDER — AMLODIPINE BESYLATE 5 MG/1
5 TABLET ORAL DAILY
Qty: 90 TABLET | Refills: 3 | Status: SHIPPED | OUTPATIENT
Start: 2024-08-09

## 2024-08-09 NOTE — ASSESSMENT & PLAN NOTE
Reports ineffectiveness of gabapentin with cyclobenzaprine; discussed sedating risks of combining these medications; she thinks she will stop cyclobenzaprine, continue gabapentin, and follow-up with Dr. Calle for injection options

## 2024-08-12 ENCOUNTER — PATIENT MESSAGE (OUTPATIENT)
Dept: PAIN MEDICINE | Facility: CLINIC | Age: 69
End: 2024-08-12
Payer: MEDICARE

## 2024-08-12 DIAGNOSIS — E78.00 PURE HYPERCHOLESTEROLEMIA: Chronic | ICD-10-CM

## 2024-08-12 RX ORDER — SIMVASTATIN 20 MG
20 TABLET ORAL EVERY EVENING
Qty: 15 TABLET | OUTPATIENT
Start: 2024-08-12

## 2024-08-14 ENCOUNTER — OFFICE VISIT (OUTPATIENT)
Dept: PAIN MEDICINE | Facility: CLINIC | Age: 69
End: 2024-08-14
Payer: MEDICARE

## 2024-08-14 VITALS — HEIGHT: 64 IN | WEIGHT: 141.8 LBS | BODY MASS INDEX: 24.21 KG/M2

## 2024-08-14 DIAGNOSIS — M54.16 LUMBAR RADICULOPATHY: Primary | ICD-10-CM

## 2024-08-14 DIAGNOSIS — R20.0 NUMBNESS OF RIGHT FOOT: ICD-10-CM

## 2024-08-14 DIAGNOSIS — M46.1 SACROILIITIS: ICD-10-CM

## 2024-08-14 DIAGNOSIS — M47.816 SPONDYLOSIS OF LUMBAR REGION WITHOUT MYELOPATHY OR RADICULOPATHY: ICD-10-CM

## 2024-08-14 NOTE — PROGRESS NOTES
Primary Physician: Rosa M Venegas MD    CHIEF COMPLAINT or REASON FOR VISIT: Follow-up (Discuss getting another injection. ) and Back Pain      Initial HPI 2/6/2023:  Ms. Carli Bolanos is 69 y.o. female who presents as a new patient referral for evaluation and treatment of chronic right-sided low back pain with radiation into the buttock.  Patient states that she has had this pain since November 2022.  She has engaged in physical therapy, continues to perform the exercises that she was instructed.  Patient denies any bowel or bladder dysfunction, lower extremity weakness, new onset saddle anesthesia or unexplained weight loss.     Pain is exacerbated with prolonged standing, ameliorated with sitting and rest.  Not associated with ambulation.  Ameliorated with Tylenol.  She did have lumbar and sacroiliac joint x-rays, lumbar MRI demonstrating degenerative disc disease and facet arthropathy.  She denies any pain in her legs beyond the buttock.    Interval history: Patient returns to clinic today.  She reports a return of her chronic right-sided low back pain with radiation to the right buttock and leg.  She continues to take gabapentin 300 mg nightly with good relief.  She is to have a numbness within her right foot which has subsequently resolved with the use of gabapentin.  She denies any new injuries or events since her previous appointment.  She is pleased with the results of the previous injection be interested in repeat injections if necessary.  She has enough gabapentin to last until her follow-up appointment on 10/28/2024.    Interventions:  3/7/2023: Right SIJ with minimal transient benefit.  4/25/2023: Right L4/5 and L5/S1  MBB with 100% relief 1 day  5/16/2023: Right L4/5 and L5/S1  MBB with 80% relief for several hours  5/30/2023: Right L4/5 and L5/S1  RFA with 70% relief for 4 months  12/7/2023: Right L4/5 and L5/S1 RFA with 0% benefit  1/24/2024: Right L4/5 and L5/S1 TFESI with 85% relief for 6  months    Objective Pain Scoring:   BRIEF PAIN INVENTORY:  Total score:   Pain Score    08/14/24 1040   PainSc:   3   PainLoc: Back          PHQ-2: PHQ-2 Total Score: 0  PHQ-9: PHQ-9: Brief Depression Severity Measure Score: 0  Opioid Risk Tool:         Review of Systems:   ROS negative except as otherwise noted     Past Medical History:   Past Medical History:   Diagnosis Date    Abnormal thyroid ultrasound 07/05/2017    stable MNG (7/5/17) by u/s    Abnormal thyroid ultrasound 06/12/2015    stable MNG (6/12/15)    Abnormal thyroid ultrasound 04/18/2014    thyr u/s (4/18/14): MNG except dominant complex mass mid L. lobe, repeat u/s in 6 mos    Central corneal ulcer of left eye     Central corneal ulcer of left eye 11/18/2022    Endometrial cancer     H/O uterine leiomyoma     s/p DUSTY/USO ('13)    History of CT scan 02/27/2016    neg head ct    Hx of bone density study 06/08/2015    DEXA (6/15) L -0.4, H0.7    Hx of bone density study 07/17/2018    nl DEXA (7/17/18): L -0.4, H 0.2, repeat 3 yrs    Hx of bone density study 09/27/2021    DEXA (9/27/21): L 0.6, A -1.8; per Dr. Giles/LEONOR Villa, APRN - repeat 2-3 yrs    Hx of bone density study 05/20/2024    DEXA (5/20/24 ) H -3.1, repeat 1 yr    Hx of colonoscopy 03/11/2011    colonosc (3/11/11): diverticulosis, sm int hem, repeat 2018; [facility]    Hx of colonoscopy 10/05/2018    colonosc (10/5/18): diverticulosis, redundant colon, int hem, repeat 5 yrs due to h/o polyps; [facility]    Hx of colonoscopy 10/18/2023    colonosc (10/18/23): tubular adenoma, diverticulosis, int hem, repeat 5 yrs; [facility]    Hx of EMG/NCV 02/27/2008    EMG/NCV (2/27/08): right median neuropathy    Hx of hand x-ray 01/24/2018    R. hand xray (1/24/18): degen changes 1st metacarpal joint and index PIP    Hx of MRI L-spine 02/03/2023    MRI L-spine (2/3/23): mod DDD with mod-severe L3-4 spinal stenosis and mild bilat NFS, mild-mod L4-5 spinal stenosis with severe R NFS     Low back pain     Osteoporosis          Past Surgical History:   Past Surgical History:   Procedure Laterality Date    APPENDECTOMY  1980    taken out at gallbladder surgery    CARPAL TUNNEL RELEASE Left     carpel tunnel left     CERVICAL POLYPECTOMY  03/2005    with h/o fibroids and DUB    CHOLECYSTECTOMY  1980    DILATATION AND CURETTAGE  02/22/2013    EXCISION MASS HEAD/NECK Right 03/05/2013    s/p excision scalp lesion large R. parietal and L. temporal areas; plastics - Dr. Amelia Gann    HIP ARTHROSCOPY Right 2004    with subsequent Ivory resurfacing with hip replacement (5/11)    JOINT REPLACEMENT  2007. 2011    both hips    ORTHOPEDIC SURGERY      SALPINGO OOPHORECTOMY Left 1991    secondary to tubal pregnancy and ovarian cyst    SALPINGO OOPHORECTOMY Right     secondary to tubal pregnancy    TOTAL HIP ARTHROPLASTY Left 06/2011     ortho Dr. Mcdermott    TOTAL HIP ARTHROPLASTY Right 05/2011    TOTAL LAPAROSCOPIC HYSTERECTOMY WITH DAVINCI ROBOT  03/05/2013    RTLH/BSO with LND and omental biopsy for endometrial cancer; GYN Onc - Dr. Malin    US GUIDED FINE NEEDLE ASPIRATION  05/11/2021         Family History   Family History   Problem Relation Age of Onset    Dementia Mother     Cancer Mother         GYN    Hip fracture Mother     Thyroid disease Mother     Hypertension Mother     Cervical cancer Mother     Heart attack Father     Heart failure Father     Diabetes Father     Heart disease Father         heart failure    Prostate cancer Father     Hypertension Father     Hypertension Brother     Heart disease Brother     Kidney cancer Maternal Grandmother     Arthritis Maternal Grandmother     Cancer Maternal Grandmother         Kidney Cancer    Colon cancer Maternal Grandfather 70    Cancer Maternal Grandfather         Colon cancer    Heart failure Paternal Grandmother     Heart disease Paternal Grandmother     Arthritis Paternal Grandmother     Hypertension Paternal Grandmother     Diabetes  Paternal Grandfather     Colon cancer Paternal Uncle 78    Breast cancer Neg Hx     Ovarian cancer Neg Hx          Social History   Social History     Socioeconomic History    Marital status:    Tobacco Use    Smoking status: Never     Passive exposure: Never    Smokeless tobacco: Never   Vaping Use    Vaping status: Never Used   Substance and Sexual Activity    Alcohol use: Yes     Alcohol/week: 1.0 standard drink of alcohol     Types: 1 Glasses of wine per week     Comment: social    Drug use: No    Sexual activity: Yes     Partners: Male     Birth control/protection: Hysterectomy        Medications:     Current Outpatient Medications:     alendronate (FOSAMAX) 70 MG tablet, Take 1 tablet by mouth Every 7 (Seven) Days., Disp: , Rfl:     amLODIPine (NORVASC) 5 MG tablet, Take 1 tablet by mouth Daily., Disp: 90 tablet, Rfl: 3    Calcium-Cholecalciferol 200-6.25 MG-MCG tablet, Take  by mouth Daily., Disp: , Rfl:     clotrimazole-betamethasone (LOTRISONE) 1-0.05 % cream, Apply 1 Application topically to the appropriate area as directed., Disp: , Rfl:     cyclobenzaprine (FLEXERIL) 10 MG tablet, Take 1 tablet by mouth Daily., Disp: 30 tablet, Rfl: 2    folic acid (FOLVITE) 1 MG tablet, Take 4 tablets by mouth Daily. (Patient taking differently: Take 5 tablets by mouth Daily.), Disp: , Rfl:     gabapentin (NEURONTIN) 300 MG capsule, Take 1 capsule by mouth Every Night., Disp: 30 capsule, Rfl: 3    hydroxychloroquine (PLAQUENIL) 200 MG tablet, Take 1 tablet by mouth. Takes one and a half po daily, Disp: , Rfl:     inFLIXimab (REMICADE) 100 MG injection, Infuse  into a venous catheter 1 (One) Time., Disp: , Rfl:     methotrexate 2.5 MG tablet, Take 8 tablets by mouth 1 (One) Time Per Week. (Patient taking differently: Take 8 tablets by mouth 1 (One) Time Per Week. Taking 9 per week), Disp: , Rfl:     Omega-3 Fatty Acids (fish oil) 1000 MG capsule capsule, Take 2 capsules by mouth Daily With Breakfast., Disp: , Rfl:  "    simvastatin (ZOCOR) 20 MG tablet, Take 1 tablet by mouth Every Evening., Disp: 90 tablet, Rfl: 3    TURMERIC PO, Take  by mouth., Disp: , Rfl:     vitamin B-6 (PYRIDOXINE) 50 MG tablet, Take 2 tablets by mouth Daily., Disp: , Rfl:     Xiidra 5 % ophthalmic solution, , Disp: , Rfl:         Physical Exam:     Vitals:    08/14/24 1040   Weight: 64.3 kg (141 lb 12.8 oz)   Height: 163.2 cm (64.25\")   PainSc:   3   PainLoc: Back            General: Alert and oriented, No acute distress.   HEENT: Normocephalic, atraumatic.   Cardiovascular: No gross edema  Respiratory: Respirations are non-labored    Lumbar Spine:   No masses or atrophy  Range of motion - Flexion normal. Extension normal. Right Lat Bending normal. Left Lat Bending normal  Facet Loading: Positive right  Facet Palpation - positive right  PSIS tenderness -positive right  Son's/DANIELA/Thigh thrust -positive right  Straight leg raise: Negative bilaterally  Slump test: Negative bilaterally    Motor Exam:  Strength: Rate on 1-5 scale Right Left    L1/2- hip flexion 5 5    L3- knee extension 5 5    L4- ankle dorsiflexion 5 5    L5- great toe extension 5 5    S1- ankle plantarflexion 5 5    Sensory Exam: Full and equal sensation to light touch throughout.  Neurologic: Cranial Nerves II-XII are grossly intact.   Clonus -negative bilaterally  Psychiatric: Cooperative.   Gait: Normal   Assistive Devices: None    Imaging Studies:   Results for orders placed during the hospital encounter of 02/03/23    MRI Lumbar Spine Without Contrast    Narrative  MRI LUMBAR SPINE WO CONTRAST    Date of Exam: 2/3/2023 4:11 PM EST    Indication: Low back pain, symptoms persist with > 6 wks treatment.    Comparison: Lumbar spine radiographs 1/24/2023    Technique:  Routine multiplanar/multisequence sequence images of the lumbar spine were obtained without contrast administration.    Findings:  There are 5 lumbar-type vertebral bodies. There is similar smooth levoconvex curvature " of the lower lumbar spine centered on L4. No significant spondylolisthesis. The bone marrow signal intensity is within normal limits without focal or suspicious bony  lesion or evidence of bone marrow edema. No acute fracture. The vertebral body heights are preserved. There are multilevel degenerative changes with level by level assessment as follows:    T12-L1: Normal.    L1-L2: Normal.    L2-L3: Moderate degenerative disc disease with small left eccentric circumferential disc bulge. Mild spinal canal stenosis. No significant neuroforaminal stenosis.    L3-L4: Moderate degenerative disc disease with circumferential disc bulge. Moderate to severe bilateral facet arthropathy and thickening of the ligamentum flavum. Moderate to severe spinal canal stenosis. Mild bilateral neuroforaminal stenosis.    L4-L5: Moderate to severe degenerative disc disease with circumferential disc bulge and moderate right and mild left facet arthropathy and mild thickening of the ligamentum flavum. Mild to moderate spinal canal stenosis. Severe right-sided neuroforaminal  stenosis.    L5-S1: Moderate degenerative disc disease with trace posterior disc bulge. No significant facet arthropathy. No significant spinal canal stenosis. No significant neuroforaminal stenosis.    Normal morphology and signal intensity of the cauda equina and conus medullaris. The conus terminates at the superior endplate of L1. The paravertebral soft tissues are unremarkable. Grossly unremarkable appearance of the partially imaged portions of the  posterior abdomen and pelvis.    Impression  Impression:  Moderate to severe degenerative disc disease and facet arthropathy, worse in the mid and lower lumbar spine contribute into moderate to severe spinal canal stenosis at L3-L4 and mild to moderate spinal canal stenosis at L4-L5. There is severe right-sided  neuroforaminal stenosis at L4-L5. Additional findings above.    Electronically Signed: Shane Beckham  2/3/2023  5:48 PM EST  Workstation ID: [id number]      Impression & Plan:   2/6/2023: Carli Bolanos is a 69 y.o. female with past medical history significant for osteoarthritis who presents to the pain clinic for evaluation and treatment of chronic right-sided low back pain.  I personally reviewed the patient's lumbar MRI which demonstrates multilevel degenerative disc disease with L3/4 canal stenosis, bilateral neuroforaminal stenosis at L4/5, facet arthropathy worst at right L4/5 with juxtafacet cyst encroaching on the neuroforamen.    On clinical exam her pain is more consistent with SI versus facet pathology.  We will first trial right diagnostic and therapeutic sacroiliac joint injection.  Additionally we discussed right L4/L5/S1 medial branch blocks in the event her SIJ injection is not diagnostic.   3/29/2023: No benefit from right SIJ.  We will proceed with right L4/L5/S1 MBB.  6/28/2023: Good ongoing benefit after rhizotomy.  Can repeat as needed every 6 months.  1/15/2024: No benefit from repeat rhizotomy.  Will schedule lumbar TFESI.  Start Flexeril 10 mg.  Can consider referral to neurosurgery if minimal or transient benefit.  3/5/2024: Good benefit from TFESI.  Will trial gabapentin for neuropathy-like complaint in her right foot.  3/27/2024: Modest benefit from TFESI.  Will refill gabapentin.  NSA referral for consideration of SCS versus surgery.  Will obtain psych clearance for SCS trial  6/27/2024: Will increase gabapentin to 300 mg.  Continue conservative measures for now.  Can consider SCS trial versus surgery if symptoms worsen.  8/14/2024: Return of right back and radicular symptoms.  Will plan for repeat right TFESI.     1. Lumbar radiculopathy    2. Numbness of right foot    3. Spondylosis of lumbar region without myelopathy or radiculopathy    4. Sacroiliitis                  PLAN:  1. Medication Recommendations: Recommend Voltaren topical, NSAIDs, Tylenol.  Can trial turmeric 500 mg twice daily if NSAID  contraindicated.    -Continue Flexeril  -Continue gabapentin 300 mg nightly, 30 pills, #3 refills.   As part of this patient's treatment plan, patient will be prescribed controlled substances.  The patient has been made aware of appropriate use of such medications, including potential risk of somnolent, limited ability to drive and/or work safely, and potential for dependence or overdose.  He has been made clear that his medications refused by this patient only, without concomitant use of alcohol or other substances as prescribed.  Controlled substance status of medication discussed with patient, discussed risk of medication including abuse potential and diversion potential and need to follow-up for reevaluation appointment in order to receive further refills.  Shabbir was reviewed and compliant.     2. Physical Therapy: [facility]    3. Psychological: Can consider psychiatric clearance from [facility] for SCS trial    4. Complementary and alternative (CAM) Therapies:     5. Labs: None indicated     6. Imaging: None indicated    7. Interventions: Schedule repeat right L4/5 and L5/S1 transforaminal epidural steroid injection (85424, [id number]).  -Can consider SCS trial if minimal or transient benefit    8. Referrals:     9. Records requested:     10. Lifestyle goals:    Follow-up as scheduled on 10/28/2024 for medication management.        [facility]  Jeni Alan PA-C

## 2024-08-20 ENCOUNTER — DOCUMENTATION (OUTPATIENT)
Dept: PAIN MEDICINE | Facility: CLINIC | Age: 69
End: 2024-08-20

## 2024-08-20 ENCOUNTER — OUTSIDE FACILITY SERVICE (OUTPATIENT)
Dept: PAIN MEDICINE | Facility: CLINIC | Age: 69
End: 2024-08-20
Payer: MEDICARE

## 2024-08-20 PROCEDURE — 64483 NJX AA&/STRD TFRM EPI L/S 1: CPT | Performed by: STUDENT IN AN ORGANIZED HEALTH CARE EDUCATION/TRAINING PROGRAM

## 2024-08-20 PROCEDURE — 64484 NJX AA&/STRD TFRM EPI L/S EA: CPT | Performed by: STUDENT IN AN ORGANIZED HEALTH CARE EDUCATION/TRAINING PROGRAM

## 2024-08-20 NOTE — PROGRESS NOTES
[facility]  [address] [place] [address]        PROCEDURE: Repeat Fluoroscopically-guided  Lumbar Transforaminal Epidural Steroid Injection -right L4/L5 and L5/S1    PRE-OP DIAGNOSIS: Lumbar radiculopathy  POST-OP DIAGNOSIS: Lumbar radiculopathy    BLOOD THINNERS (ANTIPLATELETS/ANTICOAGULANTS): Were discussed with the patient and KOTA Guidelines were followed.     CONSENT: Risks, benefits and options were explained to the patient, all questions were answered and written informed consent was obtained.     ANESTHESIA: Local Only    PROCEDURE NOTE: A pre-procedural time out was performed to confirm the correct patient, procedure, side, and site. Standard ASA monitors were applied and oxygen via nasal cannula was provided. All proceduralists donned sterile gloves with masks and surgical hats. The patient was placed prone with pillow under the abdomen and all pressure points padded. The patient's lumbar spine was prepped in standard fashion using Chlorhexidine and draped with sterile towels. The target neuroforamen was identified using oblique fluoroscopy and the superior vertebral body endplate squared. The overlying skin and subcutaneous tissue was anesthetized with 1% lidocaine. A 22 gauge 3.5 inch spinal needle with bent tip was incrementally advanced using intermittent fluoroscopy to the 6 o'clock position of the target pedicle in the mid-neuroforamen using oblique, AP and lateral intermittent fluoroscopy. After negative aspiration of blood and cerebrospinal fluid, needle placement was confirmed with 1 ml of omnipaque 180 mgI/ml contrast using AP fluoroscopic imaging. Imaging revealed a clear outline of the target spinal nerve with proximal spread of agent through the neuroforamen medially to the epidural space, without evidence of intravascular or intrathecal spread. After negative aspiration, a mixture containing dexamethasone 5 mg steroid and lidocaine 1% - 1 ml  local anesthetic for a total volume of 1.5 ml was injected under direct visualization with fluoroscopy. The needle was flushed, removed and a bandage applied.  The same procedure utilizing the same technique was performed at each target level listed above.    EBL: None     COMPLICATIONS: None     The patient was monitored in recovery area until all discharge criteria were met. Vital signs remained stable throughout the procedure and in the recovery area. There were no immediate complications and the patient tolerated the procedure well. Sensory and motor exam was unchanged from baseline. The patient received written discharge instructions prior to discharge.     FOLLOW UP: As scheduled     ADDITIONAL NOTES: []        [facility]       Esa Calle MD     CODES:  [id number]  [id number]

## 2024-08-27 ENCOUNTER — HOSPITAL ENCOUNTER (OUTPATIENT)
Dept: MAMMOGRAPHY | Facility: HOSPITAL | Age: 69
Discharge: HOME | End: 2024-08-27
Admitting: NURSE PRACTITIONER
Payer: MEDICARE

## 2024-08-27 DIAGNOSIS — Z12.31 ENCOUNTER FOR SCREENING MAMMOGRAM FOR MALIGNANT NEOPLASM OF BREAST: ICD-10-CM

## 2024-08-27 PROCEDURE — 77067 SCR MAMMO BI INCL CAD: CPT

## 2024-08-27 PROCEDURE — 77063 BREAST TOMOSYNTHESIS BI: CPT

## 2024-08-30 ENCOUNTER — TELEPHONE (OUTPATIENT)
Dept: GYNECOLOGIC ONCOLOGY | Facility: CLINIC | Age: 69
End: 2024-08-30
Payer: MEDICARE

## 2024-08-30 NOTE — TELEPHONE ENCOUNTER
----- Message from Jacinta Jacobsen sent at 8/29/2024  9:31 PM EDT -----  Normal mammogram results. Recommend repeat in 1 year (due 8/2025).

## 2024-08-30 NOTE — TELEPHONE ENCOUNTER
Patient called and notified of Mammo results normal with providers recommendation for 1 year repeat. Verbalizes understanding and offers thank you.

## 2024-10-23 PROBLEM — M35.01 SJOGREN'S SYNDROME WITH KERATOCONJUNCTIVITIS SICCA: Status: ACTIVE | Noted: 2024-10-23

## 2024-11-06 ENCOUNTER — OFFICE VISIT (OUTPATIENT)
Dept: PAIN MEDICINE | Facility: CLINIC | Age: 69
End: 2024-11-06
Payer: MEDICARE

## 2024-11-06 VITALS — BODY MASS INDEX: 23.75 KG/M2 | WEIGHT: 139.1 LBS | HEIGHT: 64 IN

## 2024-11-06 DIAGNOSIS — M47.816 SPONDYLOSIS OF LUMBAR REGION WITHOUT MYELOPATHY OR RADICULOPATHY: ICD-10-CM

## 2024-11-06 DIAGNOSIS — R20.0 NUMBNESS OF RIGHT FOOT: ICD-10-CM

## 2024-11-06 DIAGNOSIS — M54.16 LUMBAR RADICULOPATHY: ICD-10-CM

## 2024-11-06 DIAGNOSIS — M54.16 LUMBAR RADICULOPATHY: Primary | ICD-10-CM

## 2024-11-06 RX ORDER — GABAPENTIN 300 MG/1
600 CAPSULE ORAL NIGHTLY
Qty: 60 CAPSULE | Refills: 2 | Status: SHIPPED | OUTPATIENT
Start: 2024-11-06 | End: 2024-11-07 | Stop reason: SDUPTHER

## 2024-11-06 NOTE — PROGRESS NOTES
Primary Physician: Rosa M Venegas MD    CHIEF COMPLAINT or REASON FOR VISIT: Follow-up, Med Management, Back Pain, and Numbness (Right foot and leg. )      Initial HPI 2/6/2023:  Ms. Carli Bolanos is 69 y.o. female who presents as a new patient referral for evaluation and treatment of chronic right-sided low back pain with radiation into the buttock.  Patient states that she has had this pain since November 2022.  She has engaged in physical therapy, continues to perform the exercises that she was instructed.  Patient denies any bowel or bladder dysfunction, lower extremity weakness, new onset saddle anesthesia or unexplained weight loss.     Pain is exacerbated with prolonged standing, ameliorated with sitting and rest.  Not associated with ambulation.  Ameliorated with Tylenol.  She did have lumbar and sacroiliac joint x-rays, lumbar MRI demonstrating degenerative disc disease and facet arthropathy.  She denies any pain in her legs beyond the buttock.    Interval history:   m patient returns to clinic today after undergoing a repeat right-sided transforaminal epidural steroid injection.  She reports good relief from this procedure.  She has noticed a significant improvement in her low back pain and right radicular symptoms.  She would be interested in repeat injections if necessary.  She has been taking gabapentin 300 mg nightly.  This initially gave her good relief of her right lower extremity numbness and tingling however as of late is not provided the same relief.  She has tolerated the medication well without side effects.      Interventions:  3/7/2023: Right SIJ with minimal transient benefit.  4/25/2023: Right L4/5 and L5/S1  MBB with 100% relief 1 day  5/16/2023: Right L4/5 and L5/S1  MBB with 80% relief for several hours  5/30/2023: Right L4/5 and L5/S1  RFA with 70% relief for 4 months  12/7/2023: Right L4/5 and L5/S1 RFA with 0% benefit  1/24/2024: Right L4/5 and L5/S1 TFESI with 85% relief for 6  months  8/20/2024: Repeat right L4/5 and L5/S1 TFESI with 85% relief ongoing    Objective Pain Scoring:   BRIEF PAIN INVENTORY:  Total score:   Pain Score    11/06/24 1047   PainSc: 0-No pain          PHQ-2:    PHQ-9:    Opioid Risk Tool:         Review of Systems:   ROS negative except as otherwise noted     Past Medical History:   Past Medical History:   Diagnosis Date    Abnormal thyroid ultrasound 07/05/2017    stable MNG (7/5/17) by u/s    Abnormal thyroid ultrasound 06/12/2015    stable MNG (6/12/15)    Abnormal thyroid ultrasound 04/18/2014    thyr u/s (4/18/14): MNG except dominant complex mass mid L. lobe, repeat u/s in 6 mos    Central corneal ulcer of left eye     Central corneal ulcer of left eye 11/18/2022    Endometrial cancer     H/O uterine leiomyoma     s/p DUSTY/USO ('13)    Hx of bone density study 06/08/2015    DEXA (6/15) L -0.4, H0.7    Hx of bone density study 07/17/2018    nl DEXA (7/17/18): L -0.4, H 0.2, repeat 3 yrs    Hx of bone density study 09/27/2021    DEXA (9/27/21): L 0.6, A -1.8; per Dr. Giles/LEONOR Villa, APRN - repeat 2-3 yrs    Hx of bone density study 05/20/2024    DEXA (5/20/24 ) H -3.1, repeat 1 yr    Hx of colonoscopy 03/11/2011    colonosc (3/11/11): diverticulosis, sm int hem, repeat 2018; [facility]    Hx of colonoscopy 10/05/2018    colonosc (10/5/18): diverticulosis, redundant colon, int hem, repeat 5 yrs due to h/o polyps; [facility]    Hx of colonoscopy 10/18/2023    colonosc (10/18/23): tubular adenoma, diverticulosis, int hem, repeat 5 yrs; [facility]    Hx of CT scan of head 02/27/2016    neg CT head    Hx of EMG/NCV 02/27/2008    EMG/NCV (2/27/08): right median neuropathy    Hx of hand x-ray 01/24/2018    R. hand xray (1/24/18): degen changes 1st metacarpal joint and index PIP    Hx of MRI L-spine 02/03/2023    MRI L-spine (2/3/23): mod DDD with mod-severe L3-4 spinal stenosis and mild bilat NFS, mild-mod L4-5 spinal stenosis with severe R NFS     Joint pain     RA    Low back pain 2022    Osteoarthritis     Rheumatoid arthritis 2018         Past Surgical History:   Past Surgical History:   Procedure Laterality Date    APPENDECTOMY  1980    taken out at gallbladder surgery    CARPAL TUNNEL RELEASE Left     carpel tunnel left     CERVICAL POLYPECTOMY  03/2005    with h/o fibroids and DUB    CHOLECYSTECTOMY  1980    DILATATION AND CURETTAGE  02/22/2013    EXCISION MASS HEAD/NECK Right 03/05/2013    s/p excision scalp lesion large R. parietal and L. temporal areas; plastics - Dr. Amelia Gann    HIP ARTHROSCOPY Right 2004    with subsequent Hubbell resurfacing with hip replacement (5/11)    HYSTERECTOMY      JOINT REPLACEMENT  2007. 2011    both hips    ORTHOPEDIC SURGERY      SALPINGO OOPHORECTOMY Left 1991    secondary to tubal pregnancy and ovarian cyst    SALPINGO OOPHORECTOMY Right     secondary to tubal pregnancy    TOTAL HIP ARTHROPLASTY Left 06/2011     ortho Dr. Mcdermott    TOTAL HIP ARTHROPLASTY Right 05/2011    TOTAL LAPAROSCOPIC HYSTERECTOMY WITH DAVINCI ROBOT  03/05/2013    RTLH/BSO with LND and omental biopsy for endometrial cancer; GYN Onc - Dr. Malin    US GUIDED FINE NEEDLE ASPIRATION  05/11/2021         Family History   Family History   Problem Relation Age of Onset    Dementia Mother     Cancer Mother         GYN    Hip fracture Mother     Thyroid disease Mother     Hypertension Mother     Cervical cancer Mother     Heart attack Father     Heart failure Father     Diabetes Father     Heart disease Father         heart failure    Prostate cancer Father     Hypertension Father     Hypertension Brother     Heart disease Brother     Kidney cancer Maternal Grandmother     Arthritis Maternal Grandmother     Cancer Maternal Grandmother         Kidney Cancer    Colon cancer Maternal Grandfather 70    Cancer Maternal Grandfather         Colon cancer    Heart failure Paternal Grandmother     Heart disease Paternal Grandmother     Arthritis  Paternal Grandmother     Hypertension Paternal Grandmother     Diabetes Paternal Grandfather     Colon cancer Paternal Uncle 78    Breast cancer Neg Hx     Ovarian cancer Neg Hx          Social History   Social History     Socioeconomic History    Marital status:    Tobacco Use    Smoking status: Never     Passive exposure: Never    Smokeless tobacco: Never   Vaping Use    Vaping status: Never Used   Substance and Sexual Activity    Alcohol use: Yes     Alcohol/week: 2.0 standard drinks of alcohol     Types: 1 Glasses of wine, 1 Cans of beer per week     Comment: social    Drug use: Never    Sexual activity: Yes     Partners: Male     Birth control/protection: Hysterectomy        Medications:     Current Outpatient Medications:     alendronate (FOSAMAX) 70 MG tablet, Take 1 tablet by mouth Every 7 (Seven) Days., Disp: , Rfl:     amLODIPine (NORVASC) 5 MG tablet, Take 1 tablet by mouth Daily., Disp: 90 tablet, Rfl: 3    Calcium-Cholecalciferol 200-6.25 MG-MCG tablet, Take  by mouth Daily., Disp: , Rfl:     clotrimazole-betamethasone (LOTRISONE) 1-0.05 % cream, Apply 1 Application topically to the appropriate area as directed., Disp: , Rfl:     cyclobenzaprine (FLEXERIL) 10 MG tablet, Take 1 tablet by mouth Daily., Disp: 30 tablet, Rfl: 2    folic acid (FOLVITE) 1 MG tablet, Take 4 tablets by mouth Daily. (Patient taking differently: Take 5 tablets by mouth Daily.), Disp: , Rfl:     gabapentin (NEURONTIN) 300 MG capsule, Take 1 capsule by mouth Every Night., Disp: 30 capsule, Rfl: 3    hydroxychloroquine (PLAQUENIL) 200 MG tablet, Take 1 tablet by mouth. Takes one and a half po daily, Disp: , Rfl:     inFLIXimab (REMICADE) 100 MG injection, Infuse  into a venous catheter 1 (One) Time., Disp: , Rfl:     methotrexate 2.5 MG tablet, Take 8 tablets by mouth 1 (One) Time Per Week. (Patient taking differently: Take 8 tablets by mouth 1 (One) Time Per Week. Taking 9 per week), Disp: , Rfl:     Omega-3 Fatty Acids  "(fish oil) 1000 MG capsule capsule, Take 2 capsules by mouth Daily With Breakfast., Disp: , Rfl:     simvastatin (ZOCOR) 20 MG tablet, Take 1 tablet by mouth Every Evening., Disp: 90 tablet, Rfl: 3    TURMERIC PO, Take  by mouth., Disp: , Rfl:     vitamin B-6 (PYRIDOXINE) 50 MG tablet, Take 2 tablets by mouth Daily., Disp: , Rfl:     Xiidra 5 % ophthalmic solution, , Disp: , Rfl:         Physical Exam:     Vitals:    11/06/24 1047   Weight: 63.1 kg (139 lb 1.6 oz)   Height: 163.2 cm (64.25\")   PainSc: 0-No pain            General: Alert and oriented, No acute distress.   HEENT: Normocephalic, atraumatic.   Cardiovascular: No gross edema  Respiratory: Respirations are non-labored    Lumbar Spine:   No masses or atrophy  Range of motion - Flexion normal. Extension normal. Right Lat Bending normal. Left Lat Bending normal  Facet Loading: Positive right  Facet Palpation - positive right  PSIS tenderness -positive right  Son's/DANIELA/Thigh thrust -positive right  Straight leg raise: Negative bilaterally  Slump test: Negative bilaterally    Motor Exam:  Strength: Rate on 1-5 scale Right Left    L1/2- hip flexion 5 5    L3- knee extension 5 5    L4- ankle dorsiflexion 5 5    L5- great toe extension 5 5    S1- ankle plantarflexion 5 5    Sensory Exam: Full and equal sensation to light touch throughout.  Neurologic: Cranial Nerves II-XII are grossly intact.   Clonus -negative bilaterally  Psychiatric: Cooperative.   Gait: Normal   Assistive Devices: None    Imaging Studies:   Results for orders placed during the hospital encounter of 02/03/23    MRI Lumbar Spine Without Contrast    Narrative  MRI LUMBAR SPINE WO CONTRAST    Date of Exam: 2/3/2023 4:11 PM EST    Indication: Low back pain, symptoms persist with > 6 wks treatment.    Comparison: Lumbar spine radiographs 1/24/2023    Technique:  Routine multiplanar/multisequence sequence images of the lumbar spine were obtained without contrast " administration.    Findings:  There are 5 lumbar-type vertebral bodies. There is similar smooth levoconvex curvature of the lower lumbar spine centered on L4. No significant spondylolisthesis. The bone marrow signal intensity is within normal limits without focal or suspicious bony  lesion or evidence of bone marrow edema. No acute fracture. The vertebral body heights are preserved. There are multilevel degenerative changes with level by level assessment as follows:    T12-L1: Normal.    L1-L2: Normal.    L2-L3: Moderate degenerative disc disease with small left eccentric circumferential disc bulge. Mild spinal canal stenosis. No significant neuroforaminal stenosis.    L3-L4: Moderate degenerative disc disease with circumferential disc bulge. Moderate to severe bilateral facet arthropathy and thickening of the ligamentum flavum. Moderate to severe spinal canal stenosis. Mild bilateral neuroforaminal stenosis.    L4-L5: Moderate to severe degenerative disc disease with circumferential disc bulge and moderate right and mild left facet arthropathy and mild thickening of the ligamentum flavum. Mild to moderate spinal canal stenosis. Severe right-sided neuroforaminal  stenosis.    L5-S1: Moderate degenerative disc disease with trace posterior disc bulge. No significant facet arthropathy. No significant spinal canal stenosis. No significant neuroforaminal stenosis.    Normal morphology and signal intensity of the cauda equina and conus medullaris. The conus terminates at the superior endplate of L1. The paravertebral soft tissues are unremarkable. Grossly unremarkable appearance of the partially imaged portions of the  posterior abdomen and pelvis.    Impression  Impression:  Moderate to severe degenerative disc disease and facet arthropathy, worse in the mid and lower lumbar spine contribute into moderate to severe spinal canal stenosis at L3-L4 and mild to moderate spinal canal stenosis at L4-L5. There is severe  right-sided  neuroforaminal stenosis at L4-L5. Additional findings above.    Electronically Signed: Shane Beckham  2/3/2023 5:48 PM EST  Workstation ID: [id number]      Impression & Plan:   2/6/2023: Carli Bolanos is a 69 y.o. female with past medical history significant for osteoarthritis who presents to the pain clinic for evaluation and treatment of chronic right-sided low back pain.  I personally reviewed the patient's lumbar MRI which demonstrates multilevel degenerative disc disease with L3/4 canal stenosis, bilateral neuroforaminal stenosis at L4/5, facet arthropathy worst at right L4/5 with juxtafacet cyst encroaching on the neuroforamen.    On clinical exam her pain is more consistent with SI versus facet pathology.  We will first trial right diagnostic and therapeutic sacroiliac joint injection.  Additionally we discussed right L4/L5/S1 medial branch blocks in the event her SIJ injection is not diagnostic.   3/29/2023: No benefit from right SIJ.  We will proceed with right L4/L5/S1 MBB.  6/28/2023: Good ongoing benefit after rhizotomy.  Can repeat as needed every 6 months.  1/15/2024: No benefit from repeat rhizotomy.  Will schedule lumbar TFESI.  Start Flexeril 10 mg.  Can consider referral to neurosurgery if minimal or transient benefit.  3/5/2024: Good benefit from TFESI.  Will trial gabapentin for neuropathy-like complaint in her right foot.  3/27/2024: Modest benefit from TFESI.  Will refill gabapentin.  NSA referral for consideration of SCS versus surgery.  Will obtain psych clearance for SCS trial  6/27/2024: Will increase gabapentin to 300 mg.  Continue conservative measures for now.  Can consider SCS trial versus surgery if symptoms worsen.  8/14/2024: Return of right back and radicular symptoms.  Will plan for repeat right TFESI.   11/6/2024: Good relief from TFESI.  Can consider repeat.  Will increase gabapentin to 600 mg nightly    1. Lumbar radiculopathy    2. Numbness of right foot    3.  Spondylosis of lumbar region without myelopathy or radiculopathy                    PLAN:  1. Medication Recommendations: Recommend Voltaren topical, NSAIDs, Tylenol.  Can trial turmeric 500 mg twice daily if NSAID contraindicated.    -Continue Flexeril  -Increase gabapentin  -gabapentin 300 mg 2 tablets nightly, 60 pills, #2 refills.   As part of this patient's treatment plan, patient will be prescribed controlled substances.  The patient has been made aware of appropriate use of such medications, including potential risk of somnolent, limited ability to drive and/or work safely, and potential for dependence or overdose.  He has been made clear that his medications refused by this patient only, without concomitant use of alcohol or other substances as prescribed.  Controlled substance status of medication discussed with patient, discussed risk of medication including abuse potential and diversion potential and need to follow-up for reevaluation appointment in order to receive further refills.  Shabbir was reviewed and compliant.     2. Physical Therapy: [facility]    3. Psychological: Can consider psychiatric clearance from [facility] for SCS trial    4. Complementary and alternative (CAM) Therapies:     5. Labs: None indicated     6. Imaging: None indicated    7. Interventions: Can consider repeat right L4/5 and L5/S1 transforaminal epidural steroid injection ([id number] [id number]).  -Can consider SCS trial if minimal or transient benefit    8. Referrals:     9. Records requested:     10. Lifestyle goals:    Follow-up 3 months for medication management        [facility]  Jeni Alan PA-C

## 2024-11-06 NOTE — TELEPHONE ENCOUNTER
Increase gabapentin  Gabapentin 300 mg 2 tablets nightly, 60 pills, #2 refills  Shabbir reviewed and compliant  Compliant UDS  Controlled substance agreement signed in office  Appropriate follow-up visit scheduled

## 2024-11-07 DIAGNOSIS — M54.16 LUMBAR RADICULOPATHY: ICD-10-CM

## 2024-11-07 RX ORDER — GABAPENTIN 300 MG/1
600 CAPSULE ORAL NIGHTLY
Qty: 60 CAPSULE | Refills: 2 | Status: SHIPPED | OUTPATIENT
Start: 2024-11-07

## 2024-11-07 NOTE — TELEPHONE ENCOUNTER
Resending gabapentin prescription as there was an issue with the pharmacy receiving it.  We checked with pharmacy and they did not receive this prescription  Gabapentin 300 mg 2 tablets nightly, 60 pills, #2 refills  Shabbir reviewed and compliant  Compliant UDS  Controlled substance agreement signed in office  Appropriate follow-up visit scheduled

## 2025-01-13 ENCOUNTER — OFFICE VISIT (OUTPATIENT)
Dept: ENDOCRINOLOGY | Facility: CLINIC | Age: 70
End: 2025-01-13
Payer: MEDICARE

## 2025-01-13 VITALS
HEART RATE: 56 BPM | BODY MASS INDEX: 24.21 KG/M2 | HEIGHT: 64 IN | WEIGHT: 141.8 LBS | OXYGEN SATURATION: 99 % | DIASTOLIC BLOOD PRESSURE: 78 MMHG | SYSTOLIC BLOOD PRESSURE: 116 MMHG

## 2025-01-13 DIAGNOSIS — E05.90 SUBCLINICAL HYPERTHYROIDISM: Primary | ICD-10-CM

## 2025-01-13 DIAGNOSIS — E04.2 MULTINODULAR GOITER: ICD-10-CM

## 2025-01-13 LAB
T3 SERPL-MCNC: 92.4 NG/DL (ref 80–200)
T4 FREE SERPL-MCNC: 1.32 NG/DL (ref 0.92–1.68)
TSH SERPL DL<=0.05 MIU/L-ACNC: 0.3 UIU/ML (ref 0.27–4.2)

## 2025-01-13 PROCEDURE — 84443 ASSAY THYROID STIM HORMONE: CPT | Performed by: INTERNAL MEDICINE

## 2025-01-13 PROCEDURE — 84439 ASSAY OF FREE THYROXINE: CPT | Performed by: INTERNAL MEDICINE

## 2025-01-13 PROCEDURE — 84480 ASSAY TRIIODOTHYRONINE (T3): CPT | Performed by: INTERNAL MEDICINE

## 2025-01-13 PROCEDURE — 3074F SYST BP LT 130 MM HG: CPT | Performed by: INTERNAL MEDICINE

## 2025-01-13 PROCEDURE — 36415 COLL VENOUS BLD VENIPUNCTURE: CPT | Performed by: INTERNAL MEDICINE

## 2025-01-13 PROCEDURE — 99213 OFFICE O/P EST LOW 20 MIN: CPT | Performed by: INTERNAL MEDICINE

## 2025-01-13 PROCEDURE — 3078F DIAST BP <80 MM HG: CPT | Performed by: INTERNAL MEDICINE

## 2025-01-13 PROCEDURE — 84445 ASSAY OF TSI GLOBULIN: CPT | Performed by: INTERNAL MEDICINE

## 2025-01-13 NOTE — PROGRESS NOTES
Chief Complaint   Patient presents with    Subclinical hyperthyroidism     Follow-up       HPI:   Carli Bolanos is a 70 y.o.female who returns to Endocrine Clinic for f/u evaluation of her subclinical hyperthyroidism and multinodular goiter. Last visit 01/10/2024. Her history is as follows:    Interim Events:   - recent diagnosis of squamous cell carcinoma of the scalp  - is off prednisone    1) subclinical hyperthyroidism due to MNG:  - per chart review, pt has had a slightly decreased TSH with normal free T4 and T3 levels since 2014. TSH levels ranging (0.171 - 0.525)  (07/25/2023) Her TSH was suppressed to 0.092; however, pt was taking prednisone 20 mg daily at that time.     - on further review, pt denies palpitations. Wt stable. No changes in bowel habits. No tremor  - is not on a biotin supplement   - is off prednisone    2) multinodular goiter:  - first found on physical exam in 2014. Pt has had Thyroid US's completed at  radiology in 04/2014, 06/2015, and 07/2017  - no prior FNA  FM[facility]: no known thyroid cancer, mother had thyroid disease  PMH: no h/o irradiation of head, neck, or chest    Summary of imaging with  radiology:  (04/2014): R mid - 1.9 x 2.7, R inf - 1.5 x 1.9, L mid 1.7 x 2.5 complex nodules  (06/2015): Report states stable nodules. Size and appearance of nodules are not described.  (07/2017): R sup- 2.1 x 1.0 x 1.4, R mid 2.3 x 1.7 x 2.6, L mid 2.3 x 1.8 x 2.3 complex nodules    Initial Endocrine Visit: (01/2019)  Thyroid US completed in clinic (01/02/2019) showed the thyroid gland was enlarged by measured dimensions. Findings were consistent with a multinodular goiter. Multiple isoechoic, spongiform nodules were identified in both lobes as described in the full report. These nodules lacked suspicious US characteristics and did not meet criteria for FNA. These nodules appeared similar to prior outside imaging completed from 2014 - 2017.   RECOMMENDATIONS: These nodules lacked suspicious US  "characteristics and did not meet criteria for FNA. Given pt's h/o subclinical hyperthyroidism, recommended NM thyroid uptake and scan for further characterization of the nodules.     A NM thyroid uptake and scan was completed at  radiology on 02/12/2019:  \"Four hour thyroid uptake at the lower limits of normal at 11%. 24-hour thyroid uptake is also in the lower range of normal at 25%. IMPRESSION: 4 hour and 24 hour thyroid uptake both at lower limits of normal. Please correlate with patient's serum thyroid profile.Heterogeneous thyroid uptake pattern, with relatively large right thyroid lobe, and questionable small cold nodule possibly a cyst of the left lateral mid thyroid pole.\"    (01/2020) Thyroid US completed in clinic 01/06/2020 showed:  The thyroid gland was enlarged by measured dimensions. Findings were consistent with a multinodular goiter.    Multiple isoechoic, spongiform nodules were identified in both lobes as described above. These nodules lacked suspicious US characteristics and did not meet criteria for FNA.   These nodules appeared similar to prior outside imaging completed from 2014 - 2019.   In the left mid lobe, a 2.42(L) x 2.06(AP) x 2.13(T) cm isoechoic, spongiform nodule was again identified. The nodule had a smooth margin, peripheral and intranodal vascularity, and no microcalcifications. Multiple comet tail artifacts consistent with colloid crystals were present. This nodule had a peripheral cystic area that correlated with the \"questionable small cold area\" in the left lateral mid thyroid pole seen on NM thyroid uptake and scan in 02/2019.   RECOMMENDATIONS: These nodules lacked suspicious US characteristics and did not meet criteria for FNA. Repeat Thyroid US recommended if changes in the gland/neck are noted on physical exam.     Discussed plan with patient: follow yearly in regards to her subclinical hyperthyroidism    Other Medical History:  - 10/2022: developed a corneal ulceration. " "Was treated with high dose prednisone. Started at 40 mg prednisone and slowly tapered over > 15 months.    - Pt's rheumatologist started fosamax in 2022.  Is now on Reclast  - Is s/p right L4/5 and L5/S1 facet radio frequency ablation twice in 2023      Review of Systems   Constitutional:         Weight stable   HENT: Negative.     Eyes:         Dry eyes   Respiratory: Negative.     Cardiovascular:  Negative for palpitations.   Gastrointestinal: Negative.    Endocrine: Negative.    Genitourinary: Negative.    Musculoskeletal:  Positive for arthralgias and myalgias.   Skin: Negative.    Allergic/Immunologic: Negative.    Neurological: Negative.    Hematological: Negative.    Psychiatric/Behavioral: Negative.       The following portions of the patient's history were reviewed and updated as appropriate: allergies, current medications, past family history, past medical history, past social history, past surgical history and problem list.    /78 (BP Location: Left arm, Patient Position: Sitting, Cuff Size: Adult)   Pulse 56   Ht 163.2 cm (64.25\")   Wt 64.3 kg (141 lb 12.8 oz)   SpO2 99%   BMI 24.15 kg/m²   Physical Exam   Constitutional: She is oriented to person, place, and time. She appears well-developed. No distress.   HENT:   Head: Normocephalic.   Eyes: Pupils are equal, round, and reactive to light. Conjunctivae are normal.   Neck: No tracheal deviation present. Thyromegaly present.   Diffuse goiter. Left lobe 2 cm palpable nodule     Cardiovascular: Normal rate, regular rhythm and normal heart sounds.   No murmur heard.  Pulmonary/Chest: Effort normal and breath sounds normal. No respiratory distress.   Abdominal: Soft. Bowel sounds are normal. She exhibits no mass. There is no abdominal tenderness.   Lymphadenopathy:     She has no cervical adenopathy.   Neurological: She is alert and oriented to person, place, and time. No cranial nerve deficit.   Skin: Skin is warm and dry. She is not diaphoretic. " No erythema.   Psychiatric: Her behavior is normal.   Vitals reviewed.    LABS/IMAGING: outside records reviewed and summarized in HPI  Office Visit on 01/13/2025   Component Date Value Ref Range Status    TSH 01/13/2025 0.301  0.270 - 4.200 uIU/mL Final    T3, Total 01/13/2025 92.4  80.0 - 200.0 ng/dl Final    Free T4 01/13/2025 1.32  0.92 - 1.68 ng/dL Final    Thyroid Stimulating Immunoglobulin 01/13/2025 <0.10  0.00 - 0.55 IU/L Final       ASSESSMENT/PLAN:  1) subclinical hyperthyroidism due to multinodular goiter:  - labs completed today again show normal function tests. Her TSI was also normal indicating no underlying autoimmune thyroid disease as a cause of her intermittent suppressed TSH. Overall she appeared clinically euthyroid on exam.    - Have reviewed with patient that there are no definitive guidelines on the management of subclinical hyperthyroidism as there is very limited clinical data on the long term benefits of treatment. However, for patients with endogenous subclinical hyperthyroidism at high risk for cardiac or skeletal complications (ie, older adults) and who have a TSH concentration less than 0.1 mU/L persistently, treatment is often considered.   - her TFT's can be followed yearly or sooner if concerning symptoms develop  - I will have her RTC in one year for follow-up of her subclinical hyperthyroidism     2) multinodular goiter:  Stable on physical exam  Prior Thyroid US images reviewed.  I briefly looked a her gland today with clinic US which showed similar findings of MNG with bilateral isoechoic spongiform nodules and mixed cystic and solid nodules.   Will repeat formal imaging if changes in her gland/neck are noted on physical exam.    RTC in one year.    Electronically Signed: Judi Mae MD

## 2025-01-16 LAB — TSI SER-ACNC: <0.1 IU/L (ref 0–0.55)

## 2025-02-02 RX ORDER — ZOLEDRONIC ACID 0.05 MG/ML
5 INJECTION, SOLUTION INTRAVENOUS ONCE
COMMUNITY

## 2025-02-05 NOTE — PROGRESS NOTES
"Chief Complaint   Patient presents with    Impaired fasting glucose       History of Present Illness  70 y.o.  female presents for f/u on sugars and BP.    Has not been checking BPs at home; denies headaches or CP. Thinks BP is elevated due to stress about our country and Pres Trump.    Going on cruise upcoming in 4/2025 - [place], with son and his family..  Review of Systems  Denies CP, SOB, falls. All other ROS reviewed and negative.    Current Outpatient Medications:     amLODIPine 5 MG QD    Calcium-Cholecalciferol 200-6.25 MG-MCG QD    clotrimazole-betamethasone (LOTRISONE) 1-0.05 % cream AD    cyclobenzaprine 10 MG QD    folic acid 1 MG 4 tabs QD    gabapentin 300 MG 2 QHS    hydroxychloroquine (PLAQUENIL) 200 MG  1-1/2 QD    inFLIXimab (REMICADE) 100 MG injection AD    methotrexate 2.5 MG 9 tabs weekly    Omega-3 Fatty Acids (fish oil) 1000 MG 2 QD    simvastatin 20 MG QD    TURMERIC QD    vitamin B-6 (PYRIDOXINE) 50 MG 2 QD    Xiidra 5 % ophthalmic soln AD    zoledronic acid (Reclast) 5 MG/100ML Qyr    VITALS:  /90   Pulse 69   Ht 163.2 cm (64.25\")   Wt 63.7 kg (140 lb 6.4 oz)   SpO2 99%   BMI 23.91 kg/m²   Repeat BP left 164/66, right 166/70    Physical Exam  Vitals and nursing note reviewed.   Constitutional:       General: She is not in acute distress.     Appearance: Normal appearance. She is not ill-appearing.   Eyes:      Extraocular Movements: Extraocular movements intact.      Conjunctiva/sclera: Conjunctivae normal.   Cardiovascular:      Rate and Rhythm: Normal rate and regular rhythm.      Heart sounds: Normal heart sounds.   Pulmonary:      Effort: Pulmonary effort is normal. No respiratory distress.      Breath sounds: Normal breath sounds.   Neurological:      Mental Status: She is alert and oriented to person, place, and time. Mental status is at baseline.      Gait: Gait normal.   Psychiatric:         Mood and Affect: Mood normal.         Behavior: Behavior normal. "         LABS  Results for orders placed or performed in visit on 02/18/25   POC Glycosylated Hemoglobin (Hb A1C)    Collection Time: 02/18/25  9:13 AM    Specimen: Blood   Result Value Ref Range    Hemoglobin A1C 5.7 4.5 - 5.7 %    Lot Number [id number]     Expiration Date 10/18/2026      Result Value Ref Range    TSH 0.301 0.270 - 4.200 uIU/mL   T3    Collection Time: 01/13/25  9:50 AM    Specimen: Blood   Result Value Ref Range    T3, Total 92.4 80.0 - 200.0 ng/dl   T4, Free    Collection Time: 01/13/25  9:50 AM    Specimen: Blood   Result Value Ref Range    Free T4 1.32 0.92 - 1.68 ng/dL   Thyroid Stimulating Immunoglobulin    Collection Time: 01/13/25  9:50 AM    Specimen: Arm, Left; Blood   Result Value Ref Range    Thyroid Stimulating Immunoglobulin <0.10 0.00 - 0.55 IU/L     8/2/24 A1C 5.4    2/6/24 A1C 5.9    ASSESSMENT/PLAN    Diagnoses and all orders for this visit:    1. Impaired fasting glucose (Primary)  Assessment & Plan:  BG control stable with A1C 5.7; encouraged reg phys activity to decr insulin resistance, moderation in unhealthy starches/sweets; f/u A1C in 7 mos with next wellness      Orders:  -     POC Glycosylated Hemoglobin (Hb A1C)    2. Hypertension  Assessment & Plan:  BP elevated, worsened on repeat; symmetric bilaterally; cont amlo 5mg QD but rec checking home BPs 3x/week; f/u in 1-2 mos (after cruise)          FOLLOW-UP  Health maintenance - flu vacc and COVID19 vacc done for this season; RSV vacc prev completed  RTC 6 wks BP check (after getting back from cruise)    Electronically signed by:    Rosa M Venegas MD, FACP  02/18/2025

## 2025-02-05 NOTE — ASSESSMENT & PLAN NOTE
BG control stable with A1C 5.7; encouraged reg phys activity to decr insulin resistance, moderation in unhealthy starches/sweets; f/u A1C in 7 mos with next wellness

## 2025-02-05 NOTE — ASSESSMENT & PLAN NOTE
BP elevated, worsened on repeat; symmetric bilaterally; cont amlo 5mg QD but rec checking home BPs 3x/week; f/u in 1-2 mos (after cruise)

## 2025-02-06 ENCOUNTER — OFFICE VISIT (OUTPATIENT)
Dept: PAIN MEDICINE | Facility: CLINIC | Age: 70
End: 2025-02-06
Payer: MEDICARE

## 2025-02-06 VITALS — WEIGHT: 141 LBS | HEIGHT: 64 IN | BODY MASS INDEX: 24.07 KG/M2

## 2025-02-06 DIAGNOSIS — M54.16 LUMBAR RADICULOPATHY: ICD-10-CM

## 2025-02-06 DIAGNOSIS — Z51.81 ENCOUNTER FOR THERAPEUTIC DRUG LEVEL MONITORING: Primary | ICD-10-CM

## 2025-02-06 DIAGNOSIS — M46.1 SACROILIITIS: ICD-10-CM

## 2025-02-06 DIAGNOSIS — M47.816 SPONDYLOSIS OF LUMBAR REGION WITHOUT MYELOPATHY OR RADICULOPATHY: ICD-10-CM

## 2025-02-06 DIAGNOSIS — M54.16 LUMBAR RADICULOPATHY: Primary | ICD-10-CM

## 2025-02-06 DIAGNOSIS — R20.0 NUMBNESS OF RIGHT FOOT: ICD-10-CM

## 2025-02-06 NOTE — TELEPHONE ENCOUNTER
Refill gabapentin  Gabapentin 300 mg 2 tablets nightly, 60 pills, #4 refills  Shabbir reviewed and compliant  Pending updated UDS  Controlled substance agreement signed in office  Appropriate follow-up visit scheduled

## 2025-02-06 NOTE — PROGRESS NOTES
Primary Physician: Rosa M Venegas MD    CHIEF COMPLAINT or REASON FOR VISIT: Back Pain (3 month follow up)      Initial HPI 2/6/2023:  Ms. Carli Bolanos is 70 y.o. female who presents as a new patient referral for evaluation and treatment of chronic right-sided low back pain with radiation into the buttock.  Patient states that she has had this pain since November 2022.  She has engaged in physical therapy, continues to perform the exercises that she was instructed.  Patient denies any bowel or bladder dysfunction, lower extremity weakness, new onset saddle anesthesia or unexplained weight loss.     Pain is exacerbated with prolonged standing, ameliorated with sitting and rest.  Not associated with ambulation.  Ameliorated with Tylenol.  She did have lumbar and sacroiliac joint x-rays, lumbar MRI demonstrating degenerative disc disease and facet arthropathy.  She denies any pain in her legs beyond the buttock.    Interval history:   Patient returns to clinic today for medication management.  She continues to report good relief with gabapentin 600 mg nightly.  Additionally, she continues to report good relief from her repeat lumbar transforaminal epidural steroid injection in August 2024.  She still has some pain on a day-to-day basis however this is significantly improved compared to before.  She is happy with the pain relief she is receiving.  She is tolerating gabapentin without side effect.  No new injuries or complaints.      Interventions:  3/7/2023: Right SIJ with minimal transient benefit.  4/25/2023: Right L4/5 and L5/S1  MBB with 100% relief 1 day  5/16/2023: Right L4/5 and L5/S1  MBB with 80% relief for several hours  5/30/2023: Right L4/5 and L5/S1  RFA with 70% relief for 4 months  12/7/2023: Right L4/5 and L5/S1 RFA with 0% benefit  1/24/2024: Right L4/5 and L5/S1 TFESI with 85% relief for 6 months  8/20/2024: Repeat right L4/5 and L5/S1 TFESI with 85% relief ongoing    Objective Pain Scoring:    BRIEF PAIN INVENTORY:  Total score:   Pain Score    02/06/25 1258   PainSc:   2   PainLoc: Back            PHQ-2:    PHQ-9:    Opioid Risk Tool:         Review of Systems:   ROS negative except as otherwise noted     Past Medical History:   Past Medical History:   Diagnosis Date    Abnormal thyroid ultrasound 07/05/2017    stable MNG (7/5/17) by u/s    Abnormal thyroid ultrasound 06/12/2015    stable MNG (6/12/15)    Abnormal thyroid ultrasound 04/18/2014    thyr u/s (4/18/14): MNG except dominant complex mass mid L. lobe, repeat u/s in 6 mos    Central corneal ulcer of left eye     Central corneal ulcer of left eye 11/18/2022    Endometrial cancer     H/O uterine leiomyoma     s/p DUSTY/USO ('13)    Hx of bone density study 06/08/2015    DEXA (6/15) L -0.4, H0.7    Hx of bone density study 07/17/2018    nl DEXA (7/17/18): L -0.4, H 0.2, repeat 3 yrs    Hx of bone density study 09/27/2021    DEXA (9/27/21): L 0.6, A -1.8; per Dr. Giles/LEONOR Villa, APRN - repeat 2-3 yrs    Hx of bone density study 05/20/2024    DEXA (5/20/24 ) H -3.1, repeat 1 yr    Hx of colonoscopy 03/11/2011    colonosc (3/11/11): diverticulosis, sm int hem, repeat 2018; [facility]    Hx of colonoscopy 10/05/2018    colonosc (10/5/18): diverticulosis, redundant colon, int hem, repeat 5 yrs due to h/o polyps; [facility]    Hx of colonoscopy 10/18/2023    colonosc (10/18/23): tubular adenoma, diverticulosis, int hem, repeat 5 yrs; [facility]    Hx of CT scan of head 02/27/2016    neg CT head    Hx of EMG/NCV 02/27/2008    EMG/NCV (2/27/08): right median neuropathy    Hx of hand x-ray 01/24/2018    R. hand xray (1/24/18): degen changes 1st metacarpal joint and index PIP    Hx of MRI L-spine 02/03/2023    MRI L-spine (2/3/23): mod DDD with mod-severe L3-4 spinal stenosis and mild bilat NFS, mild-mod L4-5 spinal stenosis with severe R NFS    Joint pain     RA    Low back pain 2022    Osteoarthritis     Rheumatoid arthritis 2018          Past Surgical History:   Past Surgical History:   Procedure Laterality Date    APPENDECTOMY  1980    taken out at gallbladder surgery    CARPAL TUNNEL RELEASE Left     carpel tunnel left     CERVICAL POLYPECTOMY  03/2005    with h/o fibroids and DUB    CHOLECYSTECTOMY  1980    DILATATION AND CURETTAGE  02/22/2013    EXCISION MASS HEAD/NECK Right 03/05/2013    s/p excision scalp lesion large R. parietal and L. temporal areas; plastics - Dr. Amelia Gann    HIP ARTHROSCOPY Right 2004    with subsequent Ivory resurfacing with hip replacement (5/11)    HYSTERECTOMY      JOINT REPLACEMENT  2007. 2011    both hips    ORTHOPEDIC SURGERY      SALPINGO OOPHORECTOMY Left 1991    secondary to tubal pregnancy and ovarian cyst    SALPINGO OOPHORECTOMY Right     secondary to tubal pregnancy    TOTAL HIP ARTHROPLASTY Left 06/2011     ortho Dr. Mcdermott    TOTAL HIP ARTHROPLASTY Right 05/2011    TOTAL LAPAROSCOPIC HYSTERECTOMY WITH DAVINCI ROBOT  03/05/2013    RTLH/BSO with LND and omental biopsy for endometrial cancer; GYN Onc - Dr. Malin    US GUIDED FINE NEEDLE ASPIRATION  05/11/2021         Family History   Family History   Problem Relation Age of Onset    Dementia Mother     Cancer Mother         GYN    Hip fracture Mother     Thyroid disease Mother     Hypertension Mother     Cervical cancer Mother     Heart attack Father     Heart failure Father     Diabetes Father     Heart disease Father         heart failure    Prostate cancer Father     Hypertension Father     Hypertension Brother     Heart disease Brother     Kidney cancer Maternal Grandmother     Arthritis Maternal Grandmother     Cancer Maternal Grandmother         Kidney Cancer    Colon cancer Maternal Grandfather 70    Cancer Maternal Grandfather         Colon cancer    Heart failure Paternal Grandmother     Heart disease Paternal Grandmother     Arthritis Paternal Grandmother     Hypertension Paternal Grandmother     Diabetes Paternal Grandfather      Colon cancer Paternal Uncle 78    Breast cancer Neg Hx     Ovarian cancer Neg Hx          Social History   Social History     Socioeconomic History    Marital status:    Tobacco Use    Smoking status: Never     Passive exposure: Never    Smokeless tobacco: Never   Vaping Use    Vaping status: Never Used   Substance and Sexual Activity    Alcohol use: Yes     Alcohol/week: 2.0 standard drinks of alcohol     Types: 1 Glasses of wine, 1 Cans of beer per week     Comment: social    Drug use: Never    Sexual activity: Yes     Partners: Male     Birth control/protection: Hysterectomy        Medications:     Current Outpatient Medications:     amLODIPine (NORVASC) 5 MG tablet, Take 1 tablet by mouth Daily., Disp: 90 tablet, Rfl: 3    Calcium-Cholecalciferol 200-6.25 MG-MCG tablet, Take  by mouth Daily., Disp: , Rfl:     clotrimazole-betamethasone (LOTRISONE) 1-0.05 % cream, Apply 1 Application topically to the appropriate area as directed., Disp: , Rfl:     cyclobenzaprine (FLEXERIL) 10 MG tablet, Take 1 tablet by mouth Daily., Disp: 30 tablet, Rfl: 2    folic acid (FOLVITE) 1 MG tablet, Take 4 tablets by mouth Daily. (Patient taking differently: Take 5 tablets by mouth Daily.), Disp: , Rfl:     gabapentin (NEURONTIN) 300 MG capsule, Take 2 capsules by mouth Every Night., Disp: 60 capsule, Rfl: 2    hydroxychloroquine (PLAQUENIL) 200 MG tablet, Take 1 tablet by mouth. Takes one and a half po daily, Disp: , Rfl:     inFLIXimab (REMICADE) 100 MG injection, Infuse  into a venous catheter 1 (One) Time., Disp: , Rfl:     methotrexate 2.5 MG tablet, Take 8 tablets by mouth 1 (One) Time Per Week. (Patient taking differently: Take 8 tablets by mouth 1 (One) Time Per Week. Taking 9 per week), Disp: , Rfl:     Omega-3 Fatty Acids (fish oil) 1000 MG capsule capsule, Take 2 capsules by mouth Daily With Breakfast., Disp: , Rfl:     simvastatin (ZOCOR) 20 MG tablet, Take 1 tablet by mouth Every Evening., Disp: 90 tablet, Rfl: 3    " TURMERIC PO, Take  by mouth., Disp: , Rfl:     vitamin B-6 (PYRIDOXINE) 50 MG tablet, Take 2 tablets by mouth Daily., Disp: , Rfl:     Xiidra 5 % ophthalmic solution, , Disp: , Rfl:     zoledronic acid (Reclast) 5 MG/100ML solution infusion, Infuse 100 mL into a venous catheter 1 (One) Time., Disp: , Rfl:         Physical Exam:     Vitals:    02/06/25 1258   Weight: 64 kg (141 lb)   Height: 163.2 cm (64.25\")   PainSc:   2   PainLoc: Back              General: Alert and oriented, No acute distress.   HEENT: Normocephalic, atraumatic.   Cardiovascular: No gross edema  Respiratory: Respirations are non-labored    Lumbar Spine:   No masses or atrophy  Range of motion - Flexion normal. Extension normal. Right Lat Bending normal. Left Lat Bending normal  Facet Loading: Positive right  Facet Palpation - positive right  PSIS tenderness -positive right  Son's/DANIELA/Thigh thrust -positive right  Straight leg raise: Negative bilaterally  Slump test: Negative bilaterally    Motor Exam:  Strength: Rate on 1-5 scale Right Left    L1/2- hip flexion 5 5    L3- knee extension 5 5    L4- ankle dorsiflexion 5 5    L5- great toe extension 5 5    S1- ankle plantarflexion 5 5    Sensory Exam: Full and equal sensation to light touch throughout.  Neurologic: Cranial Nerves II-XII are grossly intact.   Clonus -negative bilaterally  Psychiatric: Cooperative.   Gait: Normal   Assistive Devices: None    Imaging Studies:   Results for orders placed during the hospital encounter of 02/03/23    MRI Lumbar Spine Without Contrast    Narrative  MRI LUMBAR SPINE WO CONTRAST    Date of Exam: 2/3/2023 4:11 PM EST    Indication: Low back pain, symptoms persist with > 6 wks treatment.    Comparison: Lumbar spine radiographs 1/24/2023    Technique:  Routine multiplanar/multisequence sequence images of the lumbar spine were obtained without contrast administration.    Findings:  There are 5 lumbar-type vertebral bodies. There is similar smooth " levoconvex curvature of the lower lumbar spine centered on L4. No significant spondylolisthesis. The bone marrow signal intensity is within normal limits without focal or suspicious bony  lesion or evidence of bone marrow edema. No acute fracture. The vertebral body heights are preserved. There are multilevel degenerative changes with level by level assessment as follows:    T12-L1: Normal.    L1-L2: Normal.    L2-L3: Moderate degenerative disc disease with small left eccentric circumferential disc bulge. Mild spinal canal stenosis. No significant neuroforaminal stenosis.    L3-L4: Moderate degenerative disc disease with circumferential disc bulge. Moderate to severe bilateral facet arthropathy and thickening of the ligamentum flavum. Moderate to severe spinal canal stenosis. Mild bilateral neuroforaminal stenosis.    L4-L5: Moderate to severe degenerative disc disease with circumferential disc bulge and moderate right and mild left facet arthropathy and mild thickening of the ligamentum flavum. Mild to moderate spinal canal stenosis. Severe right-sided neuroforaminal  stenosis.    L5-S1: Moderate degenerative disc disease with trace posterior disc bulge. No significant facet arthropathy. No significant spinal canal stenosis. No significant neuroforaminal stenosis.    Normal morphology and signal intensity of the cauda equina and conus medullaris. The conus terminates at the superior endplate of L1. The paravertebral soft tissues are unremarkable. Grossly unremarkable appearance of the partially imaged portions of the  posterior abdomen and pelvis.    Impression  Impression:  Moderate to severe degenerative disc disease and facet arthropathy, worse in the mid and lower lumbar spine contribute into moderate to severe spinal canal stenosis at L3-L4 and mild to moderate spinal canal stenosis at L4-L5. There is severe right-sided  neuroforaminal stenosis at L4-L5. Additional findings above.    Electronically Signed:  Shane Beckham  2/3/2023 5:48 PM EST  Workstation ID: [id number]      Impression & Plan:   2/6/2023: Carli Bolanos is a 70 y.o. female with past medical history significant for osteoarthritis who presents to the pain clinic for evaluation and treatment of chronic right-sided low back pain.  I personally reviewed the patient's lumbar MRI which demonstrates multilevel degenerative disc disease with L3/4 canal stenosis, bilateral neuroforaminal stenosis at L4/5, facet arthropathy worst at right L4/5 with juxtafacet cyst encroaching on the neuroforamen.    On clinical exam her pain is more consistent with SI versus facet pathology.  We will first trial right diagnostic and therapeutic sacroiliac joint injection.  Additionally we discussed right L4/L5/S1 medial branch blocks in the event her SIJ injection is not diagnostic.   3/29/2023: No benefit from right SIJ.  We will proceed with right L4/L5/S1 MBB.  6/28/2023: Good ongoing benefit after rhizotomy.  Can repeat as needed every 6 months.  1/15/2024: No benefit from repeat rhizotomy.  Will schedule lumbar TFESI.  Start Flexeril 10 mg.  Can consider referral to neurosurgery if minimal or transient benefit.  3/5/2024: Good benefit from TFESI.  Will trial gabapentin for neuropathy-like complaint in her right foot.  3/27/2024: Modest benefit from TFESI.  Will refill gabapentin.  NSA referral for consideration of SCS versus surgery.  Will obtain psych clearance for SCS trial  6/27/2024: Will increase gabapentin to 300 mg.  Continue conservative measures for now.  Can consider SCS trial versus surgery if symptoms worsen.  8/14/2024: Return of right back and radicular symptoms.  Will plan for repeat right TFESI.   11/6/2024: Good relief from TFESI.  Can consider repeat.  Will increase gabapentin to 600 mg nightly  2/6/2025: Good relief from TFESI ongoing.  Good relief from gabapentin.  Will refill gabapentin.    1. Lumbar radiculopathy    2. Numbness of right foot    3.  Spondylosis of lumbar region without myelopathy or radiculopathy    4. Sacroiliitis                      PLAN:  1. Medication Recommendations: Recommend Voltaren topical, NSAIDs, Tylenol.  Can trial turmeric 500 mg twice daily if NSAID contraindicated.    -Continue Flexeril  -Refill gabapentin  -gabapentin 300 mg 2 tablets nightly, 60 pills, #4 refills.   As part of this patient's treatment plan, patient will be prescribed controlled substances.  The patient has been made aware of appropriate use of such medications, including potential risk of somnolent, limited ability to drive and/or work safely, and potential for dependence or overdose.  He has been made clear that his medications refused by this patient only, without concomitant use of alcohol or other substances as prescribed.  Controlled substance status of medication discussed with patient, discussed risk of medication including abuse potential and diversion potential and need to follow-up for reevaluation appointment in order to receive further refills.  Shabbir was reviewed and compliant.     2. Physical Therapy: [facility]    3. Psychological: Can consider psychiatric clearance from [facility] point behavioral for SCS trial    4. Complementary and alternative (CAM) Therapies:     5. Labs: Obtain updated compliance UDS    6. Imaging: None indicated    7. Interventions: Can consider repeat right L4/5 and L5/S1 transforaminal epidural steroid injection (60146, [id number]).  -Can consider SCS trial if minimal or transient benefit    8. Referrals:     9. Records requested:     10. Lifestyle goals:    Follow-up 5 months for medication management        [facility]  Jeni Alan PA-C

## 2025-02-07 RX ORDER — GABAPENTIN 300 MG/1
600 CAPSULE ORAL NIGHTLY
Qty: 60 CAPSULE | Refills: 4 | Status: SHIPPED | OUTPATIENT
Start: 2025-02-07

## 2025-02-18 ENCOUNTER — OFFICE VISIT (OUTPATIENT)
Dept: INTERNAL MEDICINE | Facility: CLINIC | Age: 70
End: 2025-02-18
Payer: MEDICARE

## 2025-02-18 VITALS
SYSTOLIC BLOOD PRESSURE: 150 MMHG | DIASTOLIC BLOOD PRESSURE: 90 MMHG | OXYGEN SATURATION: 99 % | HEART RATE: 69 BPM | WEIGHT: 140.4 LBS | BODY MASS INDEX: 23.97 KG/M2 | HEIGHT: 64 IN

## 2025-02-18 DIAGNOSIS — R73.01 IMPAIRED FASTING GLUCOSE: Primary | Chronic | ICD-10-CM

## 2025-02-18 DIAGNOSIS — I10 ESSENTIAL HYPERTENSION: Chronic | ICD-10-CM

## 2025-02-18 LAB
EXPIRATION DATE: NORMAL
HBA1C MFR BLD: 5.7 % (ref 4.5–5.7)
Lab: NORMAL

## 2025-02-18 PROCEDURE — 3044F HG A1C LEVEL LT 7.0%: CPT | Performed by: INTERNAL MEDICINE

## 2025-02-18 PROCEDURE — 3080F DIAST BP >= 90 MM HG: CPT | Performed by: INTERNAL MEDICINE

## 2025-02-18 PROCEDURE — 1126F AMNT PAIN NOTED NONE PRSNT: CPT | Performed by: INTERNAL MEDICINE

## 2025-02-18 PROCEDURE — 1159F MED LIST DOCD IN RCRD: CPT | Performed by: INTERNAL MEDICINE

## 2025-02-18 PROCEDURE — 99214 OFFICE O/P EST MOD 30 MIN: CPT | Performed by: INTERNAL MEDICINE

## 2025-02-18 PROCEDURE — 1160F RVW MEDS BY RX/DR IN RCRD: CPT | Performed by: INTERNAL MEDICINE

## 2025-02-18 PROCEDURE — 83036 HEMOGLOBIN GLYCOSYLATED A1C: CPT | Performed by: INTERNAL MEDICINE

## 2025-02-18 PROCEDURE — 3077F SYST BP >= 140 MM HG: CPT | Performed by: INTERNAL MEDICINE

## 2025-02-27 ENCOUNTER — LAB (OUTPATIENT)
Dept: LAB | Facility: HOSPITAL | Age: 70
End: 2025-02-27
Payer: MEDICARE

## 2025-02-27 DIAGNOSIS — Z51.81 ENCOUNTER FOR THERAPEUTIC DRUG LEVEL MONITORING: ICD-10-CM

## 2025-02-27 PROCEDURE — 80307 DRUG TEST PRSMV CHEM ANLYZR: CPT

## 2025-03-04 ENCOUNTER — PATIENT MESSAGE (OUTPATIENT)
Dept: PAIN MEDICINE | Facility: CLINIC | Age: 70
End: 2025-03-04
Payer: MEDICARE

## 2025-03-04 DIAGNOSIS — M54.16 LUMBAR RADICULOPATHY: Primary | ICD-10-CM

## 2025-03-04 DIAGNOSIS — R20.0 NUMBNESS OF RIGHT FOOT: ICD-10-CM

## 2025-03-10 ENCOUNTER — TELEPHONE (OUTPATIENT)
Dept: PAIN MEDICINE | Facility: CLINIC | Age: 70
End: 2025-03-10
Payer: MEDICARE

## 2025-03-10 RX ORDER — GABAPENTIN 100 MG/1
100 CAPSULE ORAL NIGHTLY
Qty: 30 CAPSULE | OUTPATIENT
Start: 2025-03-10

## 2025-03-10 NOTE — TELEPHONE ENCOUNTER
Caller: Carli Bolanos    Relationship to patient: Self    Best call back number: [phone] (home)     Chief complaint: BACK     Type of visit: INJECTION

## 2025-03-12 ENCOUNTER — PATIENT MESSAGE (OUTPATIENT)
Dept: INTERNAL MEDICINE | Facility: CLINIC | Age: 70
End: 2025-03-12
Payer: MEDICARE

## 2025-03-18 ENCOUNTER — OUTSIDE FACILITY SERVICE (OUTPATIENT)
Dept: PAIN MEDICINE | Facility: CLINIC | Age: 70
End: 2025-03-18
Payer: MEDICARE

## 2025-03-18 ENCOUNTER — DOCUMENTATION (OUTPATIENT)
Dept: PAIN MEDICINE | Facility: CLINIC | Age: 70
End: 2025-03-18

## 2025-03-18 PROCEDURE — 64484 NJX AA&/STRD TFRM EPI L/S EA: CPT | Performed by: STUDENT IN AN ORGANIZED HEALTH CARE EDUCATION/TRAINING PROGRAM

## 2025-03-18 PROCEDURE — 64483 NJX AA&/STRD TFRM EPI L/S 1: CPT | Performed by: STUDENT IN AN ORGANIZED HEALTH CARE EDUCATION/TRAINING PROGRAM

## 2025-03-18 NOTE — PROGRESS NOTES
[facility]  [address] [place] [address]      PROCEDURE: Fluoroscopically-guided  Lumbar Transforaminal Epidural Steroid Injection - RIGHT L4/L5 and L5/S1    PRE-OP DIAGNOSIS: Lumbar radiculopathy  POST-OP DIAGNOSIS: Lumbar radiculopathy    BLOOD THINNERS (ANTIPLATELETS/ANTICOAGULANTS): Were discussed with the patient and KOTA Guidelines were followed.     CONSENT: Risks, benefits and options were explained to the patient, all questions were answered and written informed consent was obtained.     ANESTHESIA: Local only    PROCEDURE NOTE: A pre-procedural time out was performed to confirm the correct patient, procedure, side, and site. Standard ASA monitors were applied and oxygen via nasal cannula was provided. All proceduralists donned sterile gloves with masks and surgical hats. The patient was placed prone with pillow under the abdomen and all pressure points padded. The patient's lumbar spine was prepped in standard fashion using Chlorhexidine and draped with sterile towels. The target neuroforamen was identified using oblique fluoroscopy and the superior vertebral body endplate squared. The overlying skin and subcutaneous tissue was anesthetized with 1% lidocaine. A 22 gauge 5 inch spinal needle with bent tip was incrementally advanced using intermittent fluoroscopy to the 6 o'clock position of the target pedicle in the mid-neuroforamen using oblique, AP and lateral intermittent fluoroscopy. After negative aspiration of blood and cerebrospinal fluid, needle placement was confirmed with 1 ml of omnipaque 180 mgI/ml contrast using AP fluoroscopic imaging. Imaging revealed a clear outline of the target spinal nerve with proximal spread of agent through the neuroforamen medially to the epidural space, without evidence of intravascular or intrathecal spread. After negative aspiration, a mixture containing dexamethasone 5 mg steroid and lidocaine 1% - 1 ml local  anesthetic for a total volume of 1.5 ml was injected under direct visualization with fluoroscopy. The needle was flushed, removed and a bandage applied. The same procedure utilizing the same technique was performed at each target level listed above.    EBL: None     COMPLICATIONS: None     The patient was monitored in recovery area until all discharge criteria were met. Vital signs remained stable throughout the procedure and in the recovery area. There were no immediate complications and the patient tolerated the procedure well. Sensory and motor exam was unchanged from baseline. The patient received written discharge instructions prior to discharge.     FOLLOW UP: As scheduled     ADDITIONAL NOTES: []        [facility]       Esa Calle MD     CODES:  [id number]  [id number]

## 2025-04-14 RX ORDER — CYCLOBENZAPRINE HCL 10 MG
10 TABLET ORAL DAILY
Qty: 30 TABLET | Refills: 2 | Status: SHIPPED | OUTPATIENT
Start: 2025-04-14

## 2025-07-14 ENCOUNTER — OFFICE VISIT (OUTPATIENT)
Dept: PAIN MEDICINE | Facility: CLINIC | Age: 70
End: 2025-07-14
Payer: MEDICARE

## 2025-07-14 VITALS — WEIGHT: 140 LBS | HEIGHT: 64 IN | BODY MASS INDEX: 23.9 KG/M2

## 2025-07-14 DIAGNOSIS — M54.16 LUMBAR RADICULOPATHY: Primary | ICD-10-CM

## 2025-07-14 DIAGNOSIS — R20.0 NUMBNESS OF RIGHT FOOT: ICD-10-CM

## 2025-07-14 DIAGNOSIS — M47.816 SPONDYLOSIS OF LUMBAR REGION WITHOUT MYELOPATHY OR RADICULOPATHY: ICD-10-CM

## 2025-07-14 PROCEDURE — 1125F AMNT PAIN NOTED PAIN PRSNT: CPT

## 2025-07-14 PROCEDURE — 1159F MED LIST DOCD IN RCRD: CPT

## 2025-07-14 PROCEDURE — 1160F RVW MEDS BY RX/DR IN RCRD: CPT

## 2025-07-14 PROCEDURE — G2211 COMPLEX E/M VISIT ADD ON: HCPCS

## 2025-07-14 PROCEDURE — 99214 OFFICE O/P EST MOD 30 MIN: CPT

## 2025-07-14 RX ORDER — CELECOXIB 100 MG/1
100 CAPSULE ORAL 2 TIMES DAILY
COMMUNITY
Start: 2025-05-01

## 2025-07-14 RX ORDER — GABAPENTIN 300 MG/1
600 CAPSULE ORAL NIGHTLY
Qty: 60 CAPSULE | Refills: 4 | Status: SHIPPED | OUTPATIENT
Start: 2025-07-14

## 2025-07-14 NOTE — PROGRESS NOTES
Primary Physician: Rosa M Venegas MD    CHIEF COMPLAINT or REASON FOR VISIT: Back Pain and Follow-up      Initial HPI 2/6/2023:  Ms. Carli Bolanos is 70 y.o. female who presents as a new patient referral for evaluation and treatment of chronic right-sided low back pain with radiation into the buttock.  Patient states that she has had this pain since November 2022.  She has engaged in physical therapy, continues to perform the exercises that she was instructed.  Patient denies any bowel or bladder dysfunction, lower extremity weakness, new onset saddle anesthesia or unexplained weight loss.     Pain is exacerbated with prolonged standing, ameliorated with sitting and rest.  Not associated with ambulation.  Ameliorated with Tylenol.  She did have lumbar and sacroiliac joint x-rays, lumbar MRI demonstrating degenerative disc disease and facet arthropathy.  She denies any pain in her legs beyond the buttock.    Interval history:   Patient returns to clinic today for medication management.  Additionally, she did undergo a repeat right-sided transforaminal epidural steroid injection.  She did not notice any particular relief from this procedure.  Her symptoms are improved at the moment.  She is taking gabapentin during the evening with good relief.  She oftentimes takes 2 to 300 mg tablets with good relief.  Morning dose does cause some grogginess.  Overall, her symptoms are improved at the moment.  Continues to have some back pain with right lower extremity numbness and tingling.  We have discussed spinal cord stimulator trials in the past.  She is not interested in this procedure.      Interventions:  3/7/2023: Right SIJ with minimal transient benefit.  4/25/2023: Right L4/5 and L5/S1  MBB with 100% relief 1 day  5/16/2023: Right L4/5 and L5/S1  MBB with 80% relief for several hours  5/30/2023: Right L4/5 and L5/S1  RFA with 70% relief for 4 months  12/7/2023: Right L4/5 and L5/S1 RFA with 0% benefit  1/24/2024:  Right L4/5 and L5/S1 TFESI with 85% relief for 6 months  8/20/2024: Repeat right L4/5 and L5/S1 TFESI with 85% relief   3/18/2025: Repeat right L4/5 and L5/S1 TFESI with minimal relief    Objective Pain Scoring:   BRIEF PAIN INVENTORY:  Total score:   Pain Score    07/14/25 1218   PainSc: 2    PainLoc: Back              PHQ-2:    PHQ-9:    Opioid Risk Tool:         Review of Systems:   ROS negative except as otherwise noted     Past Medical History:   Past Medical History:   Diagnosis Date    Abnormal thyroid ultrasound 07/05/2017    stable MNG (7/5/17) by u/s    Abnormal thyroid ultrasound 06/12/2015    stable MNG (6/12/15)    Abnormal thyroid ultrasound 04/18/2014    thyr u/s (4/18/14): MNG except dominant complex mass mid L. lobe, repeat u/s in 6 mos    Allergic don't know    Naproxin, codeine    Cataract     Central corneal ulcer of left eye     Central corneal ulcer of left eye 11/18/2022    Endometrial cancer     Goiter 2018    H/O uterine leiomyoma     s/p DUSTY/USO ('13)    Hx of bone density study 06/08/2015    DEXA (6/15) L -0.4, H0.7    Hx of bone density study 07/17/2018    nl DEXA (7/17/18): L -0.4, H 0.2, repeat 3 yrs    Hx of bone density study 09/27/2021    DEXA (9/27/21): L 0.6, A -1.8; per Dr. Giles/LEONOR Villa, APRN - repeat 2-3 yrs    Hx of bone density study 05/20/2024    DEXA (5/20/24 ) H -3.1, repeat 1 yr    Hx of colonoscopy 03/11/2011    colonosc (3/11/11): diverticulosis, sm int hem, repeat 2018; CRS Juani Thornton    Hx of colonoscopy 10/05/2018    colonosc (10/5/18): diverticulosis, redundant colon, int hem, repeat 5 yrs due to h/o polyps; [facility] Thornton    Hx of colonoscopy 10/18/2023    colonosc (10/18/23): tubular adenoma, diverticulosis, int hem, repeat 5 yrs; CRS - Dr. Thornton    Hx of CT scan of head 02/27/2016    neg CT head    Hx of EMG/NCV 02/27/2008    EMG/NCV (2/27/08): right median neuropathy    Hx of hand x-ray 01/24/2018    R. hand xray (1/24/18): degen changes 1st metacarpal  joint and index PIP    Hx of MRI L-spine 02/03/2023    MRI L-spine (2/3/23): mod DDD with mod-severe L3-4 spinal stenosis and mild bilat NFS, mild-mod L4-5 spinal stenosis with severe R NFS    Hypertension 2005    Hyperthyroidism     Joint pain     RA    Low back pain 2022    Osteoarthritis     Osteopenia     Rheumatoid arthritis 2018    Thyroid nodule 2018    Visual impairment          Past Surgical History:   Past Surgical History:   Procedure Laterality Date    APPENDECTOMY  1980    taken out at gallbladder surgery    CARPAL TUNNEL RELEASE Left     carpel tunnel left     CERVICAL POLYPECTOMY  03/2005    with h/o fibroids and DUB    CHOLECYSTECTOMY  1980    COLONOSCOPY  2023    DILATATION AND CURETTAGE  02/22/2013    EXCISION MASS HEAD/NECK Right 03/05/2013    s/p excision scalp lesion large R. parietal and L. temporal areas; plastics - Dr. Amelia Gann    HAND SURGERY  2016    carpal tunnel    HIP ARTHROSCOPY Right 2004    with subsequent Ivory resurfacing with hip replacement (5/11)    HYSTERECTOMY      JOINT REPLACEMENT  2007. 2011    both hips    ORTHOPEDIC SURGERY      SALPINGO OOPHORECTOMY Left 1991    secondary to tubal pregnancy and ovarian cyst    SALPINGO OOPHORECTOMY Right     secondary to tubal pregnancy    TOTAL HIP ARTHROPLASTY Left 06/2011     ortho Dr. Mcdermott    TOTAL HIP ARTHROPLASTY Right 05/2011    TOTAL LAPAROSCOPIC HYSTERECTOMY WITH DAVINCI ROBOT  03/05/2013    RTLH/BSO with LND and omental biopsy for endometrial cancer; GYN Onc - Dr. Malin    US GUIDED FINE NEEDLE ASPIRATION  05/11/2021         Family History   Family History   Problem Relation Age of Onset    Dementia Mother     Cancer Mother         GYN    Hip fracture Mother     Thyroid disease Mother     Hypertension Mother     Cervical cancer Mother     Diabetes Mother     Heart disease Mother     Heart attack Father     Heart failure Father     Diabetes Father     Heart disease Father         heart failure    Prostate cancer  Father     Hypertension Father     Hypertension Brother     Heart disease Brother     Kidney cancer Maternal Grandmother     Arthritis Maternal Grandmother     Cancer Maternal Grandmother         Kidney Cancer    Hypertension Maternal Grandmother     Colon cancer Maternal Grandfather 70    Cancer Maternal Grandfather         Colon cancer    Heart failure Paternal Grandmother     Heart disease Paternal Grandmother     Arthritis Paternal Grandmother     Hypertension Paternal Grandmother     Diabetes Paternal Grandfather     Colon cancer Paternal Uncle 78    Arthritis Maternal Aunt     Arthritis Maternal Uncle     Breast cancer Neg Hx     Ovarian cancer Neg Hx          Social History   Social History     Socioeconomic History    Marital status:    Tobacco Use    Smoking status: Never     Passive exposure: Never    Smokeless tobacco: Never   Vaping Use    Vaping status: Never Used   Substance and Sexual Activity    Alcohol use: Yes     Alcohol/week: 2.0 standard drinks of alcohol     Types: 1 Glasses of wine, 1 Cans of beer per week     Comment: social    Drug use: Never    Sexual activity: Yes     Partners: Male     Birth control/protection: Hysterectomy        Medications:     Current Outpatient Medications:     amLODIPine (NORVASC) 5 MG tablet, Take 1 tablet by mouth Daily., Disp: 90 tablet, Rfl: 3    Calcium-Cholecalciferol 200-6.25 MG-MCG tablet, Take  by mouth Daily., Disp: , Rfl:     celecoxib (CeleBREX) 100 MG capsule, Take 1 capsule by mouth 2 (Two) Times a Day., Disp: , Rfl:     clotrimazole-betamethasone (LOTRISONE) 1-0.05 % cream, Apply 1 Application topically to the appropriate area as directed., Disp: , Rfl:     cyclobenzaprine (FLEXERIL) 10 MG tablet, TAKE 1 TABLET BY MOUTH DAILY, Disp: 30 tablet, Rfl: 2    folic acid (FOLVITE) 1 MG tablet, Take 4 tablets by mouth Daily. (Patient taking differently: Take 5 tablets by mouth Daily.), Disp: , Rfl:     gabapentin (NEURONTIN) 300 MG capsule, Take 2  "capsules by mouth Every Night., Disp: 60 capsule, Rfl: 4    hydroxychloroquine (PLAQUENIL) 200 MG tablet, Take 1 tablet by mouth. Takes one and a half po daily, Disp: , Rfl:     inFLIXimab (REMICADE) 100 MG injection, Infuse  into a venous catheter 1 (One) Time., Disp: , Rfl:     methotrexate 2.5 MG tablet, Take 8 tablets by mouth 1 (One) Time Per Week. (Patient taking differently: Take 8 tablets by mouth 1 (One) Time Per Week. Taking 9 per week), Disp: , Rfl:     Omega-3 Fatty Acids (fish oil) 1000 MG capsule capsule, Take 2 capsules by mouth Daily With Breakfast., Disp: , Rfl:     simvastatin (ZOCOR) 20 MG tablet, Take 1 tablet by mouth Every Evening., Disp: 90 tablet, Rfl: 3    TURMERIC PO, Take  by mouth., Disp: , Rfl:     vitamin B-6 (PYRIDOXINE) 50 MG tablet, Take 2 tablets by mouth Daily., Disp: , Rfl:     Xiidra 5 % ophthalmic solution, , Disp: , Rfl:     zoledronic acid (Reclast) 5 MG/100ML solution infusion, Infuse 100 mL into a venous catheter 1 (One) Time., Disp: , Rfl:         Physical Exam:     Vitals:    07/14/25 1218   Weight: 63.5 kg (140 lb)   Height: 163.2 cm (64.25\")   PainSc: 2    PainLoc: Back                General: Alert and oriented, No acute distress.   HEENT: Normocephalic, atraumatic.   Cardiovascular: No gross edema  Respiratory: Respirations are non-labored    Lumbar Spine:   No masses or atrophy  Range of motion - Flexion normal. Extension normal. Right Lat Bending normal. Left Lat Bending normal  Facet Loading: Positive right  Facet Palpation - positive right  PSIS tenderness -positive right  Son's/DANIELA/Thigh thrust -positive right  Straight leg raise: Negative bilaterally  Slump test: Negative bilaterally    Motor Exam:  Strength: Rate on 1-5 scale Right Left    L1/2- hip flexion 5 5    L3- knee extension 5 5    L4- ankle dorsiflexion 5 5    L5- great toe extension 5 5    S1- ankle plantarflexion 5 5    Sensory Exam: Full and equal sensation to light touch " throughout.  Neurologic: Cranial Nerves II-XII are grossly intact.   Clonus -negative bilaterally  Psychiatric: Cooperative.   Gait: Normal   Assistive Devices: None    Imaging Studies:   Results for orders placed during the hospital encounter of 02/03/23    MRI Lumbar Spine Without Contrast    Narrative  MRI LUMBAR SPINE WO CONTRAST    Date of Exam: 2/3/2023 4:11 PM EST    Indication: Low back pain, symptoms persist with > 6 wks treatment.    Comparison: Lumbar spine radiographs 1/24/2023    Technique:  Routine multiplanar/multisequence sequence images of the lumbar spine were obtained without contrast administration.    Findings:  There are 5 lumbar-type vertebral bodies. There is similar smooth levoconvex curvature of the lower lumbar spine centered on L4. No significant spondylolisthesis. The bone marrow signal intensity is within normal limits without focal or suspicious bony  lesion or evidence of bone marrow edema. No acute fracture. The vertebral body heights are preserved. There are multilevel degenerative changes with level by level assessment as follows:    T12-L1: Normal.    L1-L2: Normal.    L2-L3: Moderate degenerative disc disease with small left eccentric circumferential disc bulge. Mild spinal canal stenosis. No significant neuroforaminal stenosis.    L3-L4: Moderate degenerative disc disease with circumferential disc bulge. Moderate to severe bilateral facet arthropathy and thickening of the ligamentum flavum. Moderate to severe spinal canal stenosis. Mild bilateral neuroforaminal stenosis.    L4-L5: Moderate to severe degenerative disc disease with circumferential disc bulge and moderate right and mild left facet arthropathy and mild thickening of the ligamentum flavum. Mild to moderate spinal canal stenosis. Severe right-sided neuroforaminal  stenosis.    L5-S1: Moderate degenerative disc disease with trace posterior disc bulge. No significant facet arthropathy. No significant spinal canal  stenosis. No significant neuroforaminal stenosis.    Normal morphology and signal intensity of the cauda equina and conus medullaris. The conus terminates at the superior endplate of L1. The paravertebral soft tissues are unremarkable. Grossly unremarkable appearance of the partially imaged portions of the  posterior abdomen and pelvis.    Impression  Impression:  Moderate to severe degenerative disc disease and facet arthropathy, worse in the mid and lower lumbar spine contribute into moderate to severe spinal canal stenosis at L3-L4 and mild to moderate spinal canal stenosis at L4-L5. There is severe right-sided  neuroforaminal stenosis at L4-L5. Additional findings above.    Electronically Signed: Shane Beckham  2/3/2023 5:48 PM EST  Workstation ID: [id number]      Impression & Plan:   2/6/2023: Carli Bolanos is a 70 y.o. female with past medical history significant for osteoarthritis who presents to the pain clinic for evaluation and treatment of chronic right-sided low back pain.  I personally reviewed the patient's lumbar MRI which demonstrates multilevel degenerative disc disease with L3/4 canal stenosis, bilateral neuroforaminal stenosis at L4/5, facet arthropathy worst at right L4/5 with juxtafacet cyst encroaching on the neuroforamen.    On clinical exam her pain is more consistent with SI versus facet pathology.  We will first trial right diagnostic and therapeutic sacroiliac joint injection.  Additionally we discussed right L4/L5/S1 medial branch blocks in the event her SIJ injection is not diagnostic.   3/29/2023: No benefit from right SIJ.  We will proceed with right L4/L5/S1 MBB.  6/28/2023: Good ongoing benefit after rhizotomy.  Can repeat as needed every 6 months.  1/15/2024: No benefit from repeat rhizotomy.  Will schedule lumbar TFESI.  Start Flexeril 10 mg.  Can consider referral to neurosurgery if minimal or transient benefit.  3/5/2024: Good benefit from TFESI.  Will trial gabapentin for  neuropathy-like complaint in her right foot.  3/27/2024: Modest benefit from TFESI.  Will refill gabapentin.  NSA referral for consideration of SCS versus surgery.  Will obtain psych clearance for SCS trial  6/27/2024: Will increase gabapentin to 300 mg.  Continue conservative measures for now.  Can consider SCS trial versus surgery if symptoms worsen.  8/14/2024: Return of right back and radicular symptoms.  Will plan for repeat right TFESI.   11/6/2024: Good relief from TFESI.  Can consider repeat.  Will increase gabapentin to 600 mg nightly  2/6/2025: Good relief from TFESI ongoing.  Good relief from gabapentin.  Will refill gabapentin.  7/14/2025: Refill gabapentin.  Minimal relief from right TFESI.  Discussed SCS trial; patient not interested at the moment.    1. Lumbar radiculopathy    2. Numbness of right foot    3. Spondylosis of lumbar region without myelopathy or radiculopathy                        PLAN:  1. Medication Recommendations: Recommend Voltaren topical, NSAIDs, Tylenol.  Can trial turmeric 500 mg twice daily if NSAID contraindicated.    -Continue Flexeril  -Refill gabapentin  -gabapentin 300 mg 2 tablets nightly, 60 pills, #4 refills.   As part of this patient's treatment plan, patient will be prescribed controlled substances.  The patient has been made aware of appropriate use of such medications, including potential risk of somnolent, limited ability to drive and/or work safely, and potential for dependence or overdose.  He has been made clear that his medications refused by this patient only, without concomitant use of alcohol or other substances as prescribed.  Controlled substance status of medication discussed with patient, discussed risk of medication including abuse potential and diversion potential and need to follow-up for reevaluation appointment in order to receive further refills.  Shabbir was reviewed and compliant.     2. Physical Therapy: [facility]    3. Psychological: Can consider  psychiatric clearance from [facility] for SCS trial    4. Complementary and alternative (CAM) Therapies:     5. Labs: Compliant UDS    6. Imaging: None indicated    7. Interventions: Discussed spinal cord stimulator trial; patient not interested in this at the moment as her symptoms are well-controlled.    8. Referrals:     9. Records requested: Shabbir reviewed and compliant    10. Lifestyle goals:    Follow-up 5 months for medication management        [facility]  Jeni Alan PA-C

## 2025-07-15 ENCOUNTER — TRANSCRIBE ORDERS (OUTPATIENT)
Dept: INTERNAL MEDICINE | Facility: CLINIC | Age: 70
End: 2025-07-15
Payer: MEDICARE

## 2025-07-15 DIAGNOSIS — Z12.31 VISIT FOR SCREENING MAMMOGRAM: Primary | ICD-10-CM

## 2025-07-22 ENCOUNTER — OFFICE VISIT (OUTPATIENT)
Dept: GYNECOLOGIC ONCOLOGY | Facility: CLINIC | Age: 70
End: 2025-07-22
Payer: MEDICARE

## 2025-07-22 VITALS
BODY MASS INDEX: 24.01 KG/M2 | TEMPERATURE: 97.3 F | HEIGHT: 64 IN | WEIGHT: 140.6 LBS | SYSTOLIC BLOOD PRESSURE: 138 MMHG | RESPIRATION RATE: 16 BRPM | DIASTOLIC BLOOD PRESSURE: 73 MMHG | OXYGEN SATURATION: 98 % | HEART RATE: 54 BPM

## 2025-07-22 DIAGNOSIS — Z01.419 WELL WOMAN EXAM WITH ROUTINE GYNECOLOGICAL EXAM: Primary | ICD-10-CM

## 2025-07-22 DIAGNOSIS — Z85.42 HISTORY OF ENDOMETRIAL CANCER: ICD-10-CM

## 2025-07-22 NOTE — PROGRESS NOTES
GYN ONCOLOGY ANNUAL WELL WOMAN VISIT      Carli Bolanos  [id number]  1955      Subjective   Chief Complaint: Annual Exam       Answers submitted by the patient for this visit:  Chronic Condition Follow-up (Submitted on 2025)  Chief Complaint: PCP follow-up  Medication compliance: all of the time  Treatment barriers: no complaince problems  Exercise: daily      History of present illness:   Carli Bolanos is a 70 y.o. year old female who is here today for an annual exam. She has a history of stage IA grade 1 endometrial cancer as outlined below She completed treatment with surgery alone in  by Dr Malin, s/p RTLH/BSO, bilateral pelvic and periaortic LND, and omental biopsy. She reports she is feeling very well today and has no complaints. She denies vaginal bleeding, pelvic pain, changes in bowel or bladder function, new or concerning lesions, and breast problems. Well woman screenings listed below in health maintenance.  She is being followed closely by her rheumatologist for RA.  Dr. Venegas is PCP.    Cancer History:   Oncology/Hematology History   Endometrial/uterine adenocarcinoma   2013 Initial Diagnosis    D&C for PMB and abnormal uterine ultrasound. Pathology revealed well differentiated adenocarcinoma in the background of CAH     3/15/2013 Surgery    RTLH/RSO, bilateral pelvic and periaortic LND, and omental biopsy. Stage IA grade 1 by final path         Obstetric History:  OB History          2    Para   2    Term                AB        Living             SAB        IAB        Ectopic        Molar        Multiple        Live Births                   Menstrual History:     No LMP recorded. Patient is postmenopausal.          The current medication list and allergy list were reviewed and reconciled.     Past Medical History, Past Surgical History, Social History, Family History have been reviewed and are without significant changes except as mentioned.    Health  "Maintenance:  see EMR.  -Last colonoscopy 10- by Dr Thornton @[facility] with recommendation to repeat in 5 years  -Last mammogram 8-, birads1  -Last pap smear 6-, normal. No h/o abnormal paps.   -Last DEXA 5-, osteoporosis. Followed by specialist at       Review of Systems   Constitutional: Negative.    Gastrointestinal: Negative.    Genitourinary: Negative.    Psychiatric/Behavioral: Negative.           Objective   Physical Exam  Vital Signs: /73   Pulse 54   Temp 97.3 °F (36.3 °C) (Temporal)   Resp 16   Ht 163.2 cm (64.25\")   Wt 63.8 kg (140 lb 9.6 oz)   SpO2 98%   BMI 23.95 kg/m²   Vitals:    07/22/25 0907   PainSc: 0-No pain           General Appearance:  alert, cooperative, no apparent distress and appears stated age   Neurologic/Psych: A&O x 3, gait steady, appropriate affect   Lungs:   Clear to auscultation bilaterally; respirations regular, even, and unlabored bilaterally   Heart:  Regular rate and rhythm, no murmurs appreciated   Breasts:  Symmetrical, no masses, no lesions, and no nipple discharge   Abdomen:   Soft, non-tender, non-distended, and no organomegaly   Lymph nodes: No cervical, supraclavicular, inguinal or axillary adenopathy noted   Extremities: Normal, atraumatic; no clubbing, cyanosis, or edema    Skin: No rashes, ulcers, or suspicious lesions noted   Pelvic: External Genitalia  atrophic, without lesions  Vagina  is pale, atrophic. Narrow introitus, pediatric speculum used.   Vaginal Cuff  Female Vaginal Cuff: smooth, intact, and without visible lesions  Uterus  surgically absent and no palpable masses  Ovaries  surgically absent bilaterally and without palpable masses or fullness  Parametria  smooth  Rectovaginal exam deferred     ECOG score: 0                       Assessment and Plan:      -There is no evidence of disease upon today's exam. She is understanding to call with any changes in pelvic symptoms or general GYN concerns at any time between " regularly scheduled visits.   -She will continue her annual gynecologic exams here with me  -pap smears no longer indicated   -encouraged yearly mammograms.  Repeat is already scheduled for next month  -Repeat colonoscopy due 10/2028, or sooner if new problems. Dr Hillary MALDONADO due 2027, Dr Stout     Diagnoses and all orders for this visit:    1. Well woman exam with routine gynecological exam (Primary)    2. History of endometrial cancer        Pain assessment was performed today as a part of patient’s care. For patients with pain related to surgery, gynecologic malignancy or cancer treatment, the plan is as noted in the assessment/plan.  For patients with pain not related to these issues, they are to seek any further needed care from a more appropriate provider, such as PCP.    Follow-up:     Return to clinic in 1 year for Annual exam.      Electronically signed by GHADA Lopez on 07/22/2025

## 2025-08-01 DIAGNOSIS — E78.00 PURE HYPERCHOLESTEROLEMIA: Chronic | ICD-10-CM

## 2025-08-01 DIAGNOSIS — I10 ESSENTIAL HYPERTENSION: Chronic | ICD-10-CM

## 2025-08-01 RX ORDER — AMLODIPINE BESYLATE 5 MG/1
5 TABLET ORAL DAILY
Qty: 30 TABLET | Refills: 0 | Status: SHIPPED | OUTPATIENT
Start: 2025-08-01 | End: 2025-08-04

## 2025-08-01 RX ORDER — SIMVASTATIN 20 MG
20 TABLET ORAL EVERY EVENING
Qty: 30 TABLET | Refills: 0 | Status: SHIPPED | OUTPATIENT
Start: 2025-08-01 | End: 2025-08-04

## 2025-08-03 DIAGNOSIS — E78.00 PURE HYPERCHOLESTEROLEMIA: Chronic | ICD-10-CM

## 2025-08-03 DIAGNOSIS — I10 ESSENTIAL HYPERTENSION: Chronic | ICD-10-CM

## 2025-08-04 RX ORDER — SIMVASTATIN 20 MG
20 TABLET ORAL EVERY EVENING
Qty: 8 TABLET | Refills: 0 | Status: SHIPPED | OUTPATIENT
Start: 2025-08-04

## 2025-08-04 RX ORDER — AMLODIPINE BESYLATE 5 MG/1
5 TABLET ORAL DAILY
Qty: 8 TABLET | Refills: 0 | Status: SHIPPED | OUTPATIENT
Start: 2025-08-04

## 2025-08-13 LAB
NCCN CRITERIA FLAG: NORMAL
TYRER CUZICK SCORE: 5.3

## 2025-08-21 DIAGNOSIS — E78.00 PURE HYPERCHOLESTEROLEMIA: Chronic | ICD-10-CM

## 2025-08-21 DIAGNOSIS — Z00.00 MEDICARE ANNUAL WELLNESS VISIT, SUBSEQUENT: Primary | Chronic | ICD-10-CM

## 2025-08-21 DIAGNOSIS — E05.90 SUBCLINICAL HYPERTHYROIDISM: Chronic | ICD-10-CM

## 2025-08-21 DIAGNOSIS — R73.01 IMPAIRED FASTING GLUCOSE: Chronic | ICD-10-CM

## 2025-08-21 DIAGNOSIS — E55.9 VITAMIN D DEFICIENCY: Chronic | ICD-10-CM

## 2025-08-21 DIAGNOSIS — M05.79 RHEUMATOID ARTHRITIS INVOLVING MULTIPLE SITES WITH POSITIVE RHEUMATOID FACTOR: Chronic | ICD-10-CM

## 2025-08-28 ENCOUNTER — HOSPITAL ENCOUNTER (OUTPATIENT)
Dept: MAMMOGRAPHY | Facility: HOSPITAL | Age: 70
Discharge: HOME | End: 2025-08-28
Admitting: INTERNAL MEDICINE
Payer: MEDICARE

## 2025-08-28 DIAGNOSIS — Z12.31 VISIT FOR SCREENING MAMMOGRAM: ICD-10-CM

## 2025-08-28 PROCEDURE — 77067 SCR MAMMO BI INCL CAD: CPT

## 2025-08-28 PROCEDURE — 77063 BREAST TOMOSYNTHESIS BI: CPT
